# Patient Record
Sex: MALE | Race: WHITE | NOT HISPANIC OR LATINO | URBAN - METROPOLITAN AREA
[De-identification: names, ages, dates, MRNs, and addresses within clinical notes are randomized per-mention and may not be internally consistent; named-entity substitution may affect disease eponyms.]

---

## 2017-03-07 ENCOUNTER — OUTPATIENT (OUTPATIENT)
Dept: OUTPATIENT SERVICES | Facility: HOSPITAL | Age: 77
LOS: 1 days | Discharge: HOME | End: 2017-03-07

## 2017-06-27 DIAGNOSIS — C83.58 LYMPHOBLASTIC (DIFFUSE) LYMPHOMA, LYMPH NODES OF MULTIPLE SITES: ICD-10-CM

## 2017-10-13 ENCOUNTER — OUTPATIENT (OUTPATIENT)
Dept: OUTPATIENT SERVICES | Facility: HOSPITAL | Age: 77
LOS: 1 days | Discharge: HOME | End: 2017-10-13

## 2017-10-13 DIAGNOSIS — N40.0 BENIGN PROSTATIC HYPERPLASIA WITHOUT LOWER URINARY TRACT SYMPTOMS: ICD-10-CM

## 2017-10-13 DIAGNOSIS — F93.0 SEPARATION ANXIETY DISORDER OF CHILDHOOD: ICD-10-CM

## 2017-10-13 DIAGNOSIS — C83.58 LYMPHOBLASTIC (DIFFUSE) LYMPHOMA, LYMPH NODES OF MULTIPLE SITES: ICD-10-CM

## 2017-10-13 DIAGNOSIS — K21.9 GASTRO-ESOPHAGEAL REFLUX DISEASE WITHOUT ESOPHAGITIS: ICD-10-CM

## 2017-10-13 DIAGNOSIS — I95.9 HYPOTENSION, UNSPECIFIED: ICD-10-CM

## 2017-10-13 DIAGNOSIS — F41.9 ANXIETY DISORDER, UNSPECIFIED: ICD-10-CM

## 2017-10-13 DIAGNOSIS — E11.9 TYPE 2 DIABETES MELLITUS WITHOUT COMPLICATIONS: ICD-10-CM

## 2017-10-13 DIAGNOSIS — T67.5XXA HEAT EXHAUSTION, UNSPECIFIED, INITIAL ENCOUNTER: ICD-10-CM

## 2018-05-10 PROBLEM — Z00.00 ENCOUNTER FOR PREVENTIVE HEALTH EXAMINATION: Status: ACTIVE | Noted: 2018-05-10

## 2018-06-11 ENCOUNTER — APPOINTMENT (OUTPATIENT)
Dept: UROLOGY | Facility: CLINIC | Age: 78
End: 2018-06-11
Payer: MEDICARE

## 2018-06-11 VITALS
DIASTOLIC BLOOD PRESSURE: 82 MMHG | WEIGHT: 227 LBS | SYSTOLIC BLOOD PRESSURE: 112 MMHG | HEIGHT: 65 IN | BODY MASS INDEX: 37.82 KG/M2 | HEART RATE: 85 BPM

## 2018-06-11 DIAGNOSIS — F17.210 NICOTINE DEPENDENCE, CIGARETTES, UNCOMPLICATED: ICD-10-CM

## 2018-06-11 PROCEDURE — 99203 OFFICE O/P NEW LOW 30 MIN: CPT

## 2018-06-12 PROBLEM — F17.210 SMOKES CIGARETTES: Status: ACTIVE | Noted: 2018-06-11

## 2018-06-12 RX ORDER — UBIDECARENONE/VIT E ACET 100MG-5
25 MCG CAPSULE ORAL
Refills: 0 | Status: ACTIVE | COMMUNITY

## 2018-06-12 RX ORDER — VENLAFAXINE HYDROCHLORIDE 150 MG/1
150 CAPSULE, EXTENDED RELEASE ORAL
Refills: 0 | Status: ACTIVE | COMMUNITY

## 2018-06-12 RX ORDER — IRBESARTAN 300 MG/1
300 TABLET ORAL
Refills: 0 | Status: ACTIVE | COMMUNITY

## 2018-06-12 RX ORDER — METFORMIN HYDROCHLORIDE 500 MG/1
500 TABLET, COATED ORAL
Refills: 0 | Status: ACTIVE | COMMUNITY

## 2018-06-12 RX ORDER — FOLIC ACID 1 MG/1
1 TABLET ORAL
Refills: 0 | Status: ACTIVE | COMMUNITY

## 2018-06-12 RX ORDER — CLONAZEPAM 0.5 MG/1
0.5 TABLET ORAL
Refills: 0 | Status: ACTIVE | COMMUNITY

## 2018-06-12 RX ORDER — TAMSULOSIN HYDROCHLORIDE 0.4 MG/1
0.4 CAPSULE ORAL
Refills: 0 | Status: ACTIVE | COMMUNITY

## 2018-06-12 RX ORDER — LATANOPROST/PF 0.005 %
0.01 DROPS OPHTHALMIC (EYE)
Refills: 0 | Status: ACTIVE | COMMUNITY

## 2018-06-12 RX ORDER — RAMIPRIL 10 MG/1
10 CAPSULE ORAL
Refills: 0 | Status: ACTIVE | COMMUNITY

## 2018-06-12 RX ORDER — VENLAFAXINE HYDROCHLORIDE 75 MG/1
75 TABLET, EXTENDED RELEASE ORAL
Refills: 0 | Status: ACTIVE | COMMUNITY

## 2018-06-12 RX ORDER — TRAZODONE HYDROCHLORIDE 100 MG/1
100 TABLET ORAL
Refills: 0 | Status: ACTIVE | COMMUNITY

## 2018-06-12 RX ORDER — HYDROCHLOROTHIAZIDE 25 MG/1
25 TABLET ORAL
Refills: 0 | Status: ACTIVE | COMMUNITY

## 2018-06-12 RX ORDER — SIMVASTATIN 20 MG/1
20 TABLET, FILM COATED ORAL
Refills: 0 | Status: ACTIVE | COMMUNITY

## 2018-06-12 RX ORDER — ACETAMINOPHEN/DIPHENHYDRAMINE 500MG-25MG
1000 TABLET ORAL
Refills: 0 | Status: ACTIVE | COMMUNITY

## 2018-06-12 RX ORDER — VARENICLINE TARTRATE 0.5 (11)-1
0.5 MG X 11 & KIT ORAL
Refills: 0 | Status: COMPLETED | COMMUNITY

## 2018-06-12 NOTE — REASON FOR VISIT
[Initial Visit ___] : [unfilled] is here today for an initial visit  for [unfilled] [Family Member] : family member

## 2018-06-14 ENCOUNTER — OTHER (OUTPATIENT)
Age: 78
End: 2018-06-14

## 2018-06-25 ENCOUNTER — LABORATORY RESULT (OUTPATIENT)
Age: 78
End: 2018-06-25

## 2018-06-25 ENCOUNTER — OUTPATIENT (OUTPATIENT)
Dept: OUTPATIENT SERVICES | Facility: HOSPITAL | Age: 78
LOS: 1 days | Discharge: HOME | End: 2018-06-25

## 2018-06-25 DIAGNOSIS — E29.1 TESTICULAR HYPOFUNCTION: ICD-10-CM

## 2018-08-13 ENCOUNTER — APPOINTMENT (OUTPATIENT)
Dept: UROLOGY | Facility: CLINIC | Age: 78
End: 2018-08-13
Payer: MEDICARE

## 2018-08-13 VITALS
BODY MASS INDEX: 37.82 KG/M2 | WEIGHT: 227 LBS | DIASTOLIC BLOOD PRESSURE: 71 MMHG | HEIGHT: 65 IN | HEART RATE: 74 BPM | SYSTOLIC BLOOD PRESSURE: 108 MMHG

## 2018-08-13 LAB
BILIRUB UR QL STRIP: NORMAL
CLARITY UR: CLEAR
COLLECTION METHOD: NORMAL
GLUCOSE UR-MCNC: NORMAL
HCG UR QL: NORMAL EU/DL
HGB UR QL STRIP.AUTO: NORMAL
KETONES UR-MCNC: NORMAL
LEUKOCYTE ESTERASE UR QL STRIP: NORMAL
NITRITE UR QL STRIP: NORMAL
PH UR STRIP: 5
PROT UR STRIP-MCNC: NORMAL
SP GR UR STRIP: 1.01

## 2018-08-13 PROCEDURE — 51741 ELECTRO-UROFLOWMETRY FIRST: CPT

## 2018-08-13 PROCEDURE — 81003 URINALYSIS AUTO W/O SCOPE: CPT | Mod: QW

## 2018-08-13 PROCEDURE — 99214 OFFICE O/P EST MOD 30 MIN: CPT | Mod: 25

## 2018-08-13 PROCEDURE — 51798 US URINE CAPACITY MEASURE: CPT

## 2018-08-15 NOTE — ASSESSMENT
[FreeTextEntry1] : We discussed his history of microscopic hematuria. His most recent urine testing was negative for RBCs and CT urogram was negative for malignancy. His cytology did reveal atypical cells. The atypia / microhematuria may be secondary to his 6 mm stone.  At this time all options were reviewed and he elects to undergo serial cytologies. He will obtain one in 2 months from now.\par We discussed his stone and all the options available including ESWL etc... At this time he is electing for observation only. He understands the risks involved with this decision and that this stone could fall into the ureter at any moment and cause a surgical emergency.\par With regards to his hormone panel, his levels are good and we will continue to observe.\par We discussed his voiding symptoms and he is unhappy with the way he is voiding currently. He could not tolerate Tamsulosin and his uroflow from today demonstrates poor stream. His prostate is not terribly enlarged as per my physical exam and CT scan. We discussed minimally invasive prostate procedure but secondary to his comorbidities, he may not be a candidate. We did discus Urolift as an alternative option and he is interested. He will obtain a UDT to assess if he is a candidate and if so he will follow up with Dr. Alford for consultation.\par

## 2018-08-15 NOTE — LETTER HEADER
[FreeTextEntry3] : Henrry Byrd M.D.\par Director of Urology\par [facility]\par [address] [place] [address], [address]\[place] [address]

## 2018-08-15 NOTE — HISTORY OF PRESENT ILLNESS
[Urinary Frequency] : urinary frequency [Nocturia] : nocturia [Weak Stream] : weak stream [Urinary Urgency] : urinary urgency [Intermittency] : intermittency [FreeTextEntry1] : Zach is a 78 year old male, with a history of CLL and NHL, we have been following for microscopic hematuria and BPH with LUTS. Additionally, he had gynecomastia on physical examination and a hormone panel was ordered. He presents today for results of his CT urogram, uroflow, and blood work. He was managed on tamsulosin po qd however, he discontinued this medication 1 weeks ago, secondary to lightheadedness/dizziness. [Straining] : no straining [Dysuria] : no dysuria [Hematuria - Gross] : no gross hematuria

## 2018-08-15 NOTE — LETTER BODY
[Dear  ___] : Dear  [unfilled], [Please see my note below.] : Please see my note below. [Sincerely,] : Sincerely, [FreeTextEntry2] : Duncan Li MD\par [address]\[place] [address]\par

## 2018-08-15 NOTE — HISTORY OF PRESENT ILLNESS
[Urinary Urgency] : urinary urgency [Urinary Frequency] : urinary frequency [Nocturia] : nocturia [Weak Stream] : weak stream [Intermittency] : intermittency [FreeTextEntry1] : Zach is a relatively pleasant 78-year-old male who was found on routine testing to have microhematuria. He was therefore sent here for an evaluation. He is a  for some time, lives with his 33-year-old son and tells me as a totality his situation least medically hasn’t changed in the last year or two. He suffers from morbid obesity, he has a history of CLL as well as non-Hodgkin’s lymphoma. Urologically he has the microhematuria, and lower urinary tract symptoms with \par Intermittency – two \par Frequency – one\par Slow stream – one\par Urgency – two \par Incomplete emptying is – one \par A slow stream – one \par He does not strain\par Waking up at least twice per night and his quality of life is equal to a three. \par He waits until he gets a strong urge to void before he goes in with his decreased mobility that may be part of the problem.\par  [Straining] : no straining [Dysuria] : no dysuria [Hematuria - Gross] : no gross hematuria

## 2018-08-15 NOTE — LETTER BODY
[Dear  ___] : Dear  [unfilled], [Consult Letter:] : I had the pleasure of evaluating your patient, [unfilled]. [Consult Closing:] : Thank you very much for allowing me to participate in the care of this patient.  If you have any questions, please do not hesitate to contact me. [Sincerely,] : Sincerely, [FreeTextEntry2] : Duncan Li MD\par [address]\[place] [address]\par

## 2018-08-15 NOTE — PHYSICAL EXAM
[General Appearance - Well Developed] : well developed [Normal Appearance] : normal appearance [General Appearance - In No Acute Distress] : no acute distress [Bowel Sounds] : normal bowel sounds [Abdomen Soft] : soft [Abdomen Tenderness] : non-tender [Costovertebral Angle Tenderness] : no ~M costovertebral angle tenderness [Urinary Bladder Findings] : the bladder was normal on palpation [Edema] : no peripheral edema [] : no respiratory distress [Respiration, Rhythm And Depth] : normal respiratory rhythm and effort [Exaggerated Use Of Accessory Muscles For Inspiration] : no accessory muscle use [Auscultation Breath Sounds / Voice Sounds] : lungs were clear to auscultation bilaterally [Oriented To Time, Place, And Person] : oriented to person, place, and time [Affect] : the affect was normal [Mood] : the mood was normal [Not Anxious] : not anxious [Normal Station and Gait] : the gait and station were normal for the patient's age [No Focal Deficits] : no focal deficits [FreeTextEntry1] : B/L PVD

## 2018-08-15 NOTE — PHYSICAL EXAM
[General Appearance - Well Developed] : well developed [General Appearance - Well Nourished] : well nourished [Normal Appearance] : normal appearance [Well Groomed] : well groomed [General Appearance - In No Acute Distress] : no acute distress [] : no respiratory distress [Respiration, Rhythm And Depth] : normal respiratory rhythm and effort [Exaggerated Use Of Accessory Muscles For Inspiration] : no accessory muscle use [Auscultation Breath Sounds / Voice Sounds] : lungs were clear to auscultation bilaterally [Abdomen Soft] : soft [Abdomen Tenderness] : non-tender [Costovertebral Angle Tenderness] : no ~M costovertebral angle tenderness [Prostate Size ___ gm] : prostate size [unfilled] gm [Oriented To Time, Place, And Person] : oriented to person, place, and time [Affect] : the affect was normal [Mood] : the mood was normal [Not Anxious] : not anxious [FreeTextEntry1] : slow gait

## 2018-08-15 NOTE — ASSESSMENT
[FreeTextEntry1] : His examination showed a relatively small prostate perhaps 10 – 15 g without nodules. He had poor rectal tone and I could not elicit the BC reflex. He had mild edema the lower extremities with weak but present deep tendon reflexes. I thought he had a small right inguinal hernia I could not feel one on the left and he has a relatively large umbilical hernia which I could not fully reduce. He is morbidly obese with gynecomastia and testicles are smaller and softer than expected.\par \par He smokes about a pack a day has done so for as long as he can remember if not longer he doesn’t drink and he doesn’t do drugs.\par \par With respect to his general urologic issues some of this may just be his waiting to urinate until it is passed the point that he has to. His daytime urgency and frequency might be controlled if he goes before he gets the urge i.e. time voiding. His nighttime voiding may be related to mobilization of peripheral edema but the major concern is the hematuria and a man who has a strong smoking history and now has urgency.\par \par He will repeat the urine test today getting urinalysis, culture and cytology. He will send him for a CT urogram as he has a normal GFR. He will keep a record of his intake and output so I will see if any of this can be subject to behavior modification and when he comes back in we will consider a flow study. With respect to his hernias he will discuss this with Dr. Li and they will decide if he needs to see a general surgeon. With respect to his gynecomastia and his obesity he will need to get hormone values checked as if he is indeed hypogonadal secondary to hyper conversion it may help to get him to a normal testosterone level is that will help his metabolic syndrome. Whether we need to do that with testosterone or with aromatase inhibitors etc. remains to be seen. If he is indeed hypogonadal then a DEXA scan will be recommended.\par \par

## 2018-10-22 ENCOUNTER — APPOINTMENT (OUTPATIENT)
Dept: UROLOGY | Facility: CLINIC | Age: 78
End: 2018-10-22

## 2018-10-31 ENCOUNTER — EMERGENCY (EMERGENCY)
Facility: HOSPITAL | Age: 78
LOS: 1 days | Discharge: HOME | End: 2018-10-31
Attending: EMERGENCY MEDICINE

## 2018-10-31 VITALS
SYSTOLIC BLOOD PRESSURE: 104 MMHG | DIASTOLIC BLOOD PRESSURE: 61 MMHG | HEART RATE: 99 BPM | RESPIRATION RATE: 18 BRPM | OXYGEN SATURATION: 98 %

## 2018-10-31 VITALS
SYSTOLIC BLOOD PRESSURE: 118 MMHG | RESPIRATION RATE: 20 BRPM | HEART RATE: 114 BPM | OXYGEN SATURATION: 94 % | TEMPERATURE: 98 F | DIASTOLIC BLOOD PRESSURE: 60 MMHG
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
## 2018-10-31 DIAGNOSIS — S80.212A ABRASION, LEFT KNEE, INITIAL ENCOUNTER: ICD-10-CM

## 2018-10-31 DIAGNOSIS — S00.81XA ABRASION OF OTHER PART OF HEAD, INITIAL ENCOUNTER: ICD-10-CM

## 2018-10-31 DIAGNOSIS — S09.90XA UNSPECIFIED INJURY OF HEAD, INITIAL ENCOUNTER: ICD-10-CM
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
## 2018-10-31 DIAGNOSIS — Z23 ENCOUNTER FOR IMMUNIZATION: ICD-10-CM

## 2018-10-31 DIAGNOSIS — S80.211A ABRASION, RIGHT KNEE, INITIAL ENCOUNTER: ICD-10-CM

## 2018-10-31 DIAGNOSIS — E11.9 TYPE 2 DIABETES MELLITUS WITHOUT COMPLICATIONS: ICD-10-CM
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
## 2018-10-31 RX ORDER — TETANUS TOXOID, REDUCED DIPHTHERIA TOXOID AND ACELLULAR PERTUSSIS VACCINE, ADSORBED 5; 2.5; 8; 8; 2.5 [IU]/.5ML; [IU]/.5ML; UG/.5ML; UG/.5ML; UG/.5ML
0.5 SUSPENSION INTRAMUSCULAR ONCE
Qty: 0 | Refills: 0 | Status: COMPLETED | OUTPATIENT
Start: 2018-10-31 | End: 2018-10-31

## 2018-10-31 RX ADMIN — TETANUS TOXOID, REDUCED DIPHTHERIA TOXOID AND ACELLULAR PERTUSSIS VACCINE, ADSORBED 0.5 MILLILITER(S): 5; 2.5; 8; 8; 2.5 SUSPENSION INTRAMUSCULAR at 19:02

## 2018-10-31 NOTE — ED PROVIDER NOTE - PHYSICAL EXAMINATION
CONSTITUTIONAL: Well-developed; well-nourished; in no acute distress, nontoxic appearing  SKIN: abrasions to left forehead  HEAD: Normocephalic; atraumatic.  EYES: PERRL, 3 mm bilateral, no nystagmus, EOM intact; conjunctiva and sclera clear.  ENT: MMM, no nasal congestion oropharynx with only 3 present teeth but no new missing teeth or damaged mouth  NECK: Supple; non tender.  ROM intact.  CARD: S1, S2 normal, no murmur  RESP: No wheezes, rales or rhonchi. Good air movement bilaterally  ABD: soft; non-distended; non-tender. No Rebound, No guarding  EXT: Normal ROM. No cyanosis or edema. Dp Pulses intact.   NEURO: awake, alert, following commands, oriented, grossly unremarkable. No Focal deficits. GCS 15.   PSYCH: Cooperative, appropriate.

## 2018-10-31 NOTE — ED ADULT TRIAGE NOTE - CHIEF COMPLAINT QUOTE
patient found walking on side of road. states he tripped and fell into bush. no loc picked up by ems. states patient was AMS on scene patient awake alert in triage oriented. denies pain . mucous membranes dry patient unkempt .

## 2018-10-31 NOTE — ED PROVIDER NOTE - MEDICAL DECISION MAKING DETAILS
head injury with skin abrasions, knee abrasions, ct head showed no acute intracranial injury xrays of knee showed no new fx, tdap was given.

## 2018-10-31 NOTE — ED PROVIDER NOTE - NS ED ROS FT
Constitutional:  no fevers, no chills, no malaise  Eyes:  No visual changes  ENMT: No neck pain or stiffness, no nasal congestion, no ear pain, no throat pain  Cardiac:  No chest pain  Respiratory:  No cough or sob  GI:  No nausea, vomiting, diarrhea or abdominal pain.  :  No dysuria, frequency or burning.  MS:  No back pain, no joint pain.  Neuro:  No headache, no dizziness, no change in mental status  Skin:  +skin abrasion   Except as documented in the HPI,  all other systems are negative

## 2018-10-31 NOTE — ED ADULT NURSE NOTE - OBJECTIVE STATEMENT
Patient present to ED with complains of a fall sustained outside, and fell onto left side of face, denies LOC, not on blood thinner. No other injuries reported by patient.

## 2018-10-31 NOTE — ED PROVIDER NOTE - OBJECTIVE STATEMENT
77 yo male, baseline ambulatory with cane tripped and fall with abrasion to left forehead, he then got on bus and while getting of bus walked off stepped in some weeds and fell again to his knees, no loc, no vomiting no blood thinners.

## 2019-01-16 ENCOUNTER — INPATIENT (INPATIENT)
Facility: HOSPITAL | Age: 79
LOS: 1 days | Discharge: HOME | End: 2019-01-18
Attending: HOSPITALIST | Admitting: HOSPITALIST

## 2019-01-16 VITALS — HEIGHT: 66 IN | WEIGHT: 225.09 LBS

## 2019-01-16 DIAGNOSIS — Z96.653 PRESENCE OF ARTIFICIAL KNEE JOINT, BILATERAL: Chronic | ICD-10-CM

## 2019-01-16 PROBLEM — E11.9 TYPE 2 DIABETES MELLITUS WITHOUT COMPLICATIONS: Chronic | Status: ACTIVE | Noted: 2018-10-31

## 2019-01-16 PROBLEM — F41.9 ANXIETY DISORDER, UNSPECIFIED: Chronic | Status: ACTIVE | Noted: 2018-10-31

## 2019-01-16 LAB
ALBUMIN SERPL ELPH-MCNC: 3.8 G/DL — SIGNIFICANT CHANGE UP (ref 3.5–5.2)
ALP SERPL-CCNC: 87 U/L — SIGNIFICANT CHANGE UP (ref 30–115)
ALT FLD-CCNC: 10 U/L — SIGNIFICANT CHANGE UP (ref 0–41)
ANION GAP SERPL CALC-SCNC: 15 MMOL/L — HIGH (ref 7–14)
AST SERPL-CCNC: 17 U/L — SIGNIFICANT CHANGE UP (ref 0–41)
BASE EXCESS BLDV CALC-SCNC: 5.5 MMOL/L — HIGH (ref -2–2)
BASOPHILS # BLD AUTO: 0.02 K/UL — SIGNIFICANT CHANGE UP (ref 0–0.2)
BASOPHILS NFR BLD AUTO: 0.4 % — SIGNIFICANT CHANGE UP (ref 0–1)
BILIRUB SERPL-MCNC: 0.3 MG/DL — SIGNIFICANT CHANGE UP (ref 0.2–1.2)
BUN SERPL-MCNC: 14 MG/DL — SIGNIFICANT CHANGE UP (ref 10–20)
CA-I SERPL-SCNC: 1.26 MMOL/L — SIGNIFICANT CHANGE UP (ref 1.12–1.3)
CALCIUM SERPL-MCNC: 9.4 MG/DL — SIGNIFICANT CHANGE UP (ref 8.5–10.1)
CHLORIDE SERPL-SCNC: 94 MMOL/L — LOW (ref 98–110)
CO2 SERPL-SCNC: 28 MMOL/L — SIGNIFICANT CHANGE UP (ref 17–32)
CREAT SERPL-MCNC: 0.8 MG/DL — SIGNIFICANT CHANGE UP (ref 0.7–1.5)
EOSINOPHIL # BLD AUTO: 0.22 K/UL — SIGNIFICANT CHANGE UP (ref 0–0.7)
EOSINOPHIL NFR BLD AUTO: 4.5 % — SIGNIFICANT CHANGE UP (ref 0–8)
GAS PNL BLDV: 136 MMOL/L — SIGNIFICANT CHANGE UP (ref 136–145)
GAS PNL BLDV: SIGNIFICANT CHANGE UP
GLUCOSE BLDC GLUCOMTR-MCNC: 87 MG/DL — SIGNIFICANT CHANGE UP (ref 70–99)
GLUCOSE BLDC GLUCOMTR-MCNC: 97 MG/DL — SIGNIFICANT CHANGE UP (ref 70–99)
GLUCOSE SERPL-MCNC: 95 MG/DL — SIGNIFICANT CHANGE UP (ref 70–99)
HCO3 BLDV-SCNC: 33 MMOL/L — HIGH (ref 22–29)
HCT VFR BLD CALC: 40.8 % — LOW (ref 42–52)
HCT VFR BLDA CALC: 42.4 % — SIGNIFICANT CHANGE UP (ref 34–44)
HGB BLD CALC-MCNC: 13.8 G/DL — LOW (ref 14–18)
HGB BLD-MCNC: 13.3 G/DL — LOW (ref 14–18)
IMM GRANULOCYTES NFR BLD AUTO: 2 % — HIGH (ref 0.1–0.3)
LACTATE BLDV-MCNC: 1.9 MMOL/L — HIGH (ref 0.5–1.6)
LYMPHOCYTES # BLD AUTO: 0.62 K/UL — LOW (ref 1.2–3.4)
LYMPHOCYTES # BLD AUTO: 12.6 % — LOW (ref 20.5–51.1)
MAGNESIUM SERPL-MCNC: 1.7 MG/DL — LOW (ref 1.8–2.4)
MCHC RBC-ENTMCNC: 27.1 PG — SIGNIFICANT CHANGE UP (ref 27–31)
MCHC RBC-ENTMCNC: 32.6 G/DL — SIGNIFICANT CHANGE UP (ref 32–37)
MCV RBC AUTO: 83.3 FL — SIGNIFICANT CHANGE UP (ref 80–94)
MONOCYTES # BLD AUTO: 0.54 K/UL — SIGNIFICANT CHANGE UP (ref 0.1–0.6)
MONOCYTES NFR BLD AUTO: 10.9 % — HIGH (ref 1.7–9.3)
NEUTROPHILS # BLD AUTO: 3.44 K/UL — SIGNIFICANT CHANGE UP (ref 1.4–6.5)
NEUTROPHILS NFR BLD AUTO: 69.6 % — SIGNIFICANT CHANGE UP (ref 42.2–75.2)
NRBC # BLD: 0 /100 WBCS — SIGNIFICANT CHANGE UP (ref 0–0)
PCO2 BLDV: 57 MMHG — HIGH (ref 41–51)
PH BLDV: 7.36 — SIGNIFICANT CHANGE UP (ref 7.26–7.43)
PHOSPHATE SERPL-MCNC: 3.3 MG/DL — SIGNIFICANT CHANGE UP (ref 2.1–4.9)
PLATELET # BLD AUTO: 137 K/UL — SIGNIFICANT CHANGE UP (ref 130–400)
PO2 BLDV: 31 MMHG — SIGNIFICANT CHANGE UP (ref 20–40)
POTASSIUM BLDV-SCNC: 3.8 MMOL/L — SIGNIFICANT CHANGE UP (ref 3.3–5.6)
POTASSIUM SERPL-MCNC: 4.8 MMOL/L — SIGNIFICANT CHANGE UP (ref 3.5–5)
POTASSIUM SERPL-SCNC: 4.8 MMOL/L — SIGNIFICANT CHANGE UP (ref 3.5–5)
PROT SERPL-MCNC: 6.2 G/DL — SIGNIFICANT CHANGE UP (ref 6–8)
RBC # BLD: 4.9 M/UL — SIGNIFICANT CHANGE UP (ref 4.7–6.1)
RBC # FLD: 14.6 % — HIGH (ref 11.5–14.5)
SAO2 % BLDV: 49 % — SIGNIFICANT CHANGE UP
SODIUM SERPL-SCNC: 137 MMOL/L — SIGNIFICANT CHANGE UP (ref 135–146)
TROPONIN T SERPL-MCNC: <0.01 NG/ML — SIGNIFICANT CHANGE UP
WBC # BLD: 4.94 K/UL — SIGNIFICANT CHANGE UP (ref 4.8–10.8)
WBC # FLD AUTO: 4.94 K/UL — SIGNIFICANT CHANGE UP (ref 4.8–10.8)

## 2019-01-16 RX ORDER — PREGABALIN 225 MG/1
500 CAPSULE ORAL DAILY
Qty: 0 | Refills: 0 | Status: DISCONTINUED | OUTPATIENT
Start: 2019-01-16 | End: 2019-01-18

## 2019-01-16 RX ORDER — ACETAMINOPHEN 500 MG
975 TABLET ORAL ONCE
Qty: 0 | Refills: 0 | Status: COMPLETED | OUTPATIENT
Start: 2019-01-16 | End: 2019-01-16

## 2019-01-16 RX ORDER — SODIUM CHLORIDE 9 MG/ML
1000 INJECTION INTRAMUSCULAR; INTRAVENOUS; SUBCUTANEOUS ONCE
Qty: 0 | Refills: 0 | Status: COMPLETED | OUTPATIENT
Start: 2019-01-16 | End: 2019-01-16

## 2019-01-16 RX ORDER — HYDROCHLOROTHIAZIDE 25 MG
25 TABLET ORAL DAILY
Qty: 0 | Refills: 0 | Status: DISCONTINUED | OUTPATIENT
Start: 2019-01-16 | End: 2019-01-18

## 2019-01-16 RX ORDER — AMPICILLIN SODIUM AND SULBACTAM SODIUM 250; 125 MG/ML; MG/ML
1.5 INJECTION, POWDER, FOR SUSPENSION INTRAMUSCULAR; INTRAVENOUS EVERY 6 HOURS
Qty: 0 | Refills: 0 | Status: DISCONTINUED | OUTPATIENT
Start: 2019-01-16 | End: 2019-01-18

## 2019-01-16 RX ORDER — ASPIRIN/CALCIUM CARB/MAGNESIUM 324 MG
81 TABLET ORAL DAILY
Qty: 0 | Refills: 0 | Status: DISCONTINUED | OUTPATIENT
Start: 2019-01-16 | End: 2019-01-18

## 2019-01-16 RX ORDER — INFLUENZA VIRUS VACCINE 15; 15; 15; 15 UG/.5ML; UG/.5ML; UG/.5ML; UG/.5ML
0.5 SUSPENSION INTRAMUSCULAR ONCE
Qty: 0 | Refills: 0 | Status: DISCONTINUED | OUTPATIENT
Start: 2019-01-16 | End: 2019-01-18

## 2019-01-16 RX ORDER — MORPHINE SULFATE 50 MG/1
4 CAPSULE, EXTENDED RELEASE ORAL EVERY 4 HOURS
Qty: 0 | Refills: 0 | Status: DISCONTINUED | OUTPATIENT
Start: 2019-01-16 | End: 2019-01-17

## 2019-01-16 RX ORDER — LOSARTAN POTASSIUM 100 MG/1
100 TABLET, FILM COATED ORAL DAILY
Qty: 0 | Refills: 0 | Status: DISCONTINUED | OUTPATIENT
Start: 2019-01-16 | End: 2019-01-18

## 2019-01-16 RX ORDER — CHOLECALCIFEROL (VITAMIN D3) 125 MCG
1000 CAPSULE ORAL DAILY
Qty: 0 | Refills: 0 | Status: DISCONTINUED | OUTPATIENT
Start: 2019-01-16 | End: 2019-01-18

## 2019-01-16 RX ORDER — OXYCODONE AND ACETAMINOPHEN 5; 325 MG/1; MG/1
1 TABLET ORAL EVERY 6 HOURS
Qty: 0 | Refills: 0 | Status: DISCONTINUED | OUTPATIENT
Start: 2019-01-16 | End: 2019-01-17

## 2019-01-16 RX ORDER — FOLIC ACID 0.8 MG
1 TABLET ORAL DAILY
Qty: 0 | Refills: 0 | Status: DISCONTINUED | OUTPATIENT
Start: 2019-01-16 | End: 2019-01-18

## 2019-01-16 RX ORDER — TRAZODONE HCL 50 MG
100 TABLET ORAL DAILY
Qty: 0 | Refills: 0 | Status: DISCONTINUED | OUTPATIENT
Start: 2019-01-16 | End: 2019-01-18

## 2019-01-16 RX ORDER — ENOXAPARIN SODIUM 100 MG/ML
40 INJECTION SUBCUTANEOUS EVERY 24 HOURS
Qty: 0 | Refills: 0 | Status: DISCONTINUED | OUTPATIENT
Start: 2019-01-17 | End: 2019-01-18

## 2019-01-16 RX ORDER — SIMVASTATIN 20 MG/1
20 TABLET, FILM COATED ORAL AT BEDTIME
Qty: 0 | Refills: 0 | Status: DISCONTINUED | OUTPATIENT
Start: 2019-01-16 | End: 2019-01-18

## 2019-01-16 RX ORDER — CLONAZEPAM 1 MG
0.5 TABLET ORAL EVERY 6 HOURS
Qty: 0 | Refills: 0 | Status: DISCONTINUED | OUTPATIENT
Start: 2019-01-16 | End: 2019-01-18

## 2019-01-16 RX ORDER — VENLAFAXINE HCL 75 MG
75 CAPSULE, EXT RELEASE 24 HR ORAL DAILY
Qty: 0 | Refills: 0 | Status: DISCONTINUED | OUTPATIENT
Start: 2019-01-16 | End: 2019-01-17

## 2019-01-16 RX ORDER — AMPICILLIN SODIUM AND SULBACTAM SODIUM 250; 125 MG/ML; MG/ML
3 INJECTION, POWDER, FOR SUSPENSION INTRAMUSCULAR; INTRAVENOUS ONCE
Qty: 0 | Refills: 0 | Status: COMPLETED | OUTPATIENT
Start: 2019-01-16 | End: 2019-01-16

## 2019-01-16 RX ORDER — MORPHINE SULFATE 50 MG/1
4 CAPSULE, EXTENDED RELEASE ORAL ONCE
Qty: 0 | Refills: 0 | Status: DISCONTINUED | OUTPATIENT
Start: 2019-01-16 | End: 2019-01-16

## 2019-01-16 RX ORDER — VENLAFAXINE HCL 75 MG
150 CAPSULE, EXT RELEASE 24 HR ORAL DAILY
Qty: 0 | Refills: 0 | Status: DISCONTINUED | OUTPATIENT
Start: 2019-01-16 | End: 2019-01-17

## 2019-01-16 RX ADMIN — SIMVASTATIN 20 MILLIGRAM(S): 20 TABLET, FILM COATED ORAL at 23:52

## 2019-01-16 RX ADMIN — AMPICILLIN SODIUM AND SULBACTAM SODIUM 100 GRAM(S): 250; 125 INJECTION, POWDER, FOR SUSPENSION INTRAMUSCULAR; INTRAVENOUS at 23:53

## 2019-01-16 RX ADMIN — Medication 975 MILLIGRAM(S): at 10:50

## 2019-01-16 RX ADMIN — MORPHINE SULFATE 4 MILLIGRAM(S): 50 CAPSULE, EXTENDED RELEASE ORAL at 12:12

## 2019-01-16 RX ADMIN — AMPICILLIN SODIUM AND SULBACTAM SODIUM 200 GRAM(S): 250; 125 INJECTION, POWDER, FOR SUSPENSION INTRAMUSCULAR; INTRAVENOUS at 11:30

## 2019-01-16 RX ADMIN — OXYCODONE AND ACETAMINOPHEN 1 TABLET(S): 5; 325 TABLET ORAL at 23:54

## 2019-01-16 RX ADMIN — SODIUM CHLORIDE 1000 MILLILITER(S): 9 INJECTION INTRAMUSCULAR; INTRAVENOUS; SUBCUTANEOUS at 12:12

## 2019-01-16 NOTE — CONSULT NOTE ADULT - SUBJECTIVE AND OBJECTIVE BOX
78M with PMHx HTN, DM, CLL last chemo 2 months ago s/p fall last Friday when he slipped on a rug and fell hitting his face on a chair sustaining multiple tooth loss, ?LOC. He now presents to the ED complaining of worsening left facial pain and swelling. Denies any f/c/n/v, CP and SOB.      PMHx:as  above  PSHx: b/l knee replacement   All: NKDA     NAD, ambulates with cane   Left facial swelling, 3 puncture marks on left cheek draining purulence, no active bleeding, no trismus, mandible palpable, neck FROM, remaining tooth#30, edentulous everywhere else, 2x2cm intraoral opening in the buccal mucosa of left cheek draining purulence with tooth palpable inside defect. Tooth#22 extraction socket with debris.   Non-labored breathing on RA     WBC: 4.94  Plat: 137     Facial CT: Tooth embedded in the left cheek subcutaneous soft tissues with adjacent   foci of gas and small phlegmon/developing abscess lateral to the tooth.   Donor site of the tooth is likely the left mandible.  Head CT: Periventricular and subcortical white matter chronic small vessel   ischemic changes and old lacunar infarcts as described above.No acute fractures or mass effect, midline shift or intracranial   hemorrhage.      Procedure: R/B/A discussed with pt for removal of foreign body in left cheek, pt consented. 3 carpules of 2% lidocaine with 1:100,000 epinephrine used for local anesthesia. 2x2cm defect of left cheek with purulence expressed, culture/gram stain collected. Tooth#22 and wooden splinter removed from the left cheek defect, copious irrigation completed. Necrotic tissue removed with good viable bleeding tissue exposed. 3 extraoral puncture wounds of the face with intraoral communication irrigated. Both intraoral and extraoral wounds packed with iodoform gauze. Hemostasis achieved. Pt tolerated procedure well with no complications     A/P:  78M with PMHx HTN, DM, CLL last chemo 2 months ago s/p fall last Friday when he slipped on a rug and fell hitting his face on a chair sustaining multiple tooth loss with one tooth embedded in left cheek causing left cheek infection, now removed.     -Admit to medicine   -Start on Unasyn and Clindamycin  -f/u left cheek wound cultures   -Remove packings in the AM by OMFS   -pain meds prn   -Trend CBC   -Diet: mechanical soft   -Peridex mouth wash BID   -OMFS will follow pt   -Page OMFS/Dental with any further questions 78M with PMHx HTN, DM, CLL last chemo 2 months ago s/p fall last Friday when he slipped on a rug and fell hitting his face on a chair sustaining multiple tooth loss, ?LOC. He now presents to the ED complaining of worsening left facial pain and swelling. Denies any f/c/n/v, CP and SOB.      PMHx:as  above  PSHx: b/l knee replacement   All: NKDA     NAD, ambulates with cane   Left facial swelling, 3 puncture marks on left cheek draining purulence, no active bleeding, no trismus, mandible palpable, neck FROM, remaining tooth#30, edentulous everywhere else, 2x2cm intraoral opening in the buccal mucosa of left cheek draining purulence with tooth palpable inside defect. Tooth#22 extraction socket with debris.   Non-labored breathing on RA     WBC: 4.94  Plat: 137     Facial CT: Tooth embedded in the left cheek subcutaneous soft tissues with adjacent   foci of gas and small phlegmon/developing abscess lateral to the tooth.   Donor site of the tooth is likely the left mandible.  Head CT: Periventricular and subcortical white matter chronic small vessel   ischemic changes and old lacunar infarcts as described above.No acute fractures or mass effect, midline shift or intracranial   hemorrhage.      Procedure: R/B/A discussed with pt for removal of foreign body in left cheek, pt consented. 3 carpules of 2% lidocaine with 1:100,000 epinephrine used for local anesthesia. 2x2cm defect of left cheek with purulence expressed, culture/gram stain collected. Tooth#22 and wooden splinter removed from the left cheek defect, copious irrigation completed. Necrotic tissue removed with good viable bleeding tissue exposed. 3 extraoral puncture wounds of the face with intraoral communication irrigated. Both intraoral and extraoral wounds packed with iodoform gauze. Hemostasis achieved. Pt tolerated procedure well with no complications     A/P:  78M with PMHx HTN, DM, CLL last chemo 2 months ago s/p fall last Friday when he slipped on a rug and fell hitting his face on a chair sustaining multiple tooth loss with one tooth embedded in left cheek causing left cheek infection, now removed.     -Admit to medicine   -Start on Unasyn   -f/u left cheek wound cultures   -Remove packings in the AM by OMFS   -pain meds prn   -Trend CBC   -Diet: mechanical soft   -Peridex mouth wash BID   -OMFS will follow pt   -Page OMFS/Dental with any further questions

## 2019-01-16 NOTE — ED PROVIDER NOTE - PHYSICAL EXAMINATION
PHYSICAL EXAM:    Constitutional: awake, alert, NAD  Eyes: EOMI, no conj injection  HENT: NC AT, L lower/medial buccal surface w/ laceration w/ purulent material. fluctuance through cheek and superior w/ erythema/tenderness  Back: no c/t/l spine ttp  Respiratory: no respiratory distress, breath sounds equal b/l, no wheezing, rhonchi or stridor.   Cardiovascular: RRR nml S1S2  Gastrointestinal: soft, no masses, nontender, nondistended. No guarding or rebound.   Extremities: no peripheral edema  Neurological: AAOx3, CN II-XII grossly intact, no focal numbness or weakness  Skin: no rash  Musculoskeletal: no gross deformity

## 2019-01-16 NOTE — H&P ADULT - ATTENDING COMMENTS
patient seen and examined , agree with pgy 3 assesment and plan except as indicated above,   GEN Lying in no acute distress  HEENT Pupils equal and reactive to light and accommodationSupple Neck  PULM Clear to auscultation bilaterally  CV s1s2 regular rate and rhythm  GI + bowel sounds nontnender  EXT no cyanosis or edema  PSYCH awake alert and oriented x 3  INTEG No Lesions left cheek erythema  NEURO BANUELOS  #Tobacco abuse counseling - cousneled for 10 min regarding tobacco cessation, does not want to explore options for quitting at this time  #Nasal septal deviation no acute itnervention   #CKD 2 no acute intervention .

## 2019-01-16 NOTE — ED PROVIDER NOTE - OBJECTIVE STATEMENT
79 yo m hx htn, dm, CLL (on chemo q2 months w/ Dr. Shaikh) here for falls/head injury. per son, pt has been generally weak the past week and has had multiple falls. falls relating to pt tripping/falling. friday, pt suffered a fall and lost several teeth. Pt got a cut to L cheek which has since become infected w/ purulent drainage. no f/c/cough/cp/sob/ap/n/v/dysuria/hematuria/back pain.

## 2019-01-16 NOTE — ED ADULT NURSE NOTE - NSIMPLEMENTINTERV_GEN_ALL_ED
Implemented All Fall with Harm Risk Interventions:  [place] to call system. Call bell, personal items and telephone within reach. Instruct patient to call for assistance. Room bathroom lighting operational. Non-slip footwear when patient is off stretcher. Physically safe environment: no spills, clutter or unnecessary equipment. Stretcher in lowest position, wheels locked, appropriate side rails in place. Provide visual cue, wrist band, yellow gown, etc. Monitor gait and stability. Monitor for mental status changes and reorient to person, place, and time. Review medications for side effects contributing to fall risk. Reinforce activity limits and safety measures with patient and family. Provide visual clues: red socks.

## 2019-01-16 NOTE — H&P ADULT - NSHPLABSRESULTS_GEN_ALL_CORE
13.3   4.94  )-----------( 137      ( 16 Jan 2019 09:50 )             40.8       01-16    137  |  94<L>  |  14  ----------------------------<  95  4.8   |  28  |  0.8    Ca    9.4      16 Jan 2019 09:50  Phos  3.3     01-16  Mg     1.7     01-16    TPro  6.2  /  Alb  3.8  /  TBili  0.3  /  DBili  x   /  AST  17  /  ALT  10  /  AlkPhos  87  01-16        Lactate Trend      CARDIAC MARKERS ( 16 Jan 2019 09:50 )  x     / <0.01 ng/mL / x     / x     / x            CAPILLARY BLOOD GLUCOSE    < from: 12 Lead ECG (01.16.19 @ 10:32) >      Diagnosis Line Normal sinus rhythm  Left axis deviation  Abnormal ECG    < from: CT Maxillofacial w/ IV Cont (01.16.19 @ 11:45) >    Impression:    Tooth embedded in the left cheek subcutaneous soft tissues with adjacent   foci of gas and small phlegmon/developing abscess lateral to the tooth.   Donor site of the tooth is likely the left mandible.    < end of copied text >    < from: CT Cervical Spine No Cont (01.16.19 @ 11:44) >    Impression:    No evidence of acute cervical spine injury.      < from: CT Head No Cont (01.16.19 @ 11:38) >        IMPRESSION:     1.  Periventricular and subcortical white matter chronic small vessel   ischemic changes and old lacunar infarcts as described above.    2.  No acute fractures or mass effect, midline shift or intracranial   hemorrhage.      < end of copied text >    < from: Xray Chest 1 View-PORTABLE IMMEDIATE (01.16.19 @ 10:40) >      Impression:      No radiographic evidence of acute cardiopulmonary disease.    Without difference.    < end of copied text >

## 2019-01-16 NOTE — H&P ADULT - PMH
Anxiety    CLL (chronic lymphocytic leukemia)    Depression    DM (diabetes mellitus)    HTN (hypertension)

## 2019-01-16 NOTE — ED PROVIDER NOTE - PROGRESS NOTE DETAILS
oral surgery will perform procedure in dental clinic when attending arrives. pt will need admission to medicine for weakness, frequent falls after. d/w MAR who will coordinate with dental Patient to be admitted to an inpatient floor. Case discussed with and care endorsed to medical admitting resident. Admitting physician notified.  UA pending, pt to have procedure performed in dental clinic

## 2019-01-16 NOTE — H&P ADULT - ASSESSMENT
Patient is a 79 yo M PMH  HTN, DM, CLL (on chemo every 2 months w/ Dr. Shaikh, last chemotherapy 1 month PTP ) here for frequent falls and a tooth injury. As per son last Friday he walked up the front outside stair case and when he got to the top he lost control of his balance and fell forward hitting his face on an iron chair. Labs unremarkable. CT Max/fac revealed aTooth embedded in the left cheek subcutaneous soft tissues with adjacent foci of gas and small phlegmon/developing abscess lateral to the tooth.     1) Embedded Toot/ Abscess left cheek   -Admit to medicine   -started on Unasyn  -OMFS consulted, patient will be taken to clinic for additional imaging or procedure  -Pain control with morphine  -wound culture     2) CLL on chemotherapy   -Outpatient f/u with Oncology    -next due for chemo 2/12/19     3) HTN   -well controlled   -c/w home meds     4) DM   -FS AC QHS   -Start on basal bolus once patient is no longer NPO     5) DVT ppx  -start on Lovenox tomorrow after any anticipated procedure   -SCDs for today     6) Dispo: Home, requesting a walker

## 2019-01-16 NOTE — ED PROVIDER NOTE - CARE PLAN
Assessment and plan of treatment:	s/p fall and generalized weakness the past week  basic labs, r/o occult infection, r/o acs  L cheek abscess/erythema- ct maxfac w/ iv con, vbg, ctx, fluids, abx, OMFS consult  trauma- cth, ctcs, cxr Principal Discharge DX:	Weakness  Assessment and plan of treatment:	s/p fall and generalized weakness the past week  basic labs, r/o occult infection, r/o acs  L cheek abscess/erythema- ct maxfac w/ iv con, vbg, ctx, fluids, abx, OMFS consult  trauma- cth, ctcs, cxr  Secondary Diagnosis:	Falls frequently  Secondary Diagnosis:	Facial abscess

## 2019-01-16 NOTE — ED ADULT NURSE NOTE - OBJECTIVE STATEMENT
Pt presents to ED s/p fall on Friday. Pt states he was walking and tripped over his feet and landed face first onto a leg of a chair. Pt reports 3 teeth fell out during fall and pt has swelling and pain to left chin/inside of mouth with pus drainage. Pt denies LOC, pt denies anticoagulation use. Family endorses pt has been having frequent falls over the last couple weeks.

## 2019-01-16 NOTE — ED PROVIDER NOTE - MEDICAL DECISION MAKING DETAILS
pt w/ frequent falls- unclear etiology, weakness w/u negative for cause. pt found to have cheek abscess where tooth was embedded from previous fall. abx given, dental to evaluate in clinic and pt to be admitted for further w/u/pt

## 2019-01-16 NOTE — H&P ADULT - NSHPSOCIALHISTORY_GEN_ALL_CORE
Marital Status:  (   )    (   ) Single    (   )    (  )   Occupation:   Lives with: (  ) alone  (  x) children   (  ) spouse   (  ) parents  (  ) other  Recent Travel: neg     Substance Use (street drugs): (  x) never used  (  ) other:  Tobacco Usage:  (   ) never smoked   (   ) former smoker   (   x) current smoker  (  42   ) pack year  Alcohol Usage:neg   Sexual History: neg

## 2019-01-16 NOTE — H&P ADULT - NSHPPHYSICALEXAM_GEN_ALL_CORE
PHYSICAL EXAM:  GENERAL: NAD, speaks in full sentences, no signs of respiratory distress  HEAD:  Atraumatic, Normocephalic, 2cm wound to left chin w/ surrounding erythema and expressible pus   EYES: EOMI, PERRLA, conjunctiva and sclera clear  NECK: Supple, No JVD  CHEST/LUNG: Clear to auscultation bilaterally; No wheeze; No crackles; No accessory muscles used  HEART: Regular rate and rhythm; No murmurs;   ABDOMEN: Soft, Nontender, Nondistended; Bowel sounds present; No guarding  EXTREMITIES:  2+ Peripheral Pulses, No cyanosis or edema  PSYCH: AAOx3  NEUROLOGY: non-focal  SKIN: No rashes or lesions

## 2019-01-16 NOTE — ED PROVIDER NOTE - PLAN OF CARE
s/p fall and generalized weakness the past week  basic labs, r/o occult infection, r/o acs  L cheek abscess/erythema- ct maxfac w/ iv con, vbg, ctx, fluids, abx, OMFS consult  trauma- cth, ctcs, cxr

## 2019-01-16 NOTE — ED PROVIDER NOTE - NS ED ROS FT
Constitutional: no fever or rigors  Eyes: no eye redness, acute visual change  ENMT: no ear pain, no throat pain  Card: no chest pain, no palpitations  Pulm: no cough, no shortness of breath  GI: no abdominal pain, nausea or vomiting  : no dysuria or hematuria  MSK: no limitation in range of motion, no neck pain  Skin: no rash, no abrasion  Neuro: no numbness, pos weakness  Heme/Onc: no easy bruising, no bleeding tendency   Allergic: no hives, no throat swelling

## 2019-01-16 NOTE — H&P ADULT - HISTORY OF PRESENT ILLNESS
Patient is a 77 yo M PMH  HTN, DM, CLL (on chemo every 2 months w/ Dr. Shaikh, last chemotherapy 1 month PTP ) here for frequent falls and a tooth injury. As per son last Friday he walked up the front outside stair case and when he got to the top he lost control of his balance and fell forward hitting his face on an iron chair. He admitted to left sided chin/jaw pain described as throbbing type pain, 7/10 intensity with no radiation. He denied any associated symptoms. As per his son he has had over 5 falls within the last week. At baseline he uses a cane and has fallen on occasion but much more frequently as of late. He states that his knees feel unsteady and shake more now than before. He also gets the feeling that his knees sometimes buckle before he falls. He denies LOC, n/v/f/c, CP, SOB, palpitations, tongue biting, jerking of extremity, bowel/bladder incontinence, headache, neck pain, LE swelling, night sweats, weight loss.  In ED   ICU Vital Signs Last 24 Hrs  T(C): 36.4 (16 Jan 2019 10:31), Max: 36.4 (16 Jan 2019 10:31)  T(F): 97.6 (16 Jan 2019 10:31), Max: 97.6 (16 Jan 2019 10:31)  HR: 75 (16 Jan 2019 10:31) (75 - 75)  BP: 137/83 (16 Jan 2019 10:31) (137/83 - 137/83)  BP(mean): --  ABP: --  ABP(mean): --  RR: 16 (16 Jan 2019 10:31) (16 - 16)  SpO2: 98% (16 Jan 2019 10:31) (98% - 98%)

## 2019-01-17 LAB
ALBUMIN SERPL ELPH-MCNC: 4 G/DL — SIGNIFICANT CHANGE UP (ref 3.5–5.2)
ALP SERPL-CCNC: 94 U/L — SIGNIFICANT CHANGE UP (ref 30–115)
ALT FLD-CCNC: 9 U/L — SIGNIFICANT CHANGE UP (ref 0–41)
ANION GAP SERPL CALC-SCNC: 12 MMOL/L — SIGNIFICANT CHANGE UP (ref 7–14)
AST SERPL-CCNC: 11 U/L — SIGNIFICANT CHANGE UP (ref 0–41)
BASOPHILS # BLD AUTO: 0.03 K/UL — SIGNIFICANT CHANGE UP (ref 0–0.2)
BASOPHILS NFR BLD AUTO: 0.6 % — SIGNIFICANT CHANGE UP (ref 0–1)
BILIRUB SERPL-MCNC: 0.5 MG/DL — SIGNIFICANT CHANGE UP (ref 0.2–1.2)
BUN SERPL-MCNC: 11 MG/DL — SIGNIFICANT CHANGE UP (ref 10–20)
CALCIUM SERPL-MCNC: 9.6 MG/DL — SIGNIFICANT CHANGE UP (ref 8.5–10.1)
CHLORIDE SERPL-SCNC: 96 MMOL/L — LOW (ref 98–110)
CO2 SERPL-SCNC: 28 MMOL/L — SIGNIFICANT CHANGE UP (ref 17–32)
CREAT SERPL-MCNC: 0.8 MG/DL — SIGNIFICANT CHANGE UP (ref 0.7–1.5)
EOSINOPHIL # BLD AUTO: 0.1 K/UL — SIGNIFICANT CHANGE UP (ref 0–0.7)
EOSINOPHIL NFR BLD AUTO: 2 % — SIGNIFICANT CHANGE UP (ref 0–8)
GLUCOSE BLDC GLUCOMTR-MCNC: 113 MG/DL — HIGH (ref 70–99)
GLUCOSE BLDC GLUCOMTR-MCNC: 87 MG/DL — SIGNIFICANT CHANGE UP (ref 70–99)
GLUCOSE BLDC GLUCOMTR-MCNC: 96 MG/DL — SIGNIFICANT CHANGE UP (ref 70–99)
GLUCOSE BLDC GLUCOMTR-MCNC: 99 MG/DL — SIGNIFICANT CHANGE UP (ref 70–99)
GLUCOSE SERPL-MCNC: 88 MG/DL — SIGNIFICANT CHANGE UP (ref 70–99)
HCT VFR BLD CALC: 40.5 % — LOW (ref 42–52)
HGB BLD-MCNC: 12.9 G/DL — LOW (ref 14–18)
IMM GRANULOCYTES NFR BLD AUTO: 2.6 % — HIGH (ref 0.1–0.3)
LYMPHOCYTES # BLD AUTO: 0.57 K/UL — LOW (ref 1.2–3.4)
LYMPHOCYTES # BLD AUTO: 11.3 % — LOW (ref 20.5–51.1)
MAGNESIUM SERPL-MCNC: 1.7 MG/DL — LOW (ref 1.8–2.4)
MCHC RBC-ENTMCNC: 26.4 PG — LOW (ref 27–31)
MCHC RBC-ENTMCNC: 31.9 G/DL — LOW (ref 32–37)
MCV RBC AUTO: 83 FL — SIGNIFICANT CHANGE UP (ref 80–94)
MONOCYTES # BLD AUTO: 0.58 K/UL — SIGNIFICANT CHANGE UP (ref 0.1–0.6)
MONOCYTES NFR BLD AUTO: 11.5 % — HIGH (ref 1.7–9.3)
NEUTROPHILS # BLD AUTO: 3.63 K/UL — SIGNIFICANT CHANGE UP (ref 1.4–6.5)
NEUTROPHILS NFR BLD AUTO: 72 % — SIGNIFICANT CHANGE UP (ref 42.2–75.2)
PLATELET # BLD AUTO: 187 K/UL — SIGNIFICANT CHANGE UP (ref 130–400)
POTASSIUM SERPL-MCNC: 3.8 MMOL/L — SIGNIFICANT CHANGE UP (ref 3.5–5)
POTASSIUM SERPL-SCNC: 3.8 MMOL/L — SIGNIFICANT CHANGE UP (ref 3.5–5)
PROT SERPL-MCNC: 6.3 G/DL — SIGNIFICANT CHANGE UP (ref 6–8)
RBC # BLD: 4.88 M/UL — SIGNIFICANT CHANGE UP (ref 4.7–6.1)
RBC # FLD: 14.2 % — SIGNIFICANT CHANGE UP (ref 11.5–14.5)
SODIUM SERPL-SCNC: 136 MMOL/L — SIGNIFICANT CHANGE UP (ref 135–146)
WBC # BLD: 5.04 K/UL — SIGNIFICANT CHANGE UP (ref 4.8–10.8)
WBC # FLD AUTO: 5.04 K/UL — SIGNIFICANT CHANGE UP (ref 4.8–10.8)

## 2019-01-17 RX ORDER — MAGNESIUM SULFATE 500 MG/ML
2 VIAL (ML) INJECTION ONCE
Qty: 0 | Refills: 0 | Status: COMPLETED | OUTPATIENT
Start: 2019-01-17 | End: 2019-01-17

## 2019-01-17 RX ORDER — CHLORHEXIDINE GLUCONATE 213 G/1000ML
15 SOLUTION TOPICAL
Qty: 0 | Refills: 0 | Status: DISCONTINUED | OUTPATIENT
Start: 2019-01-17 | End: 2019-01-18

## 2019-01-17 RX ORDER — SODIUM CHLORIDE 9 MG/ML
1000 INJECTION INTRAMUSCULAR; INTRAVENOUS; SUBCUTANEOUS
Qty: 0 | Refills: 0 | Status: DISCONTINUED | OUTPATIENT
Start: 2019-01-17 | End: 2019-01-17

## 2019-01-17 RX ORDER — VENLAFAXINE HCL 75 MG
75 CAPSULE, EXT RELEASE 24 HR ORAL DAILY
Qty: 0 | Refills: 0 | Status: DISCONTINUED | OUTPATIENT
Start: 2019-01-17 | End: 2019-01-18

## 2019-01-17 RX ORDER — ACETAMINOPHEN 500 MG
650 TABLET ORAL EVERY 6 HOURS
Qty: 0 | Refills: 0 | Status: DISCONTINUED | OUTPATIENT
Start: 2019-01-17 | End: 2019-01-18

## 2019-01-17 RX ORDER — OXYCODONE HYDROCHLORIDE 5 MG/1
5 TABLET ORAL EVERY 6 HOURS
Qty: 0 | Refills: 0 | Status: DISCONTINUED | OUTPATIENT
Start: 2019-01-17 | End: 2019-01-18

## 2019-01-17 RX ADMIN — Medication 25 GRAM(S): at 09:51

## 2019-01-17 RX ADMIN — SIMVASTATIN 20 MILLIGRAM(S): 20 TABLET, FILM COATED ORAL at 21:26

## 2019-01-17 RX ADMIN — AMPICILLIN SODIUM AND SULBACTAM SODIUM 100 GRAM(S): 250; 125 INJECTION, POWDER, FOR SUSPENSION INTRAMUSCULAR; INTRAVENOUS at 19:05

## 2019-01-17 RX ADMIN — Medication 25 MILLIGRAM(S): at 05:19

## 2019-01-17 RX ADMIN — ENOXAPARIN SODIUM 40 MILLIGRAM(S): 100 INJECTION SUBCUTANEOUS at 05:19

## 2019-01-17 RX ADMIN — Medication 1 MILLIGRAM(S): at 12:11

## 2019-01-17 RX ADMIN — Medication 81 MILLIGRAM(S): at 12:11

## 2019-01-17 RX ADMIN — Medication 1000 UNIT(S): at 12:12

## 2019-01-17 RX ADMIN — AMPICILLIN SODIUM AND SULBACTAM SODIUM 100 GRAM(S): 250; 125 INJECTION, POWDER, FOR SUSPENSION INTRAMUSCULAR; INTRAVENOUS at 12:15

## 2019-01-17 RX ADMIN — OXYCODONE HYDROCHLORIDE 5 MILLIGRAM(S): 5 TABLET ORAL at 19:06

## 2019-01-17 RX ADMIN — PREGABALIN 500 MICROGRAM(S): 225 CAPSULE ORAL at 12:11

## 2019-01-17 RX ADMIN — CHLORHEXIDINE GLUCONATE 15 MILLILITER(S): 213 SOLUTION TOPICAL at 19:06

## 2019-01-17 RX ADMIN — CHLORHEXIDINE GLUCONATE 15 MILLILITER(S): 213 SOLUTION TOPICAL at 12:13

## 2019-01-17 RX ADMIN — AMPICILLIN SODIUM AND SULBACTAM SODIUM 100 GRAM(S): 250; 125 INJECTION, POWDER, FOR SUSPENSION INTRAMUSCULAR; INTRAVENOUS at 05:20

## 2019-01-17 RX ADMIN — Medication 100 MILLIGRAM(S): at 12:13

## 2019-01-17 RX ADMIN — Medication 150 MILLIGRAM(S): at 12:14

## 2019-01-17 RX ADMIN — LOSARTAN POTASSIUM 100 MILLIGRAM(S): 100 TABLET, FILM COATED ORAL at 05:19

## 2019-01-17 NOTE — PROGRESS NOTE ADULT - ASSESSMENT
77 yo M PMH  HTN, DM, CLL (on chemo every 2 months w/ Dr. Shaikh, last chemotherapy 1 month PTP ) here for frequent falls and a tooth injury. CT Max/fac revealed a tooth embedded in the left cheek subcutaneous soft tissues with adjacent foci of gas and small phlegmon/developing abscess lateral to the tooth. OMFS removed tooth from cheek and unasyn was started    1) Embedded Toot/ Abscess left cheek s/p removal by OMFS  -OMFS following, recs abraham  -c/w Unasyn q6h  -Pain control- oxycodone 5 q6h prn, tylenol prn  -wound culture and BCx f/u- sent  -Diet: mechanical soft   -Peridex mouth wash BID       2) CLL on chemotherapy   -Outpatient f/u with Oncology    -next due for chemo 2/12/19     3) HTN   - controlled   -c/w home meds     4) DM   -FS AC QHS   -Start on basal bolus once FS >180    5) DVT ppx- lovenox 40 qd    6) Dispo: Home, requesting a walker   Physiatry rec home with home PT. Pending PT/OT

## 2019-01-17 NOTE — CONSULT NOTE ADULT - ASSESSMENT
IMPRESSION: Rehab of debility, low back pain, falls    PRECAUTIONS: [ x ] Cardiac  [  ] Respiratory  [  ] Seizures [  ] Contact Isolation  [  ] Droplet Isolation  [  ] Other    Weight Bearing Status:     RECOMMENDATION:  Home with home PT. He will need to upgrade to a rolling walker from a straight cane. Adaptive equipment to reduce the risk of falls in the bathroom were advised. OT will see.    Out of Bed to Chair     DVT/Decubiti Prophylaxis    REHAB PLAN:     [ xx  ] Bedside P/T 3-5 times a week   [   ]   Bedside O/T  2-3 times a week             [   ] No Rehab Therapy Indicated                   [   ]  Speech Therapy   Conditioning/ROM                                    ADL  Bed Mobility                                               Conditioning/ROM  Transfers                                                     Bed Mobility  Sitting /Standing Balance                         Transfers                                        Gait Training                                               Sitting/Standing Balance  Stair Training [   ]Applicable                    Home equipment Eval                                                                        Splinting  [   ] Only      GOALS:   ADL   [ x  ]   Independent                    Transfers  [  x ] Independent                          Ambulation  [x   ] Independent     [   x ] With device                            [   ]  CG                                                         [   ]  CG                                                                  [   ] CG                            [    ] Min A                                                   [   ] Min A                                                              [   ] Min  A          DISCHARGE PLAN:   [   ]  Good candidate for Intensive Rehabilitation/Hospital based-4A [facility]                                             Will tolerate 3hrs Intensive Rehab Daily                                       [    ]  Short Term Rehab in Skilled Nursing Facility                                       [  xx  ]  Home with Outpatient or VN services including home PT, rolling walker, home OT                                         [    ]  Possible Candidate for Intensive Hospital based Rehab

## 2019-01-17 NOTE — PROGRESS NOTE ADULT - SUBJECTIVE AND OBJECTIVE BOX
Pt was seen and examined with no acute events overnight. Remains afebrile and reports improvement in left facial pain. pt was taken to the dental clinic in the pm for irrigation and packing placement. denies any f/c/n/v, CP and SOB. 	    AF VSS     NAD, resting in bed   Left facial swelling, 3 puncture marks on left cheek draining purulence, no active bleeding, no trismus, mandible palpable, neck FROM, remaining tooth#30, edentulous everywhere else, 2x2cm intraoral opening in the buccal mucosa of left cheek draining purulence. Tooth#22 extraction socket healing well   Non-labored breathing on RA     WBC: 4.94-> 5.04          A/P:  78M with PMHx HTN, DM, CLL last chemo 2 months ago s/p fall last Friday when he slipped on a rug and fell hitting his face on a chair sustaining multiple tooth loss with one tooth embedded in left cheek causing left cheek infection, now removed.     -Admit to medicine   -c/w Unasyn   -f/u left cheek wound cultures   -devance packing tomorrow by OMFS   -pain meds prn   -Trend CBC   -Diet: mechanical soft   -Peridex mouth wash BID   -OMFS will follow pt, please do not discharge until OMFS clears pt   -Page OMFS/Dental with any further questions

## 2019-01-17 NOTE — PROGRESS NOTE ADULT - SUBJECTIVE AND OBJECTIVE BOX
SUBJECTIVE:    Patient is a 78y old Male who presents with a chief complaint of s/p fall, tooth injury (17 Jan 2019 12:47)    Currently admitted to medicine with the primary diagnosis of Weakness     Today is hospital day 1d. Overnight no event. Denied CP, SOB, abd pain, uriary/ BM issues    PAST MEDICAL & SURGICAL HISTORY  CLL (chronic lymphocytic leukemia)  Depression  HTN (hypertension)  DM (diabetes mellitus)  Anxiety  H/O total knee replacement, bilateral        ALLERGIES:  No Known Allergies    MEDICATIONS:  STANDING MEDICATIONS  ampicillin/sulbactam  IVPB 1.5 Gram(s) IV Intermittent every 6 hours  aspirin  chewable 81 milliGRAM(s) Oral daily  chlorhexidine 0.12% Liquid 15 milliLiter(s) Oral Mucosa two times a day  cholecalciferol 1000 Unit(s) Oral daily  cyanocobalamin 500 MICROGram(s) Oral daily  enoxaparin Injectable 40 milliGRAM(s) SubCutaneous every 24 hours  folic acid 1 milliGRAM(s) Oral daily  hydrochlorothiazide 25 milliGRAM(s) Oral daily  influenza   Vaccine 0.5 milliLiter(s) IntraMuscular once  losartan 100 milliGRAM(s) Oral daily  simvastatin 20 milliGRAM(s) Oral at bedtime  traZODone 100 milliGRAM(s) Oral daily  venlafaxine 75 milliGRAM(s) Oral daily  venlafaxine XR. 150 milliGRAM(s) Oral daily    PRN MEDICATIONS  clonazePAM Tablet 0.5 milliGRAM(s) Oral every 6 hours PRN  morphine  - Injectable 4 milliGRAM(s) IV Push every 4 hours PRN  oxyCODONE    5 mG/acetaminophen 325 mG 1 Tablet(s) Oral every 6 hours PRN    VITALS:   T(F): 97  HR: 74  BP: 118/75  RR: 18  SpO2: --    LABS:                        12.9   5.04  )-----------( 187      ( 17 Jan 2019 06:32 )             40.5     01-17    136  |  96<L>  |  11  ----------------------------<  88  3.8   |  28  |  0.8    Ca    9.6      17 Jan 2019 06:32  Phos  3.3     01-16  Mg     1.7     01-17    TPro  6.3  /  Alb  4.0  /  TBili  0.5  /  DBili  x   /  AST  11  /  ALT  9   /  AlkPhos  94  01-17              CARDIAC MARKERS ( 16 Jan 2019 09:50 )  x     / <0.01 ng/mL / x     / x     / x          RADIOLOGY:    PHYSICAL EXAM:  GENERAL: NAD, speaks in full sentences, no signs of respiratory distress  HEAD:  Atraumatic, Normocephalic  EYES: EOMI, PERRLA, conjunctiva and sclera clear  NECK: Supple, No JVD  Mouth: L jaw swollen, red, tender. Incision inside cheek not bleeding, dressing in place  PULM: CTAB; No wheeze; No crackles; No accessory muscles used  CVA: Regular rate and rhythm; No murmurs;   ABDOMEN: Soft, Nontender, Nondistended; Bowel sounds present; No guarding  EXTREMITIES:  2+ Peripheral Pulses, No cyanosis or edema  PSYCH: AAOx3  NEUROLOGY: non-focal  SKIN: No rashes or lesions

## 2019-01-17 NOTE — PROGRESS NOTE ADULT - ATTENDING COMMENTS
patient seen and examined , agree with pgy 1 assesment and plan except as indicated above,   GEN Lying in no acute distress  HEENT Pupils equal and reactive to light and accommodationSupple Neck  PULM Clear to auscultation bilaterally  CV s1s2 regular rate and rhythm  GI + bowel sounds nontnender  EXT no cyanosis or edema  PSYCH awake alert and oriented x 3  INTEG No Lesions left cheek erythema  NEURO BANUELOS  #Tobacco abuse counseling - cousneled for 10 min regarding tobacco cessation, does not want to explore options for quitting at this time  #Nasal septal deviation no acute itnervention   #CKD 2 no acute intervention

## 2019-01-17 NOTE — OCCUPATIONAL THERAPY INITIAL EVALUATION ADULT - GENERAL OBSERVATIONS, REHAB EVAL
Pt encountered supine in bed agreeable to OT Tx. +IV lock. Pt son at bedside. Pt seen 13:15-13:45 PM

## 2019-01-17 NOTE — CONSULT NOTE ADULT - SUBJECTIVE AND OBJECTIVE BOX
HPI:  Patient is a 77 yo M PMH  HTN, DM, CLL (on chemo every 2 months w/ Dr. Shaikh, last chemotherapy 1 month PTP ) here for frequent falls and a tooth injury. As per son last Friday he walked up the front outside stair case and when he got to the top he lost control of his balance and fell forward hitting his face on an iron chair. He admitted to left sided chin/jaw pain described as throbbing type pain, 7/10 intensity with no radiation. He denied any associated symptoms. As per his son he has had over 5 falls within the last week. At baseline he uses a cane and has fallen on occasion but much more frequently as of late. He states that his knees feel unsteady and shake more now than before. He also gets the feeling that his knees sometimes buckle before he falls. He denies LOC, n/v/f/c, CP, SOB, palpitations, tongue biting, jerking of extremity, bowel/bladder incontinence, headache, neck pain, LE swelling, night sweats, weight loss.  In ED   ICU Vital Signs Last 24 Hrs  T(C): 36.4 (16 Jan 2019 10:31), Max: 36.4 (16 Jan 2019 10:31)  T(F): 97.6 (16 Jan 2019 10:31), Max: 97.6 (16 Jan 2019 10:31)  HR: 75 (16 Jan 2019 10:31) (75 - 75)  BP: 137/83 (16 Jan 2019 10:31) (137/83 - 137/83)  BP(mean): --  ABP: --  ABP(mean): --  RR: 16 (16 Jan 2019 10:31) (16 - 16)  SpO2: 98% (16 Jan 2019 10:31) (98% - 98%) (16 Jan 2019 14:24)      PAST MEDICAL & SURGICAL HISTORY:  CLL (chronic lymphocytic leukemia)  Depression  HTN (hypertension)  DM (diabetes mellitus)  Anxiety  H/O total knee replacement, bilateral      Hospital Course:  He has chronic low back pain which radiates to the left knee at times. He has a moderate pain. He has CLL and is on chemo every other month. He had 5 falls over the last week. He has a dental abscess.  TODAY'S SUBJECTIVE & REVIEW OF SYMPTOMS:     Constitutional WNL   Cardio WNL   Resp WNL   GI WNL  Heme WNL  Endo WNL  Skin WNL  MSK WNL  Neuro WNL  Cognitive WNL  Psych WNL      MEDICATIONS  (STANDING):  ampicillin/sulbactam  IVPB 1.5 Gram(s) IV Intermittent every 6 hours  aspirin  chewable 81 milliGRAM(s) Oral daily  chlorhexidine 0.12% Liquid 15 milliLiter(s) Oral Mucosa two times a day  cholecalciferol 1000 Unit(s) Oral daily  cyanocobalamin 500 MICROGram(s) Oral daily  enoxaparin Injectable 40 milliGRAM(s) SubCutaneous every 24 hours  folic acid 1 milliGRAM(s) Oral daily  hydrochlorothiazide 25 milliGRAM(s) Oral daily  influenza   Vaccine 0.5 milliLiter(s) IntraMuscular once  losartan 100 milliGRAM(s) Oral daily  simvastatin 20 milliGRAM(s) Oral at bedtime  traZODone 100 milliGRAM(s) Oral daily  venlafaxine 75 milliGRAM(s) Oral daily  venlafaxine XR. 150 milliGRAM(s) Oral daily    MEDICATIONS  (PRN):  clonazePAM Tablet 0.5 milliGRAM(s) Oral every 6 hours PRN anxiety  morphine  - Injectable 4 milliGRAM(s) IV Push every 4 hours PRN Severe Pain (7 - 10)  oxyCODONE    5 mG/acetaminophen 325 mG 1 Tablet(s) Oral every 6 hours PRN Moderate Pain (4 - 6)      FAMILY HISTORY:  No pertinent family history in first degree relatives      Allergies    No Known Allergies    Intolerances        SOCIAL HISTORY:    [  ] Etoh  [  ] Smoking  [  ] Substance abuse     Home Environment:  [  ] Home Alone  [ x ] Lives with Family-son  [  ] Home Health Aid    Dwelling:  [  ] Apartment  [  ] Private House  [  ] Adult Home  [  ] Skilled Nursing Facility      [  ] Short Term  [  ] Long Term  [  ] Stairs       Elevator [  ]    FUNCTIONAL STATUS PTA: (Check all that apply)  Ambulation: [x   ]Independent    [  ] Dependent     [  ] Non-Ambulatory  Assistive Device: [ x ] SA Cane  [  ]  Q Cane  [  ] Walker  [  ]  Wheelchair  ADL : [  ] Independent  [  ]  Dependent       Vital Signs Last 24 Hrs  T(C): 36.1 (17 Jan 2019 05:53), Max: 36.1 (17 Jan 2019 05:53)  T(F): 97 (17 Jan 2019 05:53), Max: 97 (17 Jan 2019 05:53)  HR: 74 (17 Jan 2019 05:53) (73 - 80)  BP: 118/75 (17 Jan 2019 05:53) (118/75 - 167/84)  BP(mean): --  RR: 18 (17 Jan 2019 05:53) (18 - 18)  SpO2: --      PHYSICAL EXAM: Alert & Oriented X3  GENERAL: NAD, well-groomed, well-developed  HEAD:  Atraumatic, Normocephalic  EYES: EOMI, PERRLA, conjunctiva and sclera clear  NECK: Supple, No JVD, Normal thyroid  CHEST/LUNG: Clear to percussion bilaterally; No rales, rhonchi, wheezing, or rubs  HEART: Regular rate and rhythm; No murmurs, rubs, or gallops  ABDOMEN: Soft, Nontender, Nondistended; Bowel sounds present  EXTREMITIES:  2+ Peripheral Pulses, No clubbing, cyanosis, or edema    NERVOUS SYSTEM:  Cranial Nerves 2-12 intact [  ] Abnormal  [  ]  ROM: WFL all extremities [  ]  Abnormal [  ]  Motor Strength: WFL all extremities  [  ]  Abnormal [ x ] 5-/5 LE's  Sensation: intact to light touch [  ] Abnormal [  ]  Reflexes: Symmetric [  ]  Abnormal [  ]    FUNCTIONAL STATUS:  Bed Mobility: Independent [  ]  Supervision [  ]  Needs Assistance [  ]  N/A [  ]  Transfers: Independent [  ]  Supervision [  ]  Needs Assistance [  ]  N/A [  ]   Ambulation: Independent [  ]  Supervision [  ]  Needs Assistance [  ]  N/A [  ]  ADL: Independent [  ] Requires Assistance [  ] N/A [  ]      LABS:                        12.9   5.04  )-----------( 187      ( 17 Jan 2019 06:32 )             40.5     01-17    136  |  96<L>  |  11  ----------------------------<  88  3.8   |  28  |  0.8    Ca    9.6      17 Jan 2019 06:32  Phos  3.3     01-16  Mg     1.7     01-17    TPro  6.3  /  Alb  4.0  /  TBili  0.5  /  DBili  x   /  AST  11  /  ALT  9   /  AlkPhos  94  01-17          RADIOLOGY & ADDITIONAL STUDIES:    Assesment:

## 2019-01-18 ENCOUNTER — TRANSCRIPTION ENCOUNTER (OUTPATIENT)
Age: 79
End: 2019-01-18

## 2019-01-18 VITALS
DIASTOLIC BLOOD PRESSURE: 74 MMHG | RESPIRATION RATE: 20 BRPM | SYSTOLIC BLOOD PRESSURE: 121 MMHG | HEART RATE: 88 BPM | TEMPERATURE: 98 F

## 2019-01-18 LAB
ALBUMIN SERPL ELPH-MCNC: 3.7 G/DL — SIGNIFICANT CHANGE UP (ref 3.5–5.2)
ALP SERPL-CCNC: 82 U/L — SIGNIFICANT CHANGE UP (ref 30–115)
ALT FLD-CCNC: 9 U/L — SIGNIFICANT CHANGE UP (ref 0–41)
ANION GAP SERPL CALC-SCNC: 15 MMOL/L — HIGH (ref 7–14)
AST SERPL-CCNC: 10 U/L — SIGNIFICANT CHANGE UP (ref 0–41)
BASOPHILS # BLD AUTO: 0.04 K/UL — SIGNIFICANT CHANGE UP (ref 0–0.2)
BASOPHILS NFR BLD AUTO: 0.9 % — SIGNIFICANT CHANGE UP (ref 0–1)
BILIRUB SERPL-MCNC: 0.4 MG/DL — SIGNIFICANT CHANGE UP (ref 0.2–1.2)
BUN SERPL-MCNC: 16 MG/DL — SIGNIFICANT CHANGE UP (ref 10–20)
CALCIUM SERPL-MCNC: 9.3 MG/DL — SIGNIFICANT CHANGE UP (ref 8.5–10.1)
CHLORIDE SERPL-SCNC: 97 MMOL/L — LOW (ref 98–110)
CO2 SERPL-SCNC: 27 MMOL/L — SIGNIFICANT CHANGE UP (ref 17–32)
CREAT SERPL-MCNC: 0.8 MG/DL — SIGNIFICANT CHANGE UP (ref 0.7–1.5)
CULTURE RESULTS: SIGNIFICANT CHANGE UP
EOSINOPHIL # BLD AUTO: 0.13 K/UL — SIGNIFICANT CHANGE UP (ref 0–0.7)
EOSINOPHIL NFR BLD AUTO: 2.9 % — SIGNIFICANT CHANGE UP (ref 0–8)
GLUCOSE BLDC GLUCOMTR-MCNC: 110 MG/DL — HIGH (ref 70–99)
GLUCOSE BLDC GLUCOMTR-MCNC: 114 MG/DL — HIGH (ref 70–99)
GLUCOSE SERPL-MCNC: 104 MG/DL — HIGH (ref 70–99)
HCT VFR BLD CALC: 40.9 % — LOW (ref 42–52)
HGB BLD-MCNC: 13.1 G/DL — LOW (ref 14–18)
IMM GRANULOCYTES NFR BLD AUTO: 4.2 % — HIGH (ref 0.1–0.3)
LYMPHOCYTES # BLD AUTO: 0.51 K/UL — LOW (ref 1.2–3.4)
LYMPHOCYTES # BLD AUTO: 11.3 % — LOW (ref 20.5–51.1)
MAGNESIUM SERPL-MCNC: 1.9 MG/DL — SIGNIFICANT CHANGE UP (ref 1.8–2.4)
MCHC RBC-ENTMCNC: 26.6 PG — LOW (ref 27–31)
MCHC RBC-ENTMCNC: 32 G/DL — SIGNIFICANT CHANGE UP (ref 32–37)
MCV RBC AUTO: 83 FL — SIGNIFICANT CHANGE UP (ref 80–94)
MONOCYTES # BLD AUTO: 0.57 K/UL — SIGNIFICANT CHANGE UP (ref 0.1–0.6)
MONOCYTES NFR BLD AUTO: 12.6 % — HIGH (ref 1.7–9.3)
NEUTROPHILS # BLD AUTO: 3.07 K/UL — SIGNIFICANT CHANGE UP (ref 1.4–6.5)
NEUTROPHILS NFR BLD AUTO: 68.1 % — SIGNIFICANT CHANGE UP (ref 42.2–75.2)
NRBC # BLD: 0 /100 WBCS — SIGNIFICANT CHANGE UP (ref 0–0)
PLATELET # BLD AUTO: 163 K/UL — SIGNIFICANT CHANGE UP (ref 130–400)
POTASSIUM SERPL-MCNC: 3.8 MMOL/L — SIGNIFICANT CHANGE UP (ref 3.5–5)
POTASSIUM SERPL-SCNC: 3.8 MMOL/L — SIGNIFICANT CHANGE UP (ref 3.5–5)
PROT SERPL-MCNC: 5.8 G/DL — LOW (ref 6–8)
RBC # BLD: 4.93 M/UL — SIGNIFICANT CHANGE UP (ref 4.7–6.1)
RBC # FLD: 14.2 % — SIGNIFICANT CHANGE UP (ref 11.5–14.5)
SODIUM SERPL-SCNC: 139 MMOL/L — SIGNIFICANT CHANGE UP (ref 135–146)
SPECIMEN SOURCE: SIGNIFICANT CHANGE UP
WBC # BLD: 4.51 K/UL — LOW (ref 4.8–10.8)
WBC # FLD AUTO: 4.51 K/UL — LOW (ref 4.8–10.8)

## 2019-01-18 RX ORDER — OXYCODONE HYDROCHLORIDE 5 MG/1
1 TABLET ORAL
Qty: 0 | Refills: 0 | DISCHARGE
Start: 2019-01-18

## 2019-01-18 RX ORDER — CHLORHEXIDINE GLUCONATE 213 G/1000ML
15 SOLUTION TOPICAL
Qty: 450 | Refills: 0
Start: 2019-01-18 | End: 2019-02-01

## 2019-01-18 RX ORDER — ACETAMINOPHEN 500 MG
2 TABLET ORAL
Qty: 240 | Refills: 0
Start: 2019-01-18 | End: 2019-02-16

## 2019-01-18 RX ORDER — ASPIRIN/CALCIUM CARB/MAGNESIUM 324 MG
1 TABLET ORAL
Qty: 0 | Refills: 0 | COMMUNITY

## 2019-01-18 RX ADMIN — LOSARTAN POTASSIUM 100 MILLIGRAM(S): 100 TABLET, FILM COATED ORAL at 05:48

## 2019-01-18 RX ADMIN — PREGABALIN 500 MICROGRAM(S): 225 CAPSULE ORAL at 12:33

## 2019-01-18 RX ADMIN — AMPICILLIN SODIUM AND SULBACTAM SODIUM 100 GRAM(S): 250; 125 INJECTION, POWDER, FOR SUSPENSION INTRAMUSCULAR; INTRAVENOUS at 12:33

## 2019-01-18 RX ADMIN — AMPICILLIN SODIUM AND SULBACTAM SODIUM 100 GRAM(S): 250; 125 INJECTION, POWDER, FOR SUSPENSION INTRAMUSCULAR; INTRAVENOUS at 06:13

## 2019-01-18 RX ADMIN — AMPICILLIN SODIUM AND SULBACTAM SODIUM 100 GRAM(S): 250; 125 INJECTION, POWDER, FOR SUSPENSION INTRAMUSCULAR; INTRAVENOUS at 00:15

## 2019-01-18 RX ADMIN — Medication 1 MILLIGRAM(S): at 12:33

## 2019-01-18 RX ADMIN — OXYCODONE HYDROCHLORIDE 5 MILLIGRAM(S): 5 TABLET ORAL at 12:32

## 2019-01-18 RX ADMIN — OXYCODONE HYDROCHLORIDE 5 MILLIGRAM(S): 5 TABLET ORAL at 05:48

## 2019-01-18 RX ADMIN — Medication 1000 UNIT(S): at 12:32

## 2019-01-18 RX ADMIN — Medication 75 MILLIGRAM(S): at 12:34

## 2019-01-18 RX ADMIN — Medication 100 MILLIGRAM(S): at 12:33

## 2019-01-18 RX ADMIN — Medication 81 MILLIGRAM(S): at 12:33

## 2019-01-18 RX ADMIN — Medication 25 MILLIGRAM(S): at 05:48

## 2019-01-18 RX ADMIN — CHLORHEXIDINE GLUCONATE 15 MILLILITER(S): 213 SOLUTION TOPICAL at 05:48

## 2019-01-18 RX ADMIN — ENOXAPARIN SODIUM 40 MILLIGRAM(S): 100 INJECTION SUBCUTANEOUS at 05:48

## 2019-01-18 NOTE — DISCHARGE NOTE ADULT - HOSPITAL COURSE
79 yo M PMH  HTN, DM, CLL (on chemo every 2 months w/ Dr. Shaikh, last chemotherapy 1 month before) here for frequent falls and a tooth embedded in left cheek forming abscess. As per son, last Friday he walked up the front outside stair case and when he got to the top he lost control of his balance and fell forward hitting his face on an iron chair. He admitted to left sided chin/jaw pain described as throbbing type pain, 7/10 intensity with no radiation. He denied any associated symptoms. As per his son he has had over 5 falls within the last week. At baseline he uses a cane and has fallen on occasion but much more frequently as of late. He states that his knees feel unsteady and shake more now than before. He also gets the feeling that his knees sometimes buckle before he falls. He denies LOC, n/v/f/c, CP, SOB, palpitations, tongue biting, jerking of extremity, bowel/bladder incontinence, headache, neck pain, LE swelling, night sweats, weight loss.    His embedded Toot/ Abscess left cheek was removed by OMFS.   -Was on Unasyn q6h, d/c with augmentin 875 bid for 10 days.  -Diet: mechanical soft   -Peridex mouth wash BID   -wound culture- alpha hemolytic strep and multiple bact. Sensitivities will not be performed  -BCx NGTD  -Patient is cleared for discharge per OMFS   -f/u in [facility] dental clinic with Dr. Gale on Wednesday 1/23 at 1pm     Pt will be discharged home with rolling walker. 79 yo M PMH  HTN, DM, CLL (on chemo every 2 months w/ Dr. Shaikh, last chemotherapy 1 month before) here for frequent falls and a tooth embedded in left cheek forming abscess. As per son, last Friday he walked up the front outside stair case and when he got to the top he lost control of his balance and fell forward hitting his face on an iron chair. He admitted to left sided chin/jaw pain described as throbbing type pain, 7/10 intensity with no radiation. He denied any associated symptoms. As per his son he has had over 5 falls within the last week. At baseline he uses a cane and has fallen on occasion but much more frequently as of late. He states that his knees feel unsteady and shake more now than before. He also gets the feeling that his knees sometimes buckle before he falls. He denies LOC, n/v/f/c, CP, SOB, palpitations, tongue biting, jerking of extremity, bowel/bladder incontinence, headache, neck pain, LE swelling, night sweats, weight loss.    His embedded Toot/ Abscess left cheek was removed by OMFS.   -Was on Unasyn q6h, d/c with augmentin 875 bid for 10 days.  -Diet: mechanical soft   -Peridex mouth wash BID   -wound culture- alpha hemolytic strep and multiple bact. Sensitivities will not be performed  -BCx NGTD  -Patient is cleared for discharge per OMFS   -f/u in [facility] dental clinic with Dr. Gale on Wednesday 1/23 at 1pm     Pt stated he does not take aspirin at home.  Pt will be discharged home with rolling walker.

## 2019-01-18 NOTE — DISCHARGE NOTE ADULT - CARE PLAN
Principal Discharge DX:	Facial abscess  Goal:	Treat and prevent complications of left cheek abscess from tooth  Assessment and plan of treatment:	Please take antibiotics augmentin 2x/day for 10 days.  Use peridex mouth wash 2x/day.  Consume a mechanical soft diet.    Follow up in [facility] dental clinic with Dr. Gale on Wednesday 1/23 at 1pm.  Follow up with primary care Dr. Li in 1-2 weeks.  Return to ED if worsening symptoms. Principal Discharge DX:	Facial abscess  Goal:	Treat and prevent complications of left cheek abscess from tooth  Assessment and plan of treatment:	Please take antibiotics augmentin 2x/day for 10 days.  Use peridex mouth wash 2x/day.  Consume a mechanical soft diet.    Follow up in [facility] dental clinic with Dr. Gale on Wednesday 1/23 at 1pm.  Follow up with primary care Dr. Li in 1-2 weeks.  Return to ED if worsening symptoms.  Follow up with onocologist Dr. Shaikh for CLL.

## 2019-01-18 NOTE — PHYSICAL THERAPY INITIAL EVALUATION ADULT - PERTINENT HX OF CURRENT PROBLEM, REHAB EVAL
Pt walked up the front outside stair case and when he got to the top he lost control of his balance and fell forward hitting his face on an iron chair. He admitted to left sided chin/jaw pain described as throbbing type pain, 7/10 intensity with no radiation.

## 2019-01-18 NOTE — PROGRESS NOTE ADULT - SUBJECTIVE AND OBJECTIVE BOX
LEXIE CR  78y  [facility] [place] [address] A      Patient is a 78y old  Male who presents with a chief complaint of s/p fall, tooth injury (18 Jan 2019 13:24)      INTERVAL HPI/OVERNIGHT EVENTS:    no acute complaints     REVIEW OF SYSTEMS:  CONSTITUTIONAL: No fever, weight loss, or fatigue  EYES: No eye pain, visual disturbances, or discharge  ENMT:  No difficulty hearing, tinnitus, vertigo; No sinus or throat pain  NECK: No pain or stiffness  BREASTS: No pain, masses, or nipple discharge  RESPIRATORY: No cough, wheezing, chills or hemoptysis; No shortness of breath  CARDIOVASCULAR: No chest pain, palpitations, dizziness, or leg swelling  GASTROINTESTINAL: No abdominal or epigastric pain. No nausea, vomiting, or hematemesis; No diarrhea or constipation. No melena or hematochezia.  GENITOURINARY: No dysuria, frequency, hematuria, or incontinence  NEUROLOGICAL: No headaches, memory loss, loss of strength, numbness, or tremors  SKIN: No itching, burning, rashes, or lesions   LYMPH NODES: No enlarged glands  ENDOCRINE: No heat or cold intolerance; No hair loss  MUSCULOSKELETAL: No joint pain or swelling; No muscle, back, or extremity pain  PSYCHIATRIC: No depression, anxiety, mood swings, or difficulty sleeping  HEME/LYMPH: No easy bruising, or bleeding gums  ALLERY AND IMMUNOLOGIC: No hives or eczema  FAMILY HISTORY:  No pertinent family history in first degree relatives    T(C): 36.4 (01-18-19 @ 12:44), Max: 36.4 (01-18-19 @ 05:19)  HR: 88 (01-18-19 @ 12:44) (69 - 88)  BP: 121/74 (01-18-19 @ 12:44) (121/74 - 131/84)  RR: 20 (01-18-19 @ 12:44) (18 - 20)  SpO2: --  Wt(kg): --Vital Signs Last 24 Hrs  T(C): 36.4 (18 Jan 2019 12:44), Max: 36.4 (18 Jan 2019 05:19)  T(F): 97.6 (18 Jan 2019 12:44), Max: 97.6 (18 Jan 2019 05:19)  HR: 88 (18 Jan 2019 12:44) (69 - 88)  BP: 121/74 (18 Jan 2019 12:44) (121/74 - 131/84)  BP(mean): --  RR: 20 (18 Jan 2019 12:44) (18 - 20)  SpO2: --    PHYSICAL EXAM:  GENERAL: NAD, well-groomed, well-developed  HEAD:  Atraumatic, Normocephalic  EYES: EOMI, PERRLA, conjunctiva and sclera clear  ENMT: No tonsillar erythema, exudates, or enlargement; Moist mucous membranes, Good dentition, No lesions  NECK: Supple, No JVD, Normal thyroid  NERVOUS SYSTEM:  Alert & Oriented X3, Good concentration; Motor Strength 5/5 B/L upper and lower extremities; DTRs 2+ intact and symmetric  PULM: Clear to auscultation bilaterally  CARDIAC: Regular rate and rhythm; No murmurs, rubs, or gallops  GI: Soft, Nontender, Nondistended; Bowel sounds present  EXTREMITIES:  2+ Peripheral Pulses, No clubbing, cyanosis, or edema  LYMPH: No lymphadenopathy noted  SKIN: Left cheek erythema improving     Consultant(s) Notes Reviewed:  [x ] YES  [ ] NO  Care Discussed with Consultants/Other Providers [ x] YES  [ ] NO    LABS:                            13.1   4.51  )-----------( 163      ( 18 Jan 2019 06:28 )             40.9   01-18    139  |  97<L>  |  16  ----------------------------<  104<H>  3.8   |  27  |  0.8    Ca    9.3      18 Jan 2019 06:28  Mg     1.9     01-18    TPro  5.8<L>  /  Alb  3.7  /  TBili  0.4  /  DBili  x   /  AST  10  /  ALT  9   /  AlkPhos  82  01-18            Culture - Abscess with Gram Stain (collected 16 Jan 2019 17:40)  Source: .Abscess left cheek  Preliminary Report (17 Jan 2019 22:12):    Numerous Alpha hemolytic strep    Multiple Morphological Strains "Susceptibilities not performed"    Culture - Blood (collected 16 Jan 2019 09:50)  Source: .Blood Blood-Peripheral  Preliminary Report (17 Jan 2019 17:01):    No growth to date.      acetaminophen   Tablet .. 650 milliGRAM(s) Oral every 6 hours PRN  ampicillin/sulbactam  IVPB 1.5 Gram(s) IV Intermittent every 6 hours  aspirin  chewable 81 milliGRAM(s) Oral daily  chlorhexidine 0.12% Liquid 15 milliLiter(s) Oral Mucosa two times a day  cholecalciferol 1000 Unit(s) Oral daily  clonazePAM Tablet 0.5 milliGRAM(s) Oral every 6 hours PRN  cyanocobalamin 500 MICROGram(s) Oral daily  enoxaparin Injectable 40 milliGRAM(s) SubCutaneous every 24 hours  folic acid 1 milliGRAM(s) Oral daily  hydrochlorothiazide 25 milliGRAM(s) Oral daily  influenza   Vaccine 0.5 milliLiter(s) IntraMuscular once  losartan 100 milliGRAM(s) Oral daily  oxyCODONE    IR 5 milliGRAM(s) Oral every 6 hours  simvastatin 20 milliGRAM(s) Oral at bedtime  traZODone 100 milliGRAM(s) Oral daily  venlafaxine 75 milliGRAM(s) Oral daily      HEALTH ISSUES - PROBLEM Dx:          Case Discussed with House Staff   45 minutes spent on total encounter; more than 50% of the visit was spent counseling and/or coordinating care by the attending physician.   Spectra [phone]

## 2019-01-18 NOTE — PROGRESS NOTE ADULT - ASSESSMENT
#Left cheek abscess   -Patient is cleared for discharge per OMFS   -f/u in [facility] dental clinic with Dr. Gale on Wednesday 1/23 at 1pm   -augmentin for 10 more days   #Nasal septal deviation no acute itnervention   #CKD 2 no acute intervention .  #CLL on chemotherapy  outpatient follow up

## 2019-01-18 NOTE — DISCHARGE NOTE ADULT - PATIENT PORTAL LINK FT
You can access the [facility] Patient Portal, offered by [facility], by registering with the following website: [email or web address]

## 2019-01-18 NOTE — DISCHARGE NOTE ADULT - MEDICATION SUMMARY - MEDICATIONS TO TAKE
I will START or STAY ON the medications listed below when I get home from the hospital:    acetaminophen 325 mg oral tablet  -- 2 tab(s) by mouth every 6 hours, As needed, Temp greater or equal to 38C (100.4F), Moderate Pain (4 - 6)  -- Indication: For pain    oxyCODONE 5 mg oral tablet  -- 1 tab(s) by mouth every 6 hours  -- Indication: For pain    irbesartan 300 mg oral tablet  -- 1 tab(s) by mouth once a day  -- Indication: For HTN (hypertension)    clonazePAM 0.5 mg oral tablet  -- orally every 6 hours  -- Indication: For mood    traZODone 100 mg oral tablet  -- orally once a day  -- Indication: For mood    venlafaxine 150 mg oral capsule, extended release  -- 1 cap(s) by mouth once a day  -- Indication: For mood    venlafaxine 75 mg oral tablet  -- orally once a day  -- Indication: For mood    metFORMIN 500 mg oral tablet  -- 1 tab(s) by mouth 2 times a day  -- Indication: For DM    simvastatin 20 mg oral tablet  -- 1 tab(s) by mouth once a day (at bedtime)  -- Indication: For HLD    chlorhexidine 0.12% mucous membrane liquid  -- 15 milliliter(s) mucous membrane 2 times a day  -- Indication: For mouth abscess removed    hydroCHLOROthiazide 25 mg oral tablet  -- 1 tab(s) by mouth once a day  -- Indication: For HTN (hypertension)    Augmentin 875 mg-125 mg oral tablet  -- 1 tab(s) by mouth every 12 hours   -- Finish all this medication unless otherwise directed by prescriber.  Take with food or milk.    -- Indication: For mouth abscess    Vitamin D3  -- Indication: For vitamin    Vitamin B12 500 mcg oral tablet  -- 1 tab(s) by mouth once a day  -- Indication: For vitamin    folic acid 1 mg oral tablet  -- 1 tab(s) by mouth once a day  -- Indication: For vitamin

## 2019-01-18 NOTE — DISCHARGE NOTE ADULT - CARE PROVIDER_API CALL
Duncan Li), Family Medicine  [address] [place] [address]  Phone: [phone]  Fax: [phone]    Danielle Gale (BASIM), Dentistry  [address] [place] [address]  Phone: [phone]  Fax: [phone]

## 2019-01-18 NOTE — PROGRESS NOTE ADULT - SUBJECTIVE AND OBJECTIVE BOX
Pt was seen and examined at bedside with no acute events overnight. Remains afebrile and reports improvement in left facial pain. Extraoral and intraoral packing removed with no purulent drainge. Denies any f/c/n/v, CP and SOB. 	    AF VSS     NAD, resting in bed   Left facial swelling, 3 puncture marks on left cheek draining purulence, no active bleeding, no trismus, mandible palpable, neck FROM, remaining tooth#30, edentulous everywhere else, 2x2cm intraoral opening in the buccal mucosa of left cheek draining purulence. Tooth#22 extraction socket healing well   Non-labored breathing on RA     WBC: 4.94-> 5.04->4.51  Left cheek cultures: alpha hemolytic strep       A/P:  78M with PMHx HTN, DM, CLL last chemo 2 months ago s/p fall last Friday when he slipped on a rug and fell hitting his face on a chair sustaining multiple tooth loss with one tooth embedded in left cheek causing left cheek infection, now removed.       -discharge on Augmentin 875mg for 10 days   -pain meds prn   -Diet: mechanical soft   -Peridex mouth wash BID   -Patient is cleared for discharge per OMFS   -f/u in [facility] dental clinic with Dr. Gale on Wednesday 1/23 at 1pm   -Page OMFS/Dental with any further questions

## 2019-01-18 NOTE — PHYSICAL THERAPY INITIAL EVALUATION ADULT - GENERAL OBSERVATIONS, REHAB EVAL
10:30-11:00. Pt received sitting at EOB, alert, oriented agreeable to PT evaluation, + stitches L check, pain 9/10 . NAD.

## 2019-01-18 NOTE — DISCHARGE NOTE ADULT - PLAN OF CARE
Treat and prevent complications of left cheek abscess from tooth Please take antibiotics augmentin 2x/day for 10 days.  Use peridex mouth wash 2x/day.  Consume a mechanical soft diet.    Follow up in [facility] dental clinic with Dr. Gale on Wednesday 1/23 at 1pm.  Follow up with primary care Dr. Li in 1-2 weeks.  Return to ED if worsening symptoms. Please take antibiotics augmentin 2x/day for 10 days.  Use peridex mouth wash 2x/day.  Consume a mechanical soft diet.    Follow up in [facility] dental clinic with Dr. Gale on Wednesday 1/23 at 1pm.  Follow up with primary care Dr. Li in 1-2 weeks.  Return to ED if worsening symptoms.  Follow up with onocologist Dr. Shaikh for CLL.

## 2019-01-21 LAB
CULTURE RESULTS: SIGNIFICANT CHANGE UP
SPECIMEN SOURCE: SIGNIFICANT CHANGE UP

## 2019-01-23 ENCOUNTER — OUTPATIENT (OUTPATIENT)
Dept: OUTPATIENT SERVICES | Facility: HOSPITAL | Age: 79
LOS: 1 days | Discharge: HOME | End: 2019-01-23

## 2019-01-23 DIAGNOSIS — Z96.653 PRESENCE OF ARTIFICIAL KNEE JOINT, BILATERAL: Chronic | ICD-10-CM

## 2019-01-23 PROBLEM — F32.9 MAJOR DEPRESSIVE DISORDER, SINGLE EPISODE, UNSPECIFIED: Chronic | Status: ACTIVE | Noted: 2019-01-16

## 2019-01-23 PROBLEM — I10 ESSENTIAL (PRIMARY) HYPERTENSION: Chronic | Status: ACTIVE | Noted: 2019-01-16

## 2019-01-23 NOTE — CONSULT NOTE ADULT - SUBJECTIVE AND OBJECTIVE BOX
Patient is a 78y old  Male who presents for follow up of left cheek infection following a fall which caused him to hit his face on a chair.     HPI: On 1/16/19 patient presented for incision and drainage of left facial swelling.       PAST MEDICAL & SURGICAL HISTORY:  CLL (chronic lymphocytic leukemia)  Depression  HTN (hypertension)  DM (diabetes mellitus)  Anxiety  H/O total knee replacement, bilateral      Allergies : No Known Allergies      Healing within normal limits. No purulent drainage noted. Swelling has subsided, extraoral wounds have healed appropriately. Small residual opening noted in buccal vestibule (left mandible). Noted food debris inside small opening. Instructed patient's sister on how to irrigate area after meals. Explained to patient that he must return in 2 weeks for follow up to assess further healing. Patient will undergo immunotherapy in 3 weeks.       PROCEDURE:    Irrigation of the area completed using saline.     RECOMMENDATIONS:  1) << Continue to irrigate area with saline after meals. Return in 2 weeks for follow up.  >>  2) Dental F/U with outpatient dentist for comprehensive dental care.   3) If any difficulty swallowing/breathing, fever occur, return to ER.     Resident Name, pager #  Timothy Johnson, [phone]  Neftali Howell
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
## 2019-01-25 DIAGNOSIS — Z79.84 LONG TERM (CURRENT) USE OF ORAL HYPOGLYCEMIC DRUGS: ICD-10-CM

## 2019-01-25 DIAGNOSIS — F41.9 ANXIETY DISORDER, UNSPECIFIED: ICD-10-CM

## 2019-01-25 DIAGNOSIS — Z18.32 RETAINED TOOTH: ICD-10-CM

## 2019-01-25 DIAGNOSIS — R29.6 REPEATED FALLS: ICD-10-CM

## 2019-01-25 DIAGNOSIS — E11.22 TYPE 2 DIABETES MELLITUS WITH DIABETIC CHRONIC KIDNEY DISEASE: ICD-10-CM

## 2019-01-25 DIAGNOSIS — C91.90 LYMPHOID LEUKEMIA, UNSPECIFIED NOT HAVING ACHIEVED REMISSION: ICD-10-CM

## 2019-01-25 DIAGNOSIS — K01.0 EMBEDDED TEETH: ICD-10-CM

## 2019-01-25 DIAGNOSIS — R53.1 WEAKNESS: ICD-10-CM

## 2019-01-25 DIAGNOSIS — Z92.21 PERSONAL HISTORY OF ANTINEOPLASTIC CHEMOTHERAPY: ICD-10-CM
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
## 2019-01-25 DIAGNOSIS — K02.52 DENTAL CARIES ON PIT AND FISSURE SURFACE PENETRATING INTO DENTIN: ICD-10-CM
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
## 2019-01-25 DIAGNOSIS — F17.210 NICOTINE DEPENDENCE, CIGARETTES, UNCOMPLICATED: ICD-10-CM

## 2019-01-25 DIAGNOSIS — N18.2 CHRONIC KIDNEY DISEASE, STAGE 2 (MILD): ICD-10-CM

## 2019-01-25 DIAGNOSIS — I12.9 HYPERTENSIVE CHRONIC KIDNEY DISEASE WITH STAGE 1 THROUGH STAGE 4 CHRONIC KIDNEY DISEASE, OR UNSPECIFIED CHRONIC KIDNEY DISEASE: ICD-10-CM

## 2019-01-25 DIAGNOSIS — S01.442A: ICD-10-CM

## 2019-01-25 DIAGNOSIS — Z79.82 LONG TERM (CURRENT) USE OF ASPIRIN: ICD-10-CM

## 2019-01-25 DIAGNOSIS — F32.9 MAJOR DEPRESSIVE DISORDER, SINGLE EPISODE, UNSPECIFIED: ICD-10-CM

## 2019-01-25 DIAGNOSIS — K12.2 CELLULITIS AND ABSCESS OF MOUTH: ICD-10-CM
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
## 2019-02-06 ENCOUNTER — OUTPATIENT (OUTPATIENT)
Dept: OUTPATIENT SERVICES | Facility: HOSPITAL | Age: 79
LOS: 1 days | Discharge: HOME | End: 2019-02-06

## 2019-02-06 DIAGNOSIS — Z96.653 PRESENCE OF ARTIFICIAL KNEE JOINT, BILATERAL: Chronic | ICD-10-CM

## 2019-02-07 DIAGNOSIS — Z98.818 OTHER DENTAL PROCEDURE STATUS: ICD-10-CM

## 2019-03-26 ENCOUNTER — APPOINTMENT (OUTPATIENT)
Dept: NEUROSURGERY | Facility: CLINIC | Age: 79
End: 2019-03-26
Payer: MEDICARE

## 2019-03-26 VITALS — HEIGHT: 63 IN | BODY MASS INDEX: 36.32 KG/M2 | WEIGHT: 205 LBS

## 2019-03-26 DIAGNOSIS — Z85.72 PERSONAL HISTORY OF NON-HODGKIN LYMPHOMAS: ICD-10-CM

## 2019-03-26 DIAGNOSIS — Z86.39 PERSONAL HISTORY OF OTHER ENDOCRINE, NUTRITIONAL AND METABOLIC DISEASE: ICD-10-CM

## 2019-03-26 DIAGNOSIS — F41.9 ANXIETY DISORDER, UNSPECIFIED: ICD-10-CM

## 2019-03-26 DIAGNOSIS — I25.10 ATHEROSCLEROTIC HEART DISEASE OF NATIVE CORONARY ARTERY W/OUT ANGINA PECTORIS: ICD-10-CM

## 2019-03-26 DIAGNOSIS — I10 ESSENTIAL (PRIMARY) HYPERTENSION: ICD-10-CM

## 2019-03-26 DIAGNOSIS — E78.5 HYPERLIPIDEMIA, UNSPECIFIED: ICD-10-CM

## 2019-03-26 DIAGNOSIS — F17.200 NICOTINE DEPENDENCE, UNSPECIFIED, UNCOMPLICATED: ICD-10-CM

## 2019-03-26 DIAGNOSIS — J44.9 CHRONIC OBSTRUCTIVE PULMONARY DISEASE, UNSPECIFIED: ICD-10-CM

## 2019-03-26 PROCEDURE — 99203 OFFICE O/P NEW LOW 30 MIN: CPT

## 2019-03-26 RX ORDER — CICLOPIROX 7.7 MG/G
0.77 GEL TOPICAL
Refills: 0 | Status: ACTIVE | COMMUNITY

## 2019-03-26 RX ORDER — NICOTINE 21 MG/24H
21 PATCH TRANSDERMAL
Refills: 0 | Status: DISCONTINUED | COMMUNITY
End: 2019-03-26

## 2019-03-26 RX ORDER — BUPROPION HYDROCHLORIDE 150 MG/1
150 TABLET, FILM COATED, EXTENDED RELEASE ORAL
Refills: 0 | Status: ACTIVE | COMMUNITY

## 2019-03-26 NOTE — DATA REVIEWED
[de-identified] : \par - An MRI of the lumbar spine was reviewed today from 2/26/19. He is found to have multilevel lumbar degenerative disc disease with severe stenosis worse at L3/4 and L4/5. There is prominent epidural fat which causes significant stenosis at L3.

## 2019-03-26 NOTE — PHYSICAL EXAM
[General Appearance - Alert] : alert [General Appearance - In No Acute Distress] : in no acute distress [General Appearance - Well Nourished] : well nourished [General Appearance - Well Developed] : well developed [Oriented To Time, Place, And Person] : oriented to person, place, and time [Restricted] : was restricted [Pain] : was painful [Antalgic] : antalgic [Intact] : all reflexes within normal limits bilaterally [FreeTextEntry1] : He ambulates with a cane.  [Straight-Leg Raise Test - Left] : straight leg raise of the left leg was negative [Straight-Leg Raise Test - Right] : straight leg raise  of the right leg was negative

## 2019-03-26 NOTE — HISTORY OF PRESENT ILLNESS
[de-identified] : He presents today for evaluation of lower back pain with neurogenic claudication and weakness in his legs. His back pain started 4 years ago and his condition has progressed since. He is unable to walk due to these symptoms. He has had 5 falls over the past year due to his leg weakness. He denies lower back pain and leg symptoms with sitting. He has difficulty getting up from a seated position due to back pain. He has trialed PT in the past without improvement. He has not had injection treatments. He notes pain with range of motion of the lumbar spine. It should be noted that 3 months ago he had a dental infection which was treated with IV and PO antibiotics. This injections has now cleared. He does not feel his symptoms have worsened when compared to how he felt 3 months ago prior to the dental issues. \par \par An MRI of the lumbar spine was reviewed today from 2/26/19. He is found to have multilevel lumbar degenerative disc disease with severe stenosis worse at L3/4 and L4/5. There is prominent epidural fat which causes significant stenosis at L3. \par

## 2019-08-15 ENCOUNTER — APPOINTMENT (OUTPATIENT)
Dept: NEUROSURGERY | Facility: CLINIC | Age: 79
End: 2019-08-15
Payer: MEDICARE

## 2019-08-15 VITALS — WEIGHT: 205 LBS | HEIGHT: 63 IN | BODY MASS INDEX: 36.32 KG/M2

## 2019-08-15 PROCEDURE — 99214 OFFICE O/P EST MOD 30 MIN: CPT

## 2019-08-19 NOTE — PHYSICAL EXAM
[FreeTextEntry1] : Constitutional: alert, in no acute distress, well nourished and well developed . He ambulates with a cane. \par Psychiatric: oriented to person, place, and time. \par Spine: \par Lumbar/sacral spine examination demonstrates. straight leg raise of the left leg was negative. straight leg raise of the right leg was negative. Flexion was restricted and was painful. Extension was restricted and was painful. \par Gait: antalgic \par Motor Exam: all motor groups within normal limits of strength and tone bilaterally. \par Sensory Exam: all sensory within normal limits bilaterally. \par Deep Tendon Reflexes: all reflexes within normal limits bilaterally

## 2019-08-19 NOTE — HISTORY OF PRESENT ILLNESS
[FreeTextEntry1] : Mr. Zaman presents today with continues neurogenic claudication affecting his legs. Since his last visit he has trialed PT and injection treatments without sustained relief. Recently he had an injection with Dr. Chang which provided him with minimal relief. He continues to ambulate with assistance. He feel's unsteady with ambulation. \par \par An MRI of the lumbar spine was from 2/26/19 showed multilevel lumbar degenerative disc disease with severe stenosis worse at L3/4 and L4/5. There is prominent epidural fat which causes significant stenosis at L3. \par \par

## 2019-08-19 NOTE — ASSESSMENT
[FreeTextEntry1] : At this point he is considering surgical intervention. He is a candidate for a decompressive laminectomy at L3/4 and L4/5 with posterior stabilization. I would like dynamic xrays for surgical planing. I will see him back in 2 weeks to finalize surgical intervention.

## 2019-09-14 ENCOUNTER — INPATIENT (INPATIENT)
Facility: HOSPITAL | Age: 79
LOS: 3 days | Discharge: HOME | End: 2019-09-18
Attending: INTERNAL MEDICINE | Admitting: INTERNAL MEDICINE
Payer: MEDICARE

## 2019-09-14 VITALS — SYSTOLIC BLOOD PRESSURE: 105 MMHG | HEART RATE: 94 BPM | DIASTOLIC BLOOD PRESSURE: 59 MMHG

## 2019-09-14 DIAGNOSIS — N18.3 CHRONIC KIDNEY DISEASE, STAGE 3 (MODERATE): ICD-10-CM

## 2019-09-14 DIAGNOSIS — Z96.653 PRESENCE OF ARTIFICIAL KNEE JOINT, BILATERAL: Chronic | ICD-10-CM

## 2019-09-14 LAB
ALBUMIN SERPL ELPH-MCNC: 4.3 G/DL — SIGNIFICANT CHANGE UP (ref 3.5–5.2)
ALP SERPL-CCNC: 77 U/L — SIGNIFICANT CHANGE UP (ref 30–115)
ALT FLD-CCNC: 16 U/L — SIGNIFICANT CHANGE UP (ref 0–41)
ANION GAP SERPL CALC-SCNC: 12 MMOL/L — SIGNIFICANT CHANGE UP (ref 7–14)
ANION GAP SERPL CALC-SCNC: 13 MMOL/L — SIGNIFICANT CHANGE UP (ref 7–14)
ANISOCYTOSIS BLD QL: SIGNIFICANT CHANGE UP
APTT BLD: 24.7 SEC — LOW (ref 27–39.2)
AST SERPL-CCNC: 43 U/L — HIGH (ref 0–41)
BASE EXCESS BLDV CALC-SCNC: 0.8 MMOL/L — SIGNIFICANT CHANGE UP (ref -2–2)
BASOPHILS # BLD AUTO: 0 K/UL — SIGNIFICANT CHANGE UP (ref 0–0.2)
BASOPHILS NFR BLD AUTO: 0 % — SIGNIFICANT CHANGE UP (ref 0–1)
BILIRUB SERPL-MCNC: 0.4 MG/DL — SIGNIFICANT CHANGE UP (ref 0.2–1.2)
BUN SERPL-MCNC: 24 MG/DL — HIGH (ref 10–20)
BUN SERPL-MCNC: 27 MG/DL — HIGH (ref 10–20)
CA-I SERPL-SCNC: 1.2 MMOL/L — SIGNIFICANT CHANGE UP (ref 1.12–1.3)
CALCIUM SERPL-MCNC: 8.7 MG/DL — SIGNIFICANT CHANGE UP (ref 8.5–10.1)
CALCIUM SERPL-MCNC: 9.6 MG/DL — SIGNIFICANT CHANGE UP (ref 8.5–10.1)
CHLORIDE SERPL-SCNC: 101 MMOL/L — SIGNIFICANT CHANGE UP (ref 98–110)
CHLORIDE SERPL-SCNC: 96 MMOL/L — LOW (ref 98–110)
CK SERPL-CCNC: 177 U/L — SIGNIFICANT CHANGE UP (ref 0–225)
CK SERPL-CCNC: 287 U/L — HIGH (ref 0–225)
CO2 SERPL-SCNC: 25 MMOL/L — SIGNIFICANT CHANGE UP (ref 17–32)
CO2 SERPL-SCNC: 26 MMOL/L — SIGNIFICANT CHANGE UP (ref 17–32)
CREAT SERPL-MCNC: 1.6 MG/DL — HIGH (ref 0.7–1.5)
CREAT SERPL-MCNC: 2.1 MG/DL — HIGH (ref 0.7–1.5)
ELLIPTOCYTES BLD QL SMEAR: SLIGHT — SIGNIFICANT CHANGE UP
EOSINOPHIL # BLD AUTO: 0 K/UL — SIGNIFICANT CHANGE UP (ref 0–0.7)
EOSINOPHIL NFR BLD AUTO: 0 % — SIGNIFICANT CHANGE UP (ref 0–8)
ETHANOL SERPL-MCNC: <10 MG/DL — SIGNIFICANT CHANGE UP
GAS PNL BLDV: 134 MMOL/L — LOW (ref 136–145)
GAS PNL BLDV: SIGNIFICANT CHANGE UP
GIANT PLATELETS BLD QL SMEAR: PRESENT — SIGNIFICANT CHANGE UP
GLUCOSE BLDC GLUCOMTR-MCNC: 102 MG/DL — HIGH (ref 70–99)
GLUCOSE BLDC GLUCOMTR-MCNC: 83 MG/DL — SIGNIFICANT CHANGE UP (ref 70–99)
GLUCOSE SERPL-MCNC: 162 MG/DL — HIGH (ref 70–99)
GLUCOSE SERPL-MCNC: 99 MG/DL — SIGNIFICANT CHANGE UP (ref 70–99)
HCO3 BLDV-SCNC: 31 MMOL/L — HIGH (ref 22–29)
HCT VFR BLD CALC: 48.5 % — SIGNIFICANT CHANGE UP (ref 42–52)
HCT VFR BLDA CALC: 41.2 % — SIGNIFICANT CHANGE UP (ref 34–44)
HGB BLD CALC-MCNC: 13.4 G/DL — LOW (ref 14–18)
HGB BLD-MCNC: 15.2 G/DL — SIGNIFICANT CHANGE UP (ref 14–18)
INR BLD: 1.02 RATIO — SIGNIFICANT CHANGE UP (ref 0.65–1.3)
LACTATE BLDV-MCNC: 2.5 MMOL/L — HIGH (ref 0.5–1.6)
LACTATE SERPL-SCNC: 1.4 MMOL/L — SIGNIFICANT CHANGE UP (ref 0.5–2.2)
LACTATE SERPL-SCNC: 2.6 MMOL/L — HIGH (ref 0.5–2.2)
LIDOCAIN IGE QN: 25 U/L — SIGNIFICANT CHANGE UP (ref 7–60)
LYMPHOCYTES # BLD AUTO: 0.93 K/UL — LOW (ref 1.2–3.4)
LYMPHOCYTES # BLD AUTO: 9.6 % — LOW (ref 20.5–51.1)
MANUAL SMEAR VERIFICATION: SIGNIFICANT CHANGE UP
MCHC RBC-ENTMCNC: 27.3 PG — SIGNIFICANT CHANGE UP (ref 27–31)
MCHC RBC-ENTMCNC: 31.3 G/DL — LOW (ref 32–37)
MCV RBC AUTO: 87.1 FL — SIGNIFICANT CHANGE UP (ref 80–94)
MICROCYTES BLD QL: SIGNIFICANT CHANGE UP
MONOCYTES # BLD AUTO: 0.75 K/UL — HIGH (ref 0.1–0.6)
MONOCYTES NFR BLD AUTO: 7.8 % — SIGNIFICANT CHANGE UP (ref 1.7–9.3)
MYELOCYTES NFR BLD: 0.9 % — HIGH (ref 0–0)
NEUTROPHILS # BLD AUTO: 7.72 K/UL — HIGH (ref 1.4–6.5)
NEUTROPHILS NFR BLD AUTO: 78.3 % — HIGH (ref 42.2–75.2)
NEUTS BAND # BLD: 1.7 % — SIGNIFICANT CHANGE UP (ref 0–6)
OVALOCYTES BLD QL SMEAR: SLIGHT — SIGNIFICANT CHANGE UP
PCO2 BLDV: 74 MMHG — HIGH (ref 41–51)
PH BLDV: 7.22 — LOW (ref 7.26–7.43)
PLAT MORPH BLD: NORMAL — SIGNIFICANT CHANGE UP
PLATELET # BLD AUTO: 213 K/UL — SIGNIFICANT CHANGE UP (ref 130–400)
PO2 BLDV: 23 MMHG — SIGNIFICANT CHANGE UP (ref 20–40)
POIKILOCYTOSIS BLD QL AUTO: SIGNIFICANT CHANGE UP
POLYCHROMASIA BLD QL SMEAR: SIGNIFICANT CHANGE UP
POTASSIUM BLDV-SCNC: 4 MMOL/L — SIGNIFICANT CHANGE UP (ref 3.3–5.6)
POTASSIUM SERPL-MCNC: 4.4 MMOL/L — SIGNIFICANT CHANGE UP (ref 3.5–5)
POTASSIUM SERPL-MCNC: 7.5 MMOL/L — CRITICAL HIGH (ref 3.5–5)
POTASSIUM SERPL-SCNC: 4.4 MMOL/L — SIGNIFICANT CHANGE UP (ref 3.5–5)
POTASSIUM SERPL-SCNC: 7.5 MMOL/L — CRITICAL HIGH (ref 3.5–5)
PROT SERPL-MCNC: 6.9 G/DL — SIGNIFICANT CHANGE UP (ref 6–8)
PROTHROM AB SERPL-ACNC: 11.7 SEC — SIGNIFICANT CHANGE UP (ref 9.95–12.87)
RBC # BLD: 5.57 M/UL — SIGNIFICANT CHANGE UP (ref 4.7–6.1)
RBC # FLD: 16 % — HIGH (ref 11.5–14.5)
RBC BLD AUTO: ABNORMAL
SAO2 % BLDV: 39 % — SIGNIFICANT CHANGE UP
SODIUM SERPL-SCNC: 134 MMOL/L — LOW (ref 135–146)
SODIUM SERPL-SCNC: 139 MMOL/L — SIGNIFICANT CHANGE UP (ref 135–146)
TROPONIN T SERPL-MCNC: 0.02 NG/ML — HIGH
TROPONIN T SERPL-MCNC: 0.04 NG/ML — CRITICAL HIGH
VARIANT LYMPHS # BLD: 1.7 % — SIGNIFICANT CHANGE UP (ref 0–5)
WBC # BLD: 9.65 K/UL — SIGNIFICANT CHANGE UP (ref 4.8–10.8)
WBC # FLD AUTO: 9.65 K/UL — SIGNIFICANT CHANGE UP (ref 4.8–10.8)

## 2019-09-14 PROCEDURE — 70450 CT HEAD/BRAIN W/O DYE: CPT | Mod: 26

## 2019-09-14 PROCEDURE — 93306 TTE W/DOPPLER COMPLETE: CPT | Mod: 26

## 2019-09-14 PROCEDURE — 72170 X-RAY EXAM OF PELVIS: CPT | Mod: 26

## 2019-09-14 PROCEDURE — 71045 X-RAY EXAM CHEST 1 VIEW: CPT | Mod: 26

## 2019-09-14 PROCEDURE — 93010 ELECTROCARDIOGRAM REPORT: CPT

## 2019-09-14 PROCEDURE — 72125 CT NECK SPINE W/O DYE: CPT | Mod: 26

## 2019-09-14 PROCEDURE — 99291 CRITICAL CARE FIRST HOUR: CPT

## 2019-09-14 PROCEDURE — 74177 CT ABD & PELVIS W/CONTRAST: CPT | Mod: 26,59

## 2019-09-14 PROCEDURE — 70486 CT MAXILLOFACIAL W/O DYE: CPT | Mod: 26

## 2019-09-14 PROCEDURE — 99223 1ST HOSP IP/OBS HIGH 75: CPT

## 2019-09-14 PROCEDURE — 71260 CT THORAX DX C+: CPT | Mod: 26,59

## 2019-09-14 RX ORDER — TETANUS TOXOID, REDUCED DIPHTHERIA TOXOID AND ACELLULAR PERTUSSIS VACCINE, ADSORBED 5; 2.5; 8; 8; 2.5 [IU]/.5ML; [IU]/.5ML; UG/.5ML; UG/.5ML; UG/.5ML
0.5 SUSPENSION INTRAMUSCULAR ONCE
Refills: 0 | Status: COMPLETED | OUTPATIENT
Start: 2019-09-14 | End: 2019-09-14

## 2019-09-14 RX ORDER — HEPARIN SODIUM 5000 [USP'U]/ML
5000 INJECTION INTRAVENOUS; SUBCUTANEOUS EVERY 8 HOURS
Refills: 0 | Status: DISCONTINUED | OUTPATIENT
Start: 2019-09-14 | End: 2019-09-18

## 2019-09-14 RX ORDER — BUPROPION HYDROCHLORIDE 150 MG/1
300 TABLET, EXTENDED RELEASE ORAL DAILY
Refills: 0 | Status: DISCONTINUED | OUTPATIENT
Start: 2019-09-14 | End: 2019-09-18

## 2019-09-14 RX ORDER — VENLAFAXINE HCL 75 MG
0 CAPSULE, EXT RELEASE 24 HR ORAL
Qty: 0 | Refills: 0 | DISCHARGE

## 2019-09-14 RX ORDER — LOSARTAN POTASSIUM 100 MG/1
100 TABLET, FILM COATED ORAL DAILY
Refills: 0 | Status: DISCONTINUED | OUTPATIENT
Start: 2019-09-14 | End: 2019-09-14

## 2019-09-14 RX ORDER — ACETAMINOPHEN 500 MG
650 TABLET ORAL EVERY 6 HOURS
Refills: 0 | Status: DISCONTINUED | OUTPATIENT
Start: 2019-09-14 | End: 2019-09-18

## 2019-09-14 RX ORDER — SODIUM CHLORIDE 9 MG/ML
1000 INJECTION INTRAMUSCULAR; INTRAVENOUS; SUBCUTANEOUS ONCE
Refills: 0 | Status: COMPLETED | OUTPATIENT
Start: 2019-09-14 | End: 2019-09-14

## 2019-09-14 RX ORDER — GLUCAGON INJECTION, SOLUTION 0.5 MG/.1ML
1 INJECTION, SOLUTION SUBCUTANEOUS ONCE
Refills: 0 | Status: DISCONTINUED | OUTPATIENT
Start: 2019-09-14 | End: 2019-09-18

## 2019-09-14 RX ORDER — SODIUM CHLORIDE 9 MG/ML
1000 INJECTION, SOLUTION INTRAVENOUS
Refills: 0 | Status: DISCONTINUED | OUTPATIENT
Start: 2019-09-14 | End: 2019-09-18

## 2019-09-14 RX ORDER — VENLAFAXINE HCL 75 MG
1 CAPSULE, EXT RELEASE 24 HR ORAL
Qty: 0 | Refills: 0 | DISCHARGE

## 2019-09-14 RX ORDER — HYDROCHLOROTHIAZIDE 25 MG
25 TABLET ORAL DAILY
Refills: 0 | Status: DISCONTINUED | OUTPATIENT
Start: 2019-09-14 | End: 2019-09-14

## 2019-09-14 RX ORDER — DEXTROSE 50 % IN WATER 50 %
12.5 SYRINGE (ML) INTRAVENOUS ONCE
Refills: 0 | Status: DISCONTINUED | OUTPATIENT
Start: 2019-09-14 | End: 2019-09-18

## 2019-09-14 RX ORDER — DEXTROSE 50 % IN WATER 50 %
15 SYRINGE (ML) INTRAVENOUS ONCE
Refills: 0 | Status: DISCONTINUED | OUTPATIENT
Start: 2019-09-14 | End: 2019-09-18

## 2019-09-14 RX ORDER — FOLIC ACID 0.8 MG
1 TABLET ORAL DAILY
Refills: 0 | Status: DISCONTINUED | OUTPATIENT
Start: 2019-09-14 | End: 2019-09-18

## 2019-09-14 RX ORDER — SODIUM CHLORIDE 9 MG/ML
1000 INJECTION, SOLUTION INTRAVENOUS ONCE
Refills: 0 | Status: COMPLETED | OUTPATIENT
Start: 2019-09-14 | End: 2019-09-14

## 2019-09-14 RX ORDER — PANTOPRAZOLE SODIUM 20 MG/1
40 TABLET, DELAYED RELEASE ORAL
Refills: 0 | Status: DISCONTINUED | OUTPATIENT
Start: 2019-09-14 | End: 2019-09-18

## 2019-09-14 RX ORDER — PREGABALIN 225 MG/1
1000 CAPSULE ORAL DAILY
Refills: 0 | Status: DISCONTINUED | OUTPATIENT
Start: 2019-09-14 | End: 2019-09-18

## 2019-09-14 RX ORDER — NICOTINE POLACRILEX 2 MG
1 GUM BUCCAL DAILY
Refills: 0 | Status: DISCONTINUED | OUTPATIENT
Start: 2019-09-14 | End: 2019-09-18

## 2019-09-14 RX ORDER — SODIUM CHLORIDE 9 MG/ML
1000 INJECTION INTRAMUSCULAR; INTRAVENOUS; SUBCUTANEOUS
Refills: 0 | Status: DISCONTINUED | OUTPATIENT
Start: 2019-09-14 | End: 2019-09-16

## 2019-09-14 RX ORDER — BUDESONIDE AND FORMOTEROL FUMARATE DIHYDRATE 160; 4.5 UG/1; UG/1
2 AEROSOL RESPIRATORY (INHALATION)
Refills: 0 | Status: DISCONTINUED | OUTPATIENT
Start: 2019-09-14 | End: 2019-09-18

## 2019-09-14 RX ORDER — IPRATROPIUM/ALBUTEROL SULFATE 18-103MCG
3 AEROSOL WITH ADAPTER (GRAM) INHALATION EVERY 6 HOURS
Refills: 0 | Status: DISCONTINUED | OUTPATIENT
Start: 2019-09-14 | End: 2019-09-18

## 2019-09-14 RX ORDER — INFLUENZA VIRUS VACCINE 15; 15; 15; 15 UG/.5ML; UG/.5ML; UG/.5ML; UG/.5ML
0.5 SUSPENSION INTRAMUSCULAR ONCE
Refills: 0 | Status: DISCONTINUED | OUTPATIENT
Start: 2019-09-14 | End: 2019-09-18

## 2019-09-14 RX ORDER — DEXTROSE 50 % IN WATER 50 %
25 SYRINGE (ML) INTRAVENOUS ONCE
Refills: 0 | Status: DISCONTINUED | OUTPATIENT
Start: 2019-09-14 | End: 2019-09-18

## 2019-09-14 RX ORDER — SIMVASTATIN 20 MG/1
20 TABLET, FILM COATED ORAL AT BEDTIME
Refills: 0 | Status: DISCONTINUED | OUTPATIENT
Start: 2019-09-14 | End: 2019-09-18

## 2019-09-14 RX ORDER — NALOXONE HYDROCHLORIDE 4 MG/.1ML
0.04 SPRAY NASAL ONCE
Refills: 0 | Status: COMPLETED | OUTPATIENT
Start: 2019-09-14 | End: 2019-09-14

## 2019-09-14 RX ORDER — INSULIN LISPRO 100/ML
VIAL (ML) SUBCUTANEOUS
Refills: 0 | Status: DISCONTINUED | OUTPATIENT
Start: 2019-09-14 | End: 2019-09-18

## 2019-09-14 RX ORDER — TRAZODONE HCL 50 MG
0 TABLET ORAL
Qty: 0 | Refills: 0 | DISCHARGE

## 2019-09-14 RX ORDER — TAMSULOSIN HYDROCHLORIDE 0.4 MG/1
0.4 CAPSULE ORAL AT BEDTIME
Refills: 0 | Status: DISCONTINUED | OUTPATIENT
Start: 2019-09-14 | End: 2019-09-18

## 2019-09-14 RX ORDER — CLONAZEPAM 1 MG
0.5 TABLET ORAL EVERY 6 HOURS
Refills: 0 | Status: DISCONTINUED | OUTPATIENT
Start: 2019-09-14 | End: 2019-09-18

## 2019-09-14 RX ORDER — METFORMIN HYDROCHLORIDE 850 MG/1
1 TABLET ORAL
Qty: 0 | Refills: 0 | DISCHARGE

## 2019-09-14 RX ORDER — CHLORHEXIDINE GLUCONATE 213 G/1000ML
1 SOLUTION TOPICAL
Refills: 0 | Status: DISCONTINUED | OUTPATIENT
Start: 2019-09-14 | End: 2019-09-18

## 2019-09-14 RX ADMIN — SODIUM CHLORIDE 75 MILLILITER(S): 9 INJECTION INTRAMUSCULAR; INTRAVENOUS; SUBCUTANEOUS at 22:03

## 2019-09-14 RX ADMIN — TETANUS TOXOID, REDUCED DIPHTHERIA TOXOID AND ACELLULAR PERTUSSIS VACCINE, ADSORBED 0.5 MILLILITER(S): 5; 2.5; 8; 8; 2.5 SUSPENSION INTRAMUSCULAR at 11:13

## 2019-09-14 RX ADMIN — NALOXONE HYDROCHLORIDE 0.04 MILLIGRAM(S): 4 SPRAY NASAL at 18:55

## 2019-09-14 RX ADMIN — SODIUM CHLORIDE 75 MILLILITER(S): 9 INJECTION INTRAMUSCULAR; INTRAVENOUS; SUBCUTANEOUS at 13:35

## 2019-09-14 RX ADMIN — SIMVASTATIN 20 MILLIGRAM(S): 20 TABLET, FILM COATED ORAL at 22:03

## 2019-09-14 RX ADMIN — SODIUM CHLORIDE 1000 MILLILITER(S): 9 INJECTION INTRAMUSCULAR; INTRAVENOUS; SUBCUTANEOUS at 10:31

## 2019-09-14 RX ADMIN — SODIUM CHLORIDE 2000 MILLILITER(S): 9 INJECTION, SOLUTION INTRAVENOUS at 21:21

## 2019-09-14 RX ADMIN — SODIUM CHLORIDE 1000 MILLILITER(S): 9 INJECTION, SOLUTION INTRAVENOUS at 11:15

## 2019-09-14 RX ADMIN — TAMSULOSIN HYDROCHLORIDE 0.4 MILLIGRAM(S): 0.4 CAPSULE ORAL at 22:03

## 2019-09-14 NOTE — ED ADULT NURSE REASSESSMENT NOTE - NS ED NURSE REASSESS COMMENT FT1
PT remains a&Ox4, Vs obtained and stable, BP is improving. One bolus of NS and one bolus of LR was given. CT scans were complete pending results.

## 2019-09-14 NOTE — ED ADULT NURSE NOTE - NSIMPLEMENTINTERV_GEN_ALL_ED
Implemented All Fall with Harm Risk Interventions:  [place] to call system. Call bell, personal items and telephone within reach. Instruct patient to call for assistance. Room bathroom lighting operational. Non-slip footwear when patient is off stretcher. Physically safe environment: no spills, clutter or unnecessary equipment. Stretcher in lowest position, wheels locked, appropriate side rails in place. Provide visual cue, wrist band, yellow gown, etc. Monitor gait and stability. Monitor for mental status changes and reorient to person, place, and time. Review medications for side effects contributing to fall risk. Reinforce activity limits and safety measures with patient and family. Provide visual clues: red socks.

## 2019-09-14 NOTE — ED ADULT NURSE REASSESSMENT NOTE - NS ED NURSE REASSESS COMMENT FT1
PT came in s/p fall, unknown down time at home. PT is A&Ox4, VS obtained and stable. Labs were drawn and sent. Xray was completed.

## 2019-09-14 NOTE — H&P ADULT - NSHPPHYSICALEXAM_GEN_ALL_CORE
Vital Signs Last 24 Hrs  T(C): 36.3 (14 Sep 2019 12:00), Max: 36.3 (14 Sep 2019 12:00)  T(F): 97.4 (14 Sep 2019 12:00), Max: 97.4 (14 Sep 2019 12:00)  HR: 88 (14 Sep 2019 12:00) (82 - 98)  BP: 125/60 (14 Sep 2019 12:00) (77/57 - 125/60)  BP(mean): 90 (14 Sep 2019 09:56) (73 - 90)  RR: 18 (14 Sep 2019 12:00) (18 - 54)  SpO2: 99% (14 Sep 2019 12:00) (95% - 99%)      PHYSICAL EXAM:  GENERAL: NAD, speaks in full sentences, no signs of respiratory distress, on room air  HEAD:  traumatic, nosebleed, and blood clots in oral cavity  EYES: pinpoint pupils  NECK: Supple   CHEST/LUNG: Clear to auscultation bilaterally; No wheeze; No crackles; No accessory muscles used  HEART: Regular rate and rhythm; No murmurs;   ABDOMEN: Soft, Nontender, Nondistended; No guarding  EXTREMITIES:  2+ Peripheral Pulses, No cyanosis, mild bilateral pitting edema  PSYCH: AAOx3  NEUROLOGY: non-focal  SKIN: No rashes or lesions

## 2019-09-14 NOTE — ED PROVIDER NOTE - OBJECTIVE STATEMENT
Patient has a hx of HTN, DM, CLL (last chemo 2 months ago), found down at home by family for unknown amount of time. He has history of frequent falls in the past. Family found him, called EMS. He was altered, not responding well to questions. En route, he was give intranasal narcan, which improved his responsiveness to questions. He endorsed use of unknown "illegal opioids" orally yesterday night. He denied any pain, shortness of breath, fever, chills, nausea, vomiting.

## 2019-09-14 NOTE — ED PROVIDER NOTE - CARE PLAN
Principal Discharge DX:	Elevated troponin  Secondary Diagnosis:	Nasal bone fracture  Secondary Diagnosis:	CHANA (acute kidney injury)  Secondary Diagnosis:	Opiate abuse, episodic

## 2019-09-14 NOTE — ED PROVIDER NOTE - NS ED ROS FT
Constitutional: (-) fever  Eyes/ENT: (-) blurry vision, (-) epistaxis  Cardiovascular: (-) chest pain, (-) syncope  Respiratory: (-) cough, (-) shortness of breath  Gastrointestinal: (-) vomiting, (-) diarrhea  Musculoskeletal: (-) neck pain, (-) back pain, (-) joint pain  Integumentary: (-) rash, (-) edema  Neurological: (-) headache, (+) altered mental status  Psychiatric: (-) hallucinations  Allergic/Immunologic: (-) pruritus

## 2019-09-14 NOTE — ED PROVIDER NOTE - CLINICAL SUMMARY MEDICAL DECISION MAKING FREE TEXT BOX
80yo M history of HTN DM CLL on chemo followed by Dr. Neel TEJADA sp fall- unknown downtime, no anticoagulation/antiplatelets. Per EMS, pt was found down by family at home, non ambulatory, confused, initially hypotensive 80s/palp, given narcan, mild improvement. Currently pt with no complaints. +head injury, unknown LOC, no pain, no numbness/focal weakness. labs imaging reviewed. will admit 80yo M history of HTN DM CLL on chemo followed by Dr. Neel melissa fall- unknown downtime, no anticoagulation/antiplatelets. Per EMS, pt was found down by family at home, non ambulatory, confused, initially hypotensive 80s/palp, given narcan, mild improvement. Currently pt with no complaints. +head injury, unknown LOC, no pain, no numbness/focal weakness. labs imaging reviewed. dw trauma, cleared for medicine admit. VSS. will admit

## 2019-09-14 NOTE — CONSULT NOTE ADULT - ATTENDING COMMENTS
78 yo male patient   s/p fall, unwitnessed.  Blood in his face for nasal bone fracture and facial contrusion.  No other obvious acute injuries.  Pan scan negative.    a/p;  s/p fall.  Nasal bone fx.  No other acute trauma injuries.  Admit to medicine.  Trauma reevaluation.

## 2019-09-14 NOTE — CONSULT NOTE ADULT - ASSESSMENT
Patient is a 78 y/o with PMHx of HTN, DM Type II, HLD, CLL, HLD, chronic back pain, depression, and illegal narcotics use ( take Opiods for sleep- unprescribed, could not name drug nor quantify dose), that presented to [facility] after being found down at home in an altered state. Patient notes he lives with his son but takes care of himself. He notes no recent illness and has no direct complaints during interview. He does not recall falling and as per EMS he was found by family. Fall was unwitnessed with no noted LOC. He was found altered and given 2mg of Narcan in which h returned to baseline.  Patient is a poor historian but is alert and oriented and participates in interview. Family was not present nor available to supplement. Declined to come to hospital as per EMS. In Trauma  area patient relatively stable with preserved hemodynamics.    S/P Fall/ evidence of dried blood B/L nasal passages to suggested falling forward with no evidence of trauma to the posterior aspect of the body  - U Tox  - Repeat Potassium with Mg  - CT scans advised including head, face, abdomen ( given free fluid), spine  - C-Collar placed   - LR hydration   - Will follow Patient is a 78 y/o with PMHx of HTN, DM Type II, HLD, CLL, HLD, chronic back pain, depression, and illegal narcotics use ( take Opiods for sleep- unprescribed, could not name drug nor quantify dose), that presented to [facility] after being found down at home in an altered state. Patient notes he lives with his son but takes care of himself. He notes no recent illness and has no direct complaints during interview. He does not recall falling and as per EMS he was found by family. Fall was unwitnessed with no noted LOC. He was found altered and given 2mg of Narcan in which h returned to baseline.  Patient is a poor historian but is alert and oriented and participates in interview. Family was not present nor available to supplement. Declined to come to hospital as per EMS. In Trauma  area patient relatively stable with preserved hemodynamics.    S/P Fall/ evidence of dried blood B/L nasal passages to suggested falling forward with no evidence of trauma to the posterior aspect of the body    PLAN:  - no acute traumatic injuries requiring any intervention. Cleared from Trauma, Dispo per ED.

## 2019-09-14 NOTE — H&P ADULT - NSICDXPASTMEDICALHX_GEN_ALL_CORE_FT
PAST MEDICAL HISTORY:  Anxiety     CLL (chronic lymphocytic leukemia)     Depression     DM (diabetes mellitus)     HTN (hypertension)     Non-Hodgkin lymphoma

## 2019-09-14 NOTE — H&P ADULT - NSHPLABSRESULTS_GEN_ALL_CORE
Labs:                            15.2   9.65  )-----------( 213      ( 14 Sep 2019 10:01 )             48.5       09-14    134<L>  |  96<L>  |  24<H>  ----------------------------<  162<H>  7.5<HH>   |  25  |  2.1<H>    Ca    9.6      14 Sep 2019 10:01    TPro  6.9  /  Alb  4.3  /  TBili  0.4  /  DBili  x   /  AST  43<H>  /  ALT  16  /  AlkPhos  77  09-14          PT/INR - ( 14 Sep 2019 10:01 )   PT: 11.70 sec;   INR: 1.02 ratio         PTT - ( 14 Sep 2019 10:01 )  PTT:24.7 sec    Lactate Trend  09-14 @ 10:01 Lactate:2.6       CARDIAC MARKERS ( 14 Sep 2019 10:01 )  x     / 0.04 ng/mL / 287 U/L / x     / x            CAPILLARY BLOOD GLUCOSE      POCT Blood Glucose.: 126 mg/dL (14 Sep 2019 09:51)      < from: CT Chest w/ IV Cont (09.14.19 @ 11:22) >    IMPRESSION:  Subacute/chronic left lower rib fractures. Otherwise no CT evidence of   acute traumatic injury in the chest abdomen or pelvis.    < end of copied text >      < from: CT Maxillofacial No Cont (09.14.19 @ 11:17) >    IMPRESSION:    Acute bilateral nasal bone fractures with displacement of the distal   fracture fragments and adjacent soft tissue swelling.    < end of copied text >        < from: CT Cervical Spine No Cont (09.14.19 @ 11:15) >    IMPRESSION:    In comparison with the prior noncontrast CT scan of the cervical spine   dated January 16, 2019:    Stable examination.    No evidence of acute cervical spine fracture or subluxation.    Multilevel degenerative changes of the cervical spine, most pronounced at   C4 through C7.    < end of copied text >

## 2019-09-14 NOTE — ED ADULT NURSE NOTE - OBJECTIVE STATEMENT
Pt found on the floor, ams, FOUND BY FAMILY. uNKNOWN how long pt was on the floor. Pt has bloody nose and mouth and face. Pt is confused, trauma alert called. [id number]

## 2019-09-14 NOTE — CONSULT NOTE ADULT - SUBJECTIVE AND OBJECTIVE BOX
Trauma Alert    Patient is a 80 y/o with PMHx of HTN, DM Type II, HLD, CLL, HLD, chronic back pain, depression, and illegal narcotics use ( take Opiods for sleep- unprescribed, could not name drug nor quantify dose), that presented to [facility] after being found down at home in an altered state. Patient notes he lives with his son but takes care of himself. He notes no recent illness and has no direct complaints during interview. He does not recall falling and as per EMS he was found by family. Fall was unwitnessed with no noted LOC. He was found altered and given 2mg of Narcan in which h returned to baseline.  Patient is a poor historian but is alert and oriented and participates in interview. Family was not present nor available to supplement. Declined to come to hospital as per EMS.     MECHANISM OF INJURY:   	[x] Fall	        PAST MEDICAL & SURGICAL HISTORY:  CLL (chronic lymphocytic leukemia)  Depression  HTN (hypertension)  DM (diabetes mellitus)  Anxiety  H/O total knee replacement, bilateral      Allergies  No Known Allergies        Home Medications:  clonazePAM 0.5 mg oral tablet: orally every 6 hours (16 Jan 2019 10:59)  folic acid 1 mg oral tablet: 1 tab(s) orally once a day (16 Jan 2019 10:59)  hydroCHLOROthiazide 25 mg oral tablet: 1 tab(s) orally once a day (16 Jan 2019 10:59)  irbesartan 300 mg oral tablet: 1 tab(s) orally once a day (16 Jan 2019 10:59)  metFORMIN 500 mg oral tablet: 1 tab(s) orally 2 times a day (16 Jan 2019 10:59)  oxyCODONE 5 mg oral tablet: 1 tab(s) orally every 6 hours (18 Jan 2019 14:00)  simvastatin 20 mg oral tablet: 1 tab(s) orally once a day (at bedtime) (16 Jan 2019 10:59)  traZODone 100 mg oral tablet: orally once a day (16 Jan 2019 10:59)  venlafaxine 150 mg oral capsule, extended release: 1 cap(s) orally once a day (16 Jan 2019 10:59)  venlafaxine 75 mg oral tablet: orally once a day (16 Jan 2019 10:59)  Vitamin B12 500 mcg oral tablet: 1 tab(s) orally once a day (16 Jan 2019 10:59)  Vitamin D3:  (16 Jan 2019 10:59)      ROS: 10-system review is otherwise negative except HPI above.    Primary Survey:    A - airway intact  B - bilateral breath sounds and good chest rise  C - palpable pulses in all extremities  D - GCS 15 on arrival, BANUELOS  Exposure obtained    Vital Signs:  HR: 98  BP: 105/83   RR: 26  SpO2: 96%     Secondary Survey:   General: NAD  HEENT: Normocephalic, no scalp lacerations or hematoma, dried blood seen B/L nasal passages   Neck: Soft, midline trachea. no cspine tenderness  Chest: No chest wall tenderness. or subq  emphysema   Cardiac: S1, S2, RRR  Respiratory: Bilateral breath sounds, clear and equal bilaterally  Abdomen: Soft, non-distended, non-tender, no rebound,   Groin: Normal appearing, pelvis stable   Ext: palp radial b/l UE, b/l DP palp in Lower Extrem.   Back: no TTP, no palpable runoff/stepoff/deformity  Rectal: No salvatore blood, CLARITA with good tone brown stool       POCT Blood Glucose.: 126 mg/dL (14 Sep 2019 09:51)                          15.2   9.65  )-----------( 213      ( 14 Sep 2019 10:01 )             48.5         09-14    134<L>  |  96<L>  |  24<H>  ----------------------------<  162<H>  x    |  25  |  2.1<H>      Calcium, Total Serum: 9.6 mg/dL (09-14-19 @ 10:01)      LFTs:             6.9  | 0.4  | 43       ------------------[77      ( 14 Sep 2019 10:01 )  4.3  | x    | 16          Lipase:25        Lactate, Blood: 2.6 mmol/L (09-14-19 @ 10:01)      Coags:     11.70  ----< 1.02    ( 14 Sep 2019 10:01 )     24.7        CARDIAC MARKERS ( 14 Sep 2019 10:01 )  x     / 0.04 ng/mL / 287 U/L / x     / x Trauma Alert    Patient is a 80 y/o with PMHx of HTN, DM Type II, HLD, CLL, HLD, chronic back pain, depression, and illegal narcotics use ( take Opiods for sleep- unprescribed, could not name drug nor quantify dose), that presented to [facility] after being found down at home in an altered state. Patient notes he lives with his son but takes care of himself. He notes no recent illness and has no direct complaints during interview. He does not recall falling and as per EMS he was found by family. Fall was unwitnessed with no noted LOC. He was found altered and given 2mg of Narcan in which h returned to baseline.  Patient is a poor historian but is alert and oriented and participates in interview. Family was not present nor available to supplement. Declined to come to hospital as per EMS.     MECHANISM OF INJURY:   	[x] Fall	    PAST MEDICAL & SURGICAL HISTORY:  CLL (chronic lymphocytic leukemia)  Depression  HTN (hypertension)  DM (diabetes mellitus)  Anxiety  H/O total knee replacement, bilateral    Allergies  No Known Allergies    Home Medications:  clonazePAM 0.5 mg oral tablet: orally every 6 hours (16 Jan 2019 10:59)  folic acid 1 mg oral tablet: 1 tab(s) orally once a day (16 Jan 2019 10:59)  hydroCHLOROthiazide 25 mg oral tablet: 1 tab(s) orally once a day (16 Jan 2019 10:59)  irbesartan 300 mg oral tablet: 1 tab(s) orally once a day (16 Jan 2019 10:59)  metFORMIN 500 mg oral tablet: 1 tab(s) orally 2 times a day (16 Jan 2019 10:59)  oxyCODONE 5 mg oral tablet: 1 tab(s) orally every 6 hours (18 Jan 2019 14:00)  simvastatin 20 mg oral tablet: 1 tab(s) orally once a day (at bedtime) (16 Jan 2019 10:59)  traZODone 100 mg oral tablet: orally once a day (16 Jan 2019 10:59)  venlafaxine 150 mg oral capsule, extended release: 1 cap(s) orally once a day (16 Jan 2019 10:59)  venlafaxine 75 mg oral tablet: orally once a day (16 Jan 2019 10:59)  Vitamin B12 500 mcg oral tablet: 1 tab(s) orally once a day (16 Jan 2019 10:59)  Vitamin D3:  (16 Jan 2019 10:59)      ROS: 10-system review is otherwise negative except HPI above.    Primary Survey:    A - airway intact  B - bilateral breath sounds and good chest rise  C - palpable pulses in all extremities  D - GCS 15 on arrival, BANUELOS  Exposure obtained    Vital Signs:  HR: 98  BP: 105/83   RR: 26  SpO2: 96%     Secondary Survey:   General: NAD  HEENT: Normocephalic, no scalp lacerations or hematoma, dried blood seen B/L nasal passages   Neck: Soft, midline trachea. no cspine tenderness  Chest: No chest wall tenderness. or subq  emphysema   Cardiac: S1, S2, RRR  Respiratory: Bilateral breath sounds, clear and equal bilaterally  Abdomen: Soft, non-distended, non-tender, no rebound,   Groin: Normal appearing, pelvis stable   Ext: palp radial b/l UE, b/l DP palp in Lower Extrem.   Back: no TTP, no palpable runoff/stepoff/deformity  Rectal: No salvatore blood, CLARITA with good tone brown stool       POCT Blood Glucose.: 126 mg/dL (14 Sep 2019 09:51)                          15.2   9.65  )-----------( 213      ( 14 Sep 2019 10:01 )             48.5         09-14    134<L>  |  96<L>  |  24<H>  ----------------------------<  162<H>  x    |  25  |  2.1<H>      Calcium, Total Serum: 9.6 mg/dL (09-14-19 @ 10:01)      LFTs:             6.9  | 0.4  | 43       ------------------[77      ( 14 Sep 2019 10:01 )  4.3  | x    | 16          Lipase:25        Lactate, Blood: 2.6 mmol/L (09-14-19 @ 10:01)      Coags:     11.70  ----< 1.02    ( 14 Sep 2019 10:01 )     24.7        CARDIAC MARKERS ( 14 Sep 2019 10:01 )  x     / 0.04 ng/mL / 287 U/L / x     / x        IMAGING:  CT Head No Cont (01.16.19 @ 11:38) >  IMPRESSION:   1.  Periventricular and subcortical white matter chronic small vessel   ischemic changes and old lacunar infarcts as described above.  2.  No acute fractures or mass effect, midline shift or intracranial   hemorrhage.      CT Maxillofacial No Cont (09.14.19 @ 11:17) >  IMPRESSION:  Acute bilateral nasal bone fractures with adjacent soft tissue swelling.    CT Cervical Spine No Cont (09.14.19 @ 11:15) >  IMPRESSION:   No evidence of acute cervical spine fracture or subluxation.  Multilevel degenerative changes of the cervical spine, most pronounced at   C4 through C7.    CT Chest w/ IV Cont (09.14.19 @ 11:22) >  IMPRESSION:  Subacute/chronic left lower rib fractures. Otherwise no CT evidence of   acute traumatic injury in the chest abdomen or pelvis.    CT Abdomen and Pelvis w/ IV Cont (09.14.19 @ 11:22) >  IMPRESSION:  Subacute/chronic left lower rib fractures. Otherwise no CT evidence of   acute traumatic injury in the chest abdomen or pelvis.

## 2019-09-14 NOTE — ED PROVIDER NOTE - PHYSICAL EXAMINATION
Vital Signs: I have reviewed the initial vital signs.  Constitutional: NAD, well-nourished, appears stated age, no acute distress.  HEENT: Airway patent, moist MM, dried blood on face and in the mouth. No deformity of oral structures. EOMI, PERRLA, 2-3 mm bilaterally. No alford' sign, no raccoon eyes, no septal hematoma.   CV: regular rate, regular rhythm, well-perfused extremities, 2+ b/l DP and radial pulses equal.  Lungs: BCTA, no increased WOB.  ABD: NTND, no guarding or rebound, no pulsatile mass, no hernias.   MSK: Neck supple, nontender, nl ROM, no stepoff. Chest nontender. Back nontender in TLS spine or to b/l bony structures or flanks. Ext nontender, nl rom, no deformity.   INTEG: Skin warm, dry, no rash.  NEURO: A&Ox3, moving all extremities, responding to questions, normal speech  PSYCH: Calm, cooperative, normal affect and interaction.

## 2019-09-14 NOTE — H&P ADULT - HISTORY OF PRESENT ILLNESS
Mr. Zaman is a 77 yo male patient with a PMH of HTN, DM, depression, non-hodgkin lymphoma and CLL (on rituximab every 2 months with Dr. Shaikh, last chemotherapy 3 weeks PTP), spinal stenosis, frequent falls (last in January 2019 with a tooth embedded in the left cheek subcutaneous soft tissues with adjacent foci of gas and small phlegmon/developing abscess lateral to the tooth), SOFIA not compliant with CPAP, COPD, and illegal narcotics use (buy opiods for sleep- unprescribed, could not name drug nor quantify dose), was brought to [facility] after being found down at home in an altered state.    Patient notes he lives with his son but takes care of himself. He notes no recent illness and has no direct complaints during interview. He does not recall falling and as per EMS he was found by family. Fall was unwitnessed with no noted LOC. He was found altered with pinpoint pupils and given 2mg of Narcan by EMS after which he returned to baseline.  Patient is a poor historian but is alert and oriented and participates in interview. Declined to come to hospital as per EMS.     Patient noted chronic back pain and knees pain. Sister at bedside noted frequent falls in the last year. He denies chest pain, SOB, or cough at this time. No headache. Patient noted that his depression is controlled, however when asked sister said that he is depressed. He does not do his usual activities like watching movies, as he did not pay his bills. He denies any suicidal ideation.     In ED, patient was hypotensive initially, was given 2 L of NSS with stabilization of BP. VBG showed acidosis, hyperkalemia. Patient is on room air, saturating well. Patient will be admitted to medical floor.

## 2019-09-14 NOTE — H&P ADULT - ATTENDING COMMENTS
#Progress Note Handoff    Pending (specify):  Trending troponin tele monitoring for 24 hours minimum. Doubt MI. Possible medication over dose   Family discussion: D/W patient. now AAAO X 3   Disposition: Unknown at this time, need PT / Rehab

## 2019-09-15 LAB
ALBUMIN SERPL ELPH-MCNC: 3.4 G/DL — LOW (ref 3.5–5.2)
ALP SERPL-CCNC: 72 U/L — SIGNIFICANT CHANGE UP (ref 30–115)
ALT FLD-CCNC: 12 U/L — SIGNIFICANT CHANGE UP (ref 0–41)
ANION GAP SERPL CALC-SCNC: 10 MMOL/L — SIGNIFICANT CHANGE UP (ref 7–14)
AST SERPL-CCNC: 14 U/L — SIGNIFICANT CHANGE UP (ref 0–41)
BASOPHILS # BLD AUTO: 0.05 K/UL — SIGNIFICANT CHANGE UP (ref 0–0.2)
BASOPHILS NFR BLD AUTO: 0.8 % — SIGNIFICANT CHANGE UP (ref 0–1)
BILIRUB SERPL-MCNC: 0.4 MG/DL — SIGNIFICANT CHANGE UP (ref 0.2–1.2)
BUN SERPL-MCNC: 24 MG/DL — HIGH (ref 10–20)
CALCIUM SERPL-MCNC: 8.7 MG/DL — SIGNIFICANT CHANGE UP (ref 8.5–10.1)
CHLORIDE SERPL-SCNC: 106 MMOL/L — SIGNIFICANT CHANGE UP (ref 98–110)
CK SERPL-CCNC: 127 U/L — SIGNIFICANT CHANGE UP (ref 0–225)
CO2 SERPL-SCNC: 25 MMOL/L — SIGNIFICANT CHANGE UP (ref 17–32)
CREAT SERPL-MCNC: 1.2 MG/DL — SIGNIFICANT CHANGE UP (ref 0.7–1.5)
EOSINOPHIL # BLD AUTO: 0.13 K/UL — SIGNIFICANT CHANGE UP (ref 0–0.7)
EOSINOPHIL NFR BLD AUTO: 2.2 % — SIGNIFICANT CHANGE UP (ref 0–8)
GLUCOSE BLDC GLUCOMTR-MCNC: 107 MG/DL — HIGH (ref 70–99)
GLUCOSE BLDC GLUCOMTR-MCNC: 110 MG/DL — HIGH (ref 70–99)
GLUCOSE BLDC GLUCOMTR-MCNC: 118 MG/DL — HIGH (ref 70–99)
GLUCOSE BLDC GLUCOMTR-MCNC: 95 MG/DL — SIGNIFICANT CHANGE UP (ref 70–99)
GLUCOSE SERPL-MCNC: 103 MG/DL — HIGH (ref 70–99)
HCT VFR BLD CALC: 40.5 % — LOW (ref 42–52)
HGB BLD-MCNC: 12.7 G/DL — LOW (ref 14–18)
IMM GRANULOCYTES NFR BLD AUTO: 5.1 % — HIGH (ref 0.1–0.3)
LYMPHOCYTES # BLD AUTO: 0.6 K/UL — LOW (ref 1.2–3.4)
LYMPHOCYTES # BLD AUTO: 10.1 % — LOW (ref 20.5–51.1)
MAGNESIUM SERPL-MCNC: 1.7 MG/DL — LOW (ref 1.8–2.4)
MCHC RBC-ENTMCNC: 26.9 PG — LOW (ref 27–31)
MCHC RBC-ENTMCNC: 31.4 G/DL — LOW (ref 32–37)
MCV RBC AUTO: 85.8 FL — SIGNIFICANT CHANGE UP (ref 80–94)
MONOCYTES # BLD AUTO: 0.48 K/UL — SIGNIFICANT CHANGE UP (ref 0.1–0.6)
MONOCYTES NFR BLD AUTO: 8.1 % — SIGNIFICANT CHANGE UP (ref 1.7–9.3)
NEUTROPHILS # BLD AUTO: 4.37 K/UL — SIGNIFICANT CHANGE UP (ref 1.4–6.5)
NEUTROPHILS NFR BLD AUTO: 73.7 % — SIGNIFICANT CHANGE UP (ref 42.2–75.2)
NRBC # BLD: 0 /100 WBCS — SIGNIFICANT CHANGE UP (ref 0–0)
PHOSPHATE SERPL-MCNC: 3.3 MG/DL — SIGNIFICANT CHANGE UP (ref 2.1–4.9)
PLATELET # BLD AUTO: 194 K/UL — SIGNIFICANT CHANGE UP (ref 130–400)
POTASSIUM SERPL-MCNC: 4.3 MMOL/L — SIGNIFICANT CHANGE UP (ref 3.5–5)
POTASSIUM SERPL-SCNC: 4.3 MMOL/L — SIGNIFICANT CHANGE UP (ref 3.5–5)
PROT SERPL-MCNC: 5.2 G/DL — LOW (ref 6–8)
RBC # BLD: 4.72 M/UL — SIGNIFICANT CHANGE UP (ref 4.7–6.1)
RBC # FLD: 15.9 % — HIGH (ref 11.5–14.5)
SODIUM SERPL-SCNC: 141 MMOL/L — SIGNIFICANT CHANGE UP (ref 135–146)
TROPONIN T SERPL-MCNC: <0.01 NG/ML — SIGNIFICANT CHANGE UP
WBC # BLD: 5.93 K/UL — SIGNIFICANT CHANGE UP (ref 4.8–10.8)
WBC # FLD AUTO: 5.93 K/UL — SIGNIFICANT CHANGE UP (ref 4.8–10.8)

## 2019-09-15 PROCEDURE — 99223 1ST HOSP IP/OBS HIGH 75: CPT

## 2019-09-15 RX ADMIN — TAMSULOSIN HYDROCHLORIDE 0.4 MILLIGRAM(S): 0.4 CAPSULE ORAL at 21:55

## 2019-09-15 RX ADMIN — HEPARIN SODIUM 5000 UNIT(S): 5000 INJECTION INTRAVENOUS; SUBCUTANEOUS at 21:55

## 2019-09-15 RX ADMIN — Medication 1 MILLIGRAM(S): at 11:07

## 2019-09-15 RX ADMIN — BUPROPION HYDROCHLORIDE 300 MILLIGRAM(S): 150 TABLET, EXTENDED RELEASE ORAL at 11:08

## 2019-09-15 RX ADMIN — BUDESONIDE AND FORMOTEROL FUMARATE DIHYDRATE 2 PUFF(S): 160; 4.5 AEROSOL RESPIRATORY (INHALATION) at 08:17

## 2019-09-15 RX ADMIN — HEPARIN SODIUM 5000 UNIT(S): 5000 INJECTION INTRAVENOUS; SUBCUTANEOUS at 14:18

## 2019-09-15 RX ADMIN — Medication 1 PATCH: at 19:43

## 2019-09-15 RX ADMIN — Medication 3 MILLILITER(S): at 08:58

## 2019-09-15 RX ADMIN — HEPARIN SODIUM 5000 UNIT(S): 5000 INJECTION INTRAVENOUS; SUBCUTANEOUS at 06:03

## 2019-09-15 RX ADMIN — Medication 3 MILLILITER(S): at 20:18

## 2019-09-15 RX ADMIN — BUDESONIDE AND FORMOTEROL FUMARATE DIHYDRATE 2 PUFF(S): 160; 4.5 AEROSOL RESPIRATORY (INHALATION) at 22:27

## 2019-09-15 RX ADMIN — Medication 1 PATCH: at 11:08

## 2019-09-15 RX ADMIN — PREGABALIN 1000 MICROGRAM(S): 225 CAPSULE ORAL at 11:07

## 2019-09-15 RX ADMIN — SIMVASTATIN 20 MILLIGRAM(S): 20 TABLET, FILM COATED ORAL at 21:55

## 2019-09-15 RX ADMIN — PANTOPRAZOLE SODIUM 40 MILLIGRAM(S): 20 TABLET, DELAYED RELEASE ORAL at 06:03

## 2019-09-15 NOTE — PROGRESS NOTE ADULT - ASSESSMENT
Patient is a 80 y/o with PMHx of HTN, DM Type II, HLD, CLL, HLD, chronic back pain, depression, and illegal narcotics use ( take Opiods for sleep- unprescribed, could not name drug nor quantify dose), that presented to [facility] after being found down at home in an altered state.    Plan:   - no acute traumatic intervention   - admitted to medicine service

## 2019-09-15 NOTE — PROGRESS NOTE ADULT - SUBJECTIVE AND OBJECTIVE BOX
GENERAL SURGERY PROGRESS NOTE     CR OWEN  79y  Male  Hospital day :1d  POD:  Procedure:   OVERNIGHT EVENTS: No acute events. Patient's pain is improved. Pain is worst in lower back (chronic pain). No nausea or vomiting     T(F): 97.1 (09-14-19 @ 23:50), Max: 98.5 (09-14-19 @ 22:15)  HR: 89 (09-14-19 @ 23:50) (82 - 98)  BP: 133/81 (09-14-19 @ 23:50) (77/57 - 153/79)  ABP: --  ABP(mean): --  RR: 18 (09-14-19 @ 23:50) (18 - 54)  SpO2: 99% (09-14-19 @ 23:50) (95% - 99%)    DIET/FLUIDS: cyanocobalamin 1000 MICROGram(s) Oral daily  dextrose 5%. 1000 milliLiter(s) IV Continuous <Continuous>  folic acid 1 milliGRAM(s) Oral daily  sodium chloride 0.9%. 1000 milliLiter(s) IV Continuous <Continuous>    NG:                                                                              DRAINS:   BM:   EMESIS:   URINE:    GI proph:  pantoprazole    Tablet 40 milliGRAM(s) Oral before breakfast    AC/ proph: heparin  Injectable 5000 Unit(s) SubCutaneous every 8 hours    ABx:     PHYSICAL EXAM:  GENERAL: NAD, old blood around nose  CHEST/LUNG: Clear to auscultation bilaterally  HEART: Regular rate and rhythm  ABDOMEN: Soft, Nontender, Nondistended;   EXTREMITIES:  No clubbing, cyanosis, or edema      LABS  Labs:  CAPILLARY BLOOD GLUCOSE      POCT Blood Glucose.: 83 mg/dL (14 Sep 2019 21:05)  POCT Blood Glucose.: 102 mg/dL (14 Sep 2019 16:29)  POCT Blood Glucose.: 126 mg/dL (14 Sep 2019 09:51)                          15.2   9.65  )-----------( 213      ( 14 Sep 2019 10:01 )             48.5       Auto Neutrophil %: 78.3 % (09-14-19 @ 10:01)  Band Neutrophils %: 1.7 % (09-14-19 @ 10:01)    09-14    139  |  101  |  27<H>  ----------------------------<  99  4.4   |  26  |  1.6<H>      Calcium, Total Serum: 8.7 mg/dL (09-14-19 @ 16:59)      LFTs:             6.9  | 0.4  | 43       ------------------[77      ( 14 Sep 2019 10:01 )  4.3  | x    | 16          Lipase:25     Amylase:x         Lactate, Blood: 1.4 mmol/L (09-14-19 @ 16:59)  Blood Gas Venous - Lactate: 2.5 mmoL/L (09-14-19 @ 11:28)  Lactate, Blood: 2.6 mmol/L (09-14-19 @ 10:01)      Coags:     11.70  ----< 1.02    ( 14 Sep 2019 10:01 )     24.7        CARDIAC MARKERS ( 14 Sep 2019 16:59 )  x     / 0.02 ng/mL / 177 U/L / x     / x      CARDIAC MARKERS ( 14 Sep 2019 10:01 )  x     / 0.04 ng/mL / 287 U/L / x     / x            Alcohol, Blood: <10 mg/dL (09-14-19 @ 10:01)            RADIOLOGY & ADDITIONAL TESTS:  < from: CT Abdomen and Pelvis w/ IV Cont (09.14.19 @ 11:22) >  IMPRESSION:  Subacute/chronic left lower rib fractures. Otherwise no CT evidence of   acute traumatic injury in the chest abdomen or pelvis.    < end of copied text >  < from: CT Maxillofacial No Cont (09.14.19 @ 11:17) >  IMPRESSION:    Acute bilateral nasal bone fractures with displacement of the distal   fracture fragments and adjacent soft tissue swelling.    < end of copied text >        < from: CT Head No Cont (09.14.19 @ 11:14) >  IMPRESSION:     No CT evidence of acute intracranial hemorrhage. Stable examination since   1/16/2019.    Stable mild chronic microvascular ischemic changes and chronic lacunar   infarcts as above.    Acute nasal bone fractures better evaluated on concurrently performed   maxillofacial CT.    < end of copied text >

## 2019-09-15 NOTE — CHART NOTE - NSCHARTNOTEFT_GEN_A_CORE
Trauma tertiary Survey    Patient seen and examined. No complaints at this time.     PHYSICAL EXAM:  Craniofacial: Atraumatic, No deformity  Eyes: Pupil Size equal B/L and RTL. EOMI  Oropharynx: Atraumatic  Neck: Non-tender, No deformity, Trachea midline.  Chest: Equal breath sounds, non-tender, No deformity or crepitus  Heart: RSR, No murmurs, no rubs  Abdomen: Atraumatic, Non-tender, non-distended. No hepatosplenomegaly  Pelvis: Stable, non-tender, no deformity  Back: Spine non-tender, Atraumatic  Extremities: Atraumatic, no deformities, normal pulses, moving all extremities   Neurologic: CN intact, Motor intact throughout. Sensation intact/normal throughout.  Psych: Mood and affect normal. Judgment and insight normal    Alcohol, Blood: <10 mg/dL (09-14-19 @ 10:01)    CAGE SUBSTANCE ABUSE SCREENING TOOL:  1.	Have you ever felt you should cut down on your drinking?   [  ] YES = 1      [x  ] NO = 0  2.	Have people annoyed you by criticizing your drinking?    [  ] YES = 1      [ x ] NO = 0  3.	Have you ever felt bad or guilty about your drinking?   [  ] YES = 1      [ x ] NO = 0  4.	Have you ever had a drink first thing in the morning to steady your nerves or to get rid of a hangover (eye-opener)?    [  ] YES = 1      [ x ] NO = 0    Total =     [  ] Score is two or greater which is considered clinically significant, social work consult will be placed.   [ x ] Score is not two or greater which is not considered clinically significant, social work consult not warranted at this time.    All images/reports reviewed. No further traumatic work-up warranted.

## 2019-09-16 LAB
ESTIMATED AVERAGE GLUCOSE: 126 MG/DL — HIGH (ref 68–114)
GLUCOSE BLDC GLUCOMTR-MCNC: 110 MG/DL — HIGH (ref 70–99)
GLUCOSE BLDC GLUCOMTR-MCNC: 112 MG/DL — HIGH (ref 70–99)
GLUCOSE BLDC GLUCOMTR-MCNC: 117 MG/DL — HIGH (ref 70–99)
GLUCOSE BLDC GLUCOMTR-MCNC: 118 MG/DL — HIGH (ref 70–99)
HBA1C BLD-MCNC: 6 % — HIGH (ref 4–5.6)

## 2019-09-16 PROCEDURE — 99223 1ST HOSP IP/OBS HIGH 75: CPT

## 2019-09-16 PROCEDURE — 99222 1ST HOSP IP/OBS MODERATE 55: CPT

## 2019-09-16 PROCEDURE — 99233 SBSQ HOSP IP/OBS HIGH 50: CPT

## 2019-09-16 RX ORDER — METOPROLOL TARTRATE 50 MG
25 TABLET ORAL
Refills: 0 | Status: DISCONTINUED | OUTPATIENT
Start: 2019-09-16 | End: 2019-09-18

## 2019-09-16 RX ADMIN — PANTOPRAZOLE SODIUM 40 MILLIGRAM(S): 20 TABLET, DELAYED RELEASE ORAL at 05:52

## 2019-09-16 RX ADMIN — HEPARIN SODIUM 5000 UNIT(S): 5000 INJECTION INTRAVENOUS; SUBCUTANEOUS at 13:54

## 2019-09-16 RX ADMIN — Medication 25 MILLIGRAM(S): at 17:57

## 2019-09-16 RX ADMIN — BUDESONIDE AND FORMOTEROL FUMARATE DIHYDRATE 2 PUFF(S): 160; 4.5 AEROSOL RESPIRATORY (INHALATION) at 20:39

## 2019-09-16 RX ADMIN — Medication 1 PATCH: at 07:00

## 2019-09-16 RX ADMIN — PREGABALIN 1000 MICROGRAM(S): 225 CAPSULE ORAL at 11:33

## 2019-09-16 RX ADMIN — Medication 1 PATCH: at 19:07

## 2019-09-16 RX ADMIN — TAMSULOSIN HYDROCHLORIDE 0.4 MILLIGRAM(S): 0.4 CAPSULE ORAL at 22:05

## 2019-09-16 RX ADMIN — Medication 1 PATCH: at 18:04

## 2019-09-16 RX ADMIN — BUDESONIDE AND FORMOTEROL FUMARATE DIHYDRATE 2 PUFF(S): 160; 4.5 AEROSOL RESPIRATORY (INHALATION) at 09:51

## 2019-09-16 RX ADMIN — HEPARIN SODIUM 5000 UNIT(S): 5000 INJECTION INTRAVENOUS; SUBCUTANEOUS at 05:52

## 2019-09-16 RX ADMIN — BUPROPION HYDROCHLORIDE 300 MILLIGRAM(S): 150 TABLET, EXTENDED RELEASE ORAL at 11:33

## 2019-09-16 RX ADMIN — Medication 1 PATCH: at 11:34

## 2019-09-16 RX ADMIN — SIMVASTATIN 20 MILLIGRAM(S): 20 TABLET, FILM COATED ORAL at 22:04

## 2019-09-16 RX ADMIN — HEPARIN SODIUM 5000 UNIT(S): 5000 INJECTION INTRAVENOUS; SUBCUTANEOUS at 22:04

## 2019-09-16 RX ADMIN — Medication 1 MILLIGRAM(S): at 11:33

## 2019-09-16 NOTE — PROGRESS NOTE ADULT - SUBJECTIVE AND OBJECTIVE BOX
CR OWEN  79y Male    CHIEF COMPLAINT:    Patient is a 79y old  Male who presents with a chief complaint of Syncope (16 Sep 2019 08:28)      INTERVAL HPI/OVERNIGHT EVENTS:    Patient seen and examined.    ROS: All other systems are negative.    Vital Signs:    T(F): 97 (09-16-19 @ 05:58), Max: 97 (09-15-19 @ 20:38)  HR: 58 (09-16-19 @ 05:58) (58 - 84)  BP: 136/63 (09-16-19 @ 05:58) (116/71 - 144/78)  RR: 18 (09-16-19 @ 05:58) (18 - 18)  SpO2: --  I&O's Summary    15 Sep 2019 07:01  -  16 Sep 2019 07:00  --------------------------------------------------------  IN: 900 mL / OUT: 300 mL / NET: 600 mL    16 Sep 2019 07:01  -  16 Sep 2019 12:13  --------------------------------------------------------  IN: 210 mL / OUT: 500 mL / NET: -290 mL      Daily     Daily   CAPILLARY BLOOD GLUCOSE      POCT Blood Glucose.: 117 mg/dL (16 Sep 2019 11:20)  POCT Blood Glucose.: 110 mg/dL (16 Sep 2019 08:06)  POCT Blood Glucose.: 95 mg/dL (15 Sep 2019 21:41)  POCT Blood Glucose.: 107 mg/dL (15 Sep 2019 17:01)      PHYSICAL EXAM:    GENERAL:  NAD  SKIN: No rashes or lesions  HENT: Atrumatic. Normocephalic. PERRL. Moist membranes.  NECK: Supple, No JVD. No lymphadenopathy.  PULMONARY: CTA B/L. No wheezing. No rales  CVS: Normal S1, S2. Rate and Rythm are regular. No murmurs.  ABDOMEN/GI: Soft, Nontender, Nondistended; BS present  EXTREMITIES: Peripheral pulses intact. No edema B/L LE.  NEUROLOGIC:  No motor or sensory deficit.  PSYCH: Alert & oriented x 3    Consultant(s) Notes Reviewed:  [x ] YES  [ ] NO  Care Discussed with Consultants/Other Providers [ x] YES  [ ] NO    EKG reviewed  Telemetry reviewed    LABS:                        12.7   5.93  )-----------( 194      ( 15 Sep 2019 06:19 )             40.5     09-15    141  |  106  |  24<H>  ----------------------------<  103<H>  4.3   |  25  |  1.2    Ca    8.7      15 Sep 2019 06:19  Phos  3.3     09-15  Mg     1.7     09-15    TPro  5.2<L>  /  Alb  3.4<L>  /  TBili  0.4  /  DBili  x   /  AST  14  /  ALT  12  /  AlkPhos  72  09-15        Trop <0.01, CKMB --, , 09-15-19 @ 06:19  Trop 0.02, CKMB --, , 09-14-19 @ 16:59  Trop 0.04, CKMB --, , 09-14-19 @ 10:01        RADIOLOGY & ADDITIONAL TESTS:      Imaging or report Personally Reviewed:  [ ] YES  [ ] NO    Medications:  Standing  ALBUTerol/ipratropium for Nebulization 3 milliLiter(s) Nebulizer every 6 hours  buDESOnide 160 MICROgram(s)/formoterol 4.5 MICROgram(s) Inhaler 2 Puff(s) Inhalation two times a day  buPROPion XL . 300 milliGRAM(s) Oral daily  chlorhexidine 4% Liquid 1 Application(s) Topical <User Schedule>  cyanocobalamin 1000 MICROGram(s) Oral daily  dextrose 5%. 1000 milliLiter(s) IV Continuous <Continuous>  dextrose 50% Injectable 12.5 Gram(s) IV Push once  dextrose 50% Injectable 25 Gram(s) IV Push once  dextrose 50% Injectable 25 Gram(s) IV Push once  folic acid 1 milliGRAM(s) Oral daily  heparin  Injectable 5000 Unit(s) SubCutaneous every 8 hours  influenza   Vaccine 0.5 milliLiter(s) IntraMuscular once  insulin lispro (HumaLOG) corrective regimen sliding scale   SubCutaneous three times a day before meals  nicotine -  14 mG/24Hr(s) Patch 1 patch Transdermal daily  pantoprazole    Tablet 40 milliGRAM(s) Oral before breakfast  simvastatin 20 milliGRAM(s) Oral at bedtime  sodium chloride 0.9%. 1000 milliLiter(s) IV Continuous <Continuous>  tamsulosin 0.4 milliGRAM(s) Oral at bedtime    PRN Meds  acetaminophen   Tablet .. 650 milliGRAM(s) Oral every 6 hours PRN  clonazePAM  Tablet 0.5 milliGRAM(s) Oral every 6 hours PRN  dextrose 40% Gel 15 Gram(s) Oral once PRN  glucagon  Injectable 1 milliGRAM(s) IntraMuscular once PRN      Case discussed with resident    Care discussed with pt/family CR OWEN  79y Male    CHIEF COMPLAINT:    Patient is a 79y old  Male who presents with a chief complaint of Syncope (16 Sep 2019 08:28)      INTERVAL HPI/OVERNIGHT EVENTS:    Patient seen and examined. No cp. No palpitations. No sob. Pt states he took three red pills which he bought form the street for sleeping.     ROS: All other systems are negative.    Vital Signs:    T(F): 97 (09-16-19 @ 05:58), Max: 97 (09-15-19 @ 20:38)  HR: 58 (09-16-19 @ 05:58) (58 - 84)  BP: 136/63 (09-16-19 @ 05:58) (116/71 - 144/78)  RR: 18 (09-16-19 @ 05:58) (18 - 18)  SpO2: --  I&O's Summary    15 Sep 2019 07:01  -  16 Sep 2019 07:00  --------------------------------------------------------  IN: 900 mL / OUT: 300 mL / NET: 600 mL    16 Sep 2019 07:01  -  16 Sep 2019 12:13  --------------------------------------------------------  IN: 210 mL / OUT: 500 mL / NET: -290 mL      Daily     Daily   CAPILLARY BLOOD GLUCOSE      POCT Blood Glucose.: 117 mg/dL (16 Sep 2019 11:20)  POCT Blood Glucose.: 110 mg/dL (16 Sep 2019 08:06)  POCT Blood Glucose.: 95 mg/dL (15 Sep 2019 21:41)  POCT Blood Glucose.: 107 mg/dL (15 Sep 2019 17:01)      PHYSICAL EXAM:    GENERAL:  NAD  SKIN: No rashes or lesions  HENT: Atraumatic Normocephalic. PERRL. Moist membranes.  NECK: Supple, No JVD. No lymphadenopathy.  PULMONARY: CTA B/L. No wheezing. No rales  CVS: Normal S1, S2. Rate and Rhythm are regular. No murmurs.  ABDOMEN/GI: Soft, Nontender, Nondistended; BS present  EXTREMITIES: Peripheral pulses intact. No edema B/L LE.  NEUROLOGIC:  No motor or sensory deficit.  PSYCH: Alert & oriented x 3    Consultant(s) Notes Reviewed:  [x ] YES  [ ] NO  Care Discussed with Consultants/Other Providers [ x] YES  [ ] NO    EKG reviewed  Telemetry reviewed    LABS:                        12.7   5.93  )-----------( 194      ( 15 Sep 2019 06:19 )             40.5     09-15    141  |  106  |  24<H>  ----------------------------<  103<H>  4.3   |  25  |  1.2    Ca    8.7      15 Sep 2019 06:19  Phos  3.3     09-15  Mg     1.7     09-15    TPro  5.2<L>  /  Alb  3.4<L>  /  TBili  0.4  /  DBili  x   /  AST  14  /  ALT  12  /  AlkPhos  72  09-15        Trop <0.01, CKMB --, , 09-15-19 @ 06:19  Trop 0.02, CKMB --, , 09-14-19 @ 16:59  Trop 0.04, CKMB --, , 09-14-19 @ 10:01        RADIOLOGY & ADDITIONAL TESTS:      Imaging or report Personally Reviewed:  [ ] YES  [ ] NO    Medications:  Standing  ALBUTerol/ipratropium for Nebulization 3 milliLiter(s) Nebulizer every 6 hours  buDESOnide 160 MICROgram(s)/formoterol 4.5 MICROgram(s) Inhaler 2 Puff(s) Inhalation two times a day  buPROPion XL . 300 milliGRAM(s) Oral daily  chlorhexidine 4% Liquid 1 Application(s) Topical <User Schedule>  cyanocobalamin 1000 MICROGram(s) Oral daily  dextrose 5%. 1000 milliLiter(s) IV Continuous <Continuous>  dextrose 50% Injectable 12.5 Gram(s) IV Push once  dextrose 50% Injectable 25 Gram(s) IV Push once  dextrose 50% Injectable 25 Gram(s) IV Push once  folic acid 1 milliGRAM(s) Oral daily  heparin  Injectable 5000 Unit(s) SubCutaneous every 8 hours  influenza   Vaccine 0.5 milliLiter(s) IntraMuscular once  insulin lispro (HumaLOG) corrective regimen sliding scale   SubCutaneous three times a day before meals  nicotine -  14 mG/24Hr(s) Patch 1 patch Transdermal daily  pantoprazole    Tablet 40 milliGRAM(s) Oral before breakfast  simvastatin 20 milliGRAM(s) Oral at bedtime  sodium chloride 0.9%. 1000 milliLiter(s) IV Continuous <Continuous>  tamsulosin 0.4 milliGRAM(s) Oral at bedtime    PRN Meds  acetaminophen   Tablet .. 650 milliGRAM(s) Oral every 6 hours PRN  clonazePAM  Tablet 0.5 milliGRAM(s) Oral every 6 hours PRN  dextrose 40% Gel 15 Gram(s) Oral once PRN  glucagon  Injectable 1 milliGRAM(s) IntraMuscular once PRN      Case discussed with resident    Care discussed with pt/family

## 2019-09-16 NOTE — PROGRESS NOTE ADULT - ASSESSMENT
Mr. Zaman is a 79 yo male patient with a PMH of HTN, DM, depression, non-hodgkin lymphoma and CLL (on rituximab every 2 months with Dr. Shaikh, last chemotherapy 3 weeks PTP), spinal stenosis, frequent falls (last in January 2019 with a tooth embedded in the left cheek subcutaneous soft tissues with adjacent foci of gas and small phlegmon/developing abscess lateral to the tooth), SOFIA not compliant with CPAP, COPD, and illegal narcotics use (buy opioids for sleep- unprescribed, could not name drug nor quantify dose), was brought to [facility] after being found down at home in an altered state.    # Syncope + hypotension + CO2 retention, likely due opioid intoxication  - Illegal opoid use, pinpoint pupils, responding to Narcan  - Responded to fluid challenge.   - will continue IV hydration, and monitor respiratory status.   - Admission to telemetry  - TTE: Normal LV function, G1DD, limited study to evaluate severity of AS  - orthostatic BP  - ECG with NSR.   - avoiding opioids   - will repeat CK, K+, to rule out rhabdomyolysis.    # CHANA  - likely due to hypotension and dehydration  - started on IV hydration     # Acute bilateral nasal bone fractures with displacement of the distal fracture fragments and adjacent soft tissue swelling  - will obtain an evaluation by [facility]    # Depression  - no suicidal ideation  - on bupropion 150 BID, will continue  - might need an evaluation by psychiatry    # Elevated troponins  - no chest pain at this time  - ECG: NSR, no ischemic changes  - likely nonsignificant in the setting of CHANA. Will monitor    # CLL on chemotherapy   - Outpatient f/u with Oncology Dr. Shaikh   - on rituximab    # HTN   - withholding home meds (irbesartan and HCTZ) given hypotension    # DM   - withholding metformin  - will start on basal bolus once FS >180    # COPD  - no signs of exacerbation  - will continue home symbicort  - will add duonebs    # Frequent falls  - will request an evaluation by PT/physiatry Mr. Zaman is a 79 yo male patient with a PMH of HTN, DM, depression, non-hodgkin lymphoma and CLL (on rituximab every 2 months with Dr. Shaikh, last chemotherapy 3 weeks PTP), spinal stenosis, frequent falls (last in January 2019 with a tooth embedded in the left cheek subcutaneous soft tissues with adjacent foci of gas and small phlegmon/developing abscess lateral to the tooth), SOFIA not compliant with CPAP, COPD, and illegal narcotics use (buy opioids for sleep- unprescribed, could not name drug nor quantify dose), was brought to [facility] after being found down at home in an altered state.    # Syncope + hypotension + CO2 retention, likely due opioid intoxication  - Illegal opoid use, pinpoint pupils, responding to Narcan  - Responded to fluid challenge.   - will continue IV hydration, and monitor respiratory status.   - Admission to telemetry  - TTE: Normal LV function, G1DD, limited study to evaluate severity of AS  - orthostatic BP  - ECG with NSR.   - avoiding opioids   - will repeat CK, K+, to rule out rhabdomyolysis  - Consulting cardiology for episodes of tachy  - ENT eval for facial fractures    # CHANA  - likely due to hypotension and dehydration  - started on IV hydration     # Acute bilateral nasal bone fractures with displacement of the distal fracture fragments and adjacent soft tissue swelling  - will obtain an evaluation by [facility]    # Depression  - no suicidal ideation  - on bupropion 150 BID, will continue  - might need an evaluation by psychiatry    # Elevated troponins  - no chest pain at this time  - ECG: NSR, no ischemic changes  - likely nonsignificant in the setting of CHANA. Will monitor    # CLL on chemotherapy   - Outpatient f/u with Oncology Dr. Shaikh   - on rituximab    # HTN   - withholding home meds (irbesartan and HCTZ) given hypotension    # DM   - withholding metformin  - will start on basal bolus once FS >180    # COPD  - no signs of exacerbation  - will continue home symbicort  - will add duonebs    # Frequent falls  - will request an evaluation by PT/physiatry

## 2019-09-16 NOTE — CONSULT NOTE ADULT - ASSESSMENT
IMPRESSION: Rehab of  debility, lumbar spinal stenosis/LBP    PRECAUTIONS: [x  ] Cardiac  [  ] Respiratory  [  ] Seizures [  ] Contact Isolation  [  ] Droplet Isolation  [  ] Other    Weight Bearing Status:     RECOMMENDATION:    Out of Bed to Chair     DVT/Decubiti Prophylaxis    REHAB PLAN:     [  x ] Bedside P/T 3-5 times a week   [   ]   Bedside O/T  2-3 times a week             [   ] No Rehab Therapy Indicated                   [   ]  Speech Therapy   Conditioning/ROM                                    ADL  Bed Mobility                                               Conditioning/ROM  Transfers                                                     Bed Mobility  Sitting /Standing Balance                         Transfers                                        Gait Training                                               Sitting/Standing Balance  Stair Training [   ]Applicable                    Home equipment Eval                                                                        Splinting  [   ] Only      GOALS:   ADL   [ x  ]   Independent                    Transfers  [ x  ] Independent                          Ambulation  [x   ] Independent     [    ] With device                            [   ]  CG                                                         [   ]  CG                                                                  [   ] CG                            [    ] Min A                                                   [   ] Min A                                                              [   ] Min  A          DISCHARGE PLAN:   [   ]  Good candidate for Intensive Rehabilitation/Hospital based-[facility]A [facility]                                             Will tolerate 3hrs Intensive Rehab Daily                                       [ x   ]  Short Term Rehab in Skilled Nursing Facility                                       [    ]  Home with Outpatient or  services                                         [    ]  Possible Candidate for Intensive Hospital based Rehab

## 2019-09-16 NOTE — CONSULT NOTE ADULT - SUBJECTIVE AND OBJECTIVE BOX
HPI:  Mr. Zaman is a 79 yo male patient with a PMH of HTN, DM, depression, non-hodgkin lymphoma and CLL (on rituximab every 2 months with Dr. Shaikh, last chemotherapy 3 weeks PTP), spinal stenosis, frequent falls (last in January 2019 with a tooth embedded in the left cheek subcutaneous soft tissues with adjacent foci of gas and small phlegmon/developing abscess lateral to the tooth), SOFIA not compliant with CPAP, COPD, and illegal narcotics use (buy opiods for sleep- unprescribed, could not name drug nor quantify dose), was brought to [facility] after being found down at home in an altered state.    Patient notes he lives with his son but takes care of himself. He notes no recent illness and has no direct complaints during interview. He does not recall falling and as per EMS he was found by family. Fall was unwitnessed with no noted LOC. He was found altered with pinpoint pupils and given 2mg of Narcan by EMS after which he returned to baseline.  Patient is a poor historian but is alert and oriented and participates in interview. Declined to come to hospital as per EMS.     Patient noted chronic back pain and knees pain. Sister at bedside noted frequent falls in the last year. He denies chest pain, SOB, or cough at this time. No headache. Patient noted that his depression is controlled, however when asked sister said that he is depressed. He does not do his usual activities like watching movies, as he did not pay his bills. He denies any suicidal ideation.     In ED, patient was hypotensive initially, was given 2 L of NSS with stabilization of BP. VBG showed acidosis, hyperkalemia. Patient is on room air, saturating well. Patient will be admitted to medical floor. (14 Sep 2019 12:51)      PAST MEDICAL & SURGICAL HISTORY:  Non-Hodgkin lymphoma  CLL (chronic lymphocytic leukemia)  Depression  HTN (hypertension)  DM (diabetes mellitus)  Anxiety  H/O total knee replacement, bilateral      Hospital Course:  He is deconditioned and weak. He has lumbar spinal stenosis. He amb. 77 with a RW with cg assist.  TODAY'S SUBJECTIVE & REVIEW OF SYMPTOMS:     Constitutional WNL   Cardio WNL   Resp WNL   GI WNL  Heme WNL  Endo WNL  Skin WNL  MSK WNL  Neuro WNL  Cognitive WNL  Psych WNL      MEDICATIONS  (STANDING):  ALBUTerol/ipratropium for Nebulization 3 milliLiter(s) Nebulizer every 6 hours  buDESOnide 160 MICROgram(s)/formoterol 4.5 MICROgram(s) Inhaler 2 Puff(s) Inhalation two times a day  buPROPion XL . 300 milliGRAM(s) Oral daily  chlorhexidine 4% Liquid 1 Application(s) Topical <User Schedule>  cyanocobalamin 1000 MICROGram(s) Oral daily  dextrose 5%. 1000 milliLiter(s) (50 mL/Hr) IV Continuous <Continuous>  dextrose 50% Injectable 12.5 Gram(s) IV Push once  dextrose 50% Injectable 25 Gram(s) IV Push once  dextrose 50% Injectable 25 Gram(s) IV Push once  folic acid 1 milliGRAM(s) Oral daily  heparin  Injectable 5000 Unit(s) SubCutaneous every 8 hours  influenza   Vaccine 0.5 milliLiter(s) IntraMuscular once  insulin lispro (HumaLOG) corrective regimen sliding scale   SubCutaneous three times a day before meals  metoprolol tartrate 25 milliGRAM(s) Oral two times a day  nicotine -  14 mG/24Hr(s) Patch 1 patch Transdermal daily  pantoprazole    Tablet 40 milliGRAM(s) Oral before breakfast  simvastatin 20 milliGRAM(s) Oral at bedtime  sodium chloride 0.9%. 1000 milliLiter(s) (75 mL/Hr) IV Continuous <Continuous>  tamsulosin 0.4 milliGRAM(s) Oral at bedtime    MEDICATIONS  (PRN):  acetaminophen   Tablet .. 650 milliGRAM(s) Oral every 6 hours PRN Temp greater or equal to 38C (100.4F), Mild Pain (1 - 3), Moderate Pain (4 - 6)  clonazePAM  Tablet 0.5 milliGRAM(s) Oral every 6 hours PRN anxiety, agitation  dextrose 40% Gel 15 Gram(s) Oral once PRN Blood Glucose LESS THAN 70 milliGRAM(s)/deciliter  glucagon  Injectable 1 milliGRAM(s) IntraMuscular once PRN Glucose LESS THAN 70 milligrams/deciliter      FAMILY HISTORY:  No pertinent family history in first degree relatives      Allergies    No Known Allergies    Intolerances        SOCIAL HISTORY:    [  ] Etoh  [  ] Smoking  [  ] Substance abuse     Home Environment:  [  ] Home Alone  [  ] Lives with Family  [  ] Home Health Aid    Dwelling:  [  ] Apartment  [  ] Private House  [  ] Adult Home  [  ] Skilled Nursing Facility      [  ] Short Term  [  ] Long Term  [  ] Stairs       Elevator [  ]    FUNCTIONAL STATUS PTA: (Check all that apply)  Ambulation: [   ]Independent    [  ] Dependent     [  ] Non-Ambulatory  Assistive Device: [  ] SA Cane  [  ]  Q Cane  [  ] Walker  [  ]  Wheelchair  ADL : [  ] Independent  [  ]  Dependent       Vital Signs Last 24 Hrs  T(C): 37.4 (16 Sep 2019 14:44), Max: 37.4 (16 Sep 2019 14:44)  T(F): 99.3 (16 Sep 2019 14:44), Max: 99.3 (16 Sep 2019 14:44)  HR: 94 (16 Sep 2019 14:44) (58 - 94)  BP: 146/82 (16 Sep 2019 14:44) (136/63 - 146/82)  BP(mean): --  RR: 18 (16 Sep 2019 14:44) (18 - 18)  SpO2: --      PHYSICAL EXAM: Alert & Oriented X3  GENERAL: NAD, well-groomed, well-developed  HEAD:  Atraumatic, Normocephalic  EYES: EOMI, PERRLA, conjunctiva and sclera clear  NECK: Supple, No JVD, Normal thyroid  CHEST/LUNG: Clear to percussion bilaterally; No rales, rhonchi, wheezing, or rubs  HEART: Regular rate and rhythm; No murmurs, rubs, or gallops  ABDOMEN: Soft, Nontender, Nondistended; Bowel sounds present  EXTREMITIES:  2+ Peripheral Pulses, No clubbing, cyanosis, or edema    NERVOUS SYSTEM:  Cranial Nerves 2-12 intact [  ] Abnormal  [  ]  ROM: WFL all extremities [  ]  Abnormal [  ]  Motor Strength: WFL all extremities  [  ]  Abnormal [x  ]   5-/5 LE's  Sensation: intact to light touch [  ] Abnormal [  ]  Reflexes: Symmetric [  ]  Abnormal [  ]    FUNCTIONAL STATUS:  Bed Mobility: Independent [  ]  Supervision [  ]  Needs Assistance [  ]  N/A [  ]  Transfers: Independent [  ]  Supervision [  ]  Needs Assistance [  ]  N/A [  ]   Ambulation: Independent [  ]  Supervision [  ]  Needs Assistance [  ]  N/A [  ]  ADL: Independent [  ] Requires Assistance [  ] N/A [  ]      LABS:                        12.7   5.93  )-----------( 194      ( 15 Sep 2019 06:19 )             40.5     09-15    141  |  106  |  24<H>  ----------------------------<  103<H>  4.3   |  25  |  1.2    Ca    8.7      15 Sep 2019 06:19  Phos  3.3     09-15  Mg     1.7     09-15    TPro  5.2<L>  /  Alb  3.4<L>  /  TBili  0.4  /  DBili  x   /  AST  14  /  ALT  12  /  AlkPhos  72  09-15          RADIOLOGY & ADDITIONAL STUDIES:    Assesment:

## 2019-09-16 NOTE — CONSULT NOTE ADULT - SUBJECTIVE AND OBJECTIVE BOX
Chief Complaint: Syncope    HPI:  79 yo M with a PMH of HTN, DM, depression, non-hodgkin lymphoma and CLL (on rituximab every 2 months with Dr. Shaikh, last chemotherapy 3 weeks PTP), spinal stenosis, frequent falls (last in January 2019 with a tooth embedded in the left cheek subcutaneous soft tissues with adjacent foci of gas and small phlegmon/developing abscess lateral to the tooth), SOFIA not compliant with CPAP, COPD, and illegal narcotics use (buy opiods for sleep- unprescribed, could not name drug nor quantify dose), was brought to [facility] after being found down at home in an altered state.    Patient notes he lives with his son but takes care of himself. He notes no recent illness and has no direct complaints during interview. He does not recall falling and as per EMS he was found by family. Fall was unwitnessed with no noted LOC. He was found altered with pinpoint pupils and given 2mg of Narcan by EMS after which he returned to baseline.  Patient is a poor historian but is alert and oriented and participates in interview. Declined to come to hospital as per EMS.     Patient noted chronic back pain and knees pain. Sister at bedside noted frequent falls in the last year. He denies chest pain, SOB, or cough at this time. No headache. Patient noted that his depression is controlled, however when asked sister said that he is depressed. He does not do his usual activities like watching movies, as he did not pay his bills. He denies any suicidal ideation.     In ED, patient was hypotensive initially, was given 2 L of NSS with stabilization of BP. VBG showed acidosis, hyperkalemia. Patient is on room air, saturating well. Patient will be admitted to medical floor.       Telemetry: NSR, PAC's, ?brief runs of AF/AT, significant artifact      PAST MEDICAL & SURGICAL HISTORY:  Non-Hodgkin lymphoma  CLL (chronic lymphocytic leukemia)  Depression  HTN (hypertension)  DM (diabetes mellitus)  Anxiety  H/O total knee replacement, bilateral      SOCIAL HISTORY: Denies EtOH, smoking or drug use    Home Medications:  budesonide-formoterol 160 mcg-4.5 mcg/inh inhalation aerosol: 2 puff(s) inhaled 2 times a day (14 Sep 2019 12:43)  buPROPion 150 mg/12 hours (SR) oral tablet, extended release: 1 tab(s) orally 2 times a day (14 Sep 2019 12:43)  clonazePAM 0.5 mg oral tablet: orally every 6 hours (14 Sep 2019 12:43)  folic acid 1 mg oral tablet: 1 tab(s) orally once a day (14 Sep 2019 12:43)  hydroCHLOROthiazide 25 mg oral tablet: 1 tab(s) orally once a day (14 Sep 2019 12:43)  irbesartan 300 mg oral tablet: 1 tab(s) orally once a day (14 Sep 2019 12:43)  metFORMIN 500 mg oral tablet: 1 tab(s) orally once a day (at bedtime) (14 Sep 2019 12:43)  simvastatin 20 mg oral tablet: 1 tab(s) orally once a day (at bedtime) (14 Sep 2019 12:43)  tamsulosin 0.4 mg oral capsule: 1 cap(s) orally once a day (14 Sep 2019 12:43)  Vitamin B12 500 mcg oral tablet: 1 tab(s) orally once a day (14 Sep 2019 12:43)  Vitamin D3:  (14 Sep 2019 12:43)      MEDICATIONS  (STANDING):  ALBUTerol/ipratropium for Nebulization 3 milliLiter(s) Nebulizer every 6 hours  buDESOnide 160 MICROgram(s)/formoterol 4.5 MICROgram(s) Inhaler 2 Puff(s) Inhalation two times a day  buPROPion XL . 300 milliGRAM(s) Oral daily  chlorhexidine 4% Liquid 1 Application(s) Topical <User Schedule>  cyanocobalamin 1000 MICROGram(s) Oral daily  dextrose 5%. 1000 milliLiter(s) (50 mL/Hr) IV Continuous <Continuous>  dextrose 50% Injectable 12.5 Gram(s) IV Push once  dextrose 50% Injectable 25 Gram(s) IV Push once  dextrose 50% Injectable 25 Gram(s) IV Push once  folic acid 1 milliGRAM(s) Oral daily  heparin  Injectable 5000 Unit(s) SubCutaneous every 8 hours  influenza   Vaccine 0.5 milliLiter(s) IntraMuscular once  insulin lispro (HumaLOG) corrective regimen sliding scale   SubCutaneous three times a day before meals  metoprolol tartrate 25 milliGRAM(s) Oral two times a day  nicotine -  14 mG/24Hr(s) Patch 1 patch Transdermal daily  pantoprazole    Tablet 40 milliGRAM(s) Oral before breakfast  simvastatin 20 milliGRAM(s) Oral at bedtime  sodium chloride 0.9%. 1000 milliLiter(s) (75 mL/Hr) IV Continuous <Continuous>  tamsulosin 0.4 milliGRAM(s) Oral at bedtime      REVIEW OF SYSTEMS:  CONSTITUTIONAL: No fever, weight loss, fatigue  NECK: No pain or stiffness  RESPIRATORY: No cough, wheezing, shortness of breath  CARDIOVASCULAR: See HPI  GASTROINTESTINAL: No abdominal/epigastric pain, nausea, vomiting, hematemesis, diarrhea, constipation, melena or hematochezia  GENITOURINARY: No dysuria, frequency, hematuria, incontinence  NEUROLOGICAL: No headaches, memory loss, loss of strength, numbness, tremors  SKIN: No itching, burning, rashes, lesions   ENDOCRINE: No heat/cold intolerance or hair loss  MUSCULOSKELETAL: No joint pain or swelling  HEME/LYMPH: No easy bruising or bleeding gums    Vital Signs Last 24 Hrs  T(C): 37.4 (16 Sep 2019 14:44), Max: 37.4 (16 Sep 2019 14:44)  T(F): 99.3 (16 Sep 2019 14:44), Max: 99.3 (16 Sep 2019 14:44)  HR: 94 (16 Sep 2019 14:44) (58 - 94)  BP: 146/82 (16 Sep 2019 14:44) (136/63 - 146/82)  BP(mean): --  RR: 18 (16 Sep 2019 14:44) (18 - 18)  SpO2: --    PHYSICAL EXAM:  General: NAD, AAOx3  HEENT: NCAT, EOMI  Neck: supple, no JVD  CV: irregular, S1S2 nl  Respiratory: CTA bilaterally, no wheeze, rales or rhonchi	  Abdomen: soft, NT/ND, +BS  Extremities: ROM nl, no clubbing, cyanosis or edema  Neuro: Nonfocal                            12.7   5.93  )-----------( 194      ( 15 Sep 2019 06:19 )             40.5         09-15    141  |  106  |  24<H>  ----------------------------<  103<H>  4.3   |  25  |  1.2    Ca    8.7      15 Sep 2019 06:19  Phos  3.3     09-15  Mg     1.7     09-15    TPro  5.2<L>  /  Alb  3.4<L>  /  TBili  0.4  /  DBili  x   /  AST  14  /  ALT  12  /  AlkPhos  72  09-15        < from: Transthoracic Echocardiogram (09.14.19 @ 14:50) >  Summary:   1. Normal global left ventricular systolic function.   2. Normal left ventricular internal cavity size.   3. Spectral Doppler shows impaired relaxation pattern of left   ventricular myocardial filling (Grade I diastolic dysfunction).   4. Grossly normal LV function on limited views to evaluate LV.   5. Mild mitral annular calcification.   6. Mild thickening and calcification of the anterior and posterior   mitral valve leaflets.   7. Sclerotic aortic valve with decreased opening.   8. Limited study to evaluate severity of AS.   9. Peak transaortic gradient equals 36.7 mmHg, mean transaortic gradient   equals 14.1 mmHg, the calculated aortic valve area equals 1.10 cm² by the   continuity equation consistent with moderate aortic stenosis.    < end of copied text >        < from: CT Chest w/ IV Cont (09.14.19 @ 11:22) >  FINDINGS:    CHEST:    LUNGS, PLEURA, AIRWAYS: Bilateral dependent atelectasis.  No pulmonary   consolidation pleural effusion or pneumothorax. There is a right sided   tracheal diverticulum (5/84). Left upper lobe calcified granuloma.    THORACIC NODES: No intrathoracic lymphadenopathy.    MEDIASTINUM/GREAT VESSELS: No pericardial effusion. Heart size is within   normal limits. The aorta and main pulmonary artery are of normal caliber.   Aortic valvular/mitral annular calcifications are seen. Nodular thyroid   gland.    ABDOMEN/PELVIS:    HEPATOBILIARY: Distended gallbladder.    SPLEEN: Unremarkable.    PANCREAS: Unremarkable.    ADRENAL GLANDS: Nodular thickening of the bilateral adrenal glands.    KIDNEYS: Symmetric renal enhancement. No hydronephrosis. Bilateral renal   cysts/subcentimeter hypodensities, too small to further characterize. 5   mm nonobstructing right renal calculus.    ABDOMINOPELVIC NODES: No lymphadenopathy.    PELVIC ORGANS: Prostatomegaly. Unremarkable urinary bladder.    PERITONEUM/MESENTERY/BOWEL: Periampullary duodenal diverticulum. No bowel   obstruction or pneumoperitoneum. Normal caliber appendix. No free air or   fluid collection.    BONES/SOFT TISSUES: Subacute/chronic fractures of the left posterior 11th   and 12th ribs -fracture lines are visualized with surrounding callus   formation. Degenerative changes of the spine. Loss of height of the L2   and L3 vertebral bodies, seen dating to 10/13/2017. Moderate-sized   fat-containing periumbilical hernia    OTHER: Right-sided chest wall Port-A-Cath terminating in the SVC.   Retroaortic left renal vein.      IMPRESSION:  Subacute/chronic left lower rib fractures. Otherwise no CT evidence of   acute traumatic injury in the chest abdomen or pelvis.    < end of copied text >

## 2019-09-16 NOTE — CONSULT NOTE ADULT - ASSESSMENT
Telemetry: SR  bpm, PAC's, ?brief runs of AF/AT, significant baseline artifact limits interpretation      Recommend:  - Start Metoprolol 25 mg PO BID  - Daily EKG  - EP consult to review strips and for possible event monitor

## 2019-09-16 NOTE — PROGRESS NOTE ADULT - SUBJECTIVE AND OBJECTIVE BOX
HPI  Patient is a 79y old Male who presents with a chief complaint of Syncope (15 Sep 2019 05:48)    Currently admitted to medicine with the primary diagnosis of Elevated troponin     Today is hospital day 2d.     INTERVAL HPI / OVERNIGHT EVENTS:  Patient was examined and seen at bedside. This morning he is resting comfortably in bed and reports no new issues or overnight events.     ROS: Otherwise unremarkable     PAST MEDICAL & SURGICAL HISTORY  Non-Hodgkin lymphoma  CLL (chronic lymphocytic leukemia)  Depression  HTN (hypertension)  DM (diabetes mellitus)  Anxiety  H/O total knee replacement, bilateral    ALLERGIES  No Known Allergies    MEDICATIONS  STANDING MEDICATIONS  ALBUTerol/ipratropium for Nebulization 3 milliLiter(s) Nebulizer every 6 hours  buDESOnide 160 MICROgram(s)/formoterol 4.5 MICROgram(s) Inhaler 2 Puff(s) Inhalation two times a day  buPROPion XL . 300 milliGRAM(s) Oral daily  chlorhexidine 4% Liquid 1 Application(s) Topical <User Schedule>  cyanocobalamin 1000 MICROGram(s) Oral daily  dextrose 5%. 1000 milliLiter(s) IV Continuous <Continuous>  dextrose 50% Injectable 12.5 Gram(s) IV Push once  dextrose 50% Injectable 25 Gram(s) IV Push once  dextrose 50% Injectable 25 Gram(s) IV Push once  folic acid 1 milliGRAM(s) Oral daily  heparin  Injectable 5000 Unit(s) SubCutaneous every 8 hours  influenza   Vaccine 0.5 milliLiter(s) IntraMuscular once  insulin lispro (HumaLOG) corrective regimen sliding scale   SubCutaneous three times a day before meals  nicotine -  14 mG/24Hr(s) Patch 1 patch Transdermal daily  pantoprazole    Tablet 40 milliGRAM(s) Oral before breakfast  simvastatin 20 milliGRAM(s) Oral at bedtime  sodium chloride 0.9%. 1000 milliLiter(s) IV Continuous <Continuous>  tamsulosin 0.4 milliGRAM(s) Oral at bedtime    PRN MEDICATIONS  acetaminophen   Tablet .. 650 milliGRAM(s) Oral every 6 hours PRN  clonazePAM  Tablet 0.5 milliGRAM(s) Oral every 6 hours PRN  dextrose 40% Gel 15 Gram(s) Oral once PRN  glucagon  Injectable 1 milliGRAM(s) IntraMuscular once PRN    VITALS:  T(F): 97  HR: 58  BP: 136/63  RR: 18  SpO2: 96%    PHYSICAL EXAM  GEN: NAD, Resting comfortably in bed  PULM: Clear to auscultation bilaterally, No wheezes  CVS: Regular rate and rhythm, S1-S2, no murmurs  ABD: Soft, non-tender, non-distended, no guarding  EXT: No edema  NEURO: AAOx3, no focal deficits    LABS                        12.7   5.93  )-----------( 194      ( 15 Sep 2019 06:19 )             40.5     09-15    141  |  106  |  24<H>  ----------------------------<  103<H>  4.3   |  25  |  1.2    Ca    8.7      15 Sep 2019 06:19  Phos  3.3     09-15  Mg     1.7     09-15    TPro  5.2<L>  /  Alb  3.4<L>  /  TBili  0.4  /  DBili  x   /  AST  14  /  ALT  12  /  AlkPhos  72  09-15    PT/INR - ( 14 Sep 2019 10:01 )   PT: 11.70 sec;   INR: 1.02 ratio         PTT - ( 14 Sep 2019 10:01 )  PTT:24.7 sec          CARDIAC MARKERS ( 15 Sep 2019 06:19 )  x     / <0.01 ng/mL / 127 U/L / x     / x      CARDIAC MARKERS ( 14 Sep 2019 16:59 )  x     / 0.02 ng/mL / 177 U/L / x     / x      CARDIAC MARKERS ( 14 Sep 2019 10:01 )  x     / 0.04 ng/mL / 287 U/L / x     / x          RADIOLOGY    < from: CT Abdomen and Pelvis w/ IV Cont (09.14.19 @ 11:22) >    IMPRESSION:  Subacute/chronic left lower rib fractures. Otherwise no CT evidence of   acute traumatic injury in the chest abdomen or pelvis.    < end of copied text >    < from: CT Chest w/ IV Cont (09.14.19 @ 11:22) >  IMPRESSION:  Subacute/chronic left lower rib fractures. Otherwise no CT evidence of   acute traumatic injury in the chest abdomen or pelvis.    < end of copied text >    < from: CT Maxillofacial No Cont (09.14.19 @ 11:17) >  IMPRESSION:    Acute bilateral nasal bone fractures with displacement of the distal   fracture fragments and adjacent soft tissue swelling.    < end of copied text >    < from: CT Cervical Spine No Cont (09.14.19 @ 11:15) >  IMPRESSION:    In comparison with the prior noncontrast CT scan of the cervical spine   dated January 16, 2019:    Stable examination.    No evidence of acute cervical spine fracture or subluxation.    Multilevel degenerative changes of the cervical spine, most pronounced at   C4 through C7.    < end of copied text >    < from: CT Head No Cont (09.14.19 @ 11:14) >  IMPRESSION:     No CT evidence of acute intracranial hemorrhage. Stable examination since   1/16/2019.    Stable mild chronic microvascular ischemic changes and chronic lacunar   infarcts as above.    Acute nasal bone fractures better evaluated on concurrently performed   maxillofacial CT.    < end of copied text >    < from: Xray Chest 1 View AP/PA (09.14.19 @ 10:34) >    IMPRESSION:      No radiographic evidence of acute cardiopulmonary disease.    < end of copied text >    < from: Xray Pelvis AP only (09.14.19 @ 10:33) >  mpression:    No evidence of acute fracture or dislocation.    < end of copied text >    Summary:   1. Normal global left ventricular systolic function.   2. Normal left ventricular internal cavity size.   3. Spectral Doppler shows impaired relaxation pattern of left   ventricular myocardial filling (Grade I diastolic dysfunction).   4. Gossly normal LV function on limited views to evaluate LV.   5. Mild mitral annular calcification.   6. Mild thickening and calcification of the anterior and posterior   mitral valve leaflets.   7. Sclerotic aortic valve with decreased opening.   8. Limited study to evaluate severity of AS.   9. Peak transaortic gradient equals 36.7 mmHg, mean transaortic gradient   equals 14.1 mmHg, the calculated aortic valve area equals 1.10 cm² by the   continuity equation consistent with moderate aortic stenosis.

## 2019-09-16 NOTE — CONSULT NOTE ADULT - ASSESSMENT
79 yo male patient with a PMH of HTN, DM, depression, non-hodgkin lymphoma and CLL (on rituximab every 2 months with Dr. Shaikh, last chemotherapy 3 weeks PTP), spinal stenosis, frequent falls, SOFIA not compliant with CPAP, COPD a/w illegal opioid overdose.  EP was consulted for ? afib on tele. There is a lot of artifact on tele so it is difficult to interpret - ? NSR with artifact vs afib vs atach    Plan  - Per cardiology starting low dose BB to slow rate down to better assess rhythm  - will discuss with Dr. Higgins 77 yo male patient with a PMH of HTN, DM, depression, non-hodgkin lymphoma and CLL (on rituximab every 2 months with Dr. Shaikh, last chemotherapy 3 weeks PTP), spinal stenosis, frequent falls, SOFIA not compliant with CPAP, COPD a/w illegal opioid overdose.  EP was consulted for ? afib on tele. There is a lot of artifact on tele so it is difficult to interpret - ? NSR with artifact vs afib vs atach    Plan  - Per cardiology starting low dose BB to slow rate down to better assess rhythm  - replete Magnesium  - check TSH  - will discuss with Dr. Higgins 77 yo male patient with a PMH of HTN, DM, depression, non-hodgkin lymphoma and CLL (on rituximab every 2 months with Dr. Shaikh, last chemotherapy 3 weeks PTP), spinal stenosis, frequent falls, SOFIA not compliant with CPAP, COPD a/w illegal opioid overdose.  EP was consulted for ? afib on tele. There is a lot of artifact on tele so it is difficult to interpret - ? NSR with artifact vs afib vs atach    Syncope with normal EKG and preserved LV function  - Likely from drug overdose  - no further work uo    Telemetry  - there is no abnormal rhythm or AF found on telemeter   - no further work up required

## 2019-09-16 NOTE — CONSULT NOTE ADULT - ASSESSMENT
80 y/o male with b/l nasal bone fracture s/p fall  - pain control  - possible corrective procedure once swelling resolves  - f/u as o/p with ENT  - Will d/w attending 78 y/o male with b/l nasal bone fracture s/p fall  - pain control  - f/u as o/p with ENT  - Will d/w attending

## 2019-09-16 NOTE — PROGRESS NOTE ADULT - ASSESSMENT
Mr. Zaman is a 77 yo male patient with a PMH of HTN, DM, depression, non-hodgkin lymphoma and CLL (on rituximab every 2 months with Dr. Shaikh, last chemotherapy 3 weeks PTP), spinal stenosis, frequent falls (last in January 2019 with a tooth embedded in the left cheek subcutaneous soft tissues with adjacent foci of gas and small phlegmon/developing abscess lateral to the tooth), SOFIA not compliant with CPAP, COPD, and illegal narcotics use (buy opiods for sleep- unprescribed, could not name drug nor quantify dose), was brought to [facility] after being found down at home in an altered state.    1.	Syncope/altered mental status due to opoid overdose  2.	B/L nasal bone fractures  3.	CHANA on CKD-3  4.	Ac. Hypercapnic respiratory failure due to opoid overdose  5.	DM-2 / HTN  6.	CLL  7.	SOFIA non compliant with CPAP  8.	COPD         PLAN:    · Mr. Zaman is a 79 yo male patient with a PMH of HTN, DM, depression, non-hodgkin lymphoma and CLL (on rituximab every 2 months with Dr. Shaikh, last chemotherapy 3 weeks PTP), spinal stenosis, frequent falls (last in January 2019 with a tooth embedded in the left cheek subcutaneous soft tissues with adjacent foci of gas and small phlegmon/developing abscess lateral to the tooth), SOFIA not compliant with CPAP, COPD, and illegal narcotics use (buy opiods for sleep- unprescribed, could not name drug nor quantify dose), was brought to [facility] after being found down at home in an altered state.    1.	Syncope/altered mental status due to opoid overdose  2.	B/L nasal bone fractures  3.	CHANA on CKD-3  4.	Ac. Hypercapnic respiratory failure due to opoid overdose  5.	DM-2 / HTN  6.	CLL  7.	SOFIA non compliant with CPAP  8.	COPD         PLAN:    ·	Having episodes of tachycardia on tele. Will call cardiology. R/O A-Fib.  ·	ENT eval for nasal bones fx.   ·	Renal function improved with IVF. Can D/C IVF now  ·	Monitor FS  ·	Cont Nebs.   ·	Cont Insulin and his other meds.   ·	 for D/C planning.

## 2019-09-16 NOTE — PHYSICAL THERAPY INITIAL EVALUATION ADULT - PERTINENT HX OF CURRENT PROBLEM, REHAB EVAL
Mr. Zaman is a 77 yo male was brought to [facility] after being found down at home in an altered state.

## 2019-09-16 NOTE — CONSULT NOTE ADULT - CONSULT REASON
? afib on tele
Nasal bone fracture
Tachycardia / Possible AF
Trauma Alert  S/P fall
debility  lumbar spinal stenosis

## 2019-09-16 NOTE — CONSULT NOTE ADULT - SUBJECTIVE AND OBJECTIVE BOX
ENT DAILY PROGRESS NOTE    Overnight events/Interval HPI: HPI:  Mr. Zaman is a 79 yo male patient with a PMH of HTN, DM, depression, non-hodgkin lymphoma and CLL (on rituximab every 2 months with Dr. Shaikh, last chemotherapy 3 weeks PTP), spinal stenosis, frequent falls, SOFIA not compliant with CPAP, COPD, and illegal narcotics use (buy opiods for sleep- unprescribed, could not name drug nor quantify dose), was brought to [facility] after being found down at home in an altered state. He was found by family. Fall was unwitnessed with no noted LOC. He was found altered with pinpoint pupils and given 2mg of Narcan by EMS after which he returned to baseline.  Patient is a poor historian but is alert and oriented and participates in interview. Declined to come to hospital as per EMS.     In ED, patient was hypotensive initially, was given 2 L of NSS with stabilization of BP. VBG showed acidosis, hyperkalemia. Patient is on room air, saturating well. Patient will be admitted to medical floor. (14 Sep 2019 12:51)    ENT: Found to have b/l nasal bone fractures with displacement of posterior fragments. Denies any SOB/difficulty breathing, fevers, chills, N/V. Nose bleed resolved and has not recurred.         Allergies: No Known Allergies    MEDICATIONS:  Antiinfectives:     IV fluids:  cyanocobalamin 1000 MICROGram(s) Oral daily  dextrose 5%. 1000 milliLiter(s) IV Continuous <Continuous>  folic acid 1 milliGRAM(s) Oral daily  sodium chloride 0.9%. 1000 milliLiter(s) IV Continuous <Continuous>    Hematologic/Anticoagulation:  heparin  Injectable 5000 Unit(s) SubCutaneous every 8 hours    Pain medications/Neuro:  acetaminophen   Tablet .. 650 milliGRAM(s) Oral every 6 hours PRN  buPROPion XL . 300 milliGRAM(s) Oral daily  clonazePAM  Tablet 0.5 milliGRAM(s) Oral every 6 hours PRN    Endocrine Medications:   dextrose 40% Gel 15 Gram(s) Oral once PRN  dextrose 50% Injectable 12.5 Gram(s) IV Push once  dextrose 50% Injectable 25 Gram(s) IV Push once  dextrose 50% Injectable 25 Gram(s) IV Push once  glucagon  Injectable 1 milliGRAM(s) IntraMuscular once PRN  insulin lispro (HumaLOG) corrective regimen sliding scale   SubCutaneous three times a day before meals  simvastatin 20 milliGRAM(s) Oral at bedtime    All other standing medications:   ALBUTerol/ipratropium for Nebulization 3 milliLiter(s) Nebulizer every 6 hours  buDESOnide 160 MICROgram(s)/formoterol 4.5 MICROgram(s) Inhaler 2 Puff(s) Inhalation two times a day  chlorhexidine 4% Liquid 1 Application(s) Topical <User Schedule>  influenza   Vaccine 0.5 milliLiter(s) IntraMuscular once  nicotine -  14 mG/24Hr(s) Patch 1 patch Transdermal daily  pantoprazole    Tablet 40 milliGRAM(s) Oral before breakfast  tamsulosin 0.4 milliGRAM(s) Oral at bedtime    All other PRN medications:      Vital Signs Last 24 Hrs  T(C): 36.1 (16 Sep 2019 05:58), Max: 36.1 (15 Sep 2019 20:38)  T(F): 97 (16 Sep 2019 05:58), Max: 97 (15 Sep 2019 20:38)  HR: 58 (16 Sep 2019 05:58) (58 - 84)  BP: 136/63 (16 Sep 2019 05:58) (116/71 - 144/78)  BP(mean): --  RR: 18 (16 Sep 2019 05:58) (18 - 18)  SpO2: --      09-15 @ 07:01  -  09-16 @ 07:00  --------------------------------------------------------  IN:    sodium chloride 0.9%.: 900 mL  Total IN: 900 mL    OUT:    Voided: 300 mL  Total OUT: 300 mL    Total NET: 600 mL      09-16 @ 07:01  -  09-16 @ 14:08  --------------------------------------------------------  IN:    Oral Fluid: 210 mL  Total IN: 210 mL    OUT:    Voided: 500 mL  Total OUT: 500 mL    Total NET: -290 mL            PHYSICAL EXAM:    ENT EXAM-   Constitutional: NAD, awake/alert, No hoarseness.     Head:  normocephalic, atraumatic.   Nose:  + slight L deviation. malar distribution of ecchymosis. + TTP.  OC/OP: Floor of mouth, buccal mucosa, lips, hard palate, soft palate, uvula, posterior pharyngeal wall normal.  Mucosa moist.  Neck:  Trachea midline.  Thyroid, parotid and submandibular glands normal.      LABS:  CBC-                        12.7   5.93  )-----------( 194      ( 15 Sep 2019 06:19 )             40.5     BMP/CMP-  15 Sep 2019 06:19    141    |  106    |  24     ----------------------------<  103    4.3     |  25     |  1.2      Ca    8.7        15 Sep 2019 06:19  Phos  3.3       15 Sep 2019 06:19  Mg     1.7       15 Sep 2019 06:19    TPro  5.2    /  Alb  3.4    /  TBili  0.4    /  DBili  x      /  AST  14     /  ALT  12     /  AlkPhos  72     15 Sep 2019 06:19    Coagulation Studies-    Endocrine Panel-              RADIOLOGY & ADDITIONAL STUDIES:  < from: CT Maxillofacial No Cont (09.14.19 @ 11:17) >    EXAM:  CT MAXILLOFACIAL            PROCEDURE DATE:  09/14/2019            INTERPRETATION:  CLINICAL HISTORY/REASON FOR EXAM: Trauma.    TECHNIQUE: Multiaxial CT images of the maxillofacial bones-sinuses were   obtained using thin sections from the frontal bone through the mandible   without the administration of intravenous contrast. Sagittal and coronal   reformatted images were obtained.      COMPARISON: Maxillofacial CT dated 1/16/2019.    FINDINGS:    There is a right-sided central line on the  radiographs.    Acute bilateral nasal bone fractures with adjacent soft tissue swelling.   There is slight medial displacement of the right distal fracture fragment   and mild lateral lateral displacement of the left distal fracture   fragment. No additional fractures are seen.    There is mild mucosal thickening of the right maxillary sinus.    Moderate nasal septal deviation to the right of midline with a spur   arising from the apex of the curve projecting to the right of midline.    Pneumatization of the middle turbinates consistent with bilateral lilia   bullosa.    Degenerative changes of the visualized cervical spine.    IMPRESSION:    Acute bilateral nasal bone fractures with displacement of the distal   fracture fragments and adjacent soft tissue swelling.              AGNELICA BALDWIN M.D., RESIDENT RADIOLOGIST  This document has been electronically signed.  LILIA RICHARDS M.D., ATTENDING RADIOLOGIST  This document has been electronically signed. Sep 14 2019 12:44PM                < end of copied text > ENT DAILY PROGRESS NOTE    Overnight events/Interval HPI: HPI:  Mr. Zaman is a 77 yo male patient with a PMH of HTN, DM, depression, non-hodgkin lymphoma and CLL (on rituximab every 2 months with Dr. Shaikh, last chemotherapy 3 weeks PTP), spinal stenosis, frequent falls, SOFIA not compliant with CPAP, COPD, and illegal narcotics use (buy opiods for sleep- unprescribed, could not name drug nor quantify dose), was brought to [facility] after being found down at home in an altered state. He was found by family. Fall was unwitnessed with no noted LOC. He was found altered with pinpoint pupils and given 2mg of Narcan by EMS after which he returned to baseline.  Patient is a poor historian but is alert and oriented and participates in interview. Declined to come to hospital as per EMS.     In ED, patient was hypotensive initially, was given 2 L of NSS with stabilization of BP. VBG showed acidosis, hyperkalemia. Patient is on room air, saturating well. Patient will be admitted to medical floor. (14 Sep 2019 12:51)    ENT: Found to have b/l nasal bone fractures with displacement of posterior fragments. Denies any SOB/difficulty breathing, fevers, chills, N/V. Nose bleed resolved and has not recurred.         Allergies: No Known Allergies    MEDICATIONS:  Antiinfectives:     IV fluids:  cyanocobalamin 1000 MICROGram(s) Oral daily  dextrose 5%. 1000 milliLiter(s) IV Continuous <Continuous>  folic acid 1 milliGRAM(s) Oral daily  sodium chloride 0.9%. 1000 milliLiter(s) IV Continuous <Continuous>    Hematologic/Anticoagulation:  heparin  Injectable 5000 Unit(s) SubCutaneous every 8 hours    Pain medications/Neuro:  acetaminophen   Tablet .. 650 milliGRAM(s) Oral every 6 hours PRN  buPROPion XL . 300 milliGRAM(s) Oral daily  clonazePAM  Tablet 0.5 milliGRAM(s) Oral every 6 hours PRN    Endocrine Medications:   dextrose 40% Gel 15 Gram(s) Oral once PRN  dextrose 50% Injectable 12.5 Gram(s) IV Push once  dextrose 50% Injectable 25 Gram(s) IV Push once  dextrose 50% Injectable 25 Gram(s) IV Push once  glucagon  Injectable 1 milliGRAM(s) IntraMuscular once PRN  insulin lispro (HumaLOG) corrective regimen sliding scale   SubCutaneous three times a day before meals  simvastatin 20 milliGRAM(s) Oral at bedtime    All other standing medications:   ALBUTerol/ipratropium for Nebulization 3 milliLiter(s) Nebulizer every 6 hours  buDESOnide 160 MICROgram(s)/formoterol 4.5 MICROgram(s) Inhaler 2 Puff(s) Inhalation two times a day  chlorhexidine 4% Liquid 1 Application(s) Topical <User Schedule>  influenza   Vaccine 0.5 milliLiter(s) IntraMuscular once  nicotine -  14 mG/24Hr(s) Patch 1 patch Transdermal daily  pantoprazole    Tablet 40 milliGRAM(s) Oral before breakfast  tamsulosin 0.4 milliGRAM(s) Oral at bedtime    All other PRN medications:      Vital Signs Last 24 Hrs  T(C): 36.1 (16 Sep 2019 05:58), Max: 36.1 (15 Sep 2019 20:38)  T(F): 97 (16 Sep 2019 05:58), Max: 97 (15 Sep 2019 20:38)  HR: 58 (16 Sep 2019 05:58) (58 - 84)  BP: 136/63 (16 Sep 2019 05:58) (116/71 - 144/78)  BP(mean): --  RR: 18 (16 Sep 2019 05:58) (18 - 18)  SpO2: --      09-15 @ 07:01  -  09-16 @ 07:00  --------------------------------------------------------  IN:    sodium chloride 0.9%.: 900 mL  Total IN: 900 mL    OUT:    Voided: 300 mL  Total OUT: 300 mL    Total NET: 600 mL      09-16 @ 07:01  -  09-16 @ 14:08  --------------------------------------------------------  IN:    Oral Fluid: 210 mL  Total IN: 210 mL    OUT:    Voided: 500 mL  Total OUT: 500 mL    Total NET: -290 mL            PHYSICAL EXAM:    ENT EXAM-   Constitutional: NAD, awake/alert, No hoarseness.     Head:  normocephalic, atraumatic.   Nose:  + slight L deviation. + mild malar distribution of ecchymosis - improving. + TTP.  OC/OP: Floor of mouth, buccal mucosa, lips, hard palate, soft palate, uvula, posterior pharyngeal wall normal.  Mucosa moist.  Neck:  Trachea midline.  Thyroid, parotid and submandibular glands normal.      LABS:  CBC-                        12.7   5.93  )-----------( 194      ( 15 Sep 2019 06:19 )             40.5     BMP/CMP-  15 Sep 2019 06:19    141    |  106    |  24     ----------------------------<  103    4.3     |  25     |  1.2      Ca    8.7        15 Sep 2019 06:19  Phos  3.3       15 Sep 2019 06:19  Mg     1.7       15 Sep 2019 06:19    TPro  5.2    /  Alb  3.4    /  TBili  0.4    /  DBili  x      /  AST  14     /  ALT  12     /  AlkPhos  72     15 Sep 2019 06:19    Coagulation Studies-    Endocrine Panel-              RADIOLOGY & ADDITIONAL STUDIES:  < from: CT Maxillofacial No Cont (09.14.19 @ 11:17) >    EXAM:  CT MAXILLOFACIAL            PROCEDURE DATE:  09/14/2019            INTERPRETATION:  CLINICAL HISTORY/REASON FOR EXAM: Trauma.    TECHNIQUE: Multiaxial CT images of the maxillofacial bones-sinuses were   obtained using thin sections from the frontal bone through the mandible   without the administration of intravenous contrast. Sagittal and coronal   reformatted images were obtained.      COMPARISON: Maxillofacial CT dated 1/16/2019.    FINDINGS:    There is a right-sided central line on the  radiographs.    Acute bilateral nasal bone fractures with adjacent soft tissue swelling.   There is slight medial displacement of the right distal fracture fragment   and mild lateral lateral displacement of the left distal fracture   fragment. No additional fractures are seen.    There is mild mucosal thickening of the right maxillary sinus.    Moderate nasal septal deviation to the right of midline with a spur   arising from the apex of the curve projecting to the right of midline.    Pneumatization of the middle turbinates consistent with bilateral lilia   bullosa.    Degenerative changes of the visualized cervical spine.    IMPRESSION:    Acute bilateral nasal bone fractures with displacement of the distal   fracture fragments and adjacent soft tissue swelling.              ANGELICA BALDWIN M.D., RESIDENT RADIOLOGIST  This document has been electronically signed.  LILIA RICHARDS M.D., ATTENDING RADIOLOGIST  This document has been electronically signed. Sep 14 2019 12:44PM                < end of copied text > ENT DAILY PROGRESS NOTE    Overnight events/Interval HPI: HPI:  Mr. Zaman is a 77 yo male patient with a PMH of HTN, DM, depression, non-hodgkin lymphoma and CLL (on rituximab every 2 months with Dr. Shaikh, last chemotherapy 3 weeks PTP), spinal stenosis, frequent falls, SOFIA not compliant with CPAP, COPD, and illegal narcotics use (buy opiods for sleep- unprescribed, could not name drug nor quantify dose), was brought to [facility] after being found down at home in an altered state. He was found by family. Fall was unwitnessed with no noted LOC. He was found altered with pinpoint pupils and given 2mg of Narcan by EMS after which he returned to baseline.  Patient is a poor historian but is alert and oriented and participates in interview. Declined to come to hospital as per EMS.     In ED, patient was hypotensive initially, was given 2 L of NSS with stabilization of BP. VBG showed acidosis, hyperkalemia. Patient is on room air, saturating well. Patient will be admitted to medical floor. (14 Sep 2019 12:51)    ENT: Found to have b/l nasal bone fractures with displacement of posterior fragments. Denies any SOB/difficulty breathing, fevers, chills, N/V. Nose bleed resolved and has not recurred. Patient denies nasal congestion.         Allergies: No Known Allergies    MEDICATIONS:  Antiinfectives:     IV fluids:  cyanocobalamin 1000 MICROGram(s) Oral daily  dextrose 5%. 1000 milliLiter(s) IV Continuous <Continuous>  folic acid 1 milliGRAM(s) Oral daily  sodium chloride 0.9%. 1000 milliLiter(s) IV Continuous <Continuous>    Hematologic/Anticoagulation:  heparin  Injectable 5000 Unit(s) SubCutaneous every 8 hours    Pain medications/Neuro:  acetaminophen   Tablet .. 650 milliGRAM(s) Oral every 6 hours PRN  buPROPion XL . 300 milliGRAM(s) Oral daily  clonazePAM  Tablet 0.5 milliGRAM(s) Oral every 6 hours PRN    Endocrine Medications:   dextrose 40% Gel 15 Gram(s) Oral once PRN  dextrose 50% Injectable 12.5 Gram(s) IV Push once  dextrose 50% Injectable 25 Gram(s) IV Push once  dextrose 50% Injectable 25 Gram(s) IV Push once  glucagon  Injectable 1 milliGRAM(s) IntraMuscular once PRN  insulin lispro (HumaLOG) corrective regimen sliding scale   SubCutaneous three times a day before meals  simvastatin 20 milliGRAM(s) Oral at bedtime    All other standing medications:   ALBUTerol/ipratropium for Nebulization 3 milliLiter(s) Nebulizer every 6 hours  buDESOnide 160 MICROgram(s)/formoterol 4.5 MICROgram(s) Inhaler 2 Puff(s) Inhalation two times a day  chlorhexidine 4% Liquid 1 Application(s) Topical <User Schedule>  influenza   Vaccine 0.5 milliLiter(s) IntraMuscular once  nicotine -  14 mG/24Hr(s) Patch 1 patch Transdermal daily  pantoprazole    Tablet 40 milliGRAM(s) Oral before breakfast  tamsulosin 0.4 milliGRAM(s) Oral at bedtime    All other PRN medications:      Vital Signs Last 24 Hrs  T(C): 36.1 (16 Sep 2019 05:58), Max: 36.1 (15 Sep 2019 20:38)  T(F): 97 (16 Sep 2019 05:58), Max: 97 (15 Sep 2019 20:38)  HR: 58 (16 Sep 2019 05:58) (58 - 84)  BP: 136/63 (16 Sep 2019 05:58) (116/71 - 144/78)  BP(mean): --  RR: 18 (16 Sep 2019 05:58) (18 - 18)  SpO2: --      09-15 @ 07:01  -  09-16 @ 07:00  --------------------------------------------------------  IN:    sodium chloride 0.9%.: 900 mL  Total IN: 900 mL    OUT:    Voided: 300 mL  Total OUT: 300 mL    Total NET: 600 mL      09-16 @ 07:01 - 09-16 @ 14:08  --------------------------------------------------------  IN:    Oral Fluid: 210 mL  Total IN: 210 mL    OUT:    Voided: 500 mL  Total OUT: 500 mL    Total NET: -290 mL            PHYSICAL EXAM:    ENT EXAM-   Constitutional: NAD, awake/alert, No hoarseness.     Head:  normocephalic, atraumatic.   Nose:  + slight L deviation of upper 1/3. + mild malar distribution of ecchymosis - improving. + TTP. Minimal/moderate edema over nasal dorsum  OC/OP: Floor of mouth, buccal mucosa, lips, hard palate, soft palate, uvula, posterior pharyngeal wall normal.  Mucosa moist.  Neck:  Trachea midline.  Thyroid, parotid and submandibular glands normal.      LABS:  CBC-                        12.7   5.93  )-----------( 194      ( 15 Sep 2019 06:19 )             40.5     BMP/CMP-  15 Sep 2019 06:19    141    |  106    |  24     ----------------------------<  103    4.3     |  25     |  1.2      Ca    8.7        15 Sep 2019 06:19  Phos  3.3       15 Sep 2019 06:19  Mg     1.7       15 Sep 2019 06:19    TPro  5.2    /  Alb  3.4    /  TBili  0.4    /  DBili  x      /  AST  14     /  ALT  12     /  AlkPhos  72     15 Sep 2019 06:19    Coagulation Studies-    Endocrine Panel-              RADIOLOGY & ADDITIONAL STUDIES:  < from: CT Maxillofacial No Cont (09.14.19 @ 11:17) >    EXAM:  CT MAXILLOFACIAL            PROCEDURE DATE:  09/14/2019            INTERPRETATION:  CLINICAL HISTORY/REASON FOR EXAM: Trauma.    TECHNIQUE: Multiaxial CT images of the maxillofacial bones-sinuses were   obtained using thin sections from the frontal bone through the mandible   without the administration of intravenous contrast. Sagittal and coronal   reformatted images were obtained.      COMPARISON: Maxillofacial CT dated 1/16/2019.    FINDINGS:    There is a right-sided central line on the  radiographs.    Acute bilateral nasal bone fractures with adjacent soft tissue swelling.   There is slight medial displacement of the right distal fracture fragment   and mild lateral lateral displacement of the left distal fracture   fragment. No additional fractures are seen.    There is mild mucosal thickening of the right maxillary sinus.    Moderate nasal septal deviation to the right of midline with a spur   arising from the apex of the curve projecting to the right of midline.    Pneumatization of the middle turbinates consistent with bilateral lilia   bullosa.    Degenerative changes of the visualized cervical spine.    IMPRESSION:    Acute bilateral nasal bone fractures with displacement of the distal   fracture fragments and adjacent soft tissue swelling.              ANGELICA BALDWIN M.D., RESIDENT RADIOLOGIST  This document has been electronically signed.  LILIA RICHARDS M.D., ATTENDING RADIOLOGIST  This document has been electronically signed. Sep 14 2019 12:44PM                < end of copied text >

## 2019-09-16 NOTE — PHYSICAL THERAPY INITIAL EVALUATION ADULT - LEVEL OF INDEPENDENCE: GAIT, REHAB EVAL
pt ambulated with a rapid gait pattern, poor safety awareness - does not follow safety commands/supervision

## 2019-09-16 NOTE — CONSULT NOTE ADULT - SUBJECTIVE AND OBJECTIVE BOX
Patient is a 79y old  Male who presents with a chief complaint of Syncope (16 Sep 2019 14:06)    HPI:  Pt  is a 77 yo male patient with a PMH of HTN, DM, depression, non-hodgkin lymphoma and CLL (on rituximab every 2 months with Dr. Shaikh, last chemotherapy 3 weeks PTP), spinal stenosis, frequent falls, SOFIA not compliant with CPAP, COPD.  Pt had bought oxycodone off the street because he states someone told them they would help him sleep.  He does not know the dosage of the each pill but says he took 3 of them.  He doesn't recall the events but he says he must have fallen out of bed because he broke his nose.  Pt was found unconscious by his family who called EMS.  EMS found the pt altered with pinpoint pupils.  They gave him Narcan 2 mg with return of his mental status to baseline.  Pt states he was not trying to commit suicide or harm himself.    Pt denies use of illegal drugs before.  He denies history of syncope.  He denies history of cardiac issues.  He does not have a cardiologist.    EP was consulted due to concern of afib on tele monitor    PAST MEDICAL & SURGICAL HISTORY:  Non-Hodgkin lymphoma  CLL (chronic lymphocytic leukemia)  Depression  HTN (hypertension)  DM (diabetes mellitus)  Anxiety  H/O total knee replacement, bilateral      PREVIOUS DIAGNOSTIC TESTING:      ECHO  FINDINGS:   Transthoracic Echocardiogram (09.14.19 @ 14:50) >  Summary:   1. Normal global left ventricular systolic function.   2. Normal left ventricular internal cavity size.   3. Spectral Doppler shows impaired relaxation pattern of left   ventricular myocardial filling (Grade I diastolic dysfunction).   4. Gossly normal LV function on limited views to evaluate LV.   5. Mild mitral annular calcification.   6. Mild thickening and calcification of the anterior and posterior   mitral valve leaflets.   7. Sclerotic aortic valve with decreased opening.   8. Limited study to evaluate severity of AS.   9. Peak transaortic gradient equals 36.7 mmHg, mean transaortic gradient   equals 14.1 mmHg, the calculated aortic valve area equals 1.10 cm² by the   continuity equation consistent with moderate aortic stenosis.      MEDICATIONS  (STANDING):  ALBUTerol/ipratropium for Nebulization 3 milliLiter(s) Nebulizer every 6 hours  buDESOnide 160 MICROgram(s)/formoterol 4.5 MICROgram(s) Inhaler 2 Puff(s) Inhalation two times a day  buPROPion XL . 300 milliGRAM(s) Oral daily  chlorhexidine 4% Liquid 1 Application(s) Topical <User Schedule>  cyanocobalamin 1000 MICROGram(s) Oral daily  dextrose 5%. 1000 milliLiter(s) (50 mL/Hr) IV Continuous <Continuous>  dextrose 50% Injectable 12.5 Gram(s) IV Push once  dextrose 50% Injectable 25 Gram(s) IV Push once  dextrose 50% Injectable 25 Gram(s) IV Push once  folic acid 1 milliGRAM(s) Oral daily  heparin  Injectable 5000 Unit(s) SubCutaneous every 8 hours  influenza   Vaccine 0.5 milliLiter(s) IntraMuscular once  insulin lispro (HumaLOG) corrective regimen sliding scale   SubCutaneous three times a day before meals  metoprolol tartrate 25 milliGRAM(s) Oral two times a day  nicotine -  14 mG/24Hr(s) Patch 1 patch Transdermal daily  pantoprazole    Tablet 40 milliGRAM(s) Oral before breakfast  simvastatin 20 milliGRAM(s) Oral at bedtime  sodium chloride 0.9%. 1000 milliLiter(s) (75 mL/Hr) IV Continuous <Continuous>  tamsulosin 0.4 milliGRAM(s) Oral at bedtime    MEDICATIONS  (PRN):  acetaminophen   Tablet .. 650 milliGRAM(s) Oral every 6 hours PRN Temp greater or equal to 38C (100.4F), Mild Pain (1 - 3), Moderate Pain (4 - 6)  clonazePAM  Tablet 0.5 milliGRAM(s) Oral every 6 hours PRN anxiety, agitation  dextrose 40% Gel 15 Gram(s) Oral once PRN Blood Glucose LESS THAN 70 milliGRAM(s)/deciliter  glucagon  Injectable 1 milliGRAM(s) IntraMuscular once PRN Glucose LESS THAN 70 milligrams/deciliter      FAMILY HISTORY:  No pertinent family history in first degree relatives      SOCIAL HISTORY:    CIGARETTES: 1 PPD    ALCOHOL: denies    Past Surgical History:    Allergies:    No Known Allergies      REVIEW OF SYSTEMS:  10 point ROS negative    Vital Signs Last 24 Hrs  T(C): 37.4 (16 Sep 2019 14:44), Max: 37.4 (16 Sep 2019 14:44)  T(F): 99.3 (16 Sep 2019 14:44), Max: 99.3 (16 Sep 2019 14:44)  HR: 94 (16 Sep 2019 14:44) (58 - 94)  BP: 146/82 (16 Sep 2019 14:44) (136/63 - 146/82)  BP(mean): --  RR: 18 (16 Sep 2019 14:44) (18 - 18)  SpO2: --    PHYSICAL EXAM:  GENERAL: In no apparent distress, well nourished, and hydrated.  NECK: Supple   HEART: Regular rate and rhythm; No murmurs, rubs, or gallops.  PULMONARY: Clear to auscultation and perfusion.  No rales, wheezing, or rhonchi bilaterally.  ABDOMEN: Soft, Nontender, Nondistended; Bowel sounds present  EXTREMITIES:  2+ Peripheral Pulses, No clubbing, cyanosis, or edema  NEUROLOGICAL: Grossly nonfocal      ECG:  < from: 12 Lead ECG (09.14.19 @ 10:38) >  Ventricular Rate 79 BPM    Atrial Rate 79 BPM    P-R Interval 136 ms    QRS Duration 90 ms    Q-T Interval 378 ms    QTC Calculation(Bezet) 433 ms    P Axis 65 degrees    R Axis -15 degrees    T Axis 59 degrees    Diagnosis Line Normal sinus rhythm  Left axis deviation  Poor R wave progression  Otherwise normal ECG    < end of copied text >      I&O's Detail    15 Sep 2019 07:01  -  16 Sep 2019 07:00  --------------------------------------------------------  IN:    sodium chloride 0.9%.: 900 mL  Total IN: 900 mL    OUT:    Voided: 300 mL  Total OUT: 300 mL    Total NET: 600 mL      16 Sep 2019 07:01  -  16 Sep 2019 16:29  --------------------------------------------------------  IN:    Oral Fluid: 630 mL  Total IN: 630 mL    OUT:    Voided: 800 mL  Total OUT: 800 mL    Total NET: -170 mL          LABS:                        12.7   5.93  )-----------( 194      ( 15 Sep 2019 06:19 )             40.5     09-15    141  |  106  |  24<H>  ----------------------------<  103<H>  4.3   |  25  |  1.2    Ca    8.7      15 Sep 2019 06:19  Phos  3.3     09-15  Mg     1.7     09-15    TPro  5.2<L>  /  Alb  3.4<L>  /  TBili  0.4  /  DBili  x   /  AST  14  /  ALT  12  /  AlkPhos  72  09-15    CARDIAC MARKERS ( 15 Sep 2019 06:19 )  x     / <0.01 ng/mL / 127 U/L / x     / x      CARDIAC MARKERS ( 14 Sep 2019 16:59 )  x     / 0.02 ng/mL / 177 U/L / x     / x              BNP  I&O's Detail    15 Sep 2019 07:01  -  16 Sep 2019 07:00  --------------------------------------------------------  IN:    sodium chloride 0.9%.: 900 mL  Total IN: 900 mL    OUT:    Voided: 300 mL  Total OUT: 300 mL    Total NET: 600 mL      16 Sep 2019 07:01  -  16 Sep 2019 16:29  --------------------------------------------------------  IN:    Oral Fluid: 630 mL  Total IN: 630 mL    OUT:    Voided: 800 mL  Total OUT: 800 mL    Total NET: -170 mL        Daily     Daily     RADIOLOGY & ADDITIONAL STUDIES:

## 2019-09-17 ENCOUNTER — TRANSCRIPTION ENCOUNTER (OUTPATIENT)
Age: 79
End: 2019-09-17

## 2019-09-17 LAB
ANION GAP SERPL CALC-SCNC: 12 MMOL/L — SIGNIFICANT CHANGE UP (ref 7–14)
BUN SERPL-MCNC: 12 MG/DL — SIGNIFICANT CHANGE UP (ref 10–20)
CALCIUM SERPL-MCNC: 9.1 MG/DL — SIGNIFICANT CHANGE UP (ref 8.5–10.1)
CHLORIDE SERPL-SCNC: 106 MMOL/L — SIGNIFICANT CHANGE UP (ref 98–110)
CO2 SERPL-SCNC: 26 MMOL/L — SIGNIFICANT CHANGE UP (ref 17–32)
CREAT SERPL-MCNC: 0.6 MG/DL — LOW (ref 0.7–1.5)
GLUCOSE BLDC GLUCOMTR-MCNC: 115 MG/DL — HIGH (ref 70–99)
GLUCOSE BLDC GLUCOMTR-MCNC: 118 MG/DL — HIGH (ref 70–99)
GLUCOSE BLDC GLUCOMTR-MCNC: 122 MG/DL — HIGH (ref 70–99)
GLUCOSE BLDC GLUCOMTR-MCNC: 133 MG/DL — HIGH (ref 70–99)
GLUCOSE SERPL-MCNC: 125 MG/DL — HIGH (ref 70–99)
HCT VFR BLD CALC: 37.4 % — LOW (ref 42–52)
HGB BLD-MCNC: 12.2 G/DL — LOW (ref 14–18)
MAGNESIUM SERPL-MCNC: 1.4 MG/DL — LOW (ref 1.8–2.4)
MCHC RBC-ENTMCNC: 27 PG — SIGNIFICANT CHANGE UP (ref 27–31)
MCHC RBC-ENTMCNC: 32.6 G/DL — SIGNIFICANT CHANGE UP (ref 32–37)
MCV RBC AUTO: 82.7 FL — SIGNIFICANT CHANGE UP (ref 80–94)
NRBC # BLD: 0 /100 WBCS — SIGNIFICANT CHANGE UP (ref 0–0)
PLATELET # BLD AUTO: 180 K/UL — SIGNIFICANT CHANGE UP (ref 130–400)
POTASSIUM SERPL-MCNC: 3.9 MMOL/L — SIGNIFICANT CHANGE UP (ref 3.5–5)
POTASSIUM SERPL-SCNC: 3.9 MMOL/L — SIGNIFICANT CHANGE UP (ref 3.5–5)
RBC # BLD: 4.52 M/UL — LOW (ref 4.7–6.1)
RBC # FLD: 15.3 % — HIGH (ref 11.5–14.5)
SODIUM SERPL-SCNC: 144 MMOL/L — SIGNIFICANT CHANGE UP (ref 135–146)
WBC # BLD: 4.92 K/UL — SIGNIFICANT CHANGE UP (ref 4.8–10.8)
WBC # FLD AUTO: 4.92 K/UL — SIGNIFICANT CHANGE UP (ref 4.8–10.8)

## 2019-09-17 PROCEDURE — 99233 SBSQ HOSP IP/OBS HIGH 50: CPT

## 2019-09-17 RX ORDER — MAGNESIUM SULFATE 500 MG/ML
2 VIAL (ML) INJECTION ONCE
Refills: 0 | Status: COMPLETED | OUTPATIENT
Start: 2019-09-17 | End: 2019-09-17

## 2019-09-17 RX ADMIN — Medication 1 PATCH: at 07:05

## 2019-09-17 RX ADMIN — HEPARIN SODIUM 5000 UNIT(S): 5000 INJECTION INTRAVENOUS; SUBCUTANEOUS at 05:43

## 2019-09-17 RX ADMIN — Medication 25 MILLIGRAM(S): at 17:11

## 2019-09-17 RX ADMIN — BUDESONIDE AND FORMOTEROL FUMARATE DIHYDRATE 2 PUFF(S): 160; 4.5 AEROSOL RESPIRATORY (INHALATION) at 21:57

## 2019-09-17 RX ADMIN — CHLORHEXIDINE GLUCONATE 1 APPLICATION(S): 213 SOLUTION TOPICAL at 05:43

## 2019-09-17 RX ADMIN — Medication 50 GRAM(S): at 10:28

## 2019-09-17 RX ADMIN — TAMSULOSIN HYDROCHLORIDE 0.4 MILLIGRAM(S): 0.4 CAPSULE ORAL at 21:58

## 2019-09-17 RX ADMIN — BUPROPION HYDROCHLORIDE 300 MILLIGRAM(S): 150 TABLET, EXTENDED RELEASE ORAL at 12:16

## 2019-09-17 RX ADMIN — PREGABALIN 1000 MICROGRAM(S): 225 CAPSULE ORAL at 12:21

## 2019-09-17 RX ADMIN — Medication 0.5 MILLIGRAM(S): at 21:11

## 2019-09-17 RX ADMIN — SIMVASTATIN 20 MILLIGRAM(S): 20 TABLET, FILM COATED ORAL at 21:58

## 2019-09-17 RX ADMIN — PANTOPRAZOLE SODIUM 40 MILLIGRAM(S): 20 TABLET, DELAYED RELEASE ORAL at 05:43

## 2019-09-17 RX ADMIN — Medication 1 PATCH: at 12:00

## 2019-09-17 RX ADMIN — Medication 1 PATCH: at 12:18

## 2019-09-17 RX ADMIN — Medication 25 MILLIGRAM(S): at 05:43

## 2019-09-17 RX ADMIN — Medication 1 MILLIGRAM(S): at 12:16

## 2019-09-17 RX ADMIN — BUDESONIDE AND FORMOTEROL FUMARATE DIHYDRATE 2 PUFF(S): 160; 4.5 AEROSOL RESPIRATORY (INHALATION) at 08:06

## 2019-09-17 RX ADMIN — HEPARIN SODIUM 5000 UNIT(S): 5000 INJECTION INTRAVENOUS; SUBCUTANEOUS at 13:13

## 2019-09-17 RX ADMIN — HEPARIN SODIUM 5000 UNIT(S): 5000 INJECTION INTRAVENOUS; SUBCUTANEOUS at 21:58

## 2019-09-17 NOTE — DISCHARGE NOTE PROVIDER - NSDCCPCAREPLAN_GEN_ALL_CORE_FT
PRINCIPAL DISCHARGE DIAGNOSIS  Diagnosis: Opiate abuse, episodic  Assessment and Plan of Treatment: You presented to the hospital after consuming opiates. You were found on imaging workup to have nasal fractures, and were evaluated by ear, nose, and throat specialists who are recommending outpatient followup. You were also evaluated by cardologists and eletrophysiologists for your heart rate and you were cleared for discharge. Please follow up with your primary medical doctors outpatient, adhere to your prescribed medication regimen as instructed, and please refrain from use of any illicit drugs.      SECONDARY DISCHARGE DIAGNOSES  Diagnosis: Nasal bone fracture  Assessment and Plan of Treatment: You sustained nasal fracture(s) secondary to the use of ilicit opiate medication. Please follow up with your ear, nose, and throat doctor once you are discharged. Please continue taking your medications as directed.

## 2019-09-17 NOTE — DISCHARGE NOTE PROVIDER - NSDCFUSCHEDAPPT_GEN_ALL_CORE_FT
CR OWEN ; 10/08/2019 ; [facility] [address] CR OWEN ; 10/08/2019 ; [facility] [address]

## 2019-09-17 NOTE — PROGRESS NOTE ADULT - SUBJECTIVE AND OBJECTIVE BOX
HPI  Patient is a 79y old Male who presents with a chief complaint of Syncope (16 Sep 2019 17:15)    Currently admitted to medicine with the primary diagnosis of Elevated troponin     Today is hospital day 3d.     INTERVAL HPI / OVERNIGHT EVENTS:  Patient was examined and seen at bedside. This morning he is resting comfortably in bed and reports no new issues or overnight events.     ROS: Otherwise unremarkable     PAST MEDICAL & SURGICAL HISTORY  Non-Hodgkin lymphoma  CLL (chronic lymphocytic leukemia)  Depression  HTN (hypertension)  DM (diabetes mellitus)  Anxiety  H/O total knee replacement, bilateral    ALLERGIES  No Known Allergies    MEDICATIONS  STANDING MEDICATIONS  ALBUTerol/ipratropium for Nebulization 3 milliLiter(s) Nebulizer every 6 hours  buDESOnide 160 MICROgram(s)/formoterol 4.5 MICROgram(s) Inhaler 2 Puff(s) Inhalation two times a day  buPROPion XL . 300 milliGRAM(s) Oral daily  chlorhexidine 4% Liquid 1 Application(s) Topical <User Schedule>  cyanocobalamin 1000 MICROGram(s) Oral daily  dextrose 5%. 1000 milliLiter(s) IV Continuous <Continuous>  dextrose 50% Injectable 12.5 Gram(s) IV Push once  dextrose 50% Injectable 25 Gram(s) IV Push once  dextrose 50% Injectable 25 Gram(s) IV Push once  folic acid 1 milliGRAM(s) Oral daily  heparin  Injectable 5000 Unit(s) SubCutaneous every 8 hours  influenza   Vaccine 0.5 milliLiter(s) IntraMuscular once  insulin lispro (HumaLOG) corrective regimen sliding scale   SubCutaneous three times a day before meals  metoprolol tartrate 25 milliGRAM(s) Oral two times a day  nicotine -  14 mG/24Hr(s) Patch 1 patch Transdermal daily  pantoprazole    Tablet 40 milliGRAM(s) Oral before breakfast  simvastatin 20 milliGRAM(s) Oral at bedtime  tamsulosin 0.4 milliGRAM(s) Oral at bedtime    PRN MEDICATIONS  acetaminophen   Tablet .. 650 milliGRAM(s) Oral every 6 hours PRN  clonazePAM  Tablet 0.5 milliGRAM(s) Oral every 6 hours PRN  dextrose 40% Gel 15 Gram(s) Oral once PRN  glucagon  Injectable 1 milliGRAM(s) IntraMuscular once PRN    VITALS:  T(F): 97.1  HR: 71  BP: 126/64  RR: 18  SpO2: 96%    PHYSICAL EXAM  GEN: NAD, Resting comfortably in bed  PULM: Clear to auscultation bilaterally, No wheezes  CVS: Regular rate and rhythm, S1-S2, no murmurs  ABD: Soft, non-tender, non-distended, no guarding  EXT: No edema  NEURO: AAOx3, no focal deficits    LABS                        12.2   4.92  )-----------( 180      ( 17 Sep 2019 05:51 )             37.4     09-17    144  |  106  |  12  ----------------------------<  125<H>  3.9   |  26  |  0.6<L>    Ca    9.1      17 Sep 2019 05:51  Mg     1.4     09-17      RADIOLOGY    < from: CT Abdomen and Pelvis w/ IV Cont (09.14.19 @ 11:22) >    IMPRESSION:  Subacute/chronic left lower rib fractures. Otherwise no CT evidence of   acute traumatic injury in the chest abdomen or pelvis.    < end of copied text >    < from: CT Chest w/ IV Cont (09.14.19 @ 11:22) >  IMPRESSION:  Subacute/chronic left lower rib fractures. Otherwise no CT evidence of   acute traumatic injury in the chest abdomen or pelvis.    < end of copied text >    < from: CT Maxillofacial No Cont (09.14.19 @ 11:17) >  IMPRESSION:    Acute bilateral nasal bone fractures with displacement of the distal   fracture fragments and adjacent soft tissue swelling.    < end of copied text >    < from: CT Cervical Spine No Cont (09.14.19 @ 11:15) >  IMPRESSION:    In comparison with the prior noncontrast CT scan of the cervical spine   dated January 16, 2019:    Stable examination.    No evidence of acute cervical spine fracture or subluxation.    Multilevel degenerative changes of the cervical spine, most pronounced at   C4 through C7.    < end of copied text >    < from: CT Head No Cont (09.14.19 @ 11:14) >  IMPRESSION:     No CT evidence of acute intracranial hemorrhage. Stable examination since   1/16/2019.    Stable mild chronic microvascular ischemic changes and chronic lacunar   infarcts as above.    Acute nasal bone fractures better evaluated on concurrently performed   maxillofacial CT.    < end of copied text >    < from: Xray Chest 1 View AP/PA (09.14.19 @ 10:34) >    IMPRESSION:      No radiographic evidence of acute cardiopulmonary disease.    < end of copied text >    < from: Xray Pelvis AP only (09.14.19 @ 10:33) >  mpression:    No evidence of acute fracture or dislocation.    < end of copied text >    Summary:   1. Normal global left ventricular systolic function.   2. Normal left ventricular internal cavity size.   3. Spectral Doppler shows impaired relaxation pattern of left   ventricular myocardial filling (Grade I diastolic dysfunction).   4. Gossly normal LV function on limited views to evaluate LV.   5. Mild mitral annular calcification.   6. Mild thickening and calcification of the anterior and posterior   mitral valve leaflets.   7. Sclerotic aortic valve with decreased opening.   8. Limited study to evaluate severity of AS.   9. Peak transaortic gradient equals 36.7 mmHg, mean transaortic gradient   equals 14.1 mmHg, the calculated aortic valve area equals 1.10 cm² by the   continuity equation consistent with moderate aortic stenosis

## 2019-09-17 NOTE — PROGRESS NOTE ADULT - ASSESSMENT
Mr. Zaman is a 79 yo male patient with a PMH of HTN, DM, depression, non-hodgkin lymphoma and CLL (on rituximab every 2 months with Dr. Shaikh, last chemotherapy 3 weeks PTP), spinal stenosis, frequent falls (last in January 2019 with a tooth embedded in the left cheek subcutaneous soft tissues with adjacent foci of gas and small phlegmon/developing abscess lateral to the tooth), SOFIA not compliant with CPAP, COPD, and illegal narcotics use (buy opioids for sleep- unprescribed, could not name drug nor quantify dose), was brought to [facility] after being found down at home in an altered state.    # Syncope + hypotension + CO2 retention, likely due opioid intoxication  - Illegal opoid use, pinpoint pupils, responding to Narcan  - Responded to fluid challenge.   - will continue IV hydration, and monitor respiratory status.   - Admission to telemetry  - TTE: Normal LV function, G1DD, limited study to evaluate severity of AS  - orthostatic BP  - ECG with NSR.   - avoiding opioids   - will repeat CK, K+, to rule out rhabdomyolysis  - ENT: Possible intervention when swelling resolves, can f/u outpatient  - EP: Starting BB, repleting Mg  - Cardiology eval apprecaited - daily EKG's  - F/U TSH  - PT - moderate risk for fall 2-3x/week  - Rehab: SNF/STR    # CHANA  - likely due to hypotension and dehydration  - started on IV hydration     # Acute bilateral nasal bone fractures with displacement of the distal fracture fragments and adjacent soft tissue swelling  - will obtain an evaluation by OFMS    # Depression  - no suicidal ideation  - on bupropion 150 BID, will continue  - might need an evaluation by psychiatry    # Elevated troponins  - no chest pain at this time  - ECG: NSR, no ischemic changes  - likely nonsignificant in the setting of CHANA. Will monitor    # CLL on chemotherapy   - Outpatient f/u with Oncology Dr. Shaikh   - on rituximab    # HTN   - withholding home meds (irbesartan and HCTZ) given hypotension    # DM   - withholding metformin  - will start on basal bolus once FS >180    # COPD  - no signs of exacerbation  - will continue home symbicort  - will add duonebs    # Frequent falls  - will request an evaluation by PT/physiatry Mr. Zaman is a 79 yo male patient with a PMH of HTN, DM, depression, non-hodgkin lymphoma and CLL (on rituximab every 2 months with Dr. Shaikh, last chemotherapy 3 weeks PTP), spinal stenosis, frequent falls (last in January 2019 with a tooth embedded in the left cheek subcutaneous soft tissues with adjacent foci of gas and small phlegmon/developing abscess lateral to the tooth), SOFIA not compliant with CPAP, COPD, and illegal narcotics use (buy opioids for sleep- unprescribed, could not name drug nor quantify dose), was brought to [facility] after being found down at home in an altered state.    # Syncope + hypotension + CO2 retention, likely due opioid intoxication  - Illegal opoid use, pinpoint pupils, responding to Narcan  - Responded to fluid challenge.   - will continue IV hydration, and monitor respiratory status.   - Admission to telemetry  - TTE: Normal LV function, G1DD, limited study to evaluate severity of AS  - Orthostatic BP  - ECG with NSR.   - avoiding opioids   - will repeat CK, K+, to rule out rhabdomyolysis  - ENT: Possible intervention when swelling resolves, can f/u outpatient  - EP: Starting BB, repleting Mg  - Cardiology eval appreciated - daily EKG's  - F/U TSH  - PT - moderate risk for fall 2-3x/week  - Rehab: SNF/STR    # CHANA  - likely due to hypotension and dehydration  - started on IV hydration     # Acute bilateral nasal bone fractures with displacement of the distal fracture fragments and adjacent soft tissue swelling  - will obtain an evaluation by OFMS    # Depression  - no suicidal ideation  - on bupropion 150 BID, will continue  - might need an evaluation by psychiatry    # Elevated troponins  - no chest pain at this time  - ECG: NSR, no ischemic changes  - likely nonsignificant in the setting of CHANA. Will monitor    # CLL on chemotherapy   - Outpatient f/u with Oncology Dr. Shaikh   - on rituximab    # HTN   - withholding home meds (irbesartan and HCTZ) given hypotension    # DM   - withholding metformin  - will start on basal bolus once FS >180    # COPD  - no signs of exacerbation  - will continue home symbicort  - will add duonebs    # Frequent falls  - will request an evaluation by PT/physiatry

## 2019-09-17 NOTE — DISCHARGE NOTE PROVIDER - HOSPITAL COURSE
Mr. Zaman is a 77 yo male patient with a PMH of HTN, DM, depression, non-hodgkin lymphoma and CLL (on rituximab every 2 months with Dr. Shaikh, last chemotherapy 3 weeks PTP), spinal stenosis, frequent falls (last in January 2019 with a tooth embedded in the left cheek subcutaneous soft tissues with adjacent foci of gas and small phlegmon/developing abscess lateral to the tooth), SOFIA not compliant with CPAP, COPD, and illegal narcotics use (buy opiods for sleep- unprescribed, could not name drug nor quantify dose), was brought to [facility] after being found down at home in an altered state.        Patient notes he lives with his son but takes care of himself. He notes no recent illness and has no direct complaints during interview. He does not recall falling and as per EMS he was found by family. Fall was unwitnessed with no noted LOC. He was found altered with pinpoint pupils and given 2mg of Narcan by EMS after which he returned to baseline.  Patient is a poor historian but is alert and oriented and participates in interview. Declined to come to hospital as per EMS.         Patient noted chronic back pain and knees pain. Sister at bedside noted frequent falls in the last year. He denies chest pain, SOB, or cough at this time. No headache. Patient noted that his depression is controlled, however when asked sister said that he is depressed. He does not do his usual activities like watching movies, as he did not pay his bills. He denies any suicidal ideation.         In ED, patient was hypotensive initially, was given 2 L of NSS with stabilization of BP. VBG showed acidosis, hyperkalemia. Patient is on room air, saturating well. Patient will be admitted to medical floor.         Echocardiography was unremarkable and EKG showed NSR. The patient was having some tachycardia on the telemetry monitor, cardiology suggested EP evaluation to review the strips. EP saw the patient, stating that he had a normal rhythm. The patient was worked up on imaging and was shown to have nasal fractures as well as rib fractures. ENT evaluated the patient and recommended outpatient followup and would like to wait for swelling to go down. Rehab evaluated the patient and recommended SNF and STR. Otherwise, the patient had an uneventful stay on telemetry and understood the circumstances surrounding his discharge. Mr. Zaman is a 77 yo male patient with a PMH of HTN, DM, depression, non-hodgkin lymphoma and CLL (on rituximab every 2 months with Dr. Shaikh, last chemotherapy 3 weeks PTP), spinal stenosis, frequent falls (last in January 2019 with a tooth embedded in the left cheek subcutaneous soft tissues with adjacent foci of gas and small phlegmon/developing abscess lateral to the tooth), SOFIA not compliant with CPAP, COPD, and illegal narcotics use (buy opiods for sleep- unprescribed, could not name drug nor quantify dose), was brought to [facility] after being found down at home in an altered state.        Patient notes he lives with his son but takes care of himself. He notes no recent illness and has no direct complaints during interview. He does not recall falling and as per EMS he was found by family. Fall was unwitnessed with no noted LOC. He was found altered with pinpoint pupils and given 2mg of Narcan by EMS after which he returned to baseline.  Patient is a poor historian but is alert and oriented and participates in interview. Declined to come to hospital as per EMS.         Patient noted chronic back pain and knees pain. Sister at bedside noted frequent falls in the last year. He denies chest pain, SOB, or cough at this time. No headache. Patient noted that his depression is controlled, however when asked sister said that he is depressed. He does not do his usual activities like watching movies, as he did not pay his bills. He denies any suicidal ideation.         In ED, patient was hypotensive initially, was given 2 L of NSS with stabilization of BP. VBG showed acidosis, hyperkalemia. Patient is on room air, saturating well. Patient will be admitted to medical floor.         Echocardiography was unremarkable and EKG showed NSR. The patient was having some tachycardia on the telemetry monitor, cardiology suggested EP evaluation to review the strips. EP saw the patient, stating that he had a normal rhythm. The patient was worked up on imaging and was shown to have nasal fractures as well as rib fractures. ENT evaluated the patient and recommended outpatient follow-up and would like to wait for swelling to go down. Rehab evaluated the patient and recommended SNF and STR. Otherwise, the patient had an uneventful stay on telemetry and understood the circumstances surrounding his discharge.

## 2019-09-17 NOTE — DISCHARGE NOTE PROVIDER - CARE PROVIDERS DIRECT ADDRESSES
[email or web address]

## 2019-09-17 NOTE — DISCHARGE NOTE PROVIDER - PROVIDER TOKENS
PROVIDER:[TOKEN:[[id number]],FOLLOWUP:[1 week]],PROVIDER:[TOKEN:[[id number]],FOLLOWUP:[1 week]],PROVIDER:[TOKEN:[[id number]],FOLLOWUP:[1 week]]

## 2019-09-17 NOTE — DISCHARGE NOTE PROVIDER - CARE PROVIDER_API CALL
Duncan Li)  Family Medicine  [address] [place] [address]  Phone: [phone]  Fax: [phone]  Follow Up Time: 1 week    Benita Brunner)  Otolaryngology  [address], [address] [place] [address]  Phone: [phone]  Fax: [phone]  Follow Up Time: 1 week    Imelda Brown)  Internal Medicine  [address]  [place] [address]  Phone: [phone]  Fax: [phone]  Follow Up Time: 1 week

## 2019-09-17 NOTE — PROGRESS NOTE ADULT - ASSESSMENT
Mr. Zaman is a 77 yo male patient with a PMH of HTN, DM, depression, non-hodgkin lymphoma and CLL (on rituximab every 2 months with Dr. Shaikh, last chemotherapy 3 weeks PTP), spinal stenosis, frequent falls (last in January 2019 with a tooth embedded in the left cheek subcutaneous soft tissues with adjacent foci of gas and small phlegmon/developing abscess lateral to the tooth), SOFIA not compliant with CPAP, COPD, and illegal narcotics use (buy opiods for sleep- unprescribed, could not name drug nor quantify dose), was brought to [facility] after being found down at home in an altered state.    1.	Syncope/altered mental status due to opoid overdose  2.	B/L nasal bone fractures  3.	CHANA on CKD-3  4.	Ac. Hypercapnic respiratory failure due to opoid overdose  5.	DM-2 / HTN  6.	CLL  7.	SOFIA non compliant with CPAP  8.	COPD         PLAN:    ·	Care D/W the cardiology yesterday and started him on Metoprolol 25 mg po q 12h.  ·	Replete Mg and keep the level above 2. Keep K level above 4.  ·	Having episodes of tachycardia on tele. Waiting EP eval  ·	ENT eval for nasal bones fx.   ·	Renal function improved with IVF.   ·	Monitor FS  ·	Cont Nebs.   ·	Cont Insulin and his other meds.   ·	 for D/C planning.

## 2019-09-17 NOTE — PROGRESS NOTE ADULT - SUBJECTIVE AND OBJECTIVE BOX
CR OWEN  79y Male    CHIEF COMPLAINT:    Patient is a 79y old  Male who presents with a chief complaint of Syncope (17 Sep 2019 08:15)      INTERVAL HPI/OVERNIGHT EVENTS:    Patient seen and examined. Give H/O on & off dizziness. No palpitations. No cp. Having episodes of abnormal rhythm on tele.     ROS: All other systems are negative.    Vital Signs:    T(F): 98 (19 @ 14:29), Max: 98.2 (19 @ 21:27)  HR: 71 (19 @ 14:29) (70 - 96)  BP: 161/84 (19 @ 14:29) (126/64 - 161/84)  RR: 18 (19 @ 14:29) (18 - 18)  SpO2: 96% (19 @ 18:03) (96% - 96%)  I&O's Summary    16 Sep 2019 07:01  -  17 Sep 2019 07:00  --------------------------------------------------------  IN: 630 mL / OUT: 1400 mL / NET: -770 mL      Daily     Daily Weight in k.1 (17 Sep 2019 05:05)  CAPILLARY BLOOD GLUCOSE      POCT Blood Glucose.: 122 mg/dL (17 Sep 2019 11:52)  POCT Blood Glucose.: 118 mg/dL (17 Sep 2019 07:36)  POCT Blood Glucose.: 118 mg/dL (16 Sep 2019 22:11)  POCT Blood Glucose.: 112 mg/dL (16 Sep 2019 17:08)      PHYSICAL EXAM:    GENERAL:  NAD  SKIN: No rashes or lesions  HENT: Atraumatic Normocephalic. PERRL. Moist membranes.  NECK: Supple, No JVD. No lymphadenopathy.  PULMONARY: CTA B/L. No wheezing. No rales  CVS: Normal S1, S2. Rate and Rhythm are regular. No murmurs.  ABDOMEN/GI: Soft, Nontender, Nondistended; BS present  EXTREMITIES: Peripheral pulses intact. No edema B/L LE.  NEUROLOGIC:  No motor or sensory deficit.  PSYCH: Alert & oriented x 3    Consultant(s) Notes Reviewed:  [x ] YES  [ ] NO  Care Discussed with Consultants/Other Providers [ x] YES  [ ] NO    EKG reviewed  Telemetry reviewed    LABS:                        12.2   4.92  )-----------( 180      ( 17 Sep 2019 05:51 )             37.4         144  |  106  |  12  ----------------------------<  125<H>  3.9   |  26  |  0.6<L>    Ca    9.1      17 Sep 2019 05:51  Mg     1.4               Trop <0.01, CKMB --, , 09-15-19 @ 06:19  Trop 0.02, CKMB --, , 19 @ 16:59        RADIOLOGY & ADDITIONAL TESTS:      Imaging or report Personally Reviewed:  [ ] YES  [ ] NO    Medications:  Standing  ALBUTerol/ipratropium for Nebulization 3 milliLiter(s) Nebulizer every 6 hours  buDESOnide 160 MICROgram(s)/formoterol 4.5 MICROgram(s) Inhaler 2 Puff(s) Inhalation two times a day  buPROPion XL . 300 milliGRAM(s) Oral daily  chlorhexidine 4% Liquid 1 Application(s) Topical <User Schedule>  cyanocobalamin 1000 MICROGram(s) Oral daily  dextrose 5%. 1000 milliLiter(s) IV Continuous <Continuous>  dextrose 50% Injectable 12.5 Gram(s) IV Push once  dextrose 50% Injectable 25 Gram(s) IV Push once  dextrose 50% Injectable 25 Gram(s) IV Push once  folic acid 1 milliGRAM(s) Oral daily  heparin  Injectable 5000 Unit(s) SubCutaneous every 8 hours  influenza   Vaccine 0.5 milliLiter(s) IntraMuscular once  insulin lispro (HumaLOG) corrective regimen sliding scale   SubCutaneous three times a day before meals  metoprolol tartrate 25 milliGRAM(s) Oral two times a day  nicotine -  14 mG/24Hr(s) Patch 1 patch Transdermal daily  pantoprazole    Tablet 40 milliGRAM(s) Oral before breakfast  simvastatin 20 milliGRAM(s) Oral at bedtime  tamsulosin 0.4 milliGRAM(s) Oral at bedtime    PRN Meds  acetaminophen   Tablet .. 650 milliGRAM(s) Oral every 6 hours PRN  clonazePAM  Tablet 0.5 milliGRAM(s) Oral every 6 hours PRN  dextrose 40% Gel 15 Gram(s) Oral once PRN  glucagon  Injectable 1 milliGRAM(s) IntraMuscular once PRN      Case discussed with resident    Care discussed with pt/family

## 2019-09-18 ENCOUNTER — TRANSCRIPTION ENCOUNTER (OUTPATIENT)
Age: 79
End: 2019-09-18

## 2019-09-18 VITALS
HEART RATE: 66 BPM | SYSTOLIC BLOOD PRESSURE: 144 MMHG | TEMPERATURE: 97 F | DIASTOLIC BLOOD PRESSURE: 68 MMHG | RESPIRATION RATE: 18 BRPM

## 2019-09-18 LAB
ANION GAP SERPL CALC-SCNC: 12 MMOL/L — SIGNIFICANT CHANGE UP (ref 7–14)
BUN SERPL-MCNC: 14 MG/DL — SIGNIFICANT CHANGE UP (ref 10–20)
CALCIUM SERPL-MCNC: 9.3 MG/DL — SIGNIFICANT CHANGE UP (ref 8.5–10.1)
CHLORIDE SERPL-SCNC: 105 MMOL/L — SIGNIFICANT CHANGE UP (ref 98–110)
CO2 SERPL-SCNC: 25 MMOL/L — SIGNIFICANT CHANGE UP (ref 17–32)
CREAT SERPL-MCNC: 0.8 MG/DL — SIGNIFICANT CHANGE UP (ref 0.7–1.5)
GLUCOSE BLDC GLUCOMTR-MCNC: 113 MG/DL — HIGH (ref 70–99)
GLUCOSE BLDC GLUCOMTR-MCNC: 129 MG/DL — HIGH (ref 70–99)
GLUCOSE SERPL-MCNC: 124 MG/DL — HIGH (ref 70–99)
HCT VFR BLD CALC: 40 % — LOW (ref 42–52)
HGB BLD-MCNC: 13.1 G/DL — LOW (ref 14–18)
MAGNESIUM SERPL-MCNC: 1.7 MG/DL — LOW (ref 1.8–2.4)
MCHC RBC-ENTMCNC: 27.4 PG — SIGNIFICANT CHANGE UP (ref 27–31)
MCHC RBC-ENTMCNC: 32.8 G/DL — SIGNIFICANT CHANGE UP (ref 32–37)
MCV RBC AUTO: 83.7 FL — SIGNIFICANT CHANGE UP (ref 80–94)
NRBC # BLD: 0 /100 WBCS — SIGNIFICANT CHANGE UP (ref 0–0)
PLATELET # BLD AUTO: 188 K/UL — SIGNIFICANT CHANGE UP (ref 130–400)
POTASSIUM SERPL-MCNC: 4 MMOL/L — SIGNIFICANT CHANGE UP (ref 3.5–5)
POTASSIUM SERPL-SCNC: 4 MMOL/L — SIGNIFICANT CHANGE UP (ref 3.5–5)
RBC # BLD: 4.78 M/UL — SIGNIFICANT CHANGE UP (ref 4.7–6.1)
RBC # FLD: 15.7 % — HIGH (ref 11.5–14.5)
SODIUM SERPL-SCNC: 142 MMOL/L — SIGNIFICANT CHANGE UP (ref 135–146)
TSH SERPL-MCNC: 1.22 UIU/ML — SIGNIFICANT CHANGE UP (ref 0.27–4.2)
WBC # BLD: 5.39 K/UL — SIGNIFICANT CHANGE UP (ref 4.8–10.8)
WBC # FLD AUTO: 5.39 K/UL — SIGNIFICANT CHANGE UP (ref 4.8–10.8)

## 2019-09-18 PROCEDURE — 99239 HOSP IP/OBS DSCHRG MGMT >30: CPT

## 2019-09-18 RX ORDER — METOPROLOL TARTRATE 50 MG
1 TABLET ORAL
Qty: 60 | Refills: 0
Start: 2019-09-18 | End: 2019-10-17

## 2019-09-18 RX ORDER — METOPROLOL TARTRATE 50 MG
1 TABLET ORAL
Qty: 0 | Refills: 0 | DISCHARGE
Start: 2019-09-18

## 2019-09-18 RX ORDER — MAGNESIUM SULFATE 500 MG/ML
2 VIAL (ML) INJECTION ONCE
Refills: 0 | Status: COMPLETED | OUTPATIENT
Start: 2019-09-18 | End: 2019-09-18

## 2019-09-18 RX ADMIN — CHLORHEXIDINE GLUCONATE 1 APPLICATION(S): 213 SOLUTION TOPICAL at 05:55

## 2019-09-18 RX ADMIN — PREGABALIN 1000 MICROGRAM(S): 225 CAPSULE ORAL at 11:33

## 2019-09-18 RX ADMIN — Medication 25 MILLIGRAM(S): at 05:55

## 2019-09-18 RX ADMIN — BUDESONIDE AND FORMOTEROL FUMARATE DIHYDRATE 2 PUFF(S): 160; 4.5 AEROSOL RESPIRATORY (INHALATION) at 08:22

## 2019-09-18 RX ADMIN — Medication 0.5 MILLIGRAM(S): at 11:43

## 2019-09-18 RX ADMIN — Medication 0: at 08:21

## 2019-09-18 RX ADMIN — Medication 1 MILLIGRAM(S): at 11:33

## 2019-09-18 RX ADMIN — HEPARIN SODIUM 5000 UNIT(S): 5000 INJECTION INTRAVENOUS; SUBCUTANEOUS at 05:55

## 2019-09-18 RX ADMIN — HEPARIN SODIUM 5000 UNIT(S): 5000 INJECTION INTRAVENOUS; SUBCUTANEOUS at 12:50

## 2019-09-18 RX ADMIN — Medication 0.5 MILLIGRAM(S): at 03:40

## 2019-09-18 RX ADMIN — Medication 50 GRAM(S): at 12:49

## 2019-09-18 RX ADMIN — BUPROPION HYDROCHLORIDE 300 MILLIGRAM(S): 150 TABLET, EXTENDED RELEASE ORAL at 11:33

## 2019-09-18 RX ADMIN — PANTOPRAZOLE SODIUM 40 MILLIGRAM(S): 20 TABLET, DELAYED RELEASE ORAL at 06:45

## 2019-09-18 NOTE — PROGRESS NOTE ADULT - ASSESSMENT
Mr. Zaman is a 79 yo male patient with a PMH of HTN, DM, depression, non-hodgkin lymphoma and CLL (on rituximab every 2 months with Dr. Shaikh, last chemotherapy 3 weeks PTP), spinal stenosis, frequent falls (last in January 2019 with a tooth embedded in the left cheek subcutaneous soft tissues with adjacent foci of gas and small phlegmon/developing abscess lateral to the tooth), SOFIA not compliant with CPAP, COPD, and illegal narcotics use (buy opiods for sleep- unprescribed, could not name drug nor quantify dose), was brought to [facility] after being found down at home in an altered state.    1.	Syncope/altered mental status due to opoid overdose  2.	B/L nasal bone fractures  3.	CHANA on CKD-3  4.	Ac. Hypercapnic respiratory failure due to opoid overdose  5.	DM-2 / HTN  6.	CLL  7.	SOFIA non compliant with CPAP  8.	COPD         PLAN:    ·	EP eval noted. No arrhythmia on tele as per EP. No further W/U needed.   ·	Replete Mg prior to D/C  ·	ENT eval for nasal bones fx noted. Recommended out pt F/U  ·	Renal function improved with IVF.   ·	D/C home    * Med rec reviewed. Plan of care D/W the pt. Time spent 36 minutes.

## 2019-09-18 NOTE — DISCHARGE NOTE NURSING/CASE MANAGEMENT/SOCIAL WORK - PATIENT PORTAL LINK FT
You can access the FollowMyHealth Patient Portal offered by [facility] by registering at the following website: [email or web address]. By joining [facility]’s FollowMyHealth portal, you will also be able to view your health information using other applications (apps) compatible with our system.

## 2019-09-18 NOTE — PROGRESS NOTE ADULT - SUBJECTIVE AND OBJECTIVE BOX
CR OWEN  79y Male    CHIEF COMPLAINT:    Patient is a 79y old  Male who presents with a chief complaint of Syncope (17 Sep 2019 17:55)      INTERVAL HPI/OVERNIGHT EVENTS:    Patient seen and examined. No palpitations. No dizziness.    ROS: All other systems are negative.    Vital Signs:    T(F): 96.6 (09-18-19 @ 05:38), Max: 98 (09-17-19 @ 14:29)  HR: 70 (09-18-19 @ 05:38) (66 - 72)  BP: 124/77 (09-18-19 @ 05:38) (124/77 - 161/84)  RR: 18 (09-18-19 @ 05:38) (18 - 18)  SpO2: 98% (09-17-19 @ 16:20) (98% - 98%)  I&O's Summary    17 Sep 2019 07:01  -  18 Sep 2019 07:00  --------------------------------------------------------  IN: 480 mL / OUT: 300 mL / NET: 180 mL      Daily     Daily   CAPILLARY BLOOD GLUCOSE      POCT Blood Glucose.: 129 mg/dL (18 Sep 2019 07:34)  POCT Blood Glucose.: 115 mg/dL (17 Sep 2019 21:44)  POCT Blood Glucose.: 133 mg/dL (17 Sep 2019 16:57)  POCT Blood Glucose.: 122 mg/dL (17 Sep 2019 11:52)      PHYSICAL EXAM:    GENERAL:  NAD  SKIN: No rashes or lesions  HENT: Atraumatic Normocephalic. PERRL. Moist membranes.  NECK: Supple, No JVD. No lymphadenopathy.  PULMONARY: CTA B/L. No wheezing. No rales  CVS: Normal S1, S2. Rate and Rhythm are regular. No murmurs.  ABDOMEN/GI: Soft, Nontender, Nondistended; BS present  EXTREMITIES: Peripheral pulses intact. No edema B/L LE.  NEUROLOGIC:  No motor or sensory deficit.  PSYCH: Alert & oriented x 3    Consultant(s) Notes Reviewed:  [x ] YES  [ ] NO  Care Discussed with Consultants/Other Providers [ x] YES  [ ] NO    EKG reviewed  Telemetry reviewed    LABS:                        13.1   5.39  )-----------( 188      ( 18 Sep 2019 05:42 )             40.0     09-18    142  |  105  |  14  ----------------------------<  124<H>  4.0   |  25  |  0.8    Ca    9.3      18 Sep 2019 05:42  Mg     1.7     09-18                RADIOLOGY & ADDITIONAL TESTS:      Imaging or report Personally Reviewed:  [ ] YES  [ ] NO    Medications:  Standing  ALBUTerol/ipratropium for Nebulization 3 milliLiter(s) Nebulizer every 6 hours  buDESOnide 160 MICROgram(s)/formoterol 4.5 MICROgram(s) Inhaler 2 Puff(s) Inhalation two times a day  buPROPion XL . 300 milliGRAM(s) Oral daily  chlorhexidine 4% Liquid 1 Application(s) Topical <User Schedule>  cyanocobalamin 1000 MICROGram(s) Oral daily  dextrose 5%. 1000 milliLiter(s) IV Continuous <Continuous>  dextrose 50% Injectable 12.5 Gram(s) IV Push once  dextrose 50% Injectable 25 Gram(s) IV Push once  dextrose 50% Injectable 25 Gram(s) IV Push once  folic acid 1 milliGRAM(s) Oral daily  heparin  Injectable 5000 Unit(s) SubCutaneous every 8 hours  influenza   Vaccine 0.5 milliLiter(s) IntraMuscular once  insulin lispro (HumaLOG) corrective regimen sliding scale   SubCutaneous three times a day before meals  metoprolol tartrate 25 milliGRAM(s) Oral two times a day  nicotine -  14 mG/24Hr(s) Patch 1 patch Transdermal daily  pantoprazole    Tablet 40 milliGRAM(s) Oral before breakfast  simvastatin 20 milliGRAM(s) Oral at bedtime  tamsulosin 0.4 milliGRAM(s) Oral at bedtime    PRN Meds  acetaminophen   Tablet .. 650 milliGRAM(s) Oral every 6 hours PRN  clonazePAM  Tablet 0.5 milliGRAM(s) Oral every 6 hours PRN  dextrose 40% Gel 15 Gram(s) Oral once PRN  glucagon  Injectable 1 milliGRAM(s) IntraMuscular once PRN      Case discussed with resident    Care discussed with pt/family

## 2019-09-24 DIAGNOSIS — S22.39XA FRACTURE OF ONE RIB, UNSPECIFIED SIDE, INITIAL ENCOUNTER FOR CLOSED FRACTURE: ICD-10-CM

## 2019-09-24 DIAGNOSIS — M48.061 SPINAL STENOSIS, LUMBAR REGION WITHOUT NEUROGENIC CLAUDICATION: ICD-10-CM
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
## 2019-09-24 DIAGNOSIS — G47.33 OBSTRUCTIVE SLEEP APNEA (ADULT) (PEDIATRIC): ICD-10-CM

## 2019-09-24 DIAGNOSIS — E78.5 HYPERLIPIDEMIA, UNSPECIFIED: ICD-10-CM

## 2019-09-24 DIAGNOSIS — R41.82 ALTERED MENTAL STATUS, UNSPECIFIED: ICD-10-CM

## 2019-09-24 DIAGNOSIS — F41.9 ANXIETY DISORDER, UNSPECIFIED: ICD-10-CM

## 2019-09-24 DIAGNOSIS — N17.9 ACUTE KIDNEY FAILURE, UNSPECIFIED: ICD-10-CM

## 2019-09-24 DIAGNOSIS — I95.9 HYPOTENSION, UNSPECIFIED: ICD-10-CM

## 2019-09-24 DIAGNOSIS — N18.3 CHRONIC KIDNEY DISEASE, STAGE 3 (MODERATE): ICD-10-CM

## 2019-09-24 DIAGNOSIS — F11.129 OPIOID ABUSE WITH INTOXICATION, UNSPECIFIED: ICD-10-CM

## 2019-09-24 DIAGNOSIS — T40.2X4A POISONING BY OTHER OPIOIDS, UNDETERMINED, INITIAL ENCOUNTER: ICD-10-CM

## 2019-09-24 DIAGNOSIS — Z96.653 PRESENCE OF ARTIFICIAL KNEE JOINT, BILATERAL: ICD-10-CM

## 2019-09-24 DIAGNOSIS — E11.22 TYPE 2 DIABETES MELLITUS WITH DIABETIC CHRONIC KIDNEY DISEASE: ICD-10-CM

## 2019-09-24 DIAGNOSIS — C85.90 NON-HODGKIN LYMPHOMA, UNSPECIFIED, UNSPECIFIED SITE: ICD-10-CM

## 2019-09-24 DIAGNOSIS — S00.83XA CONTUSION OF OTHER PART OF HEAD, INITIAL ENCOUNTER: ICD-10-CM

## 2019-09-24 DIAGNOSIS — E87.5 HYPERKALEMIA: ICD-10-CM

## 2019-09-24 DIAGNOSIS — J44.9 CHRONIC OBSTRUCTIVE PULMONARY DISEASE, UNSPECIFIED: ICD-10-CM

## 2019-09-24 DIAGNOSIS — J96.02 ACUTE RESPIRATORY FAILURE WITH HYPERCAPNIA: ICD-10-CM

## 2019-09-24 DIAGNOSIS — Z91.19 PATIENT'S NONCOMPLIANCE WITH OTHER MEDICAL TREATMENT AND REGIMEN: ICD-10-CM

## 2019-09-24 DIAGNOSIS — F17.210 NICOTINE DEPENDENCE, CIGARETTES, UNCOMPLICATED: ICD-10-CM

## 2019-09-24 DIAGNOSIS — R29.6 REPEATED FALLS: ICD-10-CM
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
## 2019-09-24 DIAGNOSIS — Z79.84 LONG TERM (CURRENT) USE OF ORAL HYPOGLYCEMIC DRUGS: ICD-10-CM

## 2019-09-24 DIAGNOSIS — E87.2 ACIDOSIS: ICD-10-CM

## 2019-09-24 DIAGNOSIS — C91.10 CHRONIC LYMPHOCYTIC LEUKEMIA OF B-CELL TYPE NOT HAVING ACHIEVED REMISSION: ICD-10-CM

## 2019-09-24 DIAGNOSIS — F32.9 MAJOR DEPRESSIVE DISORDER, SINGLE EPISODE, UNSPECIFIED: ICD-10-CM

## 2019-09-24 DIAGNOSIS — S02.2XXA FRACTURE OF NASAL BONES, INITIAL ENCOUNTER FOR CLOSED FRACTURE: ICD-10-CM

## 2019-09-24 DIAGNOSIS — R74.8 ABNORMAL LEVELS OF OTHER SERUM ENZYMES: ICD-10-CM
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
## 2019-09-24 DIAGNOSIS — E86.0 DEHYDRATION: ICD-10-CM

## 2019-10-22 ENCOUNTER — APPOINTMENT (OUTPATIENT)
Dept: NEUROSURGERY | Facility: CLINIC | Age: 79
End: 2019-10-22
Payer: MEDICARE

## 2019-10-22 VITALS — HEIGHT: 63 IN | BODY MASS INDEX: 32.78 KG/M2 | WEIGHT: 185 LBS

## 2019-10-22 PROCEDURE — 99214 OFFICE O/P EST MOD 30 MIN: CPT

## 2019-10-22 NOTE — HISTORY OF PRESENT ILLNESS
[FreeTextEntry1] : Mr. Zaman presents today to discuss surgical intervention. He continues to have persistent back pain with neurogenic claudication affecting his legs. He has trialed PT and injection treatments without sustained relief. He continues to ambulate with assistance. He feel's unsteady with ambulation. \par \par An MRI of the lumbar spine was from 2/26/19 showed multilevel lumbar degenerative disc disease with severe stenosis worse at L3/4 and L4/5, lesser at L2/3. There is prominent epidural fat noted. Dynamic xrays from 8/2019 did not suggested instability. \par \par

## 2019-10-22 NOTE — PHYSICAL EXAM
[FreeTextEntry1] : Constitutional: alert, in no acute distress, well nourished and well developed. He ambulates with a cane.  Psychiatric: oriented to person, place, and time.  Spine:  Lumbar/sacral spine examination demonstrates. straight leg raise of the left leg was negative. straight leg raise of the right leg was negative. Flexion was restricted and was painful. Extension was restricted and was painful.  Gait: antalgic  Motor Exam: all motor groups within normal limits of strength and tone bilaterally.  Sensory Exam: all sensory within normal limits bilaterally.  Deep Tendon Reflexes: all reflexes within normal limits bilaterally.

## 2019-10-22 NOTE — ASSESSMENT
[FreeTextEntry1] : At this point he would like to proceed with surgical intervention. His condition has failed to improve with conservative treatments.. We discussed the option of an L3/4 and L4/5 decompressive laminectomy with interspinous stabilization, and possible L2/3 laminectomy. The risks of the surgery were discussed at length, including but not limited to failure to improve, recurrent herniated disc, paralysis, spinal fluid leak, wound infections, chronic neuropathy/pain, anesthesia complications, postoperative weakness, and the potential need for further surgical intervention. He is agreeable and would like to proceed.

## 2019-10-31 PROBLEM — C85.90 NON-HODGKIN LYMPHOMA, UNSPECIFIED, UNSPECIFIED SITE: Chronic | Status: ACTIVE | Noted: 2019-09-14

## 2019-11-11 ENCOUNTER — OUTPATIENT (OUTPATIENT)
Dept: OUTPATIENT SERVICES | Facility: HOSPITAL | Age: 79
LOS: 1 days | Discharge: HOME | End: 2019-11-11
Payer: COMMERCIAL

## 2019-11-11 VITALS
RESPIRATION RATE: 16 BRPM | SYSTOLIC BLOOD PRESSURE: 102 MMHG | TEMPERATURE: 97 F | HEIGHT: 66 IN | WEIGHT: 180.78 LBS | DIASTOLIC BLOOD PRESSURE: 60 MMHG | OXYGEN SATURATION: 96 % | HEART RATE: 68 BPM

## 2019-11-11 DIAGNOSIS — Z96.653 PRESENCE OF ARTIFICIAL KNEE JOINT, BILATERAL: Chronic | ICD-10-CM

## 2019-11-11 DIAGNOSIS — M48.062 SPINAL STENOSIS, LUMBAR REGION WITH NEUROGENIC CLAUDICATION: ICD-10-CM

## 2019-11-11 DIAGNOSIS — Z01.818 ENCOUNTER FOR OTHER PREPROCEDURAL EXAMINATION: ICD-10-CM

## 2019-11-11 DIAGNOSIS — Z95.828 PRESENCE OF OTHER VASCULAR IMPLANTS AND GRAFTS: Chronic | ICD-10-CM

## 2019-11-11 LAB
ALBUMIN SERPL ELPH-MCNC: 4.2 G/DL — SIGNIFICANT CHANGE UP (ref 3.5–5.2)
ALP SERPL-CCNC: 75 U/L — SIGNIFICANT CHANGE UP (ref 30–115)
ALT FLD-CCNC: 16 U/L — SIGNIFICANT CHANGE UP (ref 0–41)
ANION GAP SERPL CALC-SCNC: 16 MMOL/L — HIGH (ref 7–14)
APPEARANCE UR: CLEAR — SIGNIFICANT CHANGE UP
APTT BLD: 32.3 SEC — SIGNIFICANT CHANGE UP (ref 27–39.2)
AST SERPL-CCNC: 16 U/L — SIGNIFICANT CHANGE UP (ref 0–41)
BACTERIA # UR AUTO: ABNORMAL
BASOPHILS # BLD AUTO: 0.07 K/UL — SIGNIFICANT CHANGE UP (ref 0–0.2)
BASOPHILS NFR BLD AUTO: 0.9 % — SIGNIFICANT CHANGE UP (ref 0–1)
BILIRUB SERPL-MCNC: 0.4 MG/DL — SIGNIFICANT CHANGE UP (ref 0.2–1.2)
BILIRUB UR-MCNC: NEGATIVE — SIGNIFICANT CHANGE UP
BLD GP AB SCN SERPL QL: SIGNIFICANT CHANGE UP
BUN SERPL-MCNC: 23 MG/DL — HIGH (ref 10–20)
CALCIUM SERPL-MCNC: 9.5 MG/DL — SIGNIFICANT CHANGE UP (ref 8.5–10.1)
CHLORIDE SERPL-SCNC: 98 MMOL/L — SIGNIFICANT CHANGE UP (ref 98–110)
CO2 SERPL-SCNC: 22 MMOL/L — SIGNIFICANT CHANGE UP (ref 17–32)
COLOR SPEC: YELLOW — SIGNIFICANT CHANGE UP
CREAT SERPL-MCNC: 0.9 MG/DL — SIGNIFICANT CHANGE UP (ref 0.7–1.5)
DIFF PNL FLD: NEGATIVE — SIGNIFICANT CHANGE UP
EOSINOPHIL # BLD AUTO: 0.71 K/UL — HIGH (ref 0–0.7)
EOSINOPHIL NFR BLD AUTO: 9.6 % — HIGH (ref 0–8)
EPI CELLS # UR: 0 /HPF — SIGNIFICANT CHANGE UP (ref 0–5)
ESTIMATED AVERAGE GLUCOSE: 111 MG/DL — SIGNIFICANT CHANGE UP (ref 68–114)
GLUCOSE SERPL-MCNC: 122 MG/DL — HIGH (ref 70–99)
GLUCOSE UR QL: NEGATIVE — SIGNIFICANT CHANGE UP
HBA1C BLD-MCNC: 5.5 % — SIGNIFICANT CHANGE UP (ref 4–5.6)
HCT VFR BLD CALC: 44 % — SIGNIFICANT CHANGE UP (ref 42–52)
HGB BLD-MCNC: 14.4 G/DL — SIGNIFICANT CHANGE UP (ref 14–18)
HYALINE CASTS # UR AUTO: 4 /LPF — SIGNIFICANT CHANGE UP (ref 0–7)
IMM GRANULOCYTES NFR BLD AUTO: 2.6 % — HIGH (ref 0.1–0.3)
INR BLD: 1.25 RATIO — SIGNIFICANT CHANGE UP (ref 0.65–1.3)
KETONES UR-MCNC: NEGATIVE — SIGNIFICANT CHANGE UP
LEUKOCYTE ESTERASE UR-ACNC: ABNORMAL
LYMPHOCYTES # BLD AUTO: 0.87 K/UL — LOW (ref 1.2–3.4)
LYMPHOCYTES # BLD AUTO: 11.8 % — LOW (ref 20.5–51.1)
MCHC RBC-ENTMCNC: 28 PG — SIGNIFICANT CHANGE UP (ref 27–31)
MCHC RBC-ENTMCNC: 32.7 G/DL — SIGNIFICANT CHANGE UP (ref 32–37)
MCV RBC AUTO: 85.6 FL — SIGNIFICANT CHANGE UP (ref 80–94)
MONOCYTES # BLD AUTO: 0.79 K/UL — HIGH (ref 0.1–0.6)
MONOCYTES NFR BLD AUTO: 10.7 % — HIGH (ref 1.7–9.3)
NEUTROPHILS # BLD AUTO: 4.76 K/UL — SIGNIFICANT CHANGE UP (ref 1.4–6.5)
NEUTROPHILS NFR BLD AUTO: 64.4 % — SIGNIFICANT CHANGE UP (ref 42.2–75.2)
NITRITE UR-MCNC: POSITIVE
NRBC # BLD: 0 /100 WBCS — SIGNIFICANT CHANGE UP (ref 0–0)
PH UR: 6 — SIGNIFICANT CHANGE UP (ref 5–8)
PLATELET # BLD AUTO: 212 K/UL — SIGNIFICANT CHANGE UP (ref 130–400)
POTASSIUM SERPL-MCNC: 4.3 MMOL/L — SIGNIFICANT CHANGE UP (ref 3.5–5)
POTASSIUM SERPL-SCNC: 4.3 MMOL/L — SIGNIFICANT CHANGE UP (ref 3.5–5)
PROT SERPL-MCNC: 6.3 G/DL — SIGNIFICANT CHANGE UP (ref 6–8)
PROT UR-MCNC: NEGATIVE — SIGNIFICANT CHANGE UP
PROTHROM AB SERPL-ACNC: 14.4 SEC — HIGH (ref 9.95–12.87)
RBC # BLD: 5.14 M/UL — SIGNIFICANT CHANGE UP (ref 4.7–6.1)
RBC # FLD: 14.8 % — HIGH (ref 11.5–14.5)
RBC CASTS # UR COMP ASSIST: 2 /HPF — SIGNIFICANT CHANGE UP (ref 0–4)
SODIUM SERPL-SCNC: 136 MMOL/L — SIGNIFICANT CHANGE UP (ref 135–146)
SP GR SPEC: 1.02 — SIGNIFICANT CHANGE UP (ref 1.01–1.02)
UROBILINOGEN FLD QL: SIGNIFICANT CHANGE UP
WBC # BLD: 7.39 K/UL — SIGNIFICANT CHANGE UP (ref 4.8–10.8)
WBC # FLD AUTO: 7.39 K/UL — SIGNIFICANT CHANGE UP (ref 4.8–10.8)
WBC UR QL: 66 /HPF — HIGH (ref 0–5)

## 2019-11-11 PROCEDURE — 93010 ELECTROCARDIOGRAM REPORT: CPT

## 2019-11-11 RX ORDER — IRBESARTAN 75 MG/1
1 TABLET ORAL
Qty: 0 | Refills: 0 | DISCHARGE

## 2019-11-11 NOTE — H&P PST ADULT - NSICDXPASTMEDICALHX_GEN_ALL_CORE_FT
PAST MEDICAL HISTORY:  Anxiety     Back pain     BPH (benign prostatic hyperplasia)     CLL (chronic lymphocytic leukemia) treatment completed 10/19    COPD (chronic obstructive pulmonary disease)     Depression     DM (diabetes mellitus)     HTN (hypertension)     Non-Hodgkin lymphoma

## 2019-11-11 NOTE — H&P PST ADULT - REASON FOR ADMISSION
78 yo male presents with c/o low back pain& leg weakness for years, "it got worse", pt is scheduled for lumbar spine surgery;  denies chest pain, palpitations, shortness of breath, dyspnea, or dysuria. exercise tolerance: 2 blocks w/o sob, limited dt pain, denies sob, ambulates w/ cane

## 2019-11-18 ENCOUNTER — INPATIENT (INPATIENT)
Facility: HOSPITAL | Age: 79
LOS: 6 days | Discharge: HOME | End: 2019-11-25
Attending: NEUROLOGICAL SURGERY | Admitting: NEUROLOGICAL SURGERY
Payer: MEDICARE

## 2019-11-18 ENCOUNTER — APPOINTMENT (OUTPATIENT)
Dept: NEUROSURGERY | Facility: HOSPITAL | Age: 79
End: 2019-11-18

## 2019-11-18 VITALS
DIASTOLIC BLOOD PRESSURE: 87 MMHG | RESPIRATION RATE: 18 BRPM | HEART RATE: 90 BPM | TEMPERATURE: 98 F | SYSTOLIC BLOOD PRESSURE: 150 MMHG | HEIGHT: 66 IN | WEIGHT: 184.97 LBS

## 2019-11-18 DIAGNOSIS — Z96.653 PRESENCE OF ARTIFICIAL KNEE JOINT, BILATERAL: Chronic | ICD-10-CM

## 2019-11-18 DIAGNOSIS — Z95.828 PRESENCE OF OTHER VASCULAR IMPLANTS AND GRAFTS: Chronic | ICD-10-CM

## 2019-11-18 LAB
GLUCOSE BLDC GLUCOMTR-MCNC: 134 MG/DL — HIGH (ref 70–99)
GLUCOSE BLDC GLUCOMTR-MCNC: 151 MG/DL — HIGH (ref 70–99)

## 2019-11-18 PROCEDURE — 22868 INSJ STABLJ DEV W/DCMPRN: CPT

## 2019-11-18 PROCEDURE — 22867 INSJ STABLJ DEV W/DCMPRN: CPT

## 2019-11-18 RX ORDER — DEXTROSE 50 % IN WATER 50 %
25 SYRINGE (ML) INTRAVENOUS ONCE
Refills: 0 | Status: DISCONTINUED | OUTPATIENT
Start: 2019-11-18 | End: 2019-11-25

## 2019-11-18 RX ORDER — METOPROLOL TARTRATE 50 MG
25 TABLET ORAL
Refills: 0 | Status: DISCONTINUED | OUTPATIENT
Start: 2019-11-18 | End: 2019-11-25

## 2019-11-18 RX ORDER — DEXTROSE 50 % IN WATER 50 %
15 SYRINGE (ML) INTRAVENOUS ONCE
Refills: 0 | Status: DISCONTINUED | OUTPATIENT
Start: 2019-11-18 | End: 2019-11-25

## 2019-11-18 RX ORDER — OXYCODONE AND ACETAMINOPHEN 5; 325 MG/1; MG/1
1 TABLET ORAL EVERY 4 HOURS
Refills: 0 | Status: DISCONTINUED | OUTPATIENT
Start: 2019-11-18 | End: 2019-11-24

## 2019-11-18 RX ORDER — FOLIC ACID 0.8 MG
1 TABLET ORAL DAILY
Refills: 0 | Status: DISCONTINUED | OUTPATIENT
Start: 2019-11-18 | End: 2019-11-25

## 2019-11-18 RX ORDER — TRAZODONE HCL 50 MG
150 TABLET ORAL AT BEDTIME
Refills: 0 | Status: DISCONTINUED | OUTPATIENT
Start: 2019-11-18 | End: 2019-11-25

## 2019-11-18 RX ORDER — PANTOPRAZOLE SODIUM 20 MG/1
40 TABLET, DELAYED RELEASE ORAL
Refills: 0 | Status: DISCONTINUED | OUTPATIENT
Start: 2019-11-18 | End: 2019-11-25

## 2019-11-18 RX ORDER — SODIUM CHLORIDE 9 MG/ML
1000 INJECTION, SOLUTION INTRAVENOUS
Refills: 0 | Status: DISCONTINUED | OUTPATIENT
Start: 2019-11-18 | End: 2019-11-25

## 2019-11-18 RX ORDER — GLUCAGON INJECTION, SOLUTION 0.5 MG/.1ML
1 INJECTION, SOLUTION SUBCUTANEOUS ONCE
Refills: 0 | Status: DISCONTINUED | OUTPATIENT
Start: 2019-11-18 | End: 2019-11-25

## 2019-11-18 RX ORDER — TAMSULOSIN HYDROCHLORIDE 0.4 MG/1
0.4 CAPSULE ORAL AT BEDTIME
Refills: 0 | Status: DISCONTINUED | OUTPATIENT
Start: 2019-11-18 | End: 2019-11-25

## 2019-11-18 RX ORDER — CLONAZEPAM 1 MG
0.5 TABLET ORAL EVERY 6 HOURS
Refills: 0 | Status: DISCONTINUED | OUTPATIENT
Start: 2019-11-18 | End: 2019-11-18

## 2019-11-18 RX ORDER — CEFAZOLIN SODIUM 1 G
1000 VIAL (EA) INJECTION EVERY 8 HOURS
Refills: 0 | Status: COMPLETED | OUTPATIENT
Start: 2019-11-18 | End: 2019-11-19

## 2019-11-18 RX ORDER — VENLAFAXINE HCL 75 MG
150 CAPSULE, EXT RELEASE 24 HR ORAL DAILY
Refills: 0 | Status: DISCONTINUED | OUTPATIENT
Start: 2019-11-18 | End: 2019-11-25

## 2019-11-18 RX ORDER — MORPHINE SULFATE 50 MG/1
2 CAPSULE, EXTENDED RELEASE ORAL EVERY 4 HOURS
Refills: 0 | Status: DISCONTINUED | OUTPATIENT
Start: 2019-11-18 | End: 2019-11-20

## 2019-11-18 RX ORDER — LISINOPRIL 2.5 MG/1
10 TABLET ORAL DAILY
Refills: 0 | Status: DISCONTINUED | OUTPATIENT
Start: 2019-11-18 | End: 2019-11-25

## 2019-11-18 RX ORDER — OXYCODONE AND ACETAMINOPHEN 5; 325 MG/1; MG/1
2 TABLET ORAL ONCE
Refills: 0 | Status: DISCONTINUED | OUTPATIENT
Start: 2019-11-18 | End: 2019-11-19

## 2019-11-18 RX ORDER — OXYCODONE AND ACETAMINOPHEN 5; 325 MG/1; MG/1
2 TABLET ORAL EVERY 4 HOURS
Refills: 0 | Status: DISCONTINUED | OUTPATIENT
Start: 2019-11-18 | End: 2019-11-23

## 2019-11-18 RX ORDER — SENNA PLUS 8.6 MG/1
2 TABLET ORAL AT BEDTIME
Refills: 0 | Status: DISCONTINUED | OUTPATIENT
Start: 2019-11-18 | End: 2019-11-25

## 2019-11-18 RX ORDER — PREGABALIN 225 MG/1
1000 CAPSULE ORAL DAILY
Refills: 0 | Status: DISCONTINUED | OUTPATIENT
Start: 2019-11-18 | End: 2019-11-25

## 2019-11-18 RX ORDER — SODIUM CHLORIDE 9 MG/ML
1000 INJECTION, SOLUTION INTRAVENOUS
Refills: 0 | Status: DISCONTINUED | OUTPATIENT
Start: 2019-11-18 | End: 2019-11-19

## 2019-11-18 RX ORDER — BUDESONIDE AND FORMOTEROL FUMARATE DIHYDRATE 160; 4.5 UG/1; UG/1
2 AEROSOL RESPIRATORY (INHALATION)
Refills: 0 | Status: DISCONTINUED | OUTPATIENT
Start: 2019-11-18 | End: 2019-11-25

## 2019-11-18 RX ORDER — ONDANSETRON 8 MG/1
4 TABLET, FILM COATED ORAL ONCE
Refills: 0 | Status: DISCONTINUED | OUTPATIENT
Start: 2019-11-18 | End: 2019-11-19

## 2019-11-18 RX ORDER — METHOCARBAMOL 500 MG/1
750 TABLET, FILM COATED ORAL EVERY 6 HOURS
Refills: 0 | Status: DISCONTINUED | OUTPATIENT
Start: 2019-11-18 | End: 2019-11-25

## 2019-11-18 RX ORDER — CHOLECALCIFEROL (VITAMIN D3) 125 MCG
400 CAPSULE ORAL DAILY
Refills: 0 | Status: DISCONTINUED | OUTPATIENT
Start: 2019-11-18 | End: 2019-11-25

## 2019-11-18 RX ORDER — ONDANSETRON 8 MG/1
4 TABLET, FILM COATED ORAL EVERY 6 HOURS
Refills: 0 | Status: DISCONTINUED | OUTPATIENT
Start: 2019-11-18 | End: 2019-11-25

## 2019-11-18 RX ORDER — DEXTROSE 50 % IN WATER 50 %
12.5 SYRINGE (ML) INTRAVENOUS ONCE
Refills: 0 | Status: DISCONTINUED | OUTPATIENT
Start: 2019-11-18 | End: 2019-11-25

## 2019-11-18 RX ORDER — METFORMIN HYDROCHLORIDE 850 MG/1
500 TABLET ORAL AT BEDTIME
Refills: 0 | Status: DISCONTINUED | OUTPATIENT
Start: 2019-11-18 | End: 2019-11-25

## 2019-11-18 RX ORDER — HYDROCHLOROTHIAZIDE 25 MG
25 TABLET ORAL DAILY
Refills: 0 | Status: DISCONTINUED | OUTPATIENT
Start: 2019-11-18 | End: 2019-11-25

## 2019-11-18 RX ORDER — ACETAMINOPHEN 500 MG
650 TABLET ORAL EVERY 6 HOURS
Refills: 0 | Status: DISCONTINUED | OUTPATIENT
Start: 2019-11-18 | End: 2019-11-25

## 2019-11-18 RX ORDER — INSULIN LISPRO 100/ML
VIAL (ML) SUBCUTANEOUS AT BEDTIME
Refills: 0 | Status: DISCONTINUED | OUTPATIENT
Start: 2019-11-18 | End: 2019-11-25

## 2019-11-18 RX ORDER — SIMVASTATIN 20 MG/1
20 TABLET, FILM COATED ORAL AT BEDTIME
Refills: 0 | Status: DISCONTINUED | OUTPATIENT
Start: 2019-11-18 | End: 2019-11-25

## 2019-11-18 RX ORDER — HYDROMORPHONE HYDROCHLORIDE 2 MG/ML
0.5 INJECTION INTRAMUSCULAR; INTRAVENOUS; SUBCUTANEOUS
Refills: 0 | Status: DISCONTINUED | OUTPATIENT
Start: 2019-11-18 | End: 2019-11-19

## 2019-11-18 RX ADMIN — METFORMIN HYDROCHLORIDE 500 MILLIGRAM(S): 850 TABLET ORAL at 22:59

## 2019-11-18 RX ADMIN — SODIUM CHLORIDE 75 MILLILITER(S): 9 INJECTION, SOLUTION INTRAVENOUS at 22:31

## 2019-11-18 RX ADMIN — METHOCARBAMOL 750 MILLIGRAM(S): 500 TABLET, FILM COATED ORAL at 23:05

## 2019-11-18 RX ADMIN — TAMSULOSIN HYDROCHLORIDE 0.4 MILLIGRAM(S): 0.4 CAPSULE ORAL at 23:00

## 2019-11-18 RX ADMIN — SIMVASTATIN 20 MILLIGRAM(S): 20 TABLET, FILM COATED ORAL at 22:59

## 2019-11-18 NOTE — BRIEF OPERATIVE NOTE - NSICDXBRIEFPROCEDURE_GEN_ALL_CORE_FT
PROCEDURES:  Interlaminar lumbar instrumented fusion at 3 levels 18-Nov-2019 21:40:46  Dylan Isabel  Laminectomy, decompressive, spine, lumbar, 3 levels 18-Nov-2019 21:39:17  Dylan Isabel

## 2019-11-18 NOTE — BRIEF OPERATIVE NOTE - OPERATION/FINDINGS
very severe stenosis at L2-3, L3-4, L4-5 due to very hypertrophic ligamentum flavum, spondylosis and lipomatosis.

## 2019-11-18 NOTE — ASU PATIENT PROFILE, ADULT - PMH
Anxiety    Back pain    BPH (benign prostatic hyperplasia)    CLL (chronic lymphocytic leukemia)  treatment completed 10/19  COPD (chronic obstructive pulmonary disease)    Depression    DM (diabetes mellitus)    HTN (hypertension)    Non-Hodgkin lymphoma

## 2019-11-18 NOTE — CHART NOTE - NSCHARTNOTEFT_GEN_A_CORE
PACU ANESTHESIA ADMISSION NOTE      Procedure: Interlaminar lumbar instrumented fusion at 3 levels  Laminectomy, decompressive, spine, lumbar, 3 levels      ____  Intubated  TV:______       Rate: ______      FiO2: ______    __x__  Patent Airway    ____  Full return of protective reflexes    ____  Full recovery from anesthesia / back to baseline status    Vitals:  T(C): 36.5   HR: 90   BP: 150/87  RR: 18   SpO2: 98    Mental Status:  _x___ Awake   _____ Alert   _____ Drowsy   _____ Sedated    Nausea/Vomiting:  ____ Yes, See Post - Op Orders      __x__ No    Pain Scale (0-10):  _____    Treatment: ____ None    __x__ See Post - Op/PCA Orders    Post - Operative Fluids:   ____ Oral   _x___ See Post - Op Orders    Plan: Discharge:   ____Home       ___x__Floor     _____Critical Care    _____  Other:_________________    Comments:  report given to RN

## 2019-11-19 PROBLEM — C91.90 LYMPHOID LEUKEMIA, UNSPECIFIED NOT HAVING ACHIEVED REMISSION: Chronic | Status: ACTIVE | Noted: 2019-01-16

## 2019-11-19 PROBLEM — M54.9 DORSALGIA, UNSPECIFIED: Chronic | Status: ACTIVE | Noted: 2019-11-11

## 2019-11-19 PROBLEM — N40.0 BENIGN PROSTATIC HYPERPLASIA WITHOUT LOWER URINARY TRACT SYMPTOMS: Chronic | Status: ACTIVE | Noted: 2019-11-11

## 2019-11-19 PROBLEM — J44.9 CHRONIC OBSTRUCTIVE PULMONARY DISEASE, UNSPECIFIED: Chronic | Status: ACTIVE | Noted: 2019-11-11

## 2019-11-19 LAB
GLUCOSE BLDC GLUCOMTR-MCNC: 119 MG/DL — HIGH (ref 70–99)
GLUCOSE BLDC GLUCOMTR-MCNC: 140 MG/DL — HIGH (ref 70–99)
HCT VFR BLD CALC: 33.4 % — LOW (ref 42–52)
HGB BLD-MCNC: 10.5 G/DL — LOW (ref 14–18)
MCHC RBC-ENTMCNC: 28.3 PG — SIGNIFICANT CHANGE UP (ref 27–31)
MCHC RBC-ENTMCNC: 31.4 G/DL — LOW (ref 32–37)
MCV RBC AUTO: 90 FL — SIGNIFICANT CHANGE UP (ref 80–94)
NRBC # BLD: 0 /100 WBCS — SIGNIFICANT CHANGE UP (ref 0–0)
PLATELET # BLD AUTO: 168 K/UL — SIGNIFICANT CHANGE UP (ref 130–400)
RBC # BLD: 3.71 M/UL — LOW (ref 4.7–6.1)
RBC # FLD: 15 % — HIGH (ref 11.5–14.5)
WBC # BLD: 6.21 K/UL — SIGNIFICANT CHANGE UP (ref 4.8–10.8)
WBC # FLD AUTO: 6.21 K/UL — SIGNIFICANT CHANGE UP (ref 4.8–10.8)

## 2019-11-19 RX ORDER — HEPARIN SODIUM 5000 [USP'U]/ML
5000 INJECTION INTRAVENOUS; SUBCUTANEOUS EVERY 8 HOURS
Refills: 0 | Status: DISCONTINUED | OUTPATIENT
Start: 2019-11-19 | End: 2019-11-25

## 2019-11-19 RX ORDER — SODIUM CHLORIDE 9 MG/ML
500 INJECTION INTRAMUSCULAR; INTRAVENOUS; SUBCUTANEOUS ONCE
Refills: 0 | Status: COMPLETED | OUTPATIENT
Start: 2019-11-19 | End: 2019-11-19

## 2019-11-19 RX ORDER — INFLUENZA VIRUS VACCINE 15; 15; 15; 15 UG/.5ML; UG/.5ML; UG/.5ML; UG/.5ML
0.5 SUSPENSION INTRAMUSCULAR ONCE
Refills: 0 | Status: COMPLETED | OUTPATIENT
Start: 2019-11-19 | End: 2019-11-25

## 2019-11-19 RX ADMIN — Medication 25 MILLIGRAM(S): at 05:42

## 2019-11-19 RX ADMIN — MORPHINE SULFATE 2 MILLIGRAM(S): 50 CAPSULE, EXTENDED RELEASE ORAL at 11:51

## 2019-11-19 RX ADMIN — BUDESONIDE AND FORMOTEROL FUMARATE DIHYDRATE 2 PUFF(S): 160; 4.5 AEROSOL RESPIRATORY (INHALATION) at 20:33

## 2019-11-19 RX ADMIN — MORPHINE SULFATE 2 MILLIGRAM(S): 50 CAPSULE, EXTENDED RELEASE ORAL at 06:52

## 2019-11-19 RX ADMIN — SODIUM CHLORIDE 500 MILLILITER(S): 9 INJECTION INTRAMUSCULAR; INTRAVENOUS; SUBCUTANEOUS at 14:26

## 2019-11-19 RX ADMIN — HEPARIN SODIUM 5000 UNIT(S): 5000 INJECTION INTRAVENOUS; SUBCUTANEOUS at 21:22

## 2019-11-19 RX ADMIN — BUDESONIDE AND FORMOTEROL FUMARATE DIHYDRATE 2 PUFF(S): 160; 4.5 AEROSOL RESPIRATORY (INHALATION) at 14:29

## 2019-11-19 RX ADMIN — HEPARIN SODIUM 5000 UNIT(S): 5000 INJECTION INTRAVENOUS; SUBCUTANEOUS at 14:48

## 2019-11-19 RX ADMIN — Medication 100 MILLIGRAM(S): at 05:41

## 2019-11-19 RX ADMIN — SIMVASTATIN 20 MILLIGRAM(S): 20 TABLET, FILM COATED ORAL at 21:23

## 2019-11-19 RX ADMIN — SODIUM CHLORIDE 500 MILLILITER(S): 9 INJECTION INTRAMUSCULAR; INTRAVENOUS; SUBCUTANEOUS at 14:27

## 2019-11-19 RX ADMIN — METFORMIN HYDROCHLORIDE 500 MILLIGRAM(S): 850 TABLET ORAL at 21:23

## 2019-11-19 RX ADMIN — LISINOPRIL 10 MILLIGRAM(S): 2.5 TABLET ORAL at 05:42

## 2019-11-19 RX ADMIN — METHOCARBAMOL 750 MILLIGRAM(S): 500 TABLET, FILM COATED ORAL at 05:42

## 2019-11-19 RX ADMIN — OXYCODONE AND ACETAMINOPHEN 2 TABLET(S): 5; 325 TABLET ORAL at 09:36

## 2019-11-19 RX ADMIN — METHOCARBAMOL 750 MILLIGRAM(S): 500 TABLET, FILM COATED ORAL at 23:58

## 2019-11-19 RX ADMIN — Medication 100 MILLIGRAM(S): at 03:00

## 2019-11-19 RX ADMIN — Medication 150 MILLIGRAM(S): at 21:23

## 2019-11-19 RX ADMIN — Medication 1 MILLIGRAM(S): at 11:49

## 2019-11-19 RX ADMIN — METHOCARBAMOL 750 MILLIGRAM(S): 500 TABLET, FILM COATED ORAL at 11:49

## 2019-11-19 RX ADMIN — MORPHINE SULFATE 2 MILLIGRAM(S): 50 CAPSULE, EXTENDED RELEASE ORAL at 06:37

## 2019-11-19 RX ADMIN — PREGABALIN 1000 MICROGRAM(S): 225 CAPSULE ORAL at 11:49

## 2019-11-19 RX ADMIN — TAMSULOSIN HYDROCHLORIDE 0.4 MILLIGRAM(S): 0.4 CAPSULE ORAL at 21:23

## 2019-11-19 RX ADMIN — PANTOPRAZOLE SODIUM 40 MILLIGRAM(S): 20 TABLET, DELAYED RELEASE ORAL at 06:37

## 2019-11-19 RX ADMIN — Medication 150 MILLIGRAM(S): at 14:47

## 2019-11-19 RX ADMIN — Medication 400 UNIT(S): at 11:50

## 2019-11-19 RX ADMIN — Medication 100 MILLIGRAM(S): at 14:47

## 2019-11-19 NOTE — CONSULT NOTE ADULT - REASON FOR ADMISSION
·  PRE-OP DIAGNOSIS:  Lumbar stenosis with neurogenic claudication 18-Nov-2019 21:41:17  Dylan Isabel.  ·  POST-OP DIAGNOSIS:  Lumbar stenosis with neurogenic claudication 18-Nov-2019 21:41:28  Dylan Isabel.  ·  PROCEDURES:  Interlaminar lumbar instrumented fusion at 3 levels 18-Nov-2019 21:40:46  Dylan Isabel  Laminectomy, decompressive, spine, lumbar, 3 levels 18-Nov-2019 21:39:17  Dylan Isabel.      Operative Findings:  · Operative Findings  very severe stenosis at L2-3, L3-4, L4-5 due to very hypertrophic ligamentum flavum, spondylosis and lipomatosis.

## 2019-11-19 NOTE — PROGRESS NOTE ADULT - SUBJECTIVE AND OBJECTIVE BOX
CR OWEN  [id number]    Current Issues: Pt is a 78y/o M s/p L3-4 L4-5 decompressive laminectomy with coflex with L2-3 laminectomy POD # 0. Pt seen and examined at bedside. No acute complaints at this time. Pt feeling well, c/o mouth dryness, slight incisional back pain.     PAST MEDICAL & SURGICAL HISTORY:  COPD (chronic obstructive pulmonary disease)  BPH (benign prostatic hyperplasia)  Back pain  Non-Hodgkin lymphoma  CLL (chronic lymphocytic leukemia): treatment completed 10/19  Depression  HTN (hypertension)  DM (diabetes mellitus)  Anxiety  Port-A-Cath in place  H/O total knee replacement, bilateral    Allergies  No Known Allergies    Home Medications:  budesonide-formoterol 160 mcg-4.5 mcg/inh inhalation aerosol: 2 puff(s) inhaled 2 times a day (18 Nov 2019 11:15)  buPROPion 150 mg/12 hours (SR) oral tablet, extended release: 1 tab(s) orally 2 times a day (18 Nov 2019 11:15)  clonazePAM 0.5 mg oral tablet: orally every 6 hours, As Needed (18 Nov 2019 11:15)  Effexor  mg oral capsule, extended release: 1 cap(s) orally once a day (18 Nov 2019 11:15)  folic acid 1 mg oral tablet: 1 tab(s) orally once a day (18 Nov 2019 11:15)  hydroCHLOROthiazide 25 mg oral tablet: 1 tab(s) orally once a day (18 Nov 2019 11:15)  metFORMIN 500 mg oral tablet: 1 tab(s) orally once a day (at bedtime) (18 Nov 2019 11:15)  ramipril 10 mg oral capsule: 1 cap(s) orally once a day (18 Nov 2019 11:15)  simvastatin 20 mg oral tablet: 1 tab(s) orally once a day (at bedtime) (18 Nov 2019 11:15)  tamsulosin 0.4 mg oral capsule: 1 cap(s) orally once a day (18 Nov 2019 11:15)  traZODone 150 mg oral tablet: 1 tab(s) orally once a day (at bedtime) (18 Nov 2019 11:15)  Vitamin B12 500 mcg oral tablet: 1 tab(s) orally once a day (18 Nov 2019 11:15)  Vitamin D3:  (18 Nov 2019 11:15)      MEDICATIONS:  Antibiotics:  ceFAZolin   IVPB 1000 milliGRAM(s) IV Intermittent every 8 hours    Neuro:  acetaminophen   Tablet .. 650 milliGRAM(s) Oral every 6 hours PRN  clonazePAM  Tablet 0.5 milliGRAM(s) Oral every 6 hours PRN  HYDROmorphone  Injectable 0.5 milliGRAM(s) IV Push every 10 minutes PRN  methocarbamol 750 milliGRAM(s) Oral every 6 hours  morphine  - Injectable 2 milliGRAM(s) IV Push every 4 hours PRN  ondansetron Injectable 4 milliGRAM(s) IV Push once PRN  ondansetron Injectable 4 milliGRAM(s) IV Push every 6 hours PRN  oxycodone    5 mG/acetaminophen 325 mG 1 Tablet(s) Oral every 4 hours PRN  oxycodone    5 mG/acetaminophen 325 mG 2 Tablet(s) Oral every 4 hours PRN  oxycodone    5 mG/acetaminophen 325 mG 2 Tablet(s) Oral once PRN  traZODone 150 milliGRAM(s) Oral at bedtime  venlafaxine XR. 150 milliGRAM(s) Oral daily    OTHER:  budesonide 160 MICROgram(s)/formoterol 4.5 MICROgram(s) Inhaler 2 Puff(s) Inhalation two times a day  dextrose 40% Gel 15 Gram(s) Oral once PRN  dextrose 50% Injectable 12.5 Gram(s) IV Push once  dextrose 50% Injectable 25 Gram(s) IV Push once  dextrose 50% Injectable 25 Gram(s) IV Push once  glucagon  Injectable 1 milliGRAM(s) IntraMuscular once PRN  hydrochlorothiazide 25 milliGRAM(s) Oral daily  insulin lispro (HumaLOG) corrective regimen sliding scale   SubCutaneous at bedtime  lisinopril 10 milliGRAM(s) Oral daily  metFORMIN 500 milliGRAM(s) Oral at bedtime  metoprolol tartrate 25 milliGRAM(s) Oral two times a day  pantoprazole    Tablet 40 milliGRAM(s) Oral before breakfast  senna 2 Tablet(s) Oral at bedtime PRN  simvastatin 20 milliGRAM(s) Oral at bedtime  tamsulosin 0.4 milliGRAM(s) Oral at bedtime    IVF:  cholecalciferol 400 Unit(s) Oral daily  cyanocobalamin 1000 MICROGram(s) Oral daily  dextrose 5% + sodium chloride 0.45%. 1000 milliLiter(s) IV Continuous <Continuous>  dextrose 5%. 1000 milliLiter(s) IV Continuous <Continuous>  folic acid 1 milliGRAM(s) Oral daily  lactated ringers. 1000 milliLiter(s) IV Continuous <Continuous>      Vital Signs Last 24 Hrs  T(C): 35.7 (19 Nov 2019 00:13), Max: 36.8 (18 Nov 2019 21:55)  T(F): 96.3 (19 Nov 2019 00:13), Max: 98.3 (18 Nov 2019 21:55)  HR: 104 (19 Nov 2019 00:13) (90 - 114)  BP: 134/65 (19 Nov 2019 00:13) (93/66 - 150/87)  RR: 18 (19 Nov 2019 00:13) (12 - 25)  SpO2: 96% (18 Nov 2019 23:35) (95% - 97%)    PHYSICAL EXAM:  AAOX3. Verbal function intact  Follows commands  tongue midline, facial motions symmetric  PERRL, EOMI  Motor: MAEx4, 5/5 power in b/l UE and LE  Sensation: intact to touch in all extremities      Plan:  - Analgesia/antiemetics prn   - OOB-chair  - PT/Rehab  - DVT/GI ppx  - Will d/w attending               . CR OWEN  [id number]    Current Issues: Pt is a 80y/o M s/p L3-4 L4-5 decompressive laminectomy with coflex with L2-3 laminectomy POD # 0. Pt seen and examined at bedside. No acute complaints at this time. Pt feeling well, c/o mouth dryness, slight incisional back pain.     PAST MEDICAL & SURGICAL HISTORY:  COPD (chronic obstructive pulmonary disease)  BPH (benign prostatic hyperplasia)  Back pain  Non-Hodgkin lymphoma  CLL (chronic lymphocytic leukemia): treatment completed 10/19  Depression  HTN (hypertension)  DM (diabetes mellitus)  Anxiety  Port-A-Cath in place  H/O total knee replacement, bilateral    Allergies  No Known Allergies    Home Medications:  budesonide-formoterol 160 mcg-4.5 mcg/inh inhalation aerosol: 2 puff(s) inhaled 2 times a day (18 Nov 2019 11:15)  buPROPion 150 mg/12 hours (SR) oral tablet, extended release: 1 tab(s) orally 2 times a day (18 Nov 2019 11:15)  clonazePAM 0.5 mg oral tablet: orally every 6 hours, As Needed (18 Nov 2019 11:15)  Effexor  mg oral capsule, extended release: 1 cap(s) orally once a day (18 Nov 2019 11:15)  folic acid 1 mg oral tablet: 1 tab(s) orally once a day (18 Nov 2019 11:15)  hydroCHLOROthiazide 25 mg oral tablet: 1 tab(s) orally once a day (18 Nov 2019 11:15)  metFORMIN 500 mg oral tablet: 1 tab(s) orally once a day (at bedtime) (18 Nov 2019 11:15)  ramipril 10 mg oral capsule: 1 cap(s) orally once a day (18 Nov 2019 11:15)  simvastatin 20 mg oral tablet: 1 tab(s) orally once a day (at bedtime) (18 Nov 2019 11:15)  tamsulosin 0.4 mg oral capsule: 1 cap(s) orally once a day (18 Nov 2019 11:15)  traZODone 150 mg oral tablet: 1 tab(s) orally once a day (at bedtime) (18 Nov 2019 11:15)  Vitamin B12 500 mcg oral tablet: 1 tab(s) orally once a day (18 Nov 2019 11:15)  Vitamin D3:  (18 Nov 2019 11:15)      MEDICATIONS:  Antibiotics:  ceFAZolin   IVPB 1000 milliGRAM(s) IV Intermittent every 8 hours    Neuro:  acetaminophen   Tablet .. 650 milliGRAM(s) Oral every 6 hours PRN  clonazePAM  Tablet 0.5 milliGRAM(s) Oral every 6 hours PRN  HYDROmorphone  Injectable 0.5 milliGRAM(s) IV Push every 10 minutes PRN  methocarbamol 750 milliGRAM(s) Oral every 6 hours  morphine  - Injectable 2 milliGRAM(s) IV Push every 4 hours PRN  ondansetron Injectable 4 milliGRAM(s) IV Push once PRN  ondansetron Injectable 4 milliGRAM(s) IV Push every 6 hours PRN  oxycodone    5 mG/acetaminophen 325 mG 1 Tablet(s) Oral every 4 hours PRN  oxycodone    5 mG/acetaminophen 325 mG 2 Tablet(s) Oral every 4 hours PRN  oxycodone    5 mG/acetaminophen 325 mG 2 Tablet(s) Oral once PRN  traZODone 150 milliGRAM(s) Oral at bedtime  venlafaxine XR. 150 milliGRAM(s) Oral daily    OTHER:  budesonide 160 MICROgram(s)/formoterol 4.5 MICROgram(s) Inhaler 2 Puff(s) Inhalation two times a day  dextrose 40% Gel 15 Gram(s) Oral once PRN  dextrose 50% Injectable 12.5 Gram(s) IV Push once  dextrose 50% Injectable 25 Gram(s) IV Push once  dextrose 50% Injectable 25 Gram(s) IV Push once  glucagon  Injectable 1 milliGRAM(s) IntraMuscular once PRN  hydrochlorothiazide 25 milliGRAM(s) Oral daily  insulin lispro (HumaLOG) corrective regimen sliding scale   SubCutaneous at bedtime  lisinopril 10 milliGRAM(s) Oral daily  metFORMIN 500 milliGRAM(s) Oral at bedtime  metoprolol tartrate 25 milliGRAM(s) Oral two times a day  pantoprazole    Tablet 40 milliGRAM(s) Oral before breakfast  senna 2 Tablet(s) Oral at bedtime PRN  simvastatin 20 milliGRAM(s) Oral at bedtime  tamsulosin 0.4 milliGRAM(s) Oral at bedtime    IVF:  cholecalciferol 400 Unit(s) Oral daily  cyanocobalamin 1000 MICROGram(s) Oral daily  dextrose 5% + sodium chloride 0.45%. 1000 milliLiter(s) IV Continuous <Continuous>  dextrose 5%. 1000 milliLiter(s) IV Continuous <Continuous>  folic acid 1 milliGRAM(s) Oral daily  lactated ringers. 1000 milliLiter(s) IV Continuous <Continuous>      Vital Signs Last 24 Hrs  T(C): 35.7 (19 Nov 2019 00:13), Max: 36.8 (18 Nov 2019 21:55)  T(F): 96.3 (19 Nov 2019 00:13), Max: 98.3 (18 Nov 2019 21:55)  HR: 104 (19 Nov 2019 00:13) (90 - 114)  BP: 134/65 (19 Nov 2019 00:13) (93/66 - 150/87)  RR: 18 (19 Nov 2019 00:13) (12 - 25)  SpO2: 96% (18 Nov 2019 23:35) (95% - 97%)    PHYSICAL EXAM:  AAOX3. Verbal function intact  Follows commands  tongue midline, facial motions symmetric  PERRL, EOMI  Motor: MAEx4, 5/5 power in b/l UE and LE  Sensation: intact to touch in all extremities      Plan:  - Analgesia/antiemetics prn   - OOB-chair  - PT/Rehab  - DVT/GI ppx  - Will d/w attending               .

## 2019-11-19 NOTE — CONSULT NOTE ADULT - CONSULT REASON
CC:  ·  PRE-OP DIAGNOSIS:  Lumbar stenosis with neurogenic claudication 18-Nov-2019 21:41:17  Dylan Isabel.  ·  POST-OP DIAGNOSIS:  Lumbar stenosis with neurogenic claudication 18-Nov-2019 21:41:28  Dylan Isabel.  ·  PROCEDURES:  Interlaminar lumbar instrumented fusion at 3 levels 18-Nov-2019 21:40:46  Dylan Isabel  Laminectomy, decompressive, spine, lumbar, 3 levels 18-Nov-2019 21:39:17  Dylan Isabel.      Operative Findings:  · Operative Findings  very severe stenosis at L2-3, L3-4, L4-5 due to very hypertrophic ligamentum flavum, spondylosis and lipomatosis.
normal...

## 2019-11-19 NOTE — CONSULT NOTE ADULT - SUBJECTIVE AND OBJECTIVE BOX
HPI:      PAST MEDICAL & SURGICAL HISTORY:  COPD (chronic obstructive pulmonary disease)  BPH (benign prostatic hyperplasia)  Back pain  Non-Hodgkin lymphoma  CLL (chronic lymphocytic leukemia): treatment completed 10/19  Depression  HTN (hypertension)  DM (diabetes mellitus)  Anxiety  Port-A-Cath in place  H/O total knee replacement, bilateral      HOSPITAL COURSE:  S/p iInterlaminar lumbar instrumented fusion at 3 levels 18-Nov-2019 21:40:46  Dylan Isabel  Laminectomy, decompressive, spine, lumbar, 3 levels 18-Nov-2019 21:39:17  Dylan Isabel.      Operative Findings:  · Operative Findings	very severe stenosis at L2-3, L3-4, L4-5 due to very hypertrophic ligamentum flavum, spondylosis and lipomatosis.	      TODAY'S SUBJECTIVE & REVIEW OF SYMPTOMS:    Constitutional WNL  Cardiac WNL   Respiratory WNL  GI WNL   WNL  Endocrine WNL  Skin WNL  Musculoskeletal WNL  Neuro WNL  Cognitive WNL  Psych WNL      MEDICATIONS  (STANDING):  budesonide 160 MICROgram(s)/formoterol 4.5 MICROgram(s) Inhaler 2 Puff(s) Inhalation two times a day  ceFAZolin   IVPB 1000 milliGRAM(s) IV Intermittent every 8 hours  cholecalciferol 400 Unit(s) Oral daily  cyanocobalamin 1000 MICROGram(s) Oral daily  dextrose 5% + sodium chloride 0.45%. 1000 milliLiter(s) (75 mL/Hr) IV Continuous <Continuous>  dextrose 5%. 1000 milliLiter(s) (50 mL/Hr) IV Continuous <Continuous>  dextrose 50% Injectable 12.5 Gram(s) IV Push once  dextrose 50% Injectable 25 Gram(s) IV Push once  dextrose 50% Injectable 25 Gram(s) IV Push once  folic acid 1 milliGRAM(s) Oral daily  hydrochlorothiazide 25 milliGRAM(s) Oral daily  influenza   Vaccine 0.5 milliLiter(s) IntraMuscular once  insulin lispro (HumaLOG) corrective regimen sliding scale   SubCutaneous at bedtime  lisinopril 10 milliGRAM(s) Oral daily  metFORMIN 500 milliGRAM(s) Oral at bedtime  methocarbamol 750 milliGRAM(s) Oral every 6 hours  metoprolol tartrate 25 milliGRAM(s) Oral two times a day  pantoprazole    Tablet 40 milliGRAM(s) Oral before breakfast  simvastatin 20 milliGRAM(s) Oral at bedtime  tamsulosin 0.4 milliGRAM(s) Oral at bedtime  traZODone 150 milliGRAM(s) Oral at bedtime  venlafaxine XR. 150 milliGRAM(s) Oral daily    MEDICATIONS  (PRN):  acetaminophen   Tablet .. 650 milliGRAM(s) Oral every 6 hours PRN Temp greater or equal to 38C (100.4F)  clonazePAM  Tablet 0.5 milliGRAM(s) Oral every 6 hours PRN anxiety  dextrose 40% Gel 15 Gram(s) Oral once PRN Blood Glucose LESS THAN 70 milliGRAM(s)/deciliter  glucagon  Injectable 1 milliGRAM(s) IntraMuscular once PRN Glucose LESS THAN 70 milligrams/deciliter  morphine  - Injectable 2 milliGRAM(s) IV Push every 4 hours PRN Severe Pain (7 - 10)  ondansetron Injectable 4 milliGRAM(s) IV Push every 6 hours PRN Nausea and/or Vomiting  oxycodone    5 mG/acetaminophen 325 mG 1 Tablet(s) Oral every 4 hours PRN Mild Pain (1 - 3)  oxycodone    5 mG/acetaminophen 325 mG 2 Tablet(s) Oral every 4 hours PRN Moderate Pain (4 - 6)  senna 2 Tablet(s) Oral at bedtime PRN Constipation      FAMILY HISTORY:      Allergies    No Known Allergies    Intolerances        SOCIAL HISTORY:    [  ] Smoking  [  ] EtOH  [  ] Substance abuse     Home Environment:  [  ] Alone  [  ] Lives with Family  [  ] Home Health Aide    Dwelling:  [  ] Private House  [  ] Apartment  [  ] Adult Home/Assisted Living Facility  [  ] Skilled Nursing Facility      [  ] Short Term  [  ] Long Term  [  ] Stairs       Elevator [  ]    FUNCTIONAL STATUS PTA: (Check all that apply)  Ambulation: [   ]Independent    [  ] Dependent     [  ] Non-Ambulatory  Assistive Device: [  ] Straight Cane  [  ]  Quad Cane  [  ] Walker  [  ]  Wheelchair  ADL: [  ] Independent  [  ]  Dependent       Vital Signs Last 24 Hrs  T(C): 36.2 (19 Nov 2019 05:00), Max: 36.8 (18 Nov 2019 21:55)  T(F): 97.1 (19 Nov 2019 05:00), Max: 98.3 (18 Nov 2019 21:55)  HR: 94 (19 Nov 2019 05:00) (90 - 114)  BP: 110/62 (19 Nov 2019 05:00) (93/66 - 150/87)  BP(mean): 81 (19 Nov 2019 05:00) (81 - 81)  RR: 18 (19 Nov 2019 05:00) (12 - 25)  SpO2: 96% (18 Nov 2019 23:35) (95% - 97%)      PHYSICAL EXAM: Alert and oriented X 4  GENERAL: Well-developed, well-nourished, in NAD  HEAD:  Normocephalic, atraumatic  EYES: EOMI, PERRLA, sclerae anicteric, conjunctivae not injected  NECK: Supple, No JVD, Normal thyroid  CHEST/LUNG: Clear to auscultation bilaterally; No rales, rhonchi, wheezing  HEART: Regular rate and rhythm; No murmurs, gallops, or rubs  ABDOMEN: Soft, nontender, nondistended; Bowel sounds present  EXTREMITIES:  2+ Peripheral pulses; No clubbing, cyanosis, or edema    NERVOUS SYSTEM:  Cranial Nerves II-XII intact [  ] Abnormal  [  ]  ROM: WFL all extremities [  ]  Abnormal [  ]  Motor Strength: WFL all extremities  [  ]  Abnormal [  ]  Sensation: intact to light touch [  ] Abnormal [  ]  Reflexes: Symmetric [  ]  Abnormal [  ]    FUNCTIONAL STATUS:  Bed Mobility: Independent [  ]  Supervision [  ]  Needs Assistance [  ]  N/A [  ]  Transfers: Independent [  ]  Supervision [  ]  Needs Assistance [  ]  N/A [  ]   Ambulation: Independent [  ]  Supervision [  ]  Needs Assistance [  ]  N/A [  ]  ADLs: Independent [  ] Requires Assistance [  ] N/A [  ]      LABS:                RADIOLOGY & ADDITIONAL STUDIES: HPI:  79-year-old right-handed man with PMH as below admitted here 11/18/2019 with lumbar spinal stenosis resulting in neurogenic claudication.      PAST MEDICAL & SURGICAL HISTORY:  COPD (chronic obstructive pulmonary disease)  BPH (benign prostatic hyperplasia)  Back pain  Non-Hodgkin lymphoma  CLL (chronic lymphocytic leukemia): treatment completed 10/19  Depression  HTN (hypertension)  DM (diabetes mellitus)  Anxiety  Port-A-Cath in place  H/O total knee replacement, bilateral      HOSPITAL COURSE:  S/p interlaminar lumbar instrumented fusion at 3 levels 18-Nov-2019 21:40:46  Dylan Isabel  Laminectomy, decompressive, spine, lumbar, 3 levels 18-Nov-2019 21:39:17  Dylan Isabel.      Operative Findings:  · Operative Findings	very severe stenosis at L2-3, L3-4, L4-5 due to very hypertrophic ligamentum flavum, spondylosis and lipomatosis.	      TODAY'S SUBJECTIVE & REVIEW OF SYMPTOMS:    Constitutional WNL  Cardiac WNL   Respiratory WNL  GI WNL   WNL  Endocrine WNL  Skin WNL  Musculoskeletal + incisioninal LBP  Neuro BLE paresthesias  Cognitive WNL  Psych WNL      MEDICATIONS  (STANDING):  budesonide 160 MICROgram(s)/formoterol 4.5 MICROgram(s) Inhaler 2 Puff(s) Inhalation two times a day  ceFAZolin   IVPB 1000 milliGRAM(s) IV Intermittent every 8 hours  cholecalciferol 400 Unit(s) Oral daily  cyanocobalamin 1000 MICROGram(s) Oral daily  dextrose 5% + sodium chloride 0.45%. 1000 milliLiter(s) (75 mL/Hr) IV Continuous <Continuous>  dextrose 5%. 1000 milliLiter(s) (50 mL/Hr) IV Continuous <Continuous>  dextrose 50% Injectable 12.5 Gram(s) IV Push once  dextrose 50% Injectable 25 Gram(s) IV Push once  dextrose 50% Injectable 25 Gram(s) IV Push once  folic acid 1 milliGRAM(s) Oral daily  hydrochlorothiazide 25 milliGRAM(s) Oral daily  influenza   Vaccine 0.5 milliLiter(s) IntraMuscular once  insulin lispro (HumaLOG) corrective regimen sliding scale   SubCutaneous at bedtime  lisinopril 10 milliGRAM(s) Oral daily  metFORMIN 500 milliGRAM(s) Oral at bedtime  methocarbamol 750 milliGRAM(s) Oral every 6 hours  metoprolol tartrate 25 milliGRAM(s) Oral two times a day  pantoprazole    Tablet 40 milliGRAM(s) Oral before breakfast  simvastatin 20 milliGRAM(s) Oral at bedtime  tamsulosin 0.4 milliGRAM(s) Oral at bedtime  traZODone 150 milliGRAM(s) Oral at bedtime  venlafaxine XR. 150 milliGRAM(s) Oral daily    MEDICATIONS  (PRN):  acetaminophen   Tablet .. 650 milliGRAM(s) Oral every 6 hours PRN Temp greater or equal to 38C (100.4F)  clonazePAM  Tablet 0.5 milliGRAM(s) Oral every 6 hours PRN anxiety  dextrose 40% Gel 15 Gram(s) Oral once PRN Blood Glucose LESS THAN 70 milliGRAM(s)/deciliter  glucagon  Injectable 1 milliGRAM(s) IntraMuscular once PRN Glucose LESS THAN 70 milligrams/deciliter  morphine  - Injectable 2 milliGRAM(s) IV Push every 4 hours PRN Severe Pain (7 - 10)  ondansetron Injectable 4 milliGRAM(s) IV Push every 6 hours PRN Nausea and/or Vomiting  oxycodone    5 mG/acetaminophen 325 mG 1 Tablet(s) Oral every 4 hours PRN Mild Pain (1 - 3)  oxycodone    5 mG/acetaminophen 325 mG 2 Tablet(s) Oral every 4 hours PRN Moderate Pain (4 - 6)  senna 2 Tablet(s) Oral at bedtime PRN Constipation      FAMILY HISTORY:      Allergies    No Known Allergies    Intolerances        SOCIAL HISTORY:    [ X ] Smoking--1 PPD  [  ] EtOH  [  ] Substance abuse     Home Environment:  [  ] Alone  [ X ] Lives with Family--son  [ X ] Home Health Aide  3-4 hours/day x 3 days/week    Dwelling:  [  ] Private House  [ X ] Apartment--walk-in  [  ] Adult Home/Assisted Living Facility  [  ] Skilled Nursing Facility      [  ] Short Term  [  ] Long Term  [  ] Stairs       Elevator [  ]    FUNCTIONAL STATUS PTA: (Check all that apply)  Ambulation: [ X  ]Independent    [  ] Dependent     [  ] Non-Ambulatory  Assistive Device: [ X ] Straight Cane  [  ]  Quad Cane  [X  ] Walker  [  ]  Wheelchair  ADL: [ X ] Independent  [  ]  Dependent       Vital Signs Last 24 Hrs  T(C): 36.2 (19 Nov 2019 05:00), Max: 36.8 (18 Nov 2019 21:55)  T(F): 97.1 (19 Nov 2019 05:00), Max: 98.3 (18 Nov 2019 21:55)  HR: 94 (19 Nov 2019 05:00) (90 - 114)  BP: 110/62 (19 Nov 2019 05:00) (93/66 - 150/87)  BP(mean): 81 (19 Nov 2019 05:00) (81 - 81)  RR: 18 (19 Nov 2019 05:00) (12 - 25)  SpO2: 96% (18 Nov 2019 23:35) (95% - 97%)      PHYSICAL EXAM: Alert and oriented X 4  GENERAL: Well-developed, well-nourished, in NAD  HEAD:  Normocephalic, atraumatic  EYES: EOMI, PERRLA, sclerae anicteric, conjunctivae not injected  NECK: Supple, No JVD, Normal thyroid  CHEST/LUNG: Clear to auscultation bilaterally; No rales, rhonchi, wheezing  HEART: Regular rate and rhythm; No murmurs, gallops, or rubs  ABDOMEN: Obese, soft, nontender, nondistended; Bowel sounds present  EXTREMITIES:  2+ Peripheral pulses; No clubbing, cyanosis, or edema    NERVOUS SYSTEM:  Cranial Nerves II-XII intact [  X] Abnormal  [  ]  ROM: WFL all extremities [X  ]  Abnormal [  ]  Motor Strength: WFL all extremities  [  ]  Abnormal [ X ]  Sensation: intact to light touch [  ] Abnormal [ X ]  Reflexes: Symmetric [  ]  Abnormal [  ]    FUNCTIONAL STATUS:  Bed Mobility: Independent [  ]  Supervision [  ]  Needs Assistance [ X ]  N/A [  ]  Transfers: Independent [  ]  Supervision [  ]  Needs Assistance [X  ]  N/A [  ]   Ambulation: Independent [  ]  Supervision [  ]  Needs Assistance [ X ]  N/A [  ]  ADLs: Independent [ X ] Requires Assistance [  ] N/A [  ]      LABS:                RADIOLOGY & ADDITIONAL STUDIES:

## 2019-11-19 NOTE — CONSULT NOTE ADULT - ASSESSMENT
IMPRESSION: Rehabilitation for lumbar stenosis with neurogenic claudication s/p  Interlaminar lumbar instrumented fusion at 3 levels and laminectomy, decompressive, spine, lumbar, 3 levels 18-Nov-2019 21:39:17  Dylan Isabel      Operative Findings:  · Operative Findings	very severe stenosis at L2-3, L3-4, L4-5 due to very hypertrophic ligamentum flavum, spondylosis and lipomatosis.	      PRECAUTIONS: [  ] Cardiac  [  ] Respiratory  [  ] Seizures [  ] Contact Precautions  [  ] Droplet Isolation  [  ] Other:      Weight Bearing Status:  WBAT    RECOMMENDATION:    Out of bed to chair     DVT/decubitus ulcer prophylaxis    REHABILITATION PLAN:     [  X ] Bedside PT 3-5 times a week   [   ]   Bedside OT  2-3 times a week             [   ] No Rehab Therapy Indicated                   [   ]  Speech Therapy   Conditioning/ROM                                    ADL  Bed Mobility                                               Conditioning/ROM  Transfers                                                     Bed Mobility  Sitting /Standing Balance                         Transfers                                        Gait Training                                               Sitting/Standing Balance  Stair Training  [   ] Applicable                    Home Equipment Evaluation                                                                        Splinting  [   ] Only      GOALS:   ADL   [   ]   Independent                    Transfers  [   ] Independent                          Ambulation  [   ] Independent     [    ] With device                            [   ]  CG                                                         [   ]  CG                                                                  [   ] CG                            [    ] Min A                                                   [   ] Min A                                                              [   ] Min  A          DISCHARGE PLAN:  [    ]  Good candidate for Intensive Rehabilitation/Hospital-based 4A [facility]                                             Will tolerate 3 hours of Intensive Rehab Daily                                       [    ]  Short Term Rehabilitation in Skilled Nursing Facility                                       [    ]  Home with Outpatient or  services                                         [    ]  Possible Candidate for Intensive Hospital-Based Rehabilitation      Thank you. IMPRESSION: Rehabilitation for lumbar stenosis with neurogenic claudication s/p  Interlaminar lumbar instrumented fusion at 3 levels and laminectomy, decompressive, spine, lumbar, 3 levels 18-Nov-2019 21:39:17  Dylan Isabel      Operative Findings:  · Operative Findings	very severe stenosis at L2-3, L3-4, L4-5 due to very hypertrophic ligamentum flavum, spondylosis and lipomatosis.	      PRECAUTIONS: [  ] Cardiac  [  ] Respiratory  [  ] Seizures [  ] Contact Precautions  [  ] Droplet Isolation  [  ] Other:      Weight Bearing Status:  WBAT    RECOMMENDATION:    Out of bed to chair     DVT/decubitus ulcer prophylaxis    REHABILITATION PLAN:     [  X ] Bedside PT 3-5 times a week   [   ]   Bedside OT  2-3 times a week             [   ] No Rehab Therapy Indicated                   [   ]  Speech Therapy   Conditioning/ROM                                    ADL  Bed Mobility                                               Conditioning/ROM  Transfers                                                     Bed Mobility  Sitting /Standing Balance                         Transfers                                        Gait Training                                               Sitting/Standing Balance  Stair Training  [   ] Applicable                    Home Equipment Evaluation                                                                        Splinting  [   ] Only      GOALS:   ADL   [   ]   Independent                    Transfers  [ X  ] Independent                          Ambulation  [  X ] Independent     [ X   ] With device                            [   ]  CG                                                         [   ]  CG                                                                  [   ] CG                            [    ] Min A                                                   [   ] Min A                                                              [   ] Min  A          DISCHARGE PLAN:  [    ]  Good candidate for Intensive Rehabilitation/Hospital-based 4A [facility]                                             Will tolerate 3 hours of Intensive Rehab Daily                                       [   X ]  Short Term Rehabilitation in Skilled Nursing Facility if needed                                       [  X  ]  Home with Outpatient or  services                                         [    ]  Possible Candidate for Intensive Hospital-Based Rehabilitation      Thank you.

## 2019-11-20 LAB
GLUCOSE BLDC GLUCOMTR-MCNC: 105 MG/DL — HIGH (ref 70–99)
GLUCOSE BLDC GLUCOMTR-MCNC: 125 MG/DL — HIGH (ref 70–99)
GLUCOSE BLDC GLUCOMTR-MCNC: 137 MG/DL — HIGH (ref 70–99)
HCT VFR BLD CALC: 32 % — LOW (ref 42–52)
HGB BLD-MCNC: 10.2 G/DL — LOW (ref 14–18)
MCHC RBC-ENTMCNC: 28.2 PG — SIGNIFICANT CHANGE UP (ref 27–31)
MCHC RBC-ENTMCNC: 31.9 G/DL — LOW (ref 32–37)
MCV RBC AUTO: 88.4 FL — SIGNIFICANT CHANGE UP (ref 80–94)
NRBC # BLD: 0 /100 WBCS — SIGNIFICANT CHANGE UP (ref 0–0)
PLATELET # BLD AUTO: 124 K/UL — LOW (ref 130–400)
RBC # BLD: 3.62 M/UL — LOW (ref 4.7–6.1)
RBC # FLD: 14.2 % — SIGNIFICANT CHANGE UP (ref 11.5–14.5)
WBC # BLD: 6.14 K/UL — SIGNIFICANT CHANGE UP (ref 4.8–10.8)
WBC # FLD AUTO: 6.14 K/UL — SIGNIFICANT CHANGE UP (ref 4.8–10.8)

## 2019-11-20 RX ORDER — SODIUM CHLORIDE 9 MG/ML
500 INJECTION INTRAMUSCULAR; INTRAVENOUS; SUBCUTANEOUS ONCE
Refills: 0 | Status: COMPLETED | OUTPATIENT
Start: 2019-11-20 | End: 2019-11-20

## 2019-11-20 RX ORDER — SODIUM CHLORIDE 9 MG/ML
1000 INJECTION INTRAMUSCULAR; INTRAVENOUS; SUBCUTANEOUS
Refills: 0 | Status: DISCONTINUED | OUTPATIENT
Start: 2019-11-20 | End: 2019-11-20

## 2019-11-20 RX ORDER — SODIUM CHLORIDE 9 MG/ML
1000 INJECTION INTRAMUSCULAR; INTRAVENOUS; SUBCUTANEOUS
Refills: 0 | Status: DISCONTINUED | OUTPATIENT
Start: 2019-11-20 | End: 2019-11-22

## 2019-11-20 RX ADMIN — MORPHINE SULFATE 2 MILLIGRAM(S): 50 CAPSULE, EXTENDED RELEASE ORAL at 15:17

## 2019-11-20 RX ADMIN — METFORMIN HYDROCHLORIDE 500 MILLIGRAM(S): 850 TABLET ORAL at 22:02

## 2019-11-20 RX ADMIN — Medication 150 MILLIGRAM(S): at 11:27

## 2019-11-20 RX ADMIN — MORPHINE SULFATE 2 MILLIGRAM(S): 50 CAPSULE, EXTENDED RELEASE ORAL at 08:17

## 2019-11-20 RX ADMIN — METHOCARBAMOL 750 MILLIGRAM(S): 500 TABLET, FILM COATED ORAL at 05:43

## 2019-11-20 RX ADMIN — OXYCODONE AND ACETAMINOPHEN 2 TABLET(S): 5; 325 TABLET ORAL at 00:58

## 2019-11-20 RX ADMIN — PREGABALIN 1000 MICROGRAM(S): 225 CAPSULE ORAL at 11:27

## 2019-11-20 RX ADMIN — Medication 150 MILLIGRAM(S): at 22:02

## 2019-11-20 RX ADMIN — TAMSULOSIN HYDROCHLORIDE 0.4 MILLIGRAM(S): 0.4 CAPSULE ORAL at 22:02

## 2019-11-20 RX ADMIN — HEPARIN SODIUM 5000 UNIT(S): 5000 INJECTION INTRAVENOUS; SUBCUTANEOUS at 22:02

## 2019-11-20 RX ADMIN — METHOCARBAMOL 750 MILLIGRAM(S): 500 TABLET, FILM COATED ORAL at 23:25

## 2019-11-20 RX ADMIN — OXYCODONE AND ACETAMINOPHEN 2 TABLET(S): 5; 325 TABLET ORAL at 11:25

## 2019-11-20 RX ADMIN — OXYCODONE AND ACETAMINOPHEN 2 TABLET(S): 5; 325 TABLET ORAL at 01:28

## 2019-11-20 RX ADMIN — SODIUM CHLORIDE 100 MILLILITER(S): 9 INJECTION INTRAMUSCULAR; INTRAVENOUS; SUBCUTANEOUS at 18:35

## 2019-11-20 RX ADMIN — METHOCARBAMOL 750 MILLIGRAM(S): 500 TABLET, FILM COATED ORAL at 17:18

## 2019-11-20 RX ADMIN — MORPHINE SULFATE 2 MILLIGRAM(S): 50 CAPSULE, EXTENDED RELEASE ORAL at 14:47

## 2019-11-20 RX ADMIN — BUDESONIDE AND FORMOTEROL FUMARATE DIHYDRATE 2 PUFF(S): 160; 4.5 AEROSOL RESPIRATORY (INHALATION) at 08:18

## 2019-11-20 RX ADMIN — PANTOPRAZOLE SODIUM 40 MILLIGRAM(S): 20 TABLET, DELAYED RELEASE ORAL at 06:39

## 2019-11-20 RX ADMIN — LISINOPRIL 10 MILLIGRAM(S): 2.5 TABLET ORAL at 05:43

## 2019-11-20 RX ADMIN — Medication 25 MILLIGRAM(S): at 05:43

## 2019-11-20 RX ADMIN — HEPARIN SODIUM 5000 UNIT(S): 5000 INJECTION INTRAVENOUS; SUBCUTANEOUS at 14:45

## 2019-11-20 RX ADMIN — Medication 1 MILLIGRAM(S): at 11:27

## 2019-11-20 RX ADMIN — MORPHINE SULFATE 2 MILLIGRAM(S): 50 CAPSULE, EXTENDED RELEASE ORAL at 08:47

## 2019-11-20 RX ADMIN — OXYCODONE AND ACETAMINOPHEN 2 TABLET(S): 5; 325 TABLET ORAL at 11:55

## 2019-11-20 RX ADMIN — METHOCARBAMOL 750 MILLIGRAM(S): 500 TABLET, FILM COATED ORAL at 11:26

## 2019-11-20 RX ADMIN — SODIUM CHLORIDE 500 MILLILITER(S): 9 INJECTION INTRAMUSCULAR; INTRAVENOUS; SUBCUTANEOUS at 17:56

## 2019-11-20 RX ADMIN — HEPARIN SODIUM 5000 UNIT(S): 5000 INJECTION INTRAVENOUS; SUBCUTANEOUS at 05:44

## 2019-11-20 RX ADMIN — SIMVASTATIN 20 MILLIGRAM(S): 20 TABLET, FILM COATED ORAL at 22:02

## 2019-11-20 NOTE — PROGRESS NOTE ADULT - SUBJECTIVE AND OBJECTIVE BOX
Subjective: 79yMale with a pmhx of M48.062/[id number]  ^M48.062/[id number]                                                           PT ID SHOWN  No pertinent family history in first degree relatives  Handoff  MEWS Score  COPD (chronic obstructive pulmonary disease)  BPH (benign prostatic hyperplasia)  Back pain  Non-Hodgkin lymphoma  CLL (chronic lymphocytic leukemia)  Depression  HTN (hypertension)  DM (diabetes mellitus)  Anxiety  Lumbar stenosis with neurogenic claudication  Lumbar stenosis with neurogenic claudication  Interlaminar lumbar instrumented fusion at 3 levels  Laminectomy, decompressive, spine, lumbar, 3 levels  Port-A-Cath in place  H/O total knee replacement, bilateral    POD#2  s/p L2-L5 decompressive lami and coflex     Pt seen an examined at bedside with Dr Isabel.  Pt's B/P improved after 1L NS bolus yesterday.  SBP remains>100.  Pt sts he's feeling ok.  c/o incisional pain, denies radicular pain, numbness or paresthesias.    JAVI 30cc/ 24 hrs.      Allergies    No Known Allergies    Intolerances        Vital Signs Last 24 Hrs  T(C): 37.2 (20 Nov 2019 05:16), Max: 37.2 (20 Nov 2019 05:16)  T(F): 98.9 (20 Nov 2019 05:16), Max: 98.9 (20 Nov 2019 05:16)  HR: 87 (20 Nov 2019 05:16) (67 - 87)  BP: 100/62 (20 Nov 2019 05:16) (75/45 - 108/64)  BP(mean): --  RR: 19 (20 Nov 2019 05:16) (18 - 19)  SpO2: --      acetaminophen   Tablet .. 650 milliGRAM(s) Oral every 6 hours PRN  budesonide 160 MICROgram(s)/formoterol 4.5 MICROgram(s) Inhaler 2 Puff(s) Inhalation two times a day  cholecalciferol 400 Unit(s) Oral daily  clonazePAM  Tablet 0.5 milliGRAM(s) Oral every 6 hours PRN  cyanocobalamin 1000 MICROGram(s) Oral daily  dextrose 40% Gel 15 Gram(s) Oral once PRN  dextrose 5% + sodium chloride 0.45%. 1000 milliLiter(s) IV Continuous <Continuous>  dextrose 5%. 1000 milliLiter(s) IV Continuous <Continuous>  dextrose 50% Injectable 12.5 Gram(s) IV Push once  dextrose 50% Injectable 25 Gram(s) IV Push once  dextrose 50% Injectable 25 Gram(s) IV Push once  folic acid 1 milliGRAM(s) Oral daily  glucagon  Injectable 1 milliGRAM(s) IntraMuscular once PRN  heparin  Injectable 5000 Unit(s) SubCutaneous every 8 hours  hydrochlorothiazide 25 milliGRAM(s) Oral daily  influenza   Vaccine 0.5 milliLiter(s) IntraMuscular once  insulin lispro (HumaLOG) corrective regimen sliding scale   SubCutaneous at bedtime  lisinopril 10 milliGRAM(s) Oral daily  metFORMIN 500 milliGRAM(s) Oral at bedtime  methocarbamol 750 milliGRAM(s) Oral every 6 hours  metoprolol tartrate 25 milliGRAM(s) Oral two times a day  morphine  - Injectable 2 milliGRAM(s) IV Push every 4 hours PRN  ondansetron Injectable 4 milliGRAM(s) IV Push every 6 hours PRN  oxycodone    5 mG/acetaminophen 325 mG 1 Tablet(s) Oral every 4 hours PRN  oxycodone    5 mG/acetaminophen 325 mG 2 Tablet(s) Oral every 4 hours PRN  pantoprazole    Tablet 40 milliGRAM(s) Oral before breakfast  senna 2 Tablet(s) Oral at bedtime PRN  simvastatin 20 milliGRAM(s) Oral at bedtime  tamsulosin 0.4 milliGRAM(s) Oral at bedtime  traZODone 150 milliGRAM(s) Oral at bedtime  venlafaxine XR. 150 milliGRAM(s) Oral daily        11-19-19 @ 07:01  -  11-20-19 @ 07:00  --------------------------------------------------------  IN: 480 mL / OUT: 2780 mL / NET: -2300 mL    11-20-19 @ 07:01  -  11-20-19 @ 12:17  --------------------------------------------------------  IN: 240 mL / OUT: 305 mL / NET: -65 mL          Exam:  AAOX3. Verbal function intact  follows commands  PERRL  Motor: MAEx4  5/5 power in b/l UE and LE  Sensation: intact         CBC Full  -  ( 19 Nov 2019 17:06 )  WBC Count : 6.21 K/uL  RBC Count : 3.71 M/uL  Hemoglobin : 10.5 g/dL  Hematocrit : 33.4 %  Platelet Count - Automated : 168 K/uL  Mean Cell Volume : 90.0 fL  Mean Cell Hemoglobin : 28.3 pg  Mean Cell Hemoglobin Concentration : 31.4 g/dL  Auto Neutrophil # : x  Auto Lymphocyte # : x  Auto Monocyte # : x  Auto Eosinophil # : x  Auto Basophil # : x  Auto Neutrophil % : x  Auto Lymphocyte % : x  Auto Monocyte % : x  Auto Eosinophil % : x  Auto Basophil % : x                Assessment/Plan: as above  Toradol x 24hrs for pain  d/c JAVI today  d/c looney for TOV  pain meds  PT/rehab  d/w attg

## 2019-11-20 NOTE — PHYSICAL THERAPY INITIAL EVALUATION ADULT - GENERAL OBSERVATIONS, REHAB EVAL
Pt encountered supine in bed in NAD. + IV. Pt recently returned to bed receiving bolus 2* to low BP 91/57. Recommend to hold PT at this time Trish SANTANA in agreement. Pt educated on goals for PT. Will f/u.
[id number] pm. 79/M received in bed, left in reclined b/s chair, nad, post pain meds, + JAVI, sister present. patient is very pleasant and motivated. reported 8/10 incisional pain in bed, 4/10 pain with ambulation, 2/10 pain in chair. vitals: in bed 87/53 66 91%; sitting EOB 91/52 67 92%; sitting in chair/feet down post amb 99/54 69 94. RN made aware of the vitals.

## 2019-11-21 LAB
GLUCOSE BLDC GLUCOMTR-MCNC: 108 MG/DL — HIGH (ref 70–99)
GLUCOSE BLDC GLUCOMTR-MCNC: 119 MG/DL — HIGH (ref 70–99)
GLUCOSE BLDC GLUCOMTR-MCNC: 129 MG/DL — HIGH (ref 70–99)
GLUCOSE BLDC GLUCOMTR-MCNC: 138 MG/DL — HIGH (ref 70–99)
HCT VFR BLD CALC: 35.7 % — LOW (ref 42–52)
HGB BLD-MCNC: 11.1 G/DL — LOW (ref 14–18)
MCHC RBC-ENTMCNC: 27.8 PG — SIGNIFICANT CHANGE UP (ref 27–31)
MCHC RBC-ENTMCNC: 31.1 G/DL — LOW (ref 32–37)
MCV RBC AUTO: 89.5 FL — SIGNIFICANT CHANGE UP (ref 80–94)
NRBC # BLD: 0 /100 WBCS — SIGNIFICANT CHANGE UP (ref 0–0)
PLATELET # BLD AUTO: 165 K/UL — SIGNIFICANT CHANGE UP (ref 130–400)
RBC # BLD: 3.99 M/UL — LOW (ref 4.7–6.1)
RBC # FLD: 14.1 % — SIGNIFICANT CHANGE UP (ref 11.5–14.5)
WBC # BLD: 6.23 K/UL — SIGNIFICANT CHANGE UP (ref 4.8–10.8)
WBC # FLD AUTO: 6.23 K/UL — SIGNIFICANT CHANGE UP (ref 4.8–10.8)

## 2019-11-21 RX ADMIN — BUDESONIDE AND FORMOTEROL FUMARATE DIHYDRATE 2 PUFF(S): 160; 4.5 AEROSOL RESPIRATORY (INHALATION) at 07:56

## 2019-11-21 RX ADMIN — Medication 25 MILLIGRAM(S): at 05:48

## 2019-11-21 RX ADMIN — SIMVASTATIN 20 MILLIGRAM(S): 20 TABLET, FILM COATED ORAL at 21:44

## 2019-11-21 RX ADMIN — Medication 1 MILLIGRAM(S): at 11:49

## 2019-11-21 RX ADMIN — HEPARIN SODIUM 5000 UNIT(S): 5000 INJECTION INTRAVENOUS; SUBCUTANEOUS at 05:48

## 2019-11-21 RX ADMIN — LISINOPRIL 10 MILLIGRAM(S): 2.5 TABLET ORAL at 05:48

## 2019-11-21 RX ADMIN — Medication 400 UNIT(S): at 11:50

## 2019-11-21 RX ADMIN — METHOCARBAMOL 750 MILLIGRAM(S): 500 TABLET, FILM COATED ORAL at 11:49

## 2019-11-21 RX ADMIN — METHOCARBAMOL 750 MILLIGRAM(S): 500 TABLET, FILM COATED ORAL at 18:10

## 2019-11-21 RX ADMIN — HEPARIN SODIUM 5000 UNIT(S): 5000 INJECTION INTRAVENOUS; SUBCUTANEOUS at 14:50

## 2019-11-21 RX ADMIN — BUDESONIDE AND FORMOTEROL FUMARATE DIHYDRATE 2 PUFF(S): 160; 4.5 AEROSOL RESPIRATORY (INHALATION) at 21:42

## 2019-11-21 RX ADMIN — METFORMIN HYDROCHLORIDE 500 MILLIGRAM(S): 850 TABLET ORAL at 21:43

## 2019-11-21 RX ADMIN — Medication 150 MILLIGRAM(S): at 21:45

## 2019-11-21 RX ADMIN — Medication 150 MILLIGRAM(S): at 11:49

## 2019-11-21 RX ADMIN — PANTOPRAZOLE SODIUM 40 MILLIGRAM(S): 20 TABLET, DELAYED RELEASE ORAL at 05:49

## 2019-11-21 RX ADMIN — TAMSULOSIN HYDROCHLORIDE 0.4 MILLIGRAM(S): 0.4 CAPSULE ORAL at 21:44

## 2019-11-21 RX ADMIN — PREGABALIN 1000 MICROGRAM(S): 225 CAPSULE ORAL at 11:49

## 2019-11-21 RX ADMIN — HEPARIN SODIUM 5000 UNIT(S): 5000 INJECTION INTRAVENOUS; SUBCUTANEOUS at 21:43

## 2019-11-21 RX ADMIN — METHOCARBAMOL 750 MILLIGRAM(S): 500 TABLET, FILM COATED ORAL at 05:48

## 2019-11-21 NOTE — PROGRESS NOTE ADULT - SUBJECTIVE AND OBJECTIVE BOX
POD # 3    S/P L2-L5 decompressive lami and coflex     Pt seen and examined at bedside. Pt c/o incisional pain at this time. Denies lower extremity pain, parasthesias.    Vital Signs Last 24 Hrs  T(C): 36 (21 Nov 2019 13:50), Max: 36.4 (21 Nov 2019 05:59)  T(F): 96.8 (21 Nov 2019 13:50), Max: 97.5 (21 Nov 2019 05:59)  HR: 74 (21 Nov 2019 13:50) (70 - 80)  BP: 96/56 (21 Nov 2019 13:50) (83/52 - 130/69)  BP(mean): --  RR: 16 (21 Nov 2019 13:50) (16 - 19)  SpO2: 97% (20 Nov 2019 17:20) (97% - 97%)  JAVI: 180cc/24hrs    PHYSICAL EXAM:  Strength 5/5  Sensation intact to light touch  Dressing intact  JAVI in place      MEDICATIONS:  Antibiotics:    Neuro:  acetaminophen   Tablet .. 650 milliGRAM(s) Oral every 6 hours PRN  clonazePAM  Tablet 0.5 milliGRAM(s) Oral every 6 hours PRN  methocarbamol 750 milliGRAM(s) Oral every 6 hours  morphine  - Injectable 2 milliGRAM(s) IV Push every 4 hours PRN  ondansetron Injectable 4 milliGRAM(s) IV Push every 6 hours PRN  oxycodone    5 mG/acetaminophen 325 mG 1 Tablet(s) Oral every 4 hours PRN  oxycodone    5 mG/acetaminophen 325 mG 2 Tablet(s) Oral every 4 hours PRN  traZODone 150 milliGRAM(s) Oral at bedtime  venlafaxine XR. 150 milliGRAM(s) Oral daily    Anticoagulation:  heparin  Injectable 5000 Unit(s) SubCutaneous every 8 hours    OTHER:  budesonide 160 MICROgram(s)/formoterol 4.5 MICROgram(s) Inhaler 2 Puff(s) Inhalation two times a day  dextrose 40% Gel 15 Gram(s) Oral once PRN  dextrose 50% Injectable 12.5 Gram(s) IV Push once  dextrose 50% Injectable 25 Gram(s) IV Push once  dextrose 50% Injectable 25 Gram(s) IV Push once  glucagon  Injectable 1 milliGRAM(s) IntraMuscular once PRN  hydrochlorothiazide 25 milliGRAM(s) Oral daily  influenza   Vaccine 0.5 milliLiter(s) IntraMuscular once  insulin lispro (HumaLOG) corrective regimen sliding scale   SubCutaneous at bedtime  lisinopril 10 milliGRAM(s) Oral daily  metFORMIN 500 milliGRAM(s) Oral at bedtime  metoprolol tartrate 25 milliGRAM(s) Oral two times a day  pantoprazole    Tablet 40 milliGRAM(s) Oral before breakfast  senna 2 Tablet(s) Oral at bedtime PRN  simvastatin 20 milliGRAM(s) Oral at bedtime  tamsulosin 0.4 milliGRAM(s) Oral at bedtime    IVF:  cholecalciferol 400 Unit(s) Oral daily  cyanocobalamin 1000 MICROGram(s) Oral daily  dextrose 5% + sodium chloride 0.45%. 1000 milliLiter(s) IV Continuous <Continuous>  dextrose 5%. 1000 milliLiter(s) IV Continuous <Continuous>  folic acid 1 milliGRAM(s) Oral daily  sodium chloride 0.9%. 1000 milliLiter(s) IV Continuous <Continuous>        LABS:                        11.1   6.23  )-----------( 165      ( 21 Nov 2019 07:04 )             35.7     Assessment:  As above    Plan:  Pain Meds PRN  Monitor JAVI o/p  PT/Rehab

## 2019-11-22 LAB
GLUCOSE BLDC GLUCOMTR-MCNC: 105 MG/DL — HIGH (ref 70–99)
GLUCOSE BLDC GLUCOMTR-MCNC: 114 MG/DL — HIGH (ref 70–99)
GLUCOSE BLDC GLUCOMTR-MCNC: 146 MG/DL — HIGH (ref 70–99)
GLUCOSE BLDC GLUCOMTR-MCNC: 157 MG/DL — HIGH (ref 70–99)

## 2019-11-22 RX ADMIN — Medication 25 MILLIGRAM(S): at 05:40

## 2019-11-22 RX ADMIN — TAMSULOSIN HYDROCHLORIDE 0.4 MILLIGRAM(S): 0.4 CAPSULE ORAL at 21:34

## 2019-11-22 RX ADMIN — HEPARIN SODIUM 5000 UNIT(S): 5000 INJECTION INTRAVENOUS; SUBCUTANEOUS at 05:41

## 2019-11-22 RX ADMIN — SIMVASTATIN 20 MILLIGRAM(S): 20 TABLET, FILM COATED ORAL at 21:34

## 2019-11-22 RX ADMIN — BUDESONIDE AND FORMOTEROL FUMARATE DIHYDRATE 2 PUFF(S): 160; 4.5 AEROSOL RESPIRATORY (INHALATION) at 19:35

## 2019-11-22 RX ADMIN — Medication 150 MILLIGRAM(S): at 21:33

## 2019-11-22 RX ADMIN — PREGABALIN 1000 MICROGRAM(S): 225 CAPSULE ORAL at 12:01

## 2019-11-22 RX ADMIN — METHOCARBAMOL 750 MILLIGRAM(S): 500 TABLET, FILM COATED ORAL at 17:58

## 2019-11-22 RX ADMIN — Medication 1 MILLIGRAM(S): at 12:01

## 2019-11-22 RX ADMIN — METHOCARBAMOL 750 MILLIGRAM(S): 500 TABLET, FILM COATED ORAL at 05:40

## 2019-11-22 RX ADMIN — LISINOPRIL 10 MILLIGRAM(S): 2.5 TABLET ORAL at 05:41

## 2019-11-22 RX ADMIN — HEPARIN SODIUM 5000 UNIT(S): 5000 INJECTION INTRAVENOUS; SUBCUTANEOUS at 14:25

## 2019-11-22 RX ADMIN — Medication 25 MILLIGRAM(S): at 17:58

## 2019-11-22 RX ADMIN — PANTOPRAZOLE SODIUM 40 MILLIGRAM(S): 20 TABLET, DELAYED RELEASE ORAL at 05:41

## 2019-11-22 RX ADMIN — METHOCARBAMOL 750 MILLIGRAM(S): 500 TABLET, FILM COATED ORAL at 12:01

## 2019-11-22 RX ADMIN — METFORMIN HYDROCHLORIDE 500 MILLIGRAM(S): 850 TABLET ORAL at 21:33

## 2019-11-22 RX ADMIN — HEPARIN SODIUM 5000 UNIT(S): 5000 INJECTION INTRAVENOUS; SUBCUTANEOUS at 21:34

## 2019-11-22 RX ADMIN — Medication 150 MILLIGRAM(S): at 12:01

## 2019-11-22 RX ADMIN — SENNA PLUS 2 TABLET(S): 8.6 TABLET ORAL at 23:33

## 2019-11-22 RX ADMIN — METHOCARBAMOL 750 MILLIGRAM(S): 500 TABLET, FILM COATED ORAL at 23:30

## 2019-11-22 RX ADMIN — METHOCARBAMOL 750 MILLIGRAM(S): 500 TABLET, FILM COATED ORAL at 00:15

## 2019-11-22 NOTE — PROGRESS NOTE ADULT - SUBJECTIVE AND OBJECTIVE BOX
POD # 4    S/P  L2-L5 decompressive lami and coflex     Pt seen and examined at bedside. Pt c/o incisional pain at this time. Denies lower extremity pain, parasthesias.     Vital Signs Last 24 Hrs  T(C): 36.5 (22 Nov 2019 05:37), Max: 36.5 (22 Nov 2019 05:37)  T(F): 97.7 (22 Nov 2019 05:37), Max: 97.7 (22 Nov 2019 05:37)  HR: 81 (22 Nov 2019 05:37) (70 - 81)  BP: 125/70 (22 Nov 2019 05:37) (96/56 - 125/70)  BP(mean): --  RR: 18 (22 Nov 2019 05:37) (16 - 19)  SpO2: --  JAVI: 120cc    PHYSICAL EXAM:  Strength 5/5  + sensation to light touch B/L LE  Dressing intact  JAVI in place    MEDICATIONS:  Antibiotics:    Neuro:  acetaminophen   Tablet .. 650 milliGRAM(s) Oral every 6 hours PRN  clonazePAM  Tablet 0.5 milliGRAM(s) Oral every 6 hours PRN  methocarbamol 750 milliGRAM(s) Oral every 6 hours  morphine  - Injectable 2 milliGRAM(s) IV Push every 4 hours PRN  ondansetron Injectable 4 milliGRAM(s) IV Push every 6 hours PRN  oxycodone    5 mG/acetaminophen 325 mG 1 Tablet(s) Oral every 4 hours PRN  oxycodone    5 mG/acetaminophen 325 mG 2 Tablet(s) Oral every 4 hours PRN  traZODone 150 milliGRAM(s) Oral at bedtime  venlafaxine XR. 150 milliGRAM(s) Oral daily    Anticoagulation:  heparin  Injectable 5000 Unit(s) SubCutaneous every 8 hours    OTHER:  budesonide 160 MICROgram(s)/formoterol 4.5 MICROgram(s) Inhaler 2 Puff(s) Inhalation two times a day  dextrose 40% Gel 15 Gram(s) Oral once PRN  dextrose 50% Injectable 12.5 Gram(s) IV Push once  dextrose 50% Injectable 25 Gram(s) IV Push once  dextrose 50% Injectable 25 Gram(s) IV Push once  glucagon  Injectable 1 milliGRAM(s) IntraMuscular once PRN  hydrochlorothiazide 25 milliGRAM(s) Oral daily  influenza   Vaccine 0.5 milliLiter(s) IntraMuscular once  insulin lispro (HumaLOG) corrective regimen sliding scale   SubCutaneous at bedtime  lisinopril 10 milliGRAM(s) Oral daily  metFORMIN 500 milliGRAM(s) Oral at bedtime  metoprolol tartrate 25 milliGRAM(s) Oral two times a day  pantoprazole    Tablet 40 milliGRAM(s) Oral before breakfast  senna 2 Tablet(s) Oral at bedtime PRN  simvastatin 20 milliGRAM(s) Oral at bedtime  tamsulosin 0.4 milliGRAM(s) Oral at bedtime    IVF:  cholecalciferol 400 Unit(s) Oral daily  cyanocobalamin 1000 MICROGram(s) Oral daily  dextrose 5% + sodium chloride 0.45%. 1000 milliLiter(s) IV Continuous <Continuous>  dextrose 5%. 1000 milliLiter(s) IV Continuous <Continuous>  folic acid 1 milliGRAM(s) Oral daily  sodium chloride 0.9%. 1000 milliLiter(s) IV Continuous <Continuous>    LABS:                        11.1   6.23  )-----------( 165      ( 21 Nov 2019 07:04 )             35.7       Assessment:  As above    Plan:  CBC in am  Continue to monitor JAVI o/p  Pain Meds PRN  PT/Rehab

## 2019-11-23 LAB
GLUCOSE BLDC GLUCOMTR-MCNC: 104 MG/DL — HIGH (ref 70–99)
GLUCOSE BLDC GLUCOMTR-MCNC: 111 MG/DL — HIGH (ref 70–99)
GLUCOSE BLDC GLUCOMTR-MCNC: 125 MG/DL — HIGH (ref 70–99)
GLUCOSE BLDC GLUCOMTR-MCNC: 128 MG/DL — HIGH (ref 70–99)
GLUCOSE BLDC GLUCOMTR-MCNC: 98 MG/DL — SIGNIFICANT CHANGE UP (ref 70–99)

## 2019-11-23 RX ADMIN — OXYCODONE AND ACETAMINOPHEN 2 TABLET(S): 5; 325 TABLET ORAL at 08:40

## 2019-11-23 RX ADMIN — SIMVASTATIN 20 MILLIGRAM(S): 20 TABLET, FILM COATED ORAL at 21:48

## 2019-11-23 RX ADMIN — BUDESONIDE AND FORMOTEROL FUMARATE DIHYDRATE 2 PUFF(S): 160; 4.5 AEROSOL RESPIRATORY (INHALATION) at 07:31

## 2019-11-23 RX ADMIN — OXYCODONE AND ACETAMINOPHEN 1 TABLET(S): 5; 325 TABLET ORAL at 18:18

## 2019-11-23 RX ADMIN — TAMSULOSIN HYDROCHLORIDE 0.4 MILLIGRAM(S): 0.4 CAPSULE ORAL at 21:47

## 2019-11-23 RX ADMIN — HEPARIN SODIUM 5000 UNIT(S): 5000 INJECTION INTRAVENOUS; SUBCUTANEOUS at 14:05

## 2019-11-23 RX ADMIN — Medication 400 UNIT(S): at 11:38

## 2019-11-23 RX ADMIN — PANTOPRAZOLE SODIUM 40 MILLIGRAM(S): 20 TABLET, DELAYED RELEASE ORAL at 07:30

## 2019-11-23 RX ADMIN — BUDESONIDE AND FORMOTEROL FUMARATE DIHYDRATE 2 PUFF(S): 160; 4.5 AEROSOL RESPIRATORY (INHALATION) at 20:16

## 2019-11-23 RX ADMIN — Medication 25 MILLIGRAM(S): at 05:08

## 2019-11-23 RX ADMIN — PREGABALIN 1000 MICROGRAM(S): 225 CAPSULE ORAL at 11:37

## 2019-11-23 RX ADMIN — METHOCARBAMOL 750 MILLIGRAM(S): 500 TABLET, FILM COATED ORAL at 23:44

## 2019-11-23 RX ADMIN — Medication 150 MILLIGRAM(S): at 21:47

## 2019-11-23 RX ADMIN — Medication 150 MILLIGRAM(S): at 11:37

## 2019-11-23 RX ADMIN — Medication 1 MILLIGRAM(S): at 11:37

## 2019-11-23 RX ADMIN — OXYCODONE AND ACETAMINOPHEN 1 TABLET(S): 5; 325 TABLET ORAL at 17:38

## 2019-11-23 RX ADMIN — METFORMIN HYDROCHLORIDE 500 MILLIGRAM(S): 850 TABLET ORAL at 21:51

## 2019-11-23 RX ADMIN — OXYCODONE AND ACETAMINOPHEN 2 TABLET(S): 5; 325 TABLET ORAL at 07:38

## 2019-11-23 RX ADMIN — HEPARIN SODIUM 5000 UNIT(S): 5000 INJECTION INTRAVENOUS; SUBCUTANEOUS at 21:48

## 2019-11-23 RX ADMIN — METHOCARBAMOL 750 MILLIGRAM(S): 500 TABLET, FILM COATED ORAL at 11:37

## 2019-11-23 RX ADMIN — LISINOPRIL 10 MILLIGRAM(S): 2.5 TABLET ORAL at 05:08

## 2019-11-23 RX ADMIN — METHOCARBAMOL 750 MILLIGRAM(S): 500 TABLET, FILM COATED ORAL at 05:08

## 2019-11-23 RX ADMIN — METHOCARBAMOL 750 MILLIGRAM(S): 500 TABLET, FILM COATED ORAL at 17:38

## 2019-11-23 RX ADMIN — HEPARIN SODIUM 5000 UNIT(S): 5000 INJECTION INTRAVENOUS; SUBCUTANEOUS at 05:08

## 2019-11-23 NOTE — PROGRESS NOTE ADULT - SUBJECTIVE AND OBJECTIVE BOX
Subjective: 79yMale with a pmhx of M48.062/[id number]  ^M48.062/[id number],[id number]                                                           PT ID SHOWN  No pertinent family history in first degree relatives  Handoff  MEWS Score  COPD (chronic obstructive pulmonary disease)  BPH (benign prostatic hyperplasia)  Back pain  Non-Hodgkin lymphoma  CLL (chronic lymphocytic leukemia)  Depression  HTN (hypertension)  DM (diabetes mellitus)  Anxiety  Lumbar stenosis with neurogenic claudication  Lumbar stenosis with neurogenic claudication  Interlaminar lumbar instrumented fusion at 3 levels  Laminectomy, decompressive, spine, lumbar, 3 levels  Port-A-Cath in place  H/O total knee replacement, bilateral      POD # 5    S/P  L2-L5 decompressive lami and coflex     Pt seen and examined at bedside. Pt c/o incisional pain at this time. Denies radicular pain or, parasthesias. JAVI output 70 cc/ 24 hrs  Allergies    No Known Allergies    Intolerances        Vital Signs Last 24 Hrs  T(C): 35.7 (23 Nov 2019 14:04), Max: 36.1 (22 Nov 2019 19:58)  T(F): 96.2 (23 Nov 2019 14:04), Max: 97 (22 Nov 2019 19:58)  HR: 66 (23 Nov 2019 14:32) (65 - 79)  BP: 96/64 (23 Nov 2019 14:32) (81/50 - 144/78)  BP(mean): --  RR: 18 (23 Nov 2019 14:04) (18 - 19)  SpO2: --      acetaminophen   Tablet .. 650 milliGRAM(s) Oral every 6 hours PRN  budesonide 160 MICROgram(s)/formoterol 4.5 MICROgram(s) Inhaler 2 Puff(s) Inhalation two times a day  cholecalciferol 400 Unit(s) Oral daily  clonazePAM  Tablet 0.5 milliGRAM(s) Oral every 6 hours PRN  cyanocobalamin 1000 MICROGram(s) Oral daily  dextrose 40% Gel 15 Gram(s) Oral once PRN  dextrose 5% + sodium chloride 0.45%. 1000 milliLiter(s) IV Continuous <Continuous>  dextrose 5%. 1000 milliLiter(s) IV Continuous <Continuous>  dextrose 50% Injectable 12.5 Gram(s) IV Push once  dextrose 50% Injectable 25 Gram(s) IV Push once  dextrose 50% Injectable 25 Gram(s) IV Push once  folic acid 1 milliGRAM(s) Oral daily  glucagon  Injectable 1 milliGRAM(s) IntraMuscular once PRN  heparin  Injectable 5000 Unit(s) SubCutaneous every 8 hours  hydrochlorothiazide 25 milliGRAM(s) Oral daily  influenza   Vaccine 0.5 milliLiter(s) IntraMuscular once  insulin lispro (HumaLOG) corrective regimen sliding scale   SubCutaneous at bedtime  lisinopril 10 milliGRAM(s) Oral daily  metFORMIN 500 milliGRAM(s) Oral at bedtime  methocarbamol 750 milliGRAM(s) Oral every 6 hours  metoprolol tartrate 25 milliGRAM(s) Oral two times a day  morphine  - Injectable 2 milliGRAM(s) IV Push every 4 hours PRN  ondansetron Injectable 4 milliGRAM(s) IV Push every 6 hours PRN  oxycodone    5 mG/acetaminophen 325 mG 1 Tablet(s) Oral every 4 hours PRN  oxycodone    5 mG/acetaminophen 325 mG 2 Tablet(s) Oral every 4 hours PRN  pantoprazole    Tablet 40 milliGRAM(s) Oral before breakfast  senna 2 Tablet(s) Oral at bedtime PRN  simvastatin 20 milliGRAM(s) Oral at bedtime  tamsulosin 0.4 milliGRAM(s) Oral at bedtime  traZODone 150 milliGRAM(s) Oral at bedtime  venlafaxine XR. 150 milliGRAM(s) Oral daily        11-22-19 @ 07:01  -  11-23-19 @ 07:00  --------------------------------------------------------  IN: 0 mL / OUT: 1670 mL / NET: -1670 mL    11-23-19 @ 07:01  -  11-23-19 @ 15:03  --------------------------------------------------------  IN: 0 mL / OUT: 35 mL / NET: -35 mL          Exam:  AAOX3. Verbal function intact  follows commands  PERRL  Motor: MAEx4, 5/5 power in b/l UE and LE  Sensation: intact         Assessment/Plan: as above  monitor JAVI output  pain meds  PT/rehab  d/w attg

## 2019-11-24 LAB
GLUCOSE BLDC GLUCOMTR-MCNC: 106 MG/DL — HIGH (ref 70–99)
GLUCOSE BLDC GLUCOMTR-MCNC: 137 MG/DL — HIGH (ref 70–99)
GLUCOSE BLDC GLUCOMTR-MCNC: 165 MG/DL — HIGH (ref 70–99)
GLUCOSE BLDC GLUCOMTR-MCNC: 89 MG/DL — SIGNIFICANT CHANGE UP (ref 70–99)

## 2019-11-24 RX ORDER — LANOLIN ALCOHOL/MO/W.PET/CERES
3 CREAM (GRAM) TOPICAL AT BEDTIME
Refills: 0 | Status: DISCONTINUED | OUTPATIENT
Start: 2019-11-24 | End: 2019-11-25

## 2019-11-24 RX ADMIN — PREGABALIN 1000 MICROGRAM(S): 225 CAPSULE ORAL at 11:25

## 2019-11-24 RX ADMIN — METHOCARBAMOL 750 MILLIGRAM(S): 500 TABLET, FILM COATED ORAL at 17:40

## 2019-11-24 RX ADMIN — PANTOPRAZOLE SODIUM 40 MILLIGRAM(S): 20 TABLET, DELAYED RELEASE ORAL at 06:00

## 2019-11-24 RX ADMIN — OXYCODONE AND ACETAMINOPHEN 1 TABLET(S): 5; 325 TABLET ORAL at 08:58

## 2019-11-24 RX ADMIN — HEPARIN SODIUM 5000 UNIT(S): 5000 INJECTION INTRAVENOUS; SUBCUTANEOUS at 05:53

## 2019-11-24 RX ADMIN — METHOCARBAMOL 750 MILLIGRAM(S): 500 TABLET, FILM COATED ORAL at 11:25

## 2019-11-24 RX ADMIN — OXYCODONE AND ACETAMINOPHEN 1 TABLET(S): 5; 325 TABLET ORAL at 17:18

## 2019-11-24 RX ADMIN — BUDESONIDE AND FORMOTEROL FUMARATE DIHYDRATE 2 PUFF(S): 160; 4.5 AEROSOL RESPIRATORY (INHALATION) at 19:36

## 2019-11-24 RX ADMIN — HEPARIN SODIUM 5000 UNIT(S): 5000 INJECTION INTRAVENOUS; SUBCUTANEOUS at 13:16

## 2019-11-24 RX ADMIN — HEPARIN SODIUM 5000 UNIT(S): 5000 INJECTION INTRAVENOUS; SUBCUTANEOUS at 21:27

## 2019-11-24 RX ADMIN — SIMVASTATIN 20 MILLIGRAM(S): 20 TABLET, FILM COATED ORAL at 21:28

## 2019-11-24 RX ADMIN — TAMSULOSIN HYDROCHLORIDE 0.4 MILLIGRAM(S): 0.4 CAPSULE ORAL at 21:28

## 2019-11-24 RX ADMIN — OXYCODONE AND ACETAMINOPHEN 1 TABLET(S): 5; 325 TABLET ORAL at 14:59

## 2019-11-24 RX ADMIN — Medication 400 UNIT(S): at 14:57

## 2019-11-24 RX ADMIN — BUDESONIDE AND FORMOTEROL FUMARATE DIHYDRATE 2 PUFF(S): 160; 4.5 AEROSOL RESPIRATORY (INHALATION) at 07:52

## 2019-11-24 RX ADMIN — Medication 150 MILLIGRAM(S): at 11:25

## 2019-11-24 RX ADMIN — Medication 150 MILLIGRAM(S): at 21:28

## 2019-11-24 RX ADMIN — METHOCARBAMOL 750 MILLIGRAM(S): 500 TABLET, FILM COATED ORAL at 23:04

## 2019-11-24 RX ADMIN — Medication 1 MILLIGRAM(S): at 11:25

## 2019-11-24 RX ADMIN — OXYCODONE AND ACETAMINOPHEN 1 TABLET(S): 5; 325 TABLET ORAL at 10:10

## 2019-11-24 RX ADMIN — METHOCARBAMOL 750 MILLIGRAM(S): 500 TABLET, FILM COATED ORAL at 05:53

## 2019-11-24 RX ADMIN — METFORMIN HYDROCHLORIDE 500 MILLIGRAM(S): 850 TABLET ORAL at 21:28

## 2019-11-24 NOTE — PROGRESS NOTE ADULT - SUBJECTIVE AND OBJECTIVE BOX
Subjective: 79yMale with a pmhx of M48.062/[id number]  ^M48.062/[id number]                                                           PT ID SHOWN  No pertinent family history in first degree relatives  Handoff  MEWS Score  COPD (chronic obstructive pulmonary disease)  BPH (benign prostatic hyperplasia)  Back pain  Non-Hodgkin lymphoma  CLL (chronic lymphocytic leukemia)  Depression  HTN (hypertension)  DM (diabetes mellitus)  Anxiety  Lumbar stenosis with neurogenic claudication  Lumbar stenosis with neurogenic claudication  Interlaminar lumbar instrumented fusion at 3 levels  Laminectomy, decompressive, spine, lumbar, 3 levels  Port-A-Cath in place  H/O total knee replacement, bilateral      POD # 6    S/P  L2-L5 decompressive lami and coflex     Pt seen and examined at bedside. Pt c/o incisional pain at this time. Denies radicular pain or, parasthesias.   Pt c/o mild numbness to lateral b/l hands along 5th digits.  JAVI output  80cc/ 24 hrs  Allergies    No Known Allergies    Intolerances        Vital Signs Last 24 Hrs  T(C): 35.9 (24 Nov 2019 05:07), Max: 35.9 (23 Nov 2019 22:07)  T(F): 96.6 (24 Nov 2019 05:07), Max: 96.7 (23 Nov 2019 22:07)  HR: 80 (24 Nov 2019 05:07) (56 - 80)  BP: 98/62 (24 Nov 2019 05:07) (81/50 - 110/66)  BP(mean): 85 (23 Nov 2019 21:44) (85 - 85)  RR: 18 (24 Nov 2019 05:07) (17 - 19)  SpO2: 99% (23 Nov 2019 21:44) (99% - 99%)      acetaminophen   Tablet .. 650 milliGRAM(s) Oral every 6 hours PRN  budesonide 160 MICROgram(s)/formoterol 4.5 MICROgram(s) Inhaler 2 Puff(s) Inhalation two times a day  cholecalciferol 400 Unit(s) Oral daily  clonazePAM  Tablet 0.5 milliGRAM(s) Oral every 6 hours PRN  cyanocobalamin 1000 MICROGram(s) Oral daily  dextrose 40% Gel 15 Gram(s) Oral once PRN  dextrose 5% + sodium chloride 0.45%. 1000 milliLiter(s) IV Continuous <Continuous>  dextrose 5%. 1000 milliLiter(s) IV Continuous <Continuous>  dextrose 50% Injectable 12.5 Gram(s) IV Push once  dextrose 50% Injectable 25 Gram(s) IV Push once  dextrose 50% Injectable 25 Gram(s) IV Push once  folic acid 1 milliGRAM(s) Oral daily  glucagon  Injectable 1 milliGRAM(s) IntraMuscular once PRN  heparin  Injectable 5000 Unit(s) SubCutaneous every 8 hours  hydrochlorothiazide 25 milliGRAM(s) Oral daily  influenza   Vaccine 0.5 milliLiter(s) IntraMuscular once  insulin lispro (HumaLOG) corrective regimen sliding scale   SubCutaneous at bedtime  lisinopril 10 milliGRAM(s) Oral daily  metFORMIN 500 milliGRAM(s) Oral at bedtime  methocarbamol 750 milliGRAM(s) Oral every 6 hours  metoprolol tartrate 25 milliGRAM(s) Oral two times a day  morphine  - Injectable 2 milliGRAM(s) IV Push every 4 hours PRN  ondansetron Injectable 4 milliGRAM(s) IV Push every 6 hours PRN  oxycodone    5 mG/acetaminophen 325 mG 1 Tablet(s) Oral every 4 hours PRN  oxycodone    5 mG/acetaminophen 325 mG 2 Tablet(s) Oral every 4 hours PRN  pantoprazole    Tablet 40 milliGRAM(s) Oral before breakfast  senna 2 Tablet(s) Oral at bedtime PRN  simvastatin 20 milliGRAM(s) Oral at bedtime  tamsulosin 0.4 milliGRAM(s) Oral at bedtime  traZODone 150 milliGRAM(s) Oral at bedtime  venlafaxine XR. 150 milliGRAM(s) Oral daily        11-23-19 @ 07:01  -  11-24-19 @ 07:00  --------------------------------------------------------  IN: 0 mL / OUT: 780 mL / NET: -780 mL    11-24-19 @ 07:01  -  11-24-19 @ 10:12  --------------------------------------------------------  IN: 0 mL / OUT: 25 mL / NET: -25 mL            Exam:  AAOX3. Verbal function intact  follows commands  PERRL  Motor: MAEx4, 5/5 power in b/l UE and LE  Sensation: intact     dressing changed- wound clean, JAVI intact        Assessment/Plan: as above  monitor JAVI output  pain meds  PT/rehab  d/w attg

## 2019-11-25 ENCOUNTER — TRANSCRIPTION ENCOUNTER (OUTPATIENT)
Age: 79
End: 2019-11-25

## 2019-11-25 VITALS — SYSTOLIC BLOOD PRESSURE: 106 MMHG | HEART RATE: 76 BPM | DIASTOLIC BLOOD PRESSURE: 66 MMHG

## 2019-11-25 LAB
GLUCOSE BLDC GLUCOMTR-MCNC: 112 MG/DL — HIGH (ref 70–99)
GLUCOSE BLDC GLUCOMTR-MCNC: 138 MG/DL — HIGH (ref 70–99)
GLUCOSE BLDC GLUCOMTR-MCNC: 98 MG/DL — SIGNIFICANT CHANGE UP (ref 70–99)

## 2019-11-25 RX ORDER — SENNA PLUS 8.6 MG/1
2 TABLET ORAL
Qty: 0 | Refills: 0 | DISCHARGE
Start: 2019-11-25

## 2019-11-25 RX ORDER — METHOCARBAMOL 500 MG/1
1 TABLET, FILM COATED ORAL
Qty: 30 | Refills: 0
Start: 2019-11-25

## 2019-11-25 RX ORDER — ACETAMINOPHEN 500 MG
2 TABLET ORAL
Qty: 0 | Refills: 0 | DISCHARGE
Start: 2019-11-25

## 2019-11-25 RX ADMIN — Medication 1 MILLIGRAM(S): at 11:09

## 2019-11-25 RX ADMIN — PREGABALIN 1000 MICROGRAM(S): 225 CAPSULE ORAL at 11:09

## 2019-11-25 RX ADMIN — HEPARIN SODIUM 5000 UNIT(S): 5000 INJECTION INTRAVENOUS; SUBCUTANEOUS at 13:16

## 2019-11-25 RX ADMIN — Medication 25 MILLIGRAM(S): at 05:12

## 2019-11-25 RX ADMIN — Medication 400 UNIT(S): at 11:09

## 2019-11-25 RX ADMIN — PANTOPRAZOLE SODIUM 40 MILLIGRAM(S): 20 TABLET, DELAYED RELEASE ORAL at 05:12

## 2019-11-25 RX ADMIN — LISINOPRIL 10 MILLIGRAM(S): 2.5 TABLET ORAL at 05:12

## 2019-11-25 RX ADMIN — Medication 150 MILLIGRAM(S): at 11:09

## 2019-11-25 RX ADMIN — METHOCARBAMOL 750 MILLIGRAM(S): 500 TABLET, FILM COATED ORAL at 17:07

## 2019-11-25 RX ADMIN — METHOCARBAMOL 750 MILLIGRAM(S): 500 TABLET, FILM COATED ORAL at 05:12

## 2019-11-25 RX ADMIN — HEPARIN SODIUM 5000 UNIT(S): 5000 INJECTION INTRAVENOUS; SUBCUTANEOUS at 05:13

## 2019-11-25 RX ADMIN — METHOCARBAMOL 750 MILLIGRAM(S): 500 TABLET, FILM COATED ORAL at 11:09

## 2019-11-25 RX ADMIN — INFLUENZA VIRUS VACCINE 0.5 MILLILITER(S): 15; 15; 15; 15 SUSPENSION INTRAMUSCULAR at 17:25

## 2019-11-25 RX ADMIN — Medication 25 MILLIGRAM(S): at 05:13

## 2019-11-25 RX ADMIN — BUDESONIDE AND FORMOTEROL FUMARATE DIHYDRATE 2 PUFF(S): 160; 4.5 AEROSOL RESPIRATORY (INHALATION) at 07:51

## 2019-11-25 NOTE — DIETITIAN INITIAL EVALUATION ADULT. - PHYSICAL APPEARANCE
BMI: 29.9- borderline overwt/obese bmi. Pt says -185#. Surgical incisions, no edema, no pressure injuries noted. IBW: 142#+/-10%

## 2019-11-25 NOTE — DISCHARGE NOTE PROVIDER - HOSPITAL COURSE
79 year male admitted 11.18.19 for an L3-4 L4-5 Decompressive Laminectomy with Coflex Stablization and L2-3 Laminectomy. Pt did well post op with improvement in his lower extremity pain. Pt had a JAVI drain intraop. He ambulated with physical therapy. By POD#7 his JAVI drain was removed and he was discharged home.

## 2019-11-25 NOTE — DIETITIAN INITIAL EVALUATION ADULT. - OTHER INFO
Pt admitted for lumbar stenosis, s/p laminectomy. Noted hx COPD, CLL, HTN, DM, non-hodgkin lymphoma.

## 2019-11-25 NOTE — DISCHARGE NOTE PROVIDER - NSDCMRMEDTOKEN_GEN_ALL_CORE_FT
acetaminophen 325 mg oral tablet: 2 tab(s) orally every 6 hours, As needed, Temp greater or equal to 38C (100.4F)  budesonide-formoterol 160 mcg-4.5 mcg/inh inhalation aerosol: 2 puff(s) inhaled 2 times a day  buPROPion 150 mg/12 hours (SR) oral tablet, extended release: 1 tab(s) orally 2 times a day  clonazePAM 0.5 mg oral tablet: orally every 6 hours, As Needed  Effexor  mg oral capsule, extended release: 1 cap(s) orally once a day  folic acid 1 mg oral tablet: 1 tab(s) orally once a day  hydroCHLOROthiazide 25 mg oral tablet: 1 tab(s) orally once a day  metFORMIN 500 mg oral tablet: 1 tab(s) orally once a day (at bedtime)  methocarbamol 750 mg oral tablet: 1 tab(s) orally every 6 hours, As Needed -for muscle spasm   metoprolol tartrate 25 mg oral tablet: 1 tab(s) orally 2 times a day  oxycodone-acetaminophen 5 mg-325 mg oral tablet: 1-2 tab(s) orally every 4 hours, As needed, Moderate Pain (4 - 6) MDD:6  ramipril 10 mg oral capsule: 1 cap(s) orally once a day  senna oral tablet: 2 tab(s) orally once a day (at bedtime), As needed, Constipation  simvastatin 20 mg oral tablet: 1 tab(s) orally once a day (at bedtime)  tamsulosin 0.4 mg oral capsule: 1 cap(s) orally once a day  traZODone 150 mg oral tablet: 1 tab(s) orally once a day (at bedtime)  Vitamin B12 500 mcg oral tablet: 1 tab(s) orally once a day  Vitamin D3:

## 2019-11-25 NOTE — DIETITIAN INITIAL EVALUATION ADULT. - DIET TYPE
RECS: Continue CHO consistent, low Na diet modifier. Pt denies diet education. No further diet interventions at present.

## 2019-11-25 NOTE — PROGRESS NOTE ADULT - SUBJECTIVE AND OBJECTIVE BOX
Subjective: less leg pain  s/p L3-4, L4-5 decmp lami with coflex L2-3 laminectomy POD #7  T(C): 36.1 (11-25-19 @ 05:12), Max: 36.1 (11-25-19 @ 05:12)  HR: 80 (11-25-19 @ 05:12) (72 - 88)  BP: 122/67 (11-25-19 @ 05:12) (98/57 - 122/67)  RR: 18 (11-25-19 @ 05:12) (18 - 18)  SpO2: --  Wt(kg): --    Exam: afebrile, JAVI 35CC in 12 hrs, serous, dressing dry, no swelling, non tender, , less leg pain, strength good bilateral sensation good      Imaging:    Assessment- stable    Plan: d/c JAVI today, continue PT, D/c planning

## 2019-11-25 NOTE — DIETITIAN INITIAL EVALUATION ADULT. - ENERGY NEEDS
estimated energy needs: 1660-1815kcal (MSJx1.1-1.2AF) borderline bmi, wt loss desirable  estimated protein needs: 65-77g (1.0-1.2g/kgIBW)  estimated fluid needs: 1ml/kcal

## 2019-11-25 NOTE — DISCHARGE NOTE PROVIDER - NSDCCPTREATMENT_GEN_ALL_CORE_FT
PRINCIPAL PROCEDURE  Procedure: Laminectomy, decompressive, spine, lumbar, 3 levels  Findings and Treatment:       SECONDARY PROCEDURE  Procedure: Interlaminar lumbar instrumented fusion at 3 levels  Findings and Treatment:

## 2019-11-25 NOTE — DIETITIAN INITIAL EVALUATION ADULT. - PERTINENT MEDS FT
heparin, humalog, metformin, vitD3, vitB12, folic acid, zestril, hydrochlorothiazide, zestril, robaxin, lopressor, morphine, zofran, oxycodone, protonix, senna, zocor, flomax

## 2019-11-25 NOTE — DISCHARGE NOTE PROVIDER - CARE PROVIDER_API CALL
Dylan Isabel)  Neurological Surgery  [address] [place] [address], [address]  [place] [address]  Phone: [phone]  Fax: [phone]  Follow Up Time:

## 2019-11-25 NOTE — DISCHARGE NOTE NURSING/CASE MANAGEMENT/SOCIAL WORK - PATIENT PORTAL LINK FT
You can access the FollowMyHealth Patient Portal offered by [facility] by registering at the following website: [email or web address]. By joining [facility]’s FollowMyHealth portal, you will also be able to view your health information using other applications (apps) compatible with our system.

## 2019-11-25 NOTE — DISCHARGE NOTE PROVIDER - NSDCCPCAREPLAN_GEN_ALL_CORE_FT
PRINCIPAL DISCHARGE DIAGNOSIS  Diagnosis: Lumbar stenosis with neurogenic claudication  Assessment and Plan of Treatment:

## 2019-11-25 NOTE — DIETITIAN INITIAL EVALUATION ADULT. - FACTORS AFF FOOD INTAKE
Pt reports good appetite and PO intake, consuming >75% of meal trays consistently. Last BM 11/24- very little. NKFA. PTA pt supplements vit B12. Denies difficulty chewing/swallowing.

## 2019-11-25 NOTE — DISCHARGE NOTE PROVIDER - NSDCACTIVITY_GEN_ALL_CORE
Do not drive or operate machinery/Showering allowed/Do not make important decisions/Stairs allowed/No heavy lifting/straining

## 2019-11-25 NOTE — DISCHARGE NOTE NURSING/CASE MANAGEMENT/SOCIAL WORK - NSDCVIVACCINE_GEN_ALL_CORE_FT
Influenza , 2019/11/25 17:25 , Carola Hernández (RN)  Tdap , 2018/10/31 19:02 , Lior Lamb (RN)  Tdap , 2019/9/14 11:13 , Calli Hdez (RN)

## 2019-11-29 DIAGNOSIS — M47.896 OTHER SPONDYLOSIS, LUMBAR REGION: ICD-10-CM

## 2019-11-29 DIAGNOSIS — C91.90 LYMPHOID LEUKEMIA, UNSPECIFIED NOT HAVING ACHIEVED REMISSION: ICD-10-CM

## 2019-11-29 DIAGNOSIS — C85.90 NON-HODGKIN LYMPHOMA, UNSPECIFIED, UNSPECIFIED SITE: ICD-10-CM

## 2019-11-29 DIAGNOSIS — Z96.653 PRESENCE OF ARTIFICIAL KNEE JOINT, BILATERAL: ICD-10-CM

## 2019-11-29 DIAGNOSIS — M47.26 OTHER SPONDYLOSIS WITH RADICULOPATHY, LUMBAR REGION: ICD-10-CM

## 2019-11-29 DIAGNOSIS — J44.9 CHRONIC OBSTRUCTIVE PULMONARY DISEASE, UNSPECIFIED: ICD-10-CM

## 2019-11-29 DIAGNOSIS — Z95.828 PRESENCE OF OTHER VASCULAR IMPLANTS AND GRAFTS: ICD-10-CM

## 2019-11-29 DIAGNOSIS — M48.062 SPINAL STENOSIS, LUMBAR REGION WITH NEUROGENIC CLAUDICATION: ICD-10-CM

## 2019-11-29 DIAGNOSIS — F32.9 MAJOR DEPRESSIVE DISORDER, SINGLE EPISODE, UNSPECIFIED: ICD-10-CM

## 2019-11-29 DIAGNOSIS — E88.2 LIPOMATOSIS, NOT ELSEWHERE CLASSIFIED: ICD-10-CM

## 2019-11-29 DIAGNOSIS — F41.9 ANXIETY DISORDER, UNSPECIFIED: ICD-10-CM

## 2019-11-29 DIAGNOSIS — E11.9 TYPE 2 DIABETES MELLITUS WITHOUT COMPLICATIONS: ICD-10-CM

## 2019-11-29 DIAGNOSIS — N40.0 BENIGN PROSTATIC HYPERPLASIA WITHOUT LOWER URINARY TRACT SYMPTOMS: ICD-10-CM

## 2019-11-29 DIAGNOSIS — I95.9 HYPOTENSION, UNSPECIFIED: ICD-10-CM

## 2019-12-05 ENCOUNTER — APPOINTMENT (OUTPATIENT)
Dept: NEUROSURGERY | Facility: CLINIC | Age: 79
End: 2019-12-05
Payer: MEDICARE

## 2019-12-05 VITALS — HEIGHT: 63 IN | WEIGHT: 185 LBS | BODY MASS INDEX: 32.78 KG/M2

## 2019-12-05 DIAGNOSIS — Z87.898 PERSONAL HISTORY OF OTHER SPECIFIED CONDITIONS: ICD-10-CM

## 2019-12-05 DIAGNOSIS — Z87.448 PERSONAL HISTORY OF OTHER DISEASES OF URINARY SYSTEM: ICD-10-CM

## 2019-12-05 DIAGNOSIS — E66.01 MORBID (SEVERE) OBESITY DUE TO EXCESS CALORIES: ICD-10-CM

## 2019-12-05 DIAGNOSIS — M48.062 SPINAL STENOSIS, LUMBAR REGION WITH NEUROGENIC CLAUDICATION: ICD-10-CM

## 2019-12-05 DIAGNOSIS — Z86.39 PERSONAL HISTORY OF OTHER ENDOCRINE, NUTRITIONAL AND METABOLIC DISEASE: ICD-10-CM

## 2019-12-05 DIAGNOSIS — M47.26 OTHER SPONDYLOSIS WITH RADICULOPATHY, LUMBAR REGION: ICD-10-CM

## 2019-12-05 DIAGNOSIS — Z86.59 PERSONAL HISTORY OF OTHER MENTAL AND BEHAVIORAL DISORDERS: ICD-10-CM

## 2019-12-05 PROCEDURE — 99024 POSTOP FOLLOW-UP VISIT: CPT

## 2019-12-05 NOTE — HISTORY OF PRESENT ILLNESS
[FreeTextEntry1] : Mr. Zaman is doing well postoperatively. He feel's his preoperative neurogenic claudication has improved. He is able to stand straighter. He ambulates slow and cautiously with a cane. He feel's his gait is more steady now. He notes some lower back discomfort however this is tolerable. He is taking Advil PRN. He is no longer taking Percocet. His incision site is healing well. There are no signs of infection. I have removed the staples from the drain site however the remaining staples will be left in for another week.

## 2019-12-05 NOTE — ASSESSMENT
[FreeTextEntry1] : Mr. Zaman is doing well. I will see him back in 1 week for a wound check and staple removal. He will call barring any issues.

## 2019-12-05 NOTE — REASON FOR VISIT
[Family Member] : family member [de-identified] : L2/3 L3/4 L4/5 Decompressive Laminectomy and foraminotomy with interlaminar stabilization (Coflex)  [de-identified] : 11/18/19

## 2019-12-11 ENCOUNTER — APPOINTMENT (OUTPATIENT)
Dept: NEUROSURGERY | Facility: CLINIC | Age: 79
End: 2019-12-11
Payer: MEDICARE

## 2019-12-11 VITALS — HEIGHT: 63 IN | BODY MASS INDEX: 32.78 KG/M2 | WEIGHT: 185 LBS

## 2019-12-11 PROCEDURE — 99024 POSTOP FOLLOW-UP VISIT: CPT

## 2019-12-11 NOTE — REASON FOR VISIT
[de-identified] : L2/3 L3/4 L4/5 Decompressive Laminectomy and foraminotomy with interlaminar stabilization (Coflex).  [de-identified] : 11/18/19

## 2019-12-11 NOTE — HISTORY OF PRESENT ILLNESS
[FreeTextEntry1] : Mr. Zaman presents today for removal of his staples. His incision site is healing well with no signs of infection. The staples were removed today. He tolerated this well.

## 2019-12-11 NOTE — ASSESSMENT
[FreeTextEntry1] : I will see him back in 2-3 weeks for a wound check. He will call barring any issues.

## 2020-01-01 ENCOUNTER — TRANSCRIPTION ENCOUNTER (OUTPATIENT)
Age: 80
End: 2020-01-01

## 2020-01-01 ENCOUNTER — OUTPATIENT (OUTPATIENT)
Dept: OUTPATIENT SERVICES | Facility: HOSPITAL | Age: 80
LOS: 1 days | Discharge: HOME | End: 2020-01-01
Payer: MEDICARE

## 2020-01-01 ENCOUNTER — RESULT REVIEW (OUTPATIENT)
Age: 80
End: 2020-01-01

## 2020-01-01 ENCOUNTER — APPOINTMENT (OUTPATIENT)
Dept: NEUROSURGERY | Facility: CLINIC | Age: 80
End: 2020-01-01

## 2020-01-01 ENCOUNTER — APPOINTMENT (OUTPATIENT)
Dept: UROLOGY | Facility: CLINIC | Age: 80
End: 2020-01-01
Payer: MEDICARE

## 2020-01-01 ENCOUNTER — INPATIENT (INPATIENT)
Facility: HOSPITAL | Age: 80
LOS: 15 days | End: 2021-01-04
Attending: INTERNAL MEDICINE | Admitting: INTERNAL MEDICINE
Payer: MEDICARE

## 2020-01-01 ENCOUNTER — APPOINTMENT (OUTPATIENT)
Dept: UROLOGY | Facility: CLINIC | Age: 80
End: 2020-01-01

## 2020-01-01 ENCOUNTER — LABORATORY RESULT (OUTPATIENT)
Age: 80
End: 2020-01-01

## 2020-01-01 ENCOUNTER — INPATIENT (INPATIENT)
Facility: HOSPITAL | Age: 80
LOS: 6 days | Discharge: HOME HEALTH | End: 2020-03-13
Attending: HOSPITALIST | Admitting: HOSPITALIST
Payer: MEDICARE

## 2020-01-01 ENCOUNTER — APPOINTMENT (OUTPATIENT)
Dept: NEUROSURGERY | Facility: CLINIC | Age: 80
End: 2020-01-01
Payer: MEDICARE

## 2020-01-01 VITALS
HEART RATE: 81 BPM | TEMPERATURE: 97 F | HEIGHT: 66 IN | RESPIRATION RATE: 20 BRPM | SYSTOLIC BLOOD PRESSURE: 134 MMHG | DIASTOLIC BLOOD PRESSURE: 71 MMHG | OXYGEN SATURATION: 96 %

## 2020-01-01 VITALS
SYSTOLIC BLOOD PRESSURE: 168 MMHG | OXYGEN SATURATION: 99 % | HEART RATE: 116 BPM | RESPIRATION RATE: 20 BRPM | DIASTOLIC BLOOD PRESSURE: 119 MMHG

## 2020-01-01 VITALS — SYSTOLIC BLOOD PRESSURE: 120 MMHG | RESPIRATION RATE: 12 BRPM | DIASTOLIC BLOOD PRESSURE: 70 MMHG | HEART RATE: 76 BPM

## 2020-01-01 VITALS
DIASTOLIC BLOOD PRESSURE: 74 MMHG | RESPIRATION RATE: 18 BRPM | TEMPERATURE: 98 F | HEART RATE: 78 BPM | SYSTOLIC BLOOD PRESSURE: 120 MMHG

## 2020-01-01 VITALS
HEIGHT: 66 IN | WEIGHT: 190.04 LBS | SYSTOLIC BLOOD PRESSURE: 130 MMHG | DIASTOLIC BLOOD PRESSURE: 69 MMHG | RESPIRATION RATE: 18 BRPM | HEART RATE: 51 BPM | TEMPERATURE: 98 F

## 2020-01-01 VITALS
HEART RATE: 68 BPM | HEIGHT: 63 IN | WEIGHT: 185 LBS | TEMPERATURE: 96.2 F | SYSTOLIC BLOOD PRESSURE: 119 MMHG | DIASTOLIC BLOOD PRESSURE: 78 MMHG | BODY MASS INDEX: 32.78 KG/M2

## 2020-01-01 VITALS — BODY MASS INDEX: 32.78 KG/M2 | HEIGHT: 63 IN | WEIGHT: 185 LBS
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
## 2020-01-01 DIAGNOSIS — R39.13 SPLITTING OF URINARY STREAM: ICD-10-CM

## 2020-01-01 DIAGNOSIS — R54 AGE-RELATED PHYSICAL DEBILITY: ICD-10-CM

## 2020-01-01 DIAGNOSIS — T40.2X1A POISONING BY OTHER OPIOIDS, ACCIDENTAL (UNINTENTIONAL), INITIAL ENCOUNTER: ICD-10-CM

## 2020-01-01 DIAGNOSIS — Z12.11 ENCOUNTER FOR SCREENING FOR MALIGNANT NEOPLASM OF COLON: Chronic | ICD-10-CM

## 2020-01-01 DIAGNOSIS — N39.41 URGE INCONTINENCE: ICD-10-CM

## 2020-01-01 DIAGNOSIS — N32.81 OVERACTIVE BLADDER: ICD-10-CM

## 2020-01-01 DIAGNOSIS — Z96.653 PRESENCE OF ARTIFICIAL KNEE JOINT, BILATERAL: Chronic | ICD-10-CM

## 2020-01-01 DIAGNOSIS — I35.0 NONRHEUMATIC AORTIC (VALVE) STENOSIS: ICD-10-CM

## 2020-01-01 DIAGNOSIS — N40.0 BENIGN PROSTATIC HYPERPLASIA WITHOUT LOWER URINARY TRACT SYMPTOMS: ICD-10-CM

## 2020-01-01 DIAGNOSIS — I10 ESSENTIAL (PRIMARY) HYPERTENSION: ICD-10-CM

## 2020-01-01 DIAGNOSIS — Z95.828 PRESENCE OF OTHER VASCULAR IMPLANTS AND GRAFTS: Chronic | ICD-10-CM

## 2020-01-01 DIAGNOSIS — G47.33 OBSTRUCTIVE SLEEP APNEA (ADULT) (PEDIATRIC): ICD-10-CM

## 2020-01-01 DIAGNOSIS — C91.10 CHRONIC LYMPHOCYTIC LEUKEMIA OF B-CELL TYPE NOT HAVING ACHIEVED REMISSION: ICD-10-CM

## 2020-01-01 DIAGNOSIS — S51.811A LACERATION WITHOUT FOREIGN BODY OF RIGHT FOREARM, INITIAL ENCOUNTER: ICD-10-CM
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
## 2020-01-01 DIAGNOSIS — E11.9 TYPE 2 DIABETES MELLITUS WITHOUT COMPLICATIONS: ICD-10-CM

## 2020-01-01 DIAGNOSIS — E66.8 OTHER OBESITY: ICD-10-CM
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
## 2020-01-01 DIAGNOSIS — E78.5 HYPERLIPIDEMIA, UNSPECIFIED: ICD-10-CM

## 2020-01-01 DIAGNOSIS — R39.198 OTHER DIFFICULTIES WITH MICTURITION: ICD-10-CM

## 2020-01-01 DIAGNOSIS — R35.0 FREQUENCY OF MICTURITION: ICD-10-CM

## 2020-01-01 DIAGNOSIS — R29.6 REPEATED FALLS: ICD-10-CM

## 2020-01-01 DIAGNOSIS — N20.0 CALCULUS OF KIDNEY: ICD-10-CM

## 2020-01-01 DIAGNOSIS — D12.4 BENIGN NEOPLASM OF DESCENDING COLON: ICD-10-CM

## 2020-01-01 DIAGNOSIS — F41.8 OTHER SPECIFIED ANXIETY DISORDERS: ICD-10-CM

## 2020-01-01 DIAGNOSIS — J44.9 CHRONIC OBSTRUCTIVE PULMONARY DISEASE, UNSPECIFIED: ICD-10-CM

## 2020-01-01 DIAGNOSIS — Z85.72 PERSONAL HISTORY OF NON-HODGKIN LYMPHOMAS: ICD-10-CM

## 2020-01-01 DIAGNOSIS — Z79.84 LONG TERM (CURRENT) USE OF ORAL HYPOGLYCEMIC DRUGS: ICD-10-CM

## 2020-01-01 DIAGNOSIS — M48.061 SPINAL STENOSIS, LUMBAR REGION WITHOUT NEUROGENIC CLAUDICATION: ICD-10-CM

## 2020-01-01 DIAGNOSIS — Z09 ENCOUNTER FOR FOLLOW-UP EXAMINATION AFTER COMPLETED TREATMENT FOR CONDITIONS OTHER THAN MALIGNANT NEOPLASM: ICD-10-CM

## 2020-01-01 DIAGNOSIS — Z79.899 OTHER LONG TERM (CURRENT) DRUG THERAPY: ICD-10-CM

## 2020-01-01 DIAGNOSIS — R33.9 RETENTION OF URINE, UNSPECIFIED: ICD-10-CM

## 2020-01-01 DIAGNOSIS — Z96.653 PRESENCE OF ARTIFICIAL KNEE JOINT, BILATERAL: ICD-10-CM

## 2020-01-01 DIAGNOSIS — T42.4X1A POISONING BY BENZODIAZEPINES, ACCIDENTAL (UNINTENTIONAL), INITIAL ENCOUNTER: ICD-10-CM
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
## 2020-01-01 DIAGNOSIS — Z12.11 ENCOUNTER FOR SCREENING FOR MALIGNANT NEOPLASM OF COLON: ICD-10-CM

## 2020-01-01 DIAGNOSIS — E11.65 TYPE 2 DIABETES MELLITUS WITH HYPERGLYCEMIA: ICD-10-CM

## 2020-01-01 DIAGNOSIS — D12.2 BENIGN NEOPLASM OF ASCENDING COLON: ICD-10-CM

## 2020-01-01 DIAGNOSIS — S00.33XA CONTUSION OF NOSE, INITIAL ENCOUNTER: ICD-10-CM

## 2020-01-01 DIAGNOSIS — G89.29 OTHER CHRONIC PAIN: ICD-10-CM

## 2020-01-01 DIAGNOSIS — D12.0 BENIGN NEOPLASM OF CECUM: ICD-10-CM

## 2020-01-01 DIAGNOSIS — C91.90 LYMPHOID LEUKEMIA, UNSPECIFIED NOT HAVING ACHIEVED REMISSION: ICD-10-CM

## 2020-01-01 DIAGNOSIS — R35.1 NOCTURIA: ICD-10-CM

## 2020-01-01 DIAGNOSIS — R30.0 DYSURIA: ICD-10-CM

## 2020-01-01 DIAGNOSIS — F17.210 NICOTINE DEPENDENCE, CIGARETTES, UNCOMPLICATED: ICD-10-CM

## 2020-01-01 LAB
A1C WITH ESTIMATED AVERAGE GLUCOSE RESULT: 6.3 % — HIGH (ref 4–5.6)
ALBUMIN SERPL ELPH-MCNC: 2.4 G/DL — LOW (ref 3.5–5.2)
ALBUMIN SERPL ELPH-MCNC: 2.5 G/DL — LOW (ref 3.5–5.2)
ALBUMIN SERPL ELPH-MCNC: 2.5 G/DL — LOW (ref 3.5–5.2)
ALBUMIN SERPL ELPH-MCNC: 2.6 G/DL — LOW (ref 3.5–5.2)
ALBUMIN SERPL ELPH-MCNC: 2.6 G/DL — LOW (ref 3.5–5.2)
ALBUMIN SERPL ELPH-MCNC: 3.2 G/DL — LOW (ref 3.5–5.2)
ALBUMIN SERPL ELPH-MCNC: 3.6 G/DL — SIGNIFICANT CHANGE UP (ref 3.5–5.2)
ALBUMIN SERPL ELPH-MCNC: 3.7 G/DL — SIGNIFICANT CHANGE UP (ref 3.5–5.2)
ALBUMIN SERPL ELPH-MCNC: 3.8 G/DL — SIGNIFICANT CHANGE UP (ref 3.5–5.2)
ALBUMIN SERPL ELPH-MCNC: 4.1 G/DL — SIGNIFICANT CHANGE UP (ref 3.5–5.2)
ALP SERPL-CCNC: 62 U/L — SIGNIFICANT CHANGE UP (ref 30–115)
ALP SERPL-CCNC: 64 U/L — SIGNIFICANT CHANGE UP (ref 30–115)
ALP SERPL-CCNC: 65 U/L — SIGNIFICANT CHANGE UP (ref 30–115)
ALP SERPL-CCNC: 69 U/L — SIGNIFICANT CHANGE UP (ref 30–115)
ALP SERPL-CCNC: 70 U/L — SIGNIFICANT CHANGE UP (ref 30–115)
ALP SERPL-CCNC: 74 U/L — SIGNIFICANT CHANGE UP (ref 30–115)
ALP SERPL-CCNC: 78 U/L — SIGNIFICANT CHANGE UP (ref 30–115)
ALP SERPL-CCNC: 85 U/L — SIGNIFICANT CHANGE UP (ref 30–115)
ALP SERPL-CCNC: 87 U/L — SIGNIFICANT CHANGE UP (ref 30–115)
ALP SERPL-CCNC: 90 U/L — SIGNIFICANT CHANGE UP (ref 30–115)
ALT FLD-CCNC: 11 U/L — SIGNIFICANT CHANGE UP (ref 0–41)
ALT FLD-CCNC: 11 U/L — SIGNIFICANT CHANGE UP (ref 0–41)
ALT FLD-CCNC: 12 U/L — SIGNIFICANT CHANGE UP (ref 0–41)
ALT FLD-CCNC: 12 U/L — SIGNIFICANT CHANGE UP (ref 0–41)
ALT FLD-CCNC: 13 U/L — SIGNIFICANT CHANGE UP (ref 0–41)
ALT FLD-CCNC: 17 U/L — SIGNIFICANT CHANGE UP (ref 0–41)
ALT FLD-CCNC: 18 U/L — SIGNIFICANT CHANGE UP (ref 0–41)
ALT FLD-CCNC: 19 U/L — SIGNIFICANT CHANGE UP (ref 0–41)
ALT FLD-CCNC: 19 U/L — SIGNIFICANT CHANGE UP (ref 0–41)
ALT FLD-CCNC: 21 U/L — SIGNIFICANT CHANGE UP (ref 0–41)
ANION GAP SERPL CALC-SCNC: 10 MMOL/L — SIGNIFICANT CHANGE UP (ref 7–14)
ANION GAP SERPL CALC-SCNC: 11 MMOL/L — SIGNIFICANT CHANGE UP (ref 7–14)
ANION GAP SERPL CALC-SCNC: 12 MMOL/L
ANION GAP SERPL CALC-SCNC: 13 MMOL/L — SIGNIFICANT CHANGE UP (ref 7–14)
ANION GAP SERPL CALC-SCNC: 6 MMOL/L — LOW (ref 7–14)
ANION GAP SERPL CALC-SCNC: 7 MMOL/L — SIGNIFICANT CHANGE UP (ref 7–14)
ANION GAP SERPL CALC-SCNC: 8 MMOL/L — SIGNIFICANT CHANGE UP (ref 7–14)
ANION GAP SERPL CALC-SCNC: 9 MMOL/L — SIGNIFICANT CHANGE UP (ref 7–14)
APPEARANCE UR: CLEAR — SIGNIFICANT CHANGE UP
APPEARANCE: CLEAR
APTT BLD: 30.3 SEC — SIGNIFICANT CHANGE UP (ref 27–39.2)
APTT BLD: 32 SEC — SIGNIFICANT CHANGE UP (ref 27–39.2)
AST SERPL-CCNC: 11 U/L — SIGNIFICANT CHANGE UP (ref 0–41)
AST SERPL-CCNC: 12 U/L — SIGNIFICANT CHANGE UP (ref 0–41)
AST SERPL-CCNC: 14 U/L — SIGNIFICANT CHANGE UP (ref 0–41)
AST SERPL-CCNC: 15 U/L — SIGNIFICANT CHANGE UP (ref 0–41)
AST SERPL-CCNC: 16 U/L — SIGNIFICANT CHANGE UP (ref 0–41)
AST SERPL-CCNC: 16 U/L — SIGNIFICANT CHANGE UP (ref 0–41)
AST SERPL-CCNC: 20 U/L — SIGNIFICANT CHANGE UP (ref 0–41)
BACTERIA # UR AUTO: NEGATIVE — SIGNIFICANT CHANGE UP
BACTERIA UR CULT: NORMAL
BASE EXCESS BLDA CALC-SCNC: -0.1 MMOL/L — SIGNIFICANT CHANGE UP (ref -2–2)
BASE EXCESS BLDA CALC-SCNC: -1.7 MMOL/L — SIGNIFICANT CHANGE UP (ref -2–2)
BASE EXCESS BLDA CALC-SCNC: 0.7 MMOL/L — SIGNIFICANT CHANGE UP (ref -2–2)
BASE EXCESS BLDA CALC-SCNC: 1.2 MMOL/L — SIGNIFICANT CHANGE UP (ref -2–2)
BASE EXCESS BLDA CALC-SCNC: 1.9 MMOL/L — SIGNIFICANT CHANGE UP (ref -2–2)
BASE EXCESS BLDA CALC-SCNC: 2.3 MMOL/L — HIGH (ref -2–2)
BASE EXCESS BLDA CALC-SCNC: 2.4 MMOL/L — HIGH (ref -2–2)
BASE EXCESS BLDA CALC-SCNC: 2.4 MMOL/L — HIGH (ref -2–2)
BASE EXCESS BLDA CALC-SCNC: 2.9 MMOL/L — HIGH (ref -2–2)
BASE EXCESS BLDA CALC-SCNC: 4.1 MMOL/L — HIGH (ref -2–2)
BASE EXCESS BLDA CALC-SCNC: 4.3 MMOL/L — HIGH (ref -2–2)
BASE EXCESS BLDA CALC-SCNC: 4.4 MMOL/L — HIGH (ref -2–2)
BASOPHILS # BLD AUTO: 0.03 K/UL — SIGNIFICANT CHANGE UP (ref 0–0.2)
BASOPHILS # BLD AUTO: 0.04 K/UL — SIGNIFICANT CHANGE UP (ref 0–0.2)
BASOPHILS # BLD AUTO: 0.05 K/UL — SIGNIFICANT CHANGE UP (ref 0–0.2)
BASOPHILS # BLD AUTO: 0.06 K/UL — SIGNIFICANT CHANGE UP (ref 0–0.2)
BASOPHILS # BLD AUTO: 0.06 K/UL — SIGNIFICANT CHANGE UP (ref 0–0.2)
BASOPHILS # BLD AUTO: 0.07 K/UL — SIGNIFICANT CHANGE UP (ref 0–0.2)
BASOPHILS # BLD AUTO: 0.07 K/UL — SIGNIFICANT CHANGE UP (ref 0–0.2)
BASOPHILS # BLD AUTO: 0.08 K/UL
BASOPHILS NFR BLD AUTO: 0.3 % — SIGNIFICANT CHANGE UP (ref 0–1)
BASOPHILS NFR BLD AUTO: 0.4 % — SIGNIFICANT CHANGE UP (ref 0–1)
BASOPHILS NFR BLD AUTO: 0.5 % — SIGNIFICANT CHANGE UP (ref 0–1)
BASOPHILS NFR BLD AUTO: 0.6 % — SIGNIFICANT CHANGE UP (ref 0–1)
BASOPHILS NFR BLD AUTO: 0.7 % — SIGNIFICANT CHANGE UP (ref 0–1)
BASOPHILS NFR BLD AUTO: 1.2 %
BILIRUB SERPL-MCNC: 0.2 MG/DL — SIGNIFICANT CHANGE UP (ref 0.2–1.2)
BILIRUB SERPL-MCNC: 0.3 MG/DL — SIGNIFICANT CHANGE UP (ref 0.2–1.2)
BILIRUB SERPL-MCNC: 0.3 MG/DL — SIGNIFICANT CHANGE UP (ref 0.2–1.2)
BILIRUB SERPL-MCNC: 0.4 MG/DL — SIGNIFICANT CHANGE UP (ref 0.2–1.2)
BILIRUB SERPL-MCNC: 0.6 MG/DL — SIGNIFICANT CHANGE UP (ref 0.2–1.2)
BILIRUB UR-MCNC: NEGATIVE — SIGNIFICANT CHANGE UP
BILIRUBIN URINE: NEGATIVE
BLD GP AB SCN SERPL QL: SIGNIFICANT CHANGE UP
BLD GP AB SCN SERPL QL: SIGNIFICANT CHANGE UP
BLOOD URINE: NEGATIVE
BUN SERPL-MCNC: 11 MG/DL — SIGNIFICANT CHANGE UP (ref 10–20)
BUN SERPL-MCNC: 14 MG/DL — SIGNIFICANT CHANGE UP (ref 10–20)
BUN SERPL-MCNC: 16 MG/DL — SIGNIFICANT CHANGE UP (ref 10–20)
BUN SERPL-MCNC: 18 MG/DL — SIGNIFICANT CHANGE UP (ref 10–20)
BUN SERPL-MCNC: 19 MG/DL
BUN SERPL-MCNC: 19 MG/DL — SIGNIFICANT CHANGE UP (ref 10–20)
BUN SERPL-MCNC: 19 MG/DL — SIGNIFICANT CHANGE UP (ref 10–20)
BUN SERPL-MCNC: 21 MG/DL — HIGH (ref 10–20)
BUN SERPL-MCNC: 22 MG/DL — HIGH (ref 10–20)
BUN SERPL-MCNC: 23 MG/DL — HIGH (ref 10–20)
BUN SERPL-MCNC: 27 MG/DL — HIGH (ref 10–20)
CALCIUM SERPL-MCNC: 8 MG/DL — LOW (ref 8.5–10.1)
CALCIUM SERPL-MCNC: 8.2 MG/DL — LOW (ref 8.5–10.1)
CALCIUM SERPL-MCNC: 8.3 MG/DL — LOW (ref 8.5–10.1)
CALCIUM SERPL-MCNC: 8.4 MG/DL — LOW (ref 8.5–10.1)
CALCIUM SERPL-MCNC: 8.5 MG/DL — SIGNIFICANT CHANGE UP (ref 8.5–10.1)
CALCIUM SERPL-MCNC: 8.7 MG/DL — SIGNIFICANT CHANGE UP (ref 8.5–10.1)
CALCIUM SERPL-MCNC: 8.7 MG/DL — SIGNIFICANT CHANGE UP (ref 8.5–10.1)
CALCIUM SERPL-MCNC: 8.9 MG/DL — SIGNIFICANT CHANGE UP (ref 8.5–10.1)
CALCIUM SERPL-MCNC: 9.4 MG/DL
CALCIUM SERPL-MCNC: 9.4 MG/DL — SIGNIFICANT CHANGE UP (ref 8.5–10.1)
CALCIUM SERPL-MCNC: 9.5 MG/DL — SIGNIFICANT CHANGE UP (ref 8.5–10.1)
CALCIUM SERPL-MCNC: 9.6 MG/DL — SIGNIFICANT CHANGE UP (ref 8.5–10.1)
CHLORIDE SERPL-SCNC: 100 MMOL/L — SIGNIFICANT CHANGE UP (ref 98–110)
CHLORIDE SERPL-SCNC: 101 MMOL/L
CHLORIDE SERPL-SCNC: 101 MMOL/L — SIGNIFICANT CHANGE UP (ref 98–110)
CHLORIDE SERPL-SCNC: 101 MMOL/L — SIGNIFICANT CHANGE UP (ref 98–110)
CHLORIDE SERPL-SCNC: 103 MMOL/L — SIGNIFICANT CHANGE UP (ref 98–110)
CHLORIDE SERPL-SCNC: 103 MMOL/L — SIGNIFICANT CHANGE UP (ref 98–110)
CHLORIDE SERPL-SCNC: 105 MMOL/L — SIGNIFICANT CHANGE UP (ref 98–110)
CHLORIDE SERPL-SCNC: 106 MMOL/L — SIGNIFICANT CHANGE UP (ref 98–110)
CHLORIDE SERPL-SCNC: 106 MMOL/L — SIGNIFICANT CHANGE UP (ref 98–110)
CHLORIDE SERPL-SCNC: 108 MMOL/L — SIGNIFICANT CHANGE UP (ref 98–110)
CHLORIDE SERPL-SCNC: 108 MMOL/L — SIGNIFICANT CHANGE UP (ref 98–110)
CHLORIDE SERPL-SCNC: 109 MMOL/L — SIGNIFICANT CHANGE UP (ref 98–110)
CHLORIDE SERPL-SCNC: 110 MMOL/L — SIGNIFICANT CHANGE UP (ref 98–110)
CHLORIDE SERPL-SCNC: 111 MMOL/L — HIGH (ref 98–110)
CHLORIDE SERPL-SCNC: 112 MMOL/L — HIGH (ref 98–110)
CHLORIDE SERPL-SCNC: 113 MMOL/L — HIGH (ref 98–110)
CHOLEST SERPL-MCNC: 92 MG/DL — SIGNIFICANT CHANGE UP
CK SERPL-CCNC: 70 U/L — SIGNIFICANT CHANGE UP (ref 0–225)
CO2 SERPL-SCNC: 24 MMOL/L — SIGNIFICANT CHANGE UP (ref 17–32)
CO2 SERPL-SCNC: 25 MMOL/L
CO2 SERPL-SCNC: 25 MMOL/L — SIGNIFICANT CHANGE UP (ref 17–32)
CO2 SERPL-SCNC: 26 MMOL/L — SIGNIFICANT CHANGE UP (ref 17–32)
CO2 SERPL-SCNC: 27 MMOL/L — SIGNIFICANT CHANGE UP (ref 17–32)
CO2 SERPL-SCNC: 27 MMOL/L — SIGNIFICANT CHANGE UP (ref 17–32)
CO2 SERPL-SCNC: 28 MMOL/L — SIGNIFICANT CHANGE UP (ref 17–32)
CO2 SERPL-SCNC: 29 MMOL/L — SIGNIFICANT CHANGE UP (ref 17–32)
CO2 SERPL-SCNC: 29 MMOL/L — SIGNIFICANT CHANGE UP (ref 17–32)
COLOR SPEC: YELLOW — SIGNIFICANT CHANGE UP
COLOR: NORMAL
CREAT SERPL-MCNC: 0.6 MG/DL — LOW (ref 0.7–1.5)
CREAT SERPL-MCNC: 0.7 MG/DL — SIGNIFICANT CHANGE UP (ref 0.7–1.5)
CREAT SERPL-MCNC: 0.7 MG/DL — SIGNIFICANT CHANGE UP (ref 0.7–1.5)
CREAT SERPL-MCNC: 0.8 MG/DL
CREAT SERPL-MCNC: 0.8 MG/DL — SIGNIFICANT CHANGE UP (ref 0.7–1.5)
CREAT SERPL-MCNC: 0.9 MG/DL — SIGNIFICANT CHANGE UP (ref 0.7–1.5)
CULTURE RESULTS: SIGNIFICANT CHANGE UP
DIFF PNL FLD: NEGATIVE — SIGNIFICANT CHANGE UP
EOSINOPHIL # BLD AUTO: 0.02 K/UL — SIGNIFICANT CHANGE UP (ref 0–0.7)
EOSINOPHIL # BLD AUTO: 0.07 K/UL — SIGNIFICANT CHANGE UP (ref 0–0.7)
EOSINOPHIL # BLD AUTO: 0.07 K/UL — SIGNIFICANT CHANGE UP (ref 0–0.7)
EOSINOPHIL # BLD AUTO: 0.1 K/UL — SIGNIFICANT CHANGE UP (ref 0–0.7)
EOSINOPHIL # BLD AUTO: 0.11 K/UL — SIGNIFICANT CHANGE UP (ref 0–0.7)
EOSINOPHIL # BLD AUTO: 0.16 K/UL — SIGNIFICANT CHANGE UP (ref 0–0.7)
EOSINOPHIL # BLD AUTO: 0.19 K/UL — SIGNIFICANT CHANGE UP (ref 0–0.7)
EOSINOPHIL # BLD AUTO: 0.23 K/UL — SIGNIFICANT CHANGE UP (ref 0–0.7)
EOSINOPHIL # BLD AUTO: 0.25 K/UL
EOSINOPHIL # BLD AUTO: 0.3 K/UL — SIGNIFICANT CHANGE UP (ref 0–0.7)
EOSINOPHIL # BLD AUTO: 0.34 K/UL — SIGNIFICANT CHANGE UP (ref 0–0.7)
EOSINOPHIL # BLD AUTO: 0.4 K/UL — SIGNIFICANT CHANGE UP (ref 0–0.7)
EOSINOPHIL # BLD AUTO: 0.44 K/UL — SIGNIFICANT CHANGE UP (ref 0–0.7)
EOSINOPHIL # BLD AUTO: 0.5 K/UL — SIGNIFICANT CHANGE UP (ref 0–0.7)
EOSINOPHIL # BLD AUTO: 0.6 K/UL — SIGNIFICANT CHANGE UP (ref 0–0.7)
EOSINOPHIL # BLD AUTO: 0.71 K/UL — HIGH (ref 0–0.7)
EOSINOPHIL NFR BLD AUTO: 0.2 % — SIGNIFICANT CHANGE UP (ref 0–8)
EOSINOPHIL NFR BLD AUTO: 0.6 % — SIGNIFICANT CHANGE UP (ref 0–8)
EOSINOPHIL NFR BLD AUTO: 0.6 % — SIGNIFICANT CHANGE UP (ref 0–8)
EOSINOPHIL NFR BLD AUTO: 0.9 % — SIGNIFICANT CHANGE UP (ref 0–8)
EOSINOPHIL NFR BLD AUTO: 1.6 % — SIGNIFICANT CHANGE UP (ref 0–8)
EOSINOPHIL NFR BLD AUTO: 2 % — SIGNIFICANT CHANGE UP (ref 0–8)
EOSINOPHIL NFR BLD AUTO: 2.1 % — SIGNIFICANT CHANGE UP (ref 0–8)
EOSINOPHIL NFR BLD AUTO: 2.8 % — SIGNIFICANT CHANGE UP (ref 0–8)
EOSINOPHIL NFR BLD AUTO: 3.7 % — SIGNIFICANT CHANGE UP (ref 0–8)
EOSINOPHIL NFR BLD AUTO: 3.8 %
EOSINOPHIL NFR BLD AUTO: 4 % — SIGNIFICANT CHANGE UP (ref 0–8)
EOSINOPHIL NFR BLD AUTO: 4.4 % — SIGNIFICANT CHANGE UP (ref 0–8)
EOSINOPHIL NFR BLD AUTO: 4.8 % — SIGNIFICANT CHANGE UP (ref 0–8)
EOSINOPHIL NFR BLD AUTO: 4.9 % — SIGNIFICANT CHANGE UP (ref 0–8)
EOSINOPHIL NFR BLD AUTO: 5.3 % — SIGNIFICANT CHANGE UP (ref 0–8)
EOSINOPHIL NFR BLD AUTO: 7.5 % — SIGNIFICANT CHANGE UP (ref 0–8)
EPI CELLS # UR: 1 /HPF — SIGNIFICANT CHANGE UP (ref 0–5)
ESTIMATED AVERAGE GLUCOSE: 134 MG/DL — HIGH (ref 68–114)
ETHANOL SERPL-MCNC: <10 MG/DL — SIGNIFICANT CHANGE UP
GAS PNL BLDA: SIGNIFICANT CHANGE UP
GLUCOSE BLDC GLUCOMTR-MCNC: 100 MG/DL — HIGH (ref 70–99)
GLUCOSE BLDC GLUCOMTR-MCNC: 103 MG/DL — HIGH (ref 70–99)
GLUCOSE BLDC GLUCOMTR-MCNC: 105 MG/DL — HIGH (ref 70–99)
GLUCOSE BLDC GLUCOMTR-MCNC: 106 MG/DL — HIGH (ref 70–99)
GLUCOSE BLDC GLUCOMTR-MCNC: 108 MG/DL — HIGH (ref 70–99)
GLUCOSE BLDC GLUCOMTR-MCNC: 109 MG/DL — HIGH (ref 70–99)
GLUCOSE BLDC GLUCOMTR-MCNC: 111 MG/DL — HIGH (ref 70–99)
GLUCOSE BLDC GLUCOMTR-MCNC: 114 MG/DL — HIGH (ref 70–99)
GLUCOSE BLDC GLUCOMTR-MCNC: 114 MG/DL — HIGH (ref 70–99)
GLUCOSE BLDC GLUCOMTR-MCNC: 116 MG/DL — HIGH (ref 70–99)
GLUCOSE BLDC GLUCOMTR-MCNC: 117 MG/DL — HIGH (ref 70–99)
GLUCOSE BLDC GLUCOMTR-MCNC: 117 MG/DL — HIGH (ref 70–99)
GLUCOSE BLDC GLUCOMTR-MCNC: 118 MG/DL — HIGH (ref 70–99)
GLUCOSE BLDC GLUCOMTR-MCNC: 118 MG/DL — HIGH (ref 70–99)
GLUCOSE BLDC GLUCOMTR-MCNC: 119 MG/DL — HIGH (ref 70–99)
GLUCOSE BLDC GLUCOMTR-MCNC: 119 MG/DL — HIGH (ref 70–99)
GLUCOSE BLDC GLUCOMTR-MCNC: 120 MG/DL — HIGH (ref 70–99)
GLUCOSE BLDC GLUCOMTR-MCNC: 121 MG/DL — HIGH (ref 70–99)
GLUCOSE BLDC GLUCOMTR-MCNC: 123 MG/DL — HIGH (ref 70–99)
GLUCOSE BLDC GLUCOMTR-MCNC: 124 MG/DL — HIGH (ref 70–99)
GLUCOSE BLDC GLUCOMTR-MCNC: 125 MG/DL — HIGH (ref 70–99)
GLUCOSE BLDC GLUCOMTR-MCNC: 126 MG/DL — HIGH (ref 70–99)
GLUCOSE BLDC GLUCOMTR-MCNC: 130 MG/DL — HIGH (ref 70–99)
GLUCOSE BLDC GLUCOMTR-MCNC: 130 MG/DL — HIGH (ref 70–99)
GLUCOSE BLDC GLUCOMTR-MCNC: 131 MG/DL — HIGH (ref 70–99)
GLUCOSE BLDC GLUCOMTR-MCNC: 132 MG/DL — HIGH (ref 70–99)
GLUCOSE BLDC GLUCOMTR-MCNC: 133 MG/DL — HIGH (ref 70–99)
GLUCOSE BLDC GLUCOMTR-MCNC: 134 MG/DL — HIGH (ref 70–99)
GLUCOSE BLDC GLUCOMTR-MCNC: 134 MG/DL — HIGH (ref 70–99)
GLUCOSE BLDC GLUCOMTR-MCNC: 135 MG/DL — HIGH (ref 70–99)
GLUCOSE BLDC GLUCOMTR-MCNC: 136 MG/DL — HIGH (ref 70–99)
GLUCOSE BLDC GLUCOMTR-MCNC: 137 MG/DL — HIGH (ref 70–99)
GLUCOSE BLDC GLUCOMTR-MCNC: 138 MG/DL — HIGH (ref 70–99)
GLUCOSE BLDC GLUCOMTR-MCNC: 139 MG/DL — HIGH (ref 70–99)
GLUCOSE BLDC GLUCOMTR-MCNC: 140 MG/DL — HIGH (ref 70–99)
GLUCOSE BLDC GLUCOMTR-MCNC: 142 MG/DL — HIGH (ref 70–99)
GLUCOSE BLDC GLUCOMTR-MCNC: 148 MG/DL — HIGH (ref 70–99)
GLUCOSE BLDC GLUCOMTR-MCNC: 149 MG/DL — HIGH (ref 70–99)
GLUCOSE BLDC GLUCOMTR-MCNC: 152 MG/DL — HIGH (ref 70–99)
GLUCOSE BLDC GLUCOMTR-MCNC: 152 MG/DL — HIGH (ref 70–99)
GLUCOSE BLDC GLUCOMTR-MCNC: 156 MG/DL — HIGH (ref 70–99)
GLUCOSE BLDC GLUCOMTR-MCNC: 157 MG/DL — HIGH (ref 70–99)
GLUCOSE BLDC GLUCOMTR-MCNC: 157 MG/DL — HIGH (ref 70–99)
GLUCOSE BLDC GLUCOMTR-MCNC: 158 MG/DL — HIGH (ref 70–99)
GLUCOSE BLDC GLUCOMTR-MCNC: 159 MG/DL — HIGH (ref 70–99)
GLUCOSE BLDC GLUCOMTR-MCNC: 161 MG/DL — HIGH (ref 70–99)
GLUCOSE BLDC GLUCOMTR-MCNC: 167 MG/DL — HIGH (ref 70–99)
GLUCOSE BLDC GLUCOMTR-MCNC: 168 MG/DL — HIGH (ref 70–99)
GLUCOSE BLDC GLUCOMTR-MCNC: 171 MG/DL — HIGH (ref 70–99)
GLUCOSE BLDC GLUCOMTR-MCNC: 171 MG/DL — HIGH (ref 70–99)
GLUCOSE BLDC GLUCOMTR-MCNC: 172 MG/DL — HIGH (ref 70–99)
GLUCOSE BLDC GLUCOMTR-MCNC: 173 MG/DL — HIGH (ref 70–99)
GLUCOSE BLDC GLUCOMTR-MCNC: 173 MG/DL — HIGH (ref 70–99)
GLUCOSE BLDC GLUCOMTR-MCNC: 178 MG/DL — HIGH (ref 70–99)
GLUCOSE BLDC GLUCOMTR-MCNC: 178 MG/DL — HIGH (ref 70–99)
GLUCOSE BLDC GLUCOMTR-MCNC: 179 MG/DL — HIGH (ref 70–99)
GLUCOSE BLDC GLUCOMTR-MCNC: 183 MG/DL — HIGH (ref 70–99)
GLUCOSE BLDC GLUCOMTR-MCNC: 185 MG/DL — HIGH (ref 70–99)
GLUCOSE BLDC GLUCOMTR-MCNC: 189 MG/DL — HIGH (ref 70–99)
GLUCOSE BLDC GLUCOMTR-MCNC: 214 MG/DL — HIGH (ref 70–99)
GLUCOSE BLDC GLUCOMTR-MCNC: 273 MG/DL — HIGH (ref 70–99)
GLUCOSE BLDC GLUCOMTR-MCNC: 88 MG/DL — SIGNIFICANT CHANGE UP (ref 70–99)
GLUCOSE BLDC GLUCOMTR-MCNC: 89 MG/DL — SIGNIFICANT CHANGE UP (ref 70–99)
GLUCOSE BLDC GLUCOMTR-MCNC: 92 MG/DL — SIGNIFICANT CHANGE UP (ref 70–99)
GLUCOSE BLDC GLUCOMTR-MCNC: 98 MG/DL — SIGNIFICANT CHANGE UP (ref 70–99)
GLUCOSE QUALITATIVE U: NEGATIVE
GLUCOSE SERPL-MCNC: 101 MG/DL
GLUCOSE SERPL-MCNC: 110 MG/DL — HIGH (ref 70–99)
GLUCOSE SERPL-MCNC: 111 MG/DL — HIGH (ref 70–99)
GLUCOSE SERPL-MCNC: 114 MG/DL — HIGH (ref 70–99)
GLUCOSE SERPL-MCNC: 116 MG/DL — HIGH (ref 70–99)
GLUCOSE SERPL-MCNC: 121 MG/DL — HIGH (ref 70–99)
GLUCOSE SERPL-MCNC: 126 MG/DL — HIGH (ref 70–99)
GLUCOSE SERPL-MCNC: 130 MG/DL — HIGH (ref 70–99)
GLUCOSE SERPL-MCNC: 137 MG/DL — HIGH (ref 70–99)
GLUCOSE SERPL-MCNC: 138 MG/DL — HIGH (ref 70–99)
GLUCOSE SERPL-MCNC: 144 MG/DL — HIGH (ref 70–99)
GLUCOSE SERPL-MCNC: 154 MG/DL — HIGH (ref 70–99)
GLUCOSE SERPL-MCNC: 166 MG/DL — HIGH (ref 70–99)
GLUCOSE SERPL-MCNC: 174 MG/DL — HIGH (ref 70–99)
GLUCOSE SERPL-MCNC: 179 MG/DL — HIGH (ref 70–99)
GLUCOSE SERPL-MCNC: 181 MG/DL — HIGH (ref 70–99)
GLUCOSE SERPL-MCNC: 184 MG/DL — HIGH (ref 70–99)
GLUCOSE SERPL-MCNC: 208 MG/DL — HIGH (ref 70–99)
GLUCOSE SERPL-MCNC: 214 MG/DL — HIGH (ref 70–99)
GLUCOSE UR QL: NEGATIVE — SIGNIFICANT CHANGE UP
GRAM STN FLD: SIGNIFICANT CHANGE UP
HCO3 BLDA-SCNC: 24 MMOL/L — SIGNIFICANT CHANGE UP (ref 23–27)
HCO3 BLDA-SCNC: 26 MMOL/L — SIGNIFICANT CHANGE UP (ref 21–29)
HCO3 BLDA-SCNC: 26 MMOL/L — SIGNIFICANT CHANGE UP (ref 23–27)
HCO3 BLDA-SCNC: 26 MMOL/L — SIGNIFICANT CHANGE UP (ref 23–27)
HCO3 BLDA-SCNC: 27 MMOL/L — SIGNIFICANT CHANGE UP (ref 21–29)
HCO3 BLDA-SCNC: 28 MMOL/L — HIGH (ref 23–27)
HCO3 BLDA-SCNC: 28 MMOL/L — SIGNIFICANT CHANGE UP (ref 21–29)
HCO3 BLDA-SCNC: 28 MMOL/L — SIGNIFICANT CHANGE UP (ref 21–29)
HCO3 BLDA-SCNC: 29 MMOL/L — HIGH (ref 23–27)
HCO3 BLDA-SCNC: 29 MMOL/L — SIGNIFICANT CHANGE UP (ref 21–29)
HCO3 BLDA-SCNC: 30 MMOL/L — HIGH (ref 21–29)
HCO3 BLDA-SCNC: 30 MMOL/L — HIGH (ref 23–27)
HCT VFR BLD CALC: 38.7 % — LOW (ref 42–52)
HCT VFR BLD CALC: 38.9 % — LOW (ref 42–52)
HCT VFR BLD CALC: 39.4 % — LOW (ref 42–52)
HCT VFR BLD CALC: 40.3 % — LOW (ref 42–52)
HCT VFR BLD CALC: 41 % — LOW (ref 42–52)
HCT VFR BLD CALC: 41.8 %
HCT VFR BLD CALC: 42.1 % — SIGNIFICANT CHANGE UP (ref 42–52)
HCT VFR BLD CALC: 42.2 % — SIGNIFICANT CHANGE UP (ref 42–52)
HCT VFR BLD CALC: 42.3 % — SIGNIFICANT CHANGE UP (ref 42–52)
HCT VFR BLD CALC: 42.4 % — SIGNIFICANT CHANGE UP (ref 42–52)
HCT VFR BLD CALC: 42.4 % — SIGNIFICANT CHANGE UP (ref 42–52)
HCT VFR BLD CALC: 42.6 % — SIGNIFICANT CHANGE UP (ref 42–52)
HCT VFR BLD CALC: 43.1 % — SIGNIFICANT CHANGE UP (ref 42–52)
HCT VFR BLD CALC: 43.3 % — SIGNIFICANT CHANGE UP (ref 42–52)
HCT VFR BLD CALC: 43.4 % — SIGNIFICANT CHANGE UP (ref 42–52)
HCT VFR BLD CALC: 43.9 % — SIGNIFICANT CHANGE UP (ref 42–52)
HCT VFR BLD CALC: 44.2 % — SIGNIFICANT CHANGE UP (ref 42–52)
HCT VFR BLD CALC: 51.2 % — SIGNIFICANT CHANGE UP (ref 42–52)
HDLC SERPL-MCNC: 29 MG/DL — LOW
HGB BLD-MCNC: 11.7 G/DL — LOW (ref 14–18)
HGB BLD-MCNC: 11.8 G/DL — LOW (ref 14–18)
HGB BLD-MCNC: 12.1 G/DL — LOW (ref 14–18)
HGB BLD-MCNC: 12.2 G/DL — LOW (ref 14–18)
HGB BLD-MCNC: 12.3 G/DL
HGB BLD-MCNC: 12.5 G/DL — LOW (ref 14–18)
HGB BLD-MCNC: 12.7 G/DL — LOW (ref 14–18)
HGB BLD-MCNC: 12.7 G/DL — LOW (ref 14–18)
HGB BLD-MCNC: 12.8 G/DL — LOW (ref 14–18)
HGB BLD-MCNC: 12.8 G/DL — LOW (ref 14–18)
HGB BLD-MCNC: 12.9 G/DL — LOW (ref 14–18)
HGB BLD-MCNC: 13 G/DL — LOW (ref 14–18)
HGB BLD-MCNC: 13 G/DL — LOW (ref 14–18)
HGB BLD-MCNC: 13.1 G/DL — LOW (ref 14–18)
HGB BLD-MCNC: 13.2 G/DL — LOW (ref 14–18)
HGB BLD-MCNC: 13.3 G/DL — LOW (ref 14–18)
HGB BLD-MCNC: 13.4 G/DL — LOW (ref 14–18)
HGB BLD-MCNC: 15.9 G/DL — SIGNIFICANT CHANGE UP (ref 14–18)
HOROWITZ INDEX BLDA+IHG-RTO: 40 — SIGNIFICANT CHANGE UP
HOROWITZ INDEX BLDA+IHG-RTO: 50 — SIGNIFICANT CHANGE UP
HOROWITZ INDEX BLDA+IHG-RTO: 70 — SIGNIFICANT CHANGE UP
HYALINE CASTS # UR AUTO: 0 /LPF — SIGNIFICANT CHANGE UP (ref 0–7)
IMM GRANULOCYTES NFR BLD AUTO: 0.6 % — HIGH (ref 0.1–0.3)
IMM GRANULOCYTES NFR BLD AUTO: 0.7 % — HIGH (ref 0.1–0.3)
IMM GRANULOCYTES NFR BLD AUTO: 0.7 % — HIGH (ref 0.1–0.3)
IMM GRANULOCYTES NFR BLD AUTO: 0.9 % — HIGH (ref 0.1–0.3)
IMM GRANULOCYTES NFR BLD AUTO: 0.9 % — HIGH (ref 0.1–0.3)
IMM GRANULOCYTES NFR BLD AUTO: 1.1 % — HIGH (ref 0.1–0.3)
IMM GRANULOCYTES NFR BLD AUTO: 1.3 % — HIGH (ref 0.1–0.3)
IMM GRANULOCYTES NFR BLD AUTO: 1.5 % — HIGH (ref 0.1–0.3)
IMM GRANULOCYTES NFR BLD AUTO: 1.5 % — HIGH (ref 0.1–0.3)
IMM GRANULOCYTES NFR BLD AUTO: 1.6 % — HIGH (ref 0.1–0.3)
IMM GRANULOCYTES NFR BLD AUTO: 1.7 % — HIGH (ref 0.1–0.3)
IMM GRANULOCYTES NFR BLD AUTO: 1.8 %
IMM GRANULOCYTES NFR BLD AUTO: 2 % — HIGH (ref 0.1–0.3)
IMM GRANULOCYTES NFR BLD AUTO: 2.2 % — HIGH (ref 0.1–0.3)
INR BLD: 1.25 RATIO — SIGNIFICANT CHANGE UP (ref 0.65–1.3)
INR BLD: 1.31 RATIO — HIGH (ref 0.65–1.3)
INR BLD: 1.35 RATIO — HIGH (ref 0.65–1.3)
KETONES UR-MCNC: NEGATIVE — SIGNIFICANT CHANGE UP
KETONES URINE: NEGATIVE
LEUKOCYTE ESTERASE UR-ACNC: NEGATIVE — SIGNIFICANT CHANGE UP
LEUKOCYTE ESTERASE URINE: ABNORMAL
LIPID PNL WITH DIRECT LDL SERPL: 52 MG/DL — SIGNIFICANT CHANGE UP
LYMPHOCYTES # BLD AUTO: 0.5 K/UL — LOW (ref 1.2–3.4)
LYMPHOCYTES # BLD AUTO: 0.53 K/UL — LOW (ref 1.2–3.4)
LYMPHOCYTES # BLD AUTO: 0.54 K/UL — LOW (ref 1.2–3.4)
LYMPHOCYTES # BLD AUTO: 0.57 K/UL — LOW (ref 1.2–3.4)
LYMPHOCYTES # BLD AUTO: 0.58 K/UL — LOW (ref 1.2–3.4)
LYMPHOCYTES # BLD AUTO: 0.59 K/UL — LOW (ref 1.2–3.4)
LYMPHOCYTES # BLD AUTO: 0.69 K/UL — LOW (ref 1.2–3.4)
LYMPHOCYTES # BLD AUTO: 0.75 K/UL — LOW (ref 1.2–3.4)
LYMPHOCYTES # BLD AUTO: 0.76 K/UL — LOW (ref 1.2–3.4)
LYMPHOCYTES # BLD AUTO: 0.81 K/UL — LOW (ref 1.2–3.4)
LYMPHOCYTES # BLD AUTO: 0.84 K/UL — LOW (ref 1.2–3.4)
LYMPHOCYTES # BLD AUTO: 0.84 K/UL — LOW (ref 1.2–3.4)
LYMPHOCYTES # BLD AUTO: 0.97 K/UL
LYMPHOCYTES # BLD AUTO: 1.6 K/UL — SIGNIFICANT CHANGE UP (ref 1.2–3.4)
LYMPHOCYTES # BLD AUTO: 12.6 % — LOW (ref 20.5–51.1)
LYMPHOCYTES # BLD AUTO: 12.6 % — LOW (ref 20.5–51.1)
LYMPHOCYTES # BLD AUTO: 14 % — LOW (ref 20.5–51.1)
LYMPHOCYTES # BLD AUTO: 4.7 % — LOW (ref 20.5–51.1)
LYMPHOCYTES # BLD AUTO: 5.1 % — LOW (ref 20.5–51.1)
LYMPHOCYTES # BLD AUTO: 5.2 % — LOW (ref 20.5–51.1)
LYMPHOCYTES # BLD AUTO: 5.4 % — LOW (ref 20.5–51.1)
LYMPHOCYTES # BLD AUTO: 5.9 % — LOW (ref 20.5–51.1)
LYMPHOCYTES # BLD AUTO: 6.1 % — LOW (ref 20.5–51.1)
LYMPHOCYTES # BLD AUTO: 6.2 % — LOW (ref 20.5–51.1)
LYMPHOCYTES # BLD AUTO: 6.6 % — LOW (ref 20.5–51.1)
LYMPHOCYTES # BLD AUTO: 6.7 % — LOW (ref 20.5–51.1)
LYMPHOCYTES # BLD AUTO: 7 % — LOW (ref 20.5–51.1)
LYMPHOCYTES # BLD AUTO: 8.3 % — LOW (ref 20.5–51.1)
LYMPHOCYTES # BLD AUTO: 8.8 % — LOW (ref 20.5–51.1)
LYMPHOCYTES NFR BLD AUTO: 14.8 %
MAGNESIUM SERPL-MCNC: 1.5 MG/DL — LOW (ref 1.8–2.4)
MAGNESIUM SERPL-MCNC: 1.6 MG/DL — LOW (ref 1.8–2.4)
MAGNESIUM SERPL-MCNC: 1.6 MG/DL — LOW (ref 1.8–2.4)
MAGNESIUM SERPL-MCNC: 1.7 MG/DL — LOW (ref 1.8–2.4)
MAGNESIUM SERPL-MCNC: 1.7 MG/DL — LOW (ref 1.8–2.4)
MAGNESIUM SERPL-MCNC: 1.8 MG/DL — SIGNIFICANT CHANGE UP (ref 1.8–2.4)
MAGNESIUM SERPL-MCNC: 1.9 MG/DL — SIGNIFICANT CHANGE UP (ref 1.8–2.4)
MAGNESIUM SERPL-MCNC: 2.2 MG/DL — SIGNIFICANT CHANGE UP (ref 1.8–2.4)
MAN DIFF?: NORMAL
MCHC RBC-ENTMCNC: 22.8 PG
MCHC RBC-ENTMCNC: 23.5 PG — LOW (ref 27–31)
MCHC RBC-ENTMCNC: 23.8 PG — LOW (ref 27–31)
MCHC RBC-ENTMCNC: 24.1 PG — LOW (ref 27–31)
MCHC RBC-ENTMCNC: 25.2 PG — LOW (ref 27–31)
MCHC RBC-ENTMCNC: 25.4 PG — LOW (ref 27–31)
MCHC RBC-ENTMCNC: 25.4 PG — LOW (ref 27–31)
MCHC RBC-ENTMCNC: 25.5 PG — LOW (ref 27–31)
MCHC RBC-ENTMCNC: 25.6 PG — LOW (ref 27–31)
MCHC RBC-ENTMCNC: 25.7 PG — LOW (ref 27–31)
MCHC RBC-ENTMCNC: 25.7 PG — LOW (ref 27–31)
MCHC RBC-ENTMCNC: 25.8 PG — LOW (ref 27–31)
MCHC RBC-ENTMCNC: 25.9 PG — LOW (ref 27–31)
MCHC RBC-ENTMCNC: 26.2 PG — LOW (ref 27–31)
MCHC RBC-ENTMCNC: 26.3 PG — LOW (ref 27–31)
MCHC RBC-ENTMCNC: 26.3 PG — LOW (ref 27–31)
MCHC RBC-ENTMCNC: 29.4 G/DL
MCHC RBC-ENTMCNC: 29.8 G/DL — LOW (ref 32–37)
MCHC RBC-ENTMCNC: 29.9 G/DL — LOW (ref 32–37)
MCHC RBC-ENTMCNC: 30 G/DL — LOW (ref 32–37)
MCHC RBC-ENTMCNC: 30.2 G/DL — LOW (ref 32–37)
MCHC RBC-ENTMCNC: 30.2 G/DL — LOW (ref 32–37)
MCHC RBC-ENTMCNC: 30.3 G/DL — LOW (ref 32–37)
MCHC RBC-ENTMCNC: 30.5 G/DL — LOW (ref 32–37)
MCHC RBC-ENTMCNC: 30.7 G/DL — LOW (ref 32–37)
MCHC RBC-ENTMCNC: 31.1 G/DL — LOW (ref 32–37)
MCHC RBC-ENTMCNC: 31.3 G/DL — LOW (ref 32–37)
MCV RBC AUTO: 77.6 FL
MCV RBC AUTO: 77.9 FL — LOW (ref 80–94)
MCV RBC AUTO: 78.3 FL — LOW (ref 80–94)
MCV RBC AUTO: 78.4 FL — LOW (ref 80–94)
MCV RBC AUTO: 82.6 FL — SIGNIFICANT CHANGE UP (ref 80–94)
MCV RBC AUTO: 82.7 FL — SIGNIFICANT CHANGE UP (ref 80–94)
MCV RBC AUTO: 82.8 FL — SIGNIFICANT CHANGE UP (ref 80–94)
MCV RBC AUTO: 83.4 FL — SIGNIFICANT CHANGE UP (ref 80–94)
MCV RBC AUTO: 84.1 FL — SIGNIFICANT CHANGE UP (ref 80–94)
MCV RBC AUTO: 84.5 FL — SIGNIFICANT CHANGE UP (ref 80–94)
MCV RBC AUTO: 84.9 FL — SIGNIFICANT CHANGE UP (ref 80–94)
MCV RBC AUTO: 84.9 FL — SIGNIFICANT CHANGE UP (ref 80–94)
MCV RBC AUTO: 85 FL — SIGNIFICANT CHANGE UP (ref 80–94)
MCV RBC AUTO: 85.9 FL — SIGNIFICANT CHANGE UP (ref 80–94)
MCV RBC AUTO: 86.1 FL — SIGNIFICANT CHANGE UP (ref 80–94)
MCV RBC AUTO: 86.3 FL — SIGNIFICANT CHANGE UP (ref 80–94)
MCV RBC AUTO: 86.8 FL — SIGNIFICANT CHANGE UP (ref 80–94)
MCV RBC AUTO: 87 FL — SIGNIFICANT CHANGE UP (ref 80–94)
MONOCYTES # BLD AUTO: 0.49 K/UL — SIGNIFICANT CHANGE UP (ref 0.1–0.6)
MONOCYTES # BLD AUTO: 0.5 K/UL — SIGNIFICANT CHANGE UP (ref 0.1–0.6)
MONOCYTES # BLD AUTO: 0.55 K/UL — SIGNIFICANT CHANGE UP (ref 0.1–0.6)
MONOCYTES # BLD AUTO: 0.56 K/UL — SIGNIFICANT CHANGE UP (ref 0.1–0.6)
MONOCYTES # BLD AUTO: 0.59 K/UL — SIGNIFICANT CHANGE UP (ref 0.1–0.6)
MONOCYTES # BLD AUTO: 0.59 K/UL — SIGNIFICANT CHANGE UP (ref 0.1–0.6)
MONOCYTES # BLD AUTO: 0.64 K/UL — HIGH (ref 0.1–0.6)
MONOCYTES # BLD AUTO: 0.64 K/UL — HIGH (ref 0.1–0.6)
MONOCYTES # BLD AUTO: 0.72 K/UL — HIGH (ref 0.1–0.6)
MONOCYTES # BLD AUTO: 0.75 K/UL
MONOCYTES # BLD AUTO: 0.78 K/UL — HIGH (ref 0.1–0.6)
MONOCYTES # BLD AUTO: 0.8 K/UL — HIGH (ref 0.1–0.6)
MONOCYTES # BLD AUTO: 0.83 K/UL — HIGH (ref 0.1–0.6)
MONOCYTES # BLD AUTO: 0.83 K/UL — HIGH (ref 0.1–0.6)
MONOCYTES # BLD AUTO: 0.84 K/UL — HIGH (ref 0.1–0.6)
MONOCYTES # BLD AUTO: 0.97 K/UL — HIGH (ref 0.1–0.6)
MONOCYTES NFR BLD AUTO: 10.4 % — HIGH (ref 1.7–9.3)
MONOCYTES NFR BLD AUTO: 11.5 %
MONOCYTES NFR BLD AUTO: 11.7 % — HIGH (ref 1.7–9.3)
MONOCYTES NFR BLD AUTO: 3.9 % — SIGNIFICANT CHANGE UP (ref 1.7–9.3)
MONOCYTES NFR BLD AUTO: 5.6 % — SIGNIFICANT CHANGE UP (ref 1.7–9.3)
MONOCYTES NFR BLD AUTO: 6.1 % — SIGNIFICANT CHANGE UP (ref 1.7–9.3)
MONOCYTES NFR BLD AUTO: 6.2 % — SIGNIFICANT CHANGE UP (ref 1.7–9.3)
MONOCYTES NFR BLD AUTO: 6.4 % — SIGNIFICANT CHANGE UP (ref 1.7–9.3)
MONOCYTES NFR BLD AUTO: 6.4 % — SIGNIFICANT CHANGE UP (ref 1.7–9.3)
MONOCYTES NFR BLD AUTO: 7.2 % — SIGNIFICANT CHANGE UP (ref 1.7–9.3)
MONOCYTES NFR BLD AUTO: 7.3 % — SIGNIFICANT CHANGE UP (ref 1.7–9.3)
MONOCYTES NFR BLD AUTO: 7.5 % — SIGNIFICANT CHANGE UP (ref 1.7–9.3)
MONOCYTES NFR BLD AUTO: 7.6 % — SIGNIFICANT CHANGE UP (ref 1.7–9.3)
MONOCYTES NFR BLD AUTO: 7.7 % — SIGNIFICANT CHANGE UP (ref 1.7–9.3)
MONOCYTES NFR BLD AUTO: 7.7 % — SIGNIFICANT CHANGE UP (ref 1.7–9.3)
MONOCYTES NFR BLD AUTO: 8.1 % — SIGNIFICANT CHANGE UP (ref 1.7–9.3)
MRSA PCR RESULT.: NEGATIVE — SIGNIFICANT CHANGE UP
NEUTROPHILS # BLD AUTO: 10.36 K/UL — HIGH (ref 1.4–6.5)
NEUTROPHILS # BLD AUTO: 3.22 K/UL — SIGNIFICANT CHANGE UP (ref 1.4–6.5)
NEUTROPHILS # BLD AUTO: 3.82 K/UL — SIGNIFICANT CHANGE UP (ref 1.4–6.5)
NEUTROPHILS # BLD AUTO: 4.37 K/UL
NEUTROPHILS # BLD AUTO: 6.67 K/UL — HIGH (ref 1.4–6.5)
NEUTROPHILS # BLD AUTO: 7.14 K/UL — HIGH (ref 1.4–6.5)
NEUTROPHILS # BLD AUTO: 7.36 K/UL — HIGH (ref 1.4–6.5)
NEUTROPHILS # BLD AUTO: 7.88 K/UL — HIGH (ref 1.4–6.5)
NEUTROPHILS # BLD AUTO: 8.21 K/UL — HIGH (ref 1.4–6.5)
NEUTROPHILS # BLD AUTO: 8.37 K/UL — HIGH (ref 1.4–6.5)
NEUTROPHILS # BLD AUTO: 9.04 K/UL — HIGH (ref 1.4–6.5)
NEUTROPHILS # BLD AUTO: 9.18 K/UL — HIGH (ref 1.4–6.5)
NEUTROPHILS # BLD AUTO: 9.36 K/UL — HIGH (ref 1.4–6.5)
NEUTROPHILS # BLD AUTO: 9.57 K/UL — HIGH (ref 1.4–6.5)
NEUTROPHILS # BLD AUTO: 9.59 K/UL — HIGH (ref 1.4–6.5)
NEUTROPHILS # BLD AUTO: 9.6 K/UL — HIGH (ref 1.4–6.5)
NEUTROPHILS NFR BLD AUTO: 66.9 %
NEUTROPHILS NFR BLD AUTO: 68.5 % — SIGNIFICANT CHANGE UP (ref 42.2–75.2)
NEUTROPHILS NFR BLD AUTO: 71.3 % — SIGNIFICANT CHANGE UP (ref 42.2–75.2)
NEUTROPHILS NFR BLD AUTO: 77.8 % — HIGH (ref 42.2–75.2)
NEUTROPHILS NFR BLD AUTO: 77.9 % — HIGH (ref 42.2–75.2)
NEUTROPHILS NFR BLD AUTO: 78.1 % — HIGH (ref 42.2–75.2)
NEUTROPHILS NFR BLD AUTO: 79.5 % — HIGH (ref 42.2–75.2)
NEUTROPHILS NFR BLD AUTO: 80.1 % — HIGH (ref 42.2–75.2)
NEUTROPHILS NFR BLD AUTO: 81.6 % — HIGH (ref 42.2–75.2)
NEUTROPHILS NFR BLD AUTO: 81.9 % — HIGH (ref 42.2–75.2)
NEUTROPHILS NFR BLD AUTO: 82.4 % — HIGH (ref 42.2–75.2)
NEUTROPHILS NFR BLD AUTO: 83.4 % — HIGH (ref 42.2–75.2)
NEUTROPHILS NFR BLD AUTO: 84.9 % — HIGH (ref 42.2–75.2)
NEUTROPHILS NFR BLD AUTO: 85.2 % — HIGH (ref 42.2–75.2)
NEUTROPHILS NFR BLD AUTO: 86 % — HIGH (ref 42.2–75.2)
NEUTROPHILS NFR BLD AUTO: 86 % — HIGH (ref 42.2–75.2)
NIGHT BLUE STAIN TISS: SIGNIFICANT CHANGE UP
NIGHT BLUE STAIN TISS: SIGNIFICANT CHANGE UP
NITRITE UR-MCNC: NEGATIVE — SIGNIFICANT CHANGE UP
NITRITE URINE: POSITIVE
NON HDL CHOLESTEROL: 63 MG/DL — SIGNIFICANT CHANGE UP
NRBC # BLD: 0 /100 WBCS — SIGNIFICANT CHANGE UP (ref 0–0)
PCO2 BLDA: 43 MMHG — HIGH (ref 38–42)
PCO2 BLDA: 44 MMHG — HIGH (ref 38–42)
PCO2 BLDA: 45 MMHG — HIGH (ref 38–42)
PCO2 BLDA: 47 MMHG — HIGH (ref 38–42)
PCO2 BLDA: 48 MMHG — HIGH (ref 38–42)
PCO2 BLDA: 49 MMHG — HIGH (ref 38–42)
PH BLDA: 7.35 — LOW (ref 7.38–7.42)
PH BLDA: 7.36 — LOW (ref 7.38–7.42)
PH BLDA: 7.37 — LOW (ref 7.38–7.42)
PH BLDA: 7.38 — SIGNIFICANT CHANGE UP (ref 7.38–7.42)
PH BLDA: 7.39 — SIGNIFICANT CHANGE UP (ref 7.38–7.42)
PH BLDA: 7.39 — SIGNIFICANT CHANGE UP (ref 7.38–7.42)
PH BLDA: 7.4 — SIGNIFICANT CHANGE UP (ref 7.38–7.42)
PH BLDA: 7.41 — SIGNIFICANT CHANGE UP (ref 7.38–7.42)
PH BLDA: 7.42 — SIGNIFICANT CHANGE UP (ref 7.38–7.42)
PH BLDA: 7.44 — HIGH (ref 7.38–7.42)
PH UR: 6 — SIGNIFICANT CHANGE UP (ref 5–8)
PH URINE: 6
PHOSPHATE SERPL-MCNC: 1.8 MG/DL — LOW (ref 2.1–4.9)
PHOSPHATE SERPL-MCNC: 2.6 MG/DL — SIGNIFICANT CHANGE UP (ref 2.1–4.9)
PLATELET # BLD AUTO: 144 K/UL — SIGNIFICANT CHANGE UP (ref 130–400)
PLATELET # BLD AUTO: 153 K/UL — SIGNIFICANT CHANGE UP (ref 130–400)
PLATELET # BLD AUTO: 155 K/UL — SIGNIFICANT CHANGE UP (ref 130–400)
PLATELET # BLD AUTO: 165 K/UL — SIGNIFICANT CHANGE UP (ref 130–400)
PLATELET # BLD AUTO: 167 K/UL — SIGNIFICANT CHANGE UP (ref 130–400)
PLATELET # BLD AUTO: 168 K/UL — SIGNIFICANT CHANGE UP (ref 130–400)
PLATELET # BLD AUTO: 168 K/UL — SIGNIFICANT CHANGE UP (ref 130–400)
PLATELET # BLD AUTO: 169 K/UL — SIGNIFICANT CHANGE UP (ref 130–400)
PLATELET # BLD AUTO: 171 K/UL — SIGNIFICANT CHANGE UP (ref 130–400)
PLATELET # BLD AUTO: 175 K/UL — SIGNIFICANT CHANGE UP (ref 130–400)
PLATELET # BLD AUTO: 194 K/UL — SIGNIFICANT CHANGE UP (ref 130–400)
PLATELET # BLD AUTO: 197 K/UL — SIGNIFICANT CHANGE UP (ref 130–400)
PLATELET # BLD AUTO: 199 K/UL — SIGNIFICANT CHANGE UP (ref 130–400)
PLATELET # BLD AUTO: 207 K/UL — SIGNIFICANT CHANGE UP (ref 130–400)
PLATELET # BLD AUTO: 208 K/UL — SIGNIFICANT CHANGE UP (ref 130–400)
PLATELET # BLD AUTO: 210 K/UL
PLATELET # BLD AUTO: 210 K/UL — SIGNIFICANT CHANGE UP (ref 130–400)
PLATELET # BLD AUTO: 217 K/UL — SIGNIFICANT CHANGE UP (ref 130–400)
PO2 BLDA: 107 MMHG — HIGH (ref 78–95)
PO2 BLDA: 117 MMHG — HIGH (ref 78–95)
PO2 BLDA: 143 MMHG — HIGH (ref 78–95)
PO2 BLDA: 161 MMHG — HIGH (ref 78–95)
PO2 BLDA: 59 MMHG — LOW (ref 78–95)
PO2 BLDA: 72 MMHG — LOW (ref 78–95)
PO2 BLDA: 78 MMHG — SIGNIFICANT CHANGE UP (ref 78–95)
PO2 BLDA: 82 MMHG — SIGNIFICANT CHANGE UP (ref 78–95)
PO2 BLDA: 87 MMHG — SIGNIFICANT CHANGE UP (ref 78–95)
PO2 BLDA: 88 MMHG — SIGNIFICANT CHANGE UP (ref 78–95)
PO2 BLDA: 89 MMHG — SIGNIFICANT CHANGE UP (ref 78–95)
PO2 BLDA: 92 MMHG — SIGNIFICANT CHANGE UP (ref 78–95)
POTASSIUM SERPL-MCNC: 3.5 MMOL/L — SIGNIFICANT CHANGE UP (ref 3.5–5)
POTASSIUM SERPL-MCNC: 3.6 MMOL/L — SIGNIFICANT CHANGE UP (ref 3.5–5)
POTASSIUM SERPL-MCNC: 3.9 MMOL/L — SIGNIFICANT CHANGE UP (ref 3.5–5)
POTASSIUM SERPL-MCNC: 4 MMOL/L — SIGNIFICANT CHANGE UP (ref 3.5–5)
POTASSIUM SERPL-MCNC: 4.1 MMOL/L — SIGNIFICANT CHANGE UP (ref 3.5–5)
POTASSIUM SERPL-MCNC: 4.2 MMOL/L — SIGNIFICANT CHANGE UP (ref 3.5–5)
POTASSIUM SERPL-MCNC: 4.3 MMOL/L — SIGNIFICANT CHANGE UP (ref 3.5–5)
POTASSIUM SERPL-MCNC: 4.3 MMOL/L — SIGNIFICANT CHANGE UP (ref 3.5–5)
POTASSIUM SERPL-MCNC: 4.4 MMOL/L — SIGNIFICANT CHANGE UP (ref 3.5–5)
POTASSIUM SERPL-MCNC: 4.4 MMOL/L — SIGNIFICANT CHANGE UP (ref 3.5–5)
POTASSIUM SERPL-MCNC: 4.6 MMOL/L — SIGNIFICANT CHANGE UP (ref 3.5–5)
POTASSIUM SERPL-SCNC: 3.5 MMOL/L — SIGNIFICANT CHANGE UP (ref 3.5–5)
POTASSIUM SERPL-SCNC: 3.6 MMOL/L — SIGNIFICANT CHANGE UP (ref 3.5–5)
POTASSIUM SERPL-SCNC: 3.9 MMOL/L — SIGNIFICANT CHANGE UP (ref 3.5–5)
POTASSIUM SERPL-SCNC: 4 MMOL/L — SIGNIFICANT CHANGE UP (ref 3.5–5)
POTASSIUM SERPL-SCNC: 4.1 MMOL/L — SIGNIFICANT CHANGE UP (ref 3.5–5)
POTASSIUM SERPL-SCNC: 4.2 MMOL/L — SIGNIFICANT CHANGE UP (ref 3.5–5)
POTASSIUM SERPL-SCNC: 4.3 MMOL/L — SIGNIFICANT CHANGE UP (ref 3.5–5)
POTASSIUM SERPL-SCNC: 4.3 MMOL/L — SIGNIFICANT CHANGE UP (ref 3.5–5)
POTASSIUM SERPL-SCNC: 4.4 MMOL/L — SIGNIFICANT CHANGE UP (ref 3.5–5)
POTASSIUM SERPL-SCNC: 4.4 MMOL/L — SIGNIFICANT CHANGE UP (ref 3.5–5)
POTASSIUM SERPL-SCNC: 4.6 MMOL/L — SIGNIFICANT CHANGE UP (ref 3.5–5)
POTASSIUM SERPL-SCNC: 4.7 MMOL/L
PROCALCITONIN SERPL-MCNC: 0.22 NG/ML — HIGH (ref 0.02–0.1)
PROT SERPL-MCNC: 4.2 G/DL — LOW (ref 6–8)
PROT SERPL-MCNC: 4.2 G/DL — LOW (ref 6–8)
PROT SERPL-MCNC: 4.3 G/DL — LOW (ref 6–8)
PROT SERPL-MCNC: 4.5 G/DL — LOW (ref 6–8)
PROT SERPL-MCNC: 4.5 G/DL — LOW (ref 6–8)
PROT SERPL-MCNC: 4.6 G/DL — LOW (ref 6–8)
PROT SERPL-MCNC: 5.5 G/DL — LOW (ref 6–8)
PROT SERPL-MCNC: 5.8 G/DL — LOW (ref 6–8)
PROT SERPL-MCNC: 5.9 G/DL — LOW (ref 6–8)
PROT SERPL-MCNC: 6.4 G/DL — SIGNIFICANT CHANGE UP (ref 6–8)
PROT UR-MCNC: ABNORMAL
PROTEIN URINE: NEGATIVE
PROTHROM AB SERPL-ACNC: 14.4 SEC — HIGH (ref 9.95–12.87)
PROTHROM AB SERPL-ACNC: 15.1 SEC — HIGH (ref 9.95–12.87)
PROTHROM AB SERPL-ACNC: 15.5 SEC — HIGH (ref 9.95–12.87)
PSA FREE FLD-MCNC: 14 %
PSA FREE SERPL-MCNC: 0.21 NG/ML
PSA SERPL-MCNC: 1.48 NG/ML
RAPID RVP RESULT: SIGNIFICANT CHANGE UP
RBC # BLD: 4.45 M/UL — LOW (ref 4.7–6.1)
RBC # BLD: 4.51 M/UL — LOW (ref 4.7–6.1)
RBC # BLD: 4.77 M/UL — SIGNIFICANT CHANGE UP (ref 4.7–6.1)
RBC # BLD: 4.96 M/UL — SIGNIFICANT CHANGE UP (ref 4.7–6.1)
RBC # BLD: 5.01 M/UL — SIGNIFICANT CHANGE UP (ref 4.7–6.1)
RBC # BLD: 5.02 M/UL — SIGNIFICANT CHANGE UP (ref 4.7–6.1)
RBC # BLD: 5.03 M/UL — SIGNIFICANT CHANGE UP (ref 4.7–6.1)
RBC # BLD: 5.07 M/UL — SIGNIFICANT CHANGE UP (ref 4.7–6.1)
RBC # BLD: 5.09 M/UL — SIGNIFICANT CHANGE UP (ref 4.7–6.1)
RBC # BLD: 5.1 M/UL — SIGNIFICANT CHANGE UP (ref 4.7–6.1)
RBC # BLD: 5.1 M/UL — SIGNIFICANT CHANGE UP (ref 4.7–6.1)
RBC # BLD: 5.11 M/UL — SIGNIFICANT CHANGE UP (ref 4.7–6.1)
RBC # BLD: 5.12 M/UL — SIGNIFICANT CHANGE UP (ref 4.7–6.1)
RBC # BLD: 5.15 M/UL — SIGNIFICANT CHANGE UP (ref 4.7–6.1)
RBC # BLD: 5.26 M/UL — SIGNIFICANT CHANGE UP (ref 4.7–6.1)
RBC # BLD: 5.39 M/UL
RBC # BLD: 5.41 M/UL — SIGNIFICANT CHANGE UP (ref 4.7–6.1)
RBC # BLD: 6.2 M/UL — HIGH (ref 4.7–6.1)
RBC # FLD: 16.3 % — HIGH (ref 11.5–14.5)
RBC # FLD: 16.4 % — HIGH (ref 11.5–14.5)
RBC # FLD: 16.5 % — HIGH (ref 11.5–14.5)
RBC # FLD: 16.5 % — HIGH (ref 11.5–14.5)
RBC # FLD: 16.6 % — HIGH (ref 11.5–14.5)
RBC # FLD: 16.6 % — HIGH (ref 11.5–14.5)
RBC # FLD: 16.7 % — HIGH (ref 11.5–14.5)
RBC # FLD: 16.7 % — HIGH (ref 11.5–14.5)
RBC # FLD: 16.8 % — HIGH (ref 11.5–14.5)
RBC # FLD: 16.9 % — HIGH (ref 11.5–14.5)
RBC # FLD: 17 % — HIGH (ref 11.5–14.5)
RBC # FLD: 17.2 % — HIGH (ref 11.5–14.5)
RBC # FLD: 17.2 % — HIGH (ref 11.5–14.5)
RBC # FLD: 17.5 % — HIGH (ref 11.5–14.5)
RBC # FLD: 17.7 %
RBC # FLD: 18.1 % — HIGH (ref 11.5–14.5)
RBC CASTS # UR COMP ASSIST: 1 /HPF — SIGNIFICANT CHANGE UP (ref 0–4)
SAO2 % BLDA: 88 % — LOW (ref 92–96)
SAO2 % BLDA: 92 % — SIGNIFICANT CHANGE UP (ref 92–96)
SAO2 % BLDA: 92 % — SIGNIFICANT CHANGE UP (ref 92–96)
SAO2 % BLDA: 94 % — SIGNIFICANT CHANGE UP (ref 92–96)
SAO2 % BLDA: 95 % — SIGNIFICANT CHANGE UP (ref 94–98)
SAO2 % BLDA: 96 % — SIGNIFICANT CHANGE UP (ref 92–96)
SAO2 % BLDA: 98 % — SIGNIFICANT CHANGE UP (ref 94–98)
SAO2 % BLDA: 99 % — HIGH (ref 94–98)
SARS-COV-2 RNA SPEC QL NAA+PROBE: SIGNIFICANT CHANGE UP
SARS-COV-2 RNA SPEC QL NAA+PROBE: SIGNIFICANT CHANGE UP
SODIUM SERPL-SCNC: 135 MMOL/L — SIGNIFICANT CHANGE UP (ref 135–146)
SODIUM SERPL-SCNC: 138 MMOL/L
SODIUM SERPL-SCNC: 138 MMOL/L — SIGNIFICANT CHANGE UP (ref 135–146)
SODIUM SERPL-SCNC: 138 MMOL/L — SIGNIFICANT CHANGE UP (ref 135–146)
SODIUM SERPL-SCNC: 139 MMOL/L — SIGNIFICANT CHANGE UP (ref 135–146)
SODIUM SERPL-SCNC: 140 MMOL/L — SIGNIFICANT CHANGE UP (ref 135–146)
SODIUM SERPL-SCNC: 141 MMOL/L — SIGNIFICANT CHANGE UP (ref 135–146)
SODIUM SERPL-SCNC: 142 MMOL/L — SIGNIFICANT CHANGE UP (ref 135–146)
SODIUM SERPL-SCNC: 143 MMOL/L — SIGNIFICANT CHANGE UP (ref 135–146)
SODIUM SERPL-SCNC: 145 MMOL/L — SIGNIFICANT CHANGE UP (ref 135–146)
SODIUM SERPL-SCNC: 146 MMOL/L — SIGNIFICANT CHANGE UP (ref 135–146)
SODIUM SERPL-SCNC: 146 MMOL/L — SIGNIFICANT CHANGE UP (ref 135–146)
SODIUM SERPL-SCNC: 148 MMOL/L — HIGH (ref 135–146)
SP GR SPEC: 1.04 — HIGH (ref 1.01–1.03)
SPECIFIC GRAVITY URINE: 1.01
SPECIMEN SOURCE: SIGNIFICANT CHANGE UP
SURGICAL PATHOLOGY STUDY: SIGNIFICANT CHANGE UP
TRIGL SERPL-MCNC: 98 MG/DL — SIGNIFICANT CHANGE UP
TROPONIN T SERPL-MCNC: 0.01 NG/ML — SIGNIFICANT CHANGE UP
TROPONIN T SERPL-MCNC: 0.01 NG/ML — SIGNIFICANT CHANGE UP
TROPONIN T SERPL-MCNC: <0.01 NG/ML — SIGNIFICANT CHANGE UP
TSH SERPL-MCNC: 1.95 UIU/ML — SIGNIFICANT CHANGE UP (ref 0.27–4.2)
URINE CYTOLOGY: NORMAL
UROBILINOGEN FLD QL: ABNORMAL
UROBILINOGEN URINE: NORMAL
WBC # BLD: 10.03 K/UL — SIGNIFICANT CHANGE UP (ref 4.8–10.8)
WBC # BLD: 10.09 K/UL — SIGNIFICANT CHANGE UP (ref 4.8–10.8)
WBC # BLD: 10.8 K/UL — SIGNIFICANT CHANGE UP (ref 4.8–10.8)
WBC # BLD: 11.15 K/UL — HIGH (ref 4.8–10.8)
WBC # BLD: 11.17 K/UL — HIGH (ref 4.8–10.8)
WBC # BLD: 11.3 K/UL — HIGH (ref 4.8–10.8)
WBC # BLD: 11.43 K/UL — HIGH (ref 4.8–10.8)
WBC # BLD: 12.03 K/UL — HIGH (ref 4.8–10.8)
WBC # BLD: 12.68 K/UL — HIGH (ref 4.8–10.8)
WBC # BLD: 4.7 K/UL — LOW (ref 4.8–10.8)
WBC # BLD: 4.72 K/UL — LOW (ref 4.8–10.8)
WBC # BLD: 5.36 K/UL — SIGNIFICANT CHANGE UP (ref 4.8–10.8)
WBC # BLD: 8.09 K/UL — SIGNIFICANT CHANGE UP (ref 4.8–10.8)
WBC # BLD: 9.18 K/UL — SIGNIFICANT CHANGE UP (ref 4.8–10.8)
WBC # BLD: 9.47 K/UL — SIGNIFICANT CHANGE UP (ref 4.8–10.8)
WBC # BLD: 9.51 K/UL — SIGNIFICANT CHANGE UP (ref 4.8–10.8)
WBC # BLD: 9.64 K/UL — SIGNIFICANT CHANGE UP (ref 4.8–10.8)
WBC # FLD AUTO: 10.03 K/UL — SIGNIFICANT CHANGE UP (ref 4.8–10.8)
WBC # FLD AUTO: 10.09 K/UL — SIGNIFICANT CHANGE UP (ref 4.8–10.8)
WBC # FLD AUTO: 10.8 K/UL — SIGNIFICANT CHANGE UP (ref 4.8–10.8)
WBC # FLD AUTO: 11.15 K/UL — HIGH (ref 4.8–10.8)
WBC # FLD AUTO: 11.17 K/UL — HIGH (ref 4.8–10.8)
WBC # FLD AUTO: 11.3 K/UL — HIGH (ref 4.8–10.8)
WBC # FLD AUTO: 11.43 K/UL — HIGH (ref 4.8–10.8)
WBC # FLD AUTO: 12.03 K/UL — HIGH (ref 4.8–10.8)
WBC # FLD AUTO: 12.68 K/UL — HIGH (ref 4.8–10.8)
WBC # FLD AUTO: 4.7 K/UL — LOW (ref 4.8–10.8)
WBC # FLD AUTO: 4.72 K/UL — LOW (ref 4.8–10.8)
WBC # FLD AUTO: 5.36 K/UL — SIGNIFICANT CHANGE UP (ref 4.8–10.8)
WBC # FLD AUTO: 6.54 K/UL
WBC # FLD AUTO: 8.09 K/UL — SIGNIFICANT CHANGE UP (ref 4.8–10.8)
WBC # FLD AUTO: 9.18 K/UL — SIGNIFICANT CHANGE UP (ref 4.8–10.8)
WBC # FLD AUTO: 9.47 K/UL — SIGNIFICANT CHANGE UP (ref 4.8–10.8)
WBC # FLD AUTO: 9.51 K/UL — SIGNIFICANT CHANGE UP (ref 4.8–10.8)
WBC # FLD AUTO: 9.64 K/UL — SIGNIFICANT CHANGE UP (ref 4.8–10.8)
WBC UR QL: 1 /HPF — SIGNIFICANT CHANGE UP (ref 0–5)

## 2020-01-01 PROCEDURE — 99232 SBSQ HOSP IP/OBS MODERATE 35: CPT

## 2020-01-01 PROCEDURE — 71045 X-RAY EXAM CHEST 1 VIEW: CPT | Mod: 26

## 2020-01-01 PROCEDURE — 99222 1ST HOSP IP/OBS MODERATE 55: CPT

## 2020-01-01 PROCEDURE — 12002 RPR S/N/AX/GEN/TRNK2.6-7.5CM: CPT

## 2020-01-01 PROCEDURE — 99285 EMERGENCY DEPT VISIT HI MDM: CPT | Mod: 25

## 2020-01-01 PROCEDURE — 51798 US URINE CAPACITY MEASURE: CPT

## 2020-01-01 PROCEDURE — 70450 CT HEAD/BRAIN W/O DYE: CPT | Mod: 26,77

## 2020-01-01 PROCEDURE — 70450 CT HEAD/BRAIN W/O DYE: CPT | Mod: 26

## 2020-01-01 PROCEDURE — 93010 ELECTROCARDIOGRAM REPORT: CPT

## 2020-01-01 PROCEDURE — 37191 INS ENDOVAS VENA CAVA FILTR: CPT

## 2020-01-01 PROCEDURE — 36556 INSERT NON-TUNNEL CV CATH: CPT

## 2020-01-01 PROCEDURE — 71045 X-RAY EXAM CHEST 1 VIEW: CPT | Mod: 26,77

## 2020-01-01 PROCEDURE — 99231 SBSQ HOSP IP/OBS SF/LOW 25: CPT

## 2020-01-01 PROCEDURE — 99291 CRITICAL CARE FIRST HOUR: CPT

## 2020-01-01 PROCEDURE — 70551 MRI BRAIN STEM W/O DYE: CPT | Mod: 26

## 2020-01-01 PROCEDURE — 99233 SBSQ HOSP IP/OBS HIGH 50: CPT

## 2020-01-01 PROCEDURE — 99024 POSTOP FOLLOW-UP VISIT: CPT

## 2020-01-01 PROCEDURE — 74177 CT ABD & PELVIS W/CONTRAST: CPT | Mod: 26

## 2020-01-01 PROCEDURE — 0042T: CPT

## 2020-01-01 PROCEDURE — 93306 TTE W/DOPPLER COMPLETE: CPT | Mod: 26

## 2020-01-01 PROCEDURE — 70553 MRI BRAIN STEM W/O & W/DYE: CPT | Mod: 26

## 2020-01-01 PROCEDURE — 99215 OFFICE O/P EST HI 40 MIN: CPT

## 2020-01-01 PROCEDURE — 72170 X-RAY EXAM OF PELVIS: CPT | Mod: 26

## 2020-01-01 PROCEDURE — 36620 INSERTION CATHETER ARTERY: CPT

## 2020-01-01 PROCEDURE — 95720 EEG PHY/QHP EA INCR W/VEEG: CPT

## 2020-01-01 PROCEDURE — 70486 CT MAXILLOFACIAL W/O DYE: CPT | Mod: 26

## 2020-01-01 PROCEDURE — 76937 US GUIDE VASCULAR ACCESS: CPT | Mod: 26

## 2020-01-01 PROCEDURE — 71260 CT THORAX DX C+: CPT | Mod: 26

## 2020-01-01 PROCEDURE — 88305 TISSUE EXAM BY PATHOLOGIST: CPT | Mod: 26

## 2020-01-01 PROCEDURE — 74019 RADEX ABDOMEN 2 VIEWS: CPT | Mod: 26

## 2020-01-01 PROCEDURE — 76770 US EXAM ABDO BACK WALL COMP: CPT | Mod: 26

## 2020-01-01 PROCEDURE — 31500 INSERT EMERGENCY AIRWAY: CPT | Mod: 59

## 2020-01-01 PROCEDURE — 72125 CT NECK SPINE W/O DYE: CPT | Mod: 26

## 2020-01-01 PROCEDURE — 93970 EXTREMITY STUDY: CPT | Mod: 26

## 2020-01-01 PROCEDURE — 99291 CRITICAL CARE FIRST HOUR: CPT | Mod: 25

## 2020-01-01 PROCEDURE — 99221 1ST HOSP IP/OBS SF/LOW 40: CPT | Mod: GC,AI

## 2020-01-01 RX ORDER — TAMSULOSIN HYDROCHLORIDE 0.4 MG/1
0.4 CAPSULE ORAL AT BEDTIME
Refills: 0 | Status: DISCONTINUED | OUTPATIENT
Start: 2020-01-01 | End: 2021-01-01

## 2020-01-01 RX ORDER — MIDAZOLAM HYDROCHLORIDE 1 MG/ML
5 INJECTION, SOLUTION INTRAMUSCULAR; INTRAVENOUS ONCE
Refills: 0 | Status: DISCONTINUED | OUTPATIENT
Start: 2020-01-01 | End: 2020-01-01

## 2020-01-01 RX ORDER — SODIUM CHLORIDE 9 MG/ML
1000 INJECTION INTRAMUSCULAR; INTRAVENOUS; SUBCUTANEOUS ONCE
Refills: 0 | Status: DISCONTINUED | OUTPATIENT
Start: 2020-01-01 | End: 2021-01-01

## 2020-01-01 RX ORDER — MAGNESIUM SULFATE 500 MG/ML
1 VIAL (ML) INJECTION ONCE
Refills: 0 | Status: COMPLETED | OUTPATIENT
Start: 2020-01-01 | End: 2020-01-01

## 2020-01-01 RX ORDER — TAMSULOSIN HYDROCHLORIDE 0.4 MG/1
1 CAPSULE ORAL
Qty: 0 | Refills: 0 | DISCHARGE

## 2020-01-01 RX ORDER — NOREPINEPHRINE BITARTRATE/D5W 8 MG/250ML
0.05 PLASTIC BAG, INJECTION (ML) INTRAVENOUS
Qty: 8 | Refills: 0 | Status: DISCONTINUED | OUTPATIENT
Start: 2020-01-01 | End: 2021-01-01

## 2020-01-01 RX ORDER — TAMSULOSIN HYDROCHLORIDE 0.4 MG/1
0.4 CAPSULE ORAL AT BEDTIME
Refills: 0 | Status: DISCONTINUED | OUTPATIENT
Start: 2020-01-01 | End: 2020-01-01

## 2020-01-01 RX ORDER — AMPICILLIN SODIUM AND SULBACTAM SODIUM 250; 125 MG/ML; MG/ML
1.5 INJECTION, POWDER, FOR SUSPENSION INTRAMUSCULAR; INTRAVENOUS ONCE
Refills: 0 | Status: COMPLETED | OUTPATIENT
Start: 2020-01-01 | End: 2020-01-01

## 2020-01-01 RX ORDER — FENTANYL CITRATE 50 UG/ML
25 INJECTION INTRAVENOUS EVERY 6 HOURS
Refills: 0 | Status: DISCONTINUED | OUTPATIENT
Start: 2020-01-01 | End: 2020-01-01

## 2020-01-01 RX ORDER — CHOLECALCIFEROL (VITAMIN D3) 125 MCG
0 CAPSULE ORAL
Qty: 0 | Refills: 0 | DISCHARGE

## 2020-01-01 RX ORDER — POTASSIUM CHLORIDE 20 MEQ
10 PACKET (EA) ORAL ONCE
Refills: 0 | Status: DISCONTINUED | OUTPATIENT
Start: 2020-01-01 | End: 2020-01-01

## 2020-01-01 RX ORDER — MIDAZOLAM HYDROCHLORIDE 1 MG/ML
2 INJECTION, SOLUTION INTRAMUSCULAR; INTRAVENOUS ONCE
Refills: 0 | Status: DISCONTINUED | OUTPATIENT
Start: 2020-01-01 | End: 2020-01-01

## 2020-01-01 RX ORDER — MEROPENEM 1 G/30ML
500 INJECTION INTRAVENOUS ONCE
Refills: 0 | Status: COMPLETED | OUTPATIENT
Start: 2020-01-01 | End: 2020-01-01

## 2020-01-01 RX ORDER — CHLORHEXIDINE GLUCONATE 213 G/1000ML
15 SOLUTION TOPICAL
Refills: 0 | Status: DISCONTINUED | OUTPATIENT
Start: 2020-01-01 | End: 2021-01-01

## 2020-01-01 RX ORDER — DEXTROSE 50 % IN WATER 50 %
15 SYRINGE (ML) INTRAVENOUS ONCE
Refills: 0 | Status: DISCONTINUED | OUTPATIENT
Start: 2020-01-01 | End: 2021-01-01

## 2020-01-01 RX ORDER — SODIUM CHLORIDE 9 MG/ML
500 INJECTION INTRAMUSCULAR; INTRAVENOUS; SUBCUTANEOUS ONCE
Refills: 0 | Status: COMPLETED | OUTPATIENT
Start: 2020-01-01 | End: 2020-01-01

## 2020-01-01 RX ORDER — DEXMEDETOMIDINE HYDROCHLORIDE IN 0.9% SODIUM CHLORIDE 4 UG/ML
0.2 INJECTION INTRAVENOUS
Qty: 400 | Refills: 0 | Status: DISCONTINUED | OUTPATIENT
Start: 2020-01-01 | End: 2020-01-01

## 2020-01-01 RX ORDER — MEROPENEM 1 G/30ML
500 INJECTION INTRAVENOUS EVERY 8 HOURS
Refills: 0 | Status: DISCONTINUED | OUTPATIENT
Start: 2020-01-01 | End: 2020-01-01

## 2020-01-01 RX ORDER — SIMVASTATIN 20 MG/1
20 TABLET, FILM COATED ORAL AT BEDTIME
Refills: 0 | Status: DISCONTINUED | OUTPATIENT
Start: 2020-01-01 | End: 2020-01-01

## 2020-01-01 RX ORDER — PREGABALIN 225 MG/1
1000 CAPSULE ORAL DAILY
Refills: 0 | Status: DISCONTINUED | OUTPATIENT
Start: 2020-01-01 | End: 2020-01-01

## 2020-01-01 RX ORDER — SODIUM CHLORIDE 9 MG/ML
1000 INJECTION, SOLUTION INTRAVENOUS ONCE
Refills: 0 | Status: COMPLETED | OUTPATIENT
Start: 2020-01-01 | End: 2020-01-01

## 2020-01-01 RX ORDER — DEXTROSE 50 % IN WATER 50 %
25 SYRINGE (ML) INTRAVENOUS ONCE
Refills: 0 | Status: DISCONTINUED | OUTPATIENT
Start: 2020-01-01 | End: 2020-01-01

## 2020-01-01 RX ORDER — SENNA PLUS 8.6 MG/1
2 TABLET ORAL AT BEDTIME
Refills: 0 | Status: DISCONTINUED | OUTPATIENT
Start: 2020-01-01 | End: 2020-01-01

## 2020-01-01 RX ORDER — CHOLECALCIFEROL (VITAMIN D3) 125 MCG
1 CAPSULE ORAL
Qty: 0 | Refills: 0 | DISCHARGE

## 2020-01-01 RX ORDER — VENLAFAXINE HCL 75 MG
150 CAPSULE, EXT RELEASE 24 HR ORAL DAILY
Refills: 0 | Status: DISCONTINUED | OUTPATIENT
Start: 2020-01-01 | End: 2020-01-01

## 2020-01-01 RX ORDER — FOLIC ACID 0.8 MG
1 TABLET ORAL
Qty: 0 | Refills: 0 | DISCHARGE

## 2020-01-01 RX ORDER — POLYETHYLENE GLYCOL 3350 17 G/17G
17 POWDER, FOR SOLUTION ORAL DAILY
Refills: 0 | Status: DISCONTINUED | OUTPATIENT
Start: 2020-01-01 | End: 2020-01-01

## 2020-01-01 RX ORDER — POTASSIUM CHLORIDE 20 MEQ
20 PACKET (EA) ORAL ONCE
Refills: 0 | Status: COMPLETED | OUTPATIENT
Start: 2020-01-01 | End: 2020-01-01

## 2020-01-01 RX ORDER — MIDAZOLAM HYDROCHLORIDE 1 MG/ML
4 INJECTION, SOLUTION INTRAMUSCULAR; INTRAVENOUS ONCE
Refills: 0 | Status: DISCONTINUED | OUTPATIENT
Start: 2020-01-01 | End: 2020-01-01

## 2020-01-01 RX ORDER — DEXTROSE 50 % IN WATER 50 %
12.5 SYRINGE (ML) INTRAVENOUS ONCE
Refills: 0 | Status: DISCONTINUED | OUTPATIENT
Start: 2020-01-01 | End: 2020-01-01

## 2020-01-01 RX ORDER — POTASSIUM CHLORIDE 20 MEQ
20 PACKET (EA) ORAL
Refills: 0 | Status: COMPLETED | OUTPATIENT
Start: 2020-01-01 | End: 2020-01-01

## 2020-01-01 RX ORDER — MIDAZOLAM HYDROCHLORIDE 1 MG/ML
0.02 INJECTION, SOLUTION INTRAMUSCULAR; INTRAVENOUS
Qty: 100 | Refills: 0 | Status: DISCONTINUED | OUTPATIENT
Start: 2020-01-01 | End: 2020-01-01

## 2020-01-01 RX ORDER — SODIUM CHLORIDE 9 MG/ML
500 INJECTION, SOLUTION INTRAVENOUS ONCE
Refills: 0 | Status: COMPLETED | OUTPATIENT
Start: 2020-01-01 | End: 2020-01-01

## 2020-01-01 RX ORDER — CLEVIDIPINE BUTYRATE 50MG/100ML
1 VIAL (ML) INTRAVENOUS
Qty: 25 | Refills: 0 | Status: DISCONTINUED | OUTPATIENT
Start: 2020-01-01 | End: 2020-01-01

## 2020-01-01 RX ORDER — ENOXAPARIN SODIUM 100 MG/ML
40 INJECTION SUBCUTANEOUS DAILY
Refills: 0 | Status: DISCONTINUED | OUTPATIENT
Start: 2020-01-01 | End: 2020-01-01

## 2020-01-01 RX ORDER — FENTANYL CITRATE 50 UG/ML
0.5 INJECTION INTRAVENOUS
Qty: 2500 | Refills: 0 | Status: DISCONTINUED | OUTPATIENT
Start: 2020-01-01 | End: 2021-01-01

## 2020-01-01 RX ORDER — MAGNESIUM SULFATE 500 MG/ML
2 VIAL (ML) INJECTION ONCE
Refills: 0 | Status: COMPLETED | OUTPATIENT
Start: 2020-01-01 | End: 2020-01-01

## 2020-01-01 RX ORDER — DEXTROSE 50 % IN WATER 50 %
25 SYRINGE (ML) INTRAVENOUS ONCE
Refills: 0 | Status: DISCONTINUED | OUTPATIENT
Start: 2020-01-01 | End: 2021-01-01

## 2020-01-01 RX ORDER — SIMVASTATIN 20 MG/1
1 TABLET, FILM COATED ORAL
Qty: 0 | Refills: 0 | DISCHARGE

## 2020-01-01 RX ORDER — LISINOPRIL 2.5 MG/1
40 TABLET ORAL DAILY
Refills: 0 | Status: DISCONTINUED | OUTPATIENT
Start: 2020-01-01 | End: 2020-01-01

## 2020-01-01 RX ORDER — SODIUM CHLORIDE 9 MG/ML
1000 INJECTION, SOLUTION INTRAVENOUS
Refills: 0 | Status: DISCONTINUED | OUTPATIENT
Start: 2020-01-01 | End: 2020-01-01

## 2020-01-01 RX ORDER — GLUCAGON INJECTION, SOLUTION 0.5 MG/.1ML
1 INJECTION, SOLUTION SUBCUTANEOUS ONCE
Refills: 0 | Status: DISCONTINUED | OUTPATIENT
Start: 2020-01-01 | End: 2020-01-01

## 2020-01-01 RX ORDER — METOPROLOL TARTRATE 50 MG
12.5 TABLET ORAL ONCE
Refills: 0 | Status: COMPLETED | OUTPATIENT
Start: 2020-01-01 | End: 2020-01-01

## 2020-01-01 RX ORDER — TRAZODONE HCL 50 MG
150 TABLET ORAL DAILY
Refills: 0 | Status: DISCONTINUED | OUTPATIENT
Start: 2020-01-01 | End: 2020-01-01

## 2020-01-01 RX ORDER — DEXTROSE 50 % IN WATER 50 %
15 SYRINGE (ML) INTRAVENOUS ONCE
Refills: 0 | Status: DISCONTINUED | OUTPATIENT
Start: 2020-01-01 | End: 2020-01-01

## 2020-01-01 RX ORDER — INSULIN LISPRO 100/ML
VIAL (ML) SUBCUTANEOUS
Refills: 0 | Status: DISCONTINUED | OUTPATIENT
Start: 2020-01-01 | End: 2021-01-01

## 2020-01-01 RX ORDER — LEVETIRACETAM 250 MG/1
500 TABLET, FILM COATED ORAL EVERY 12 HOURS
Refills: 0 | Status: DISCONTINUED | OUTPATIENT
Start: 2020-01-01 | End: 2021-01-01

## 2020-01-01 RX ORDER — PREGABALIN 225 MG/1
1000 CAPSULE ORAL DAILY
Refills: 0 | Status: DISCONTINUED | OUTPATIENT
Start: 2020-01-01 | End: 2021-01-01

## 2020-01-01 RX ORDER — PROPOFOL 10 MG/ML
23.1 INJECTION, EMULSION INTRAVENOUS
Qty: 1000 | Refills: 0 | Status: DISCONTINUED | OUTPATIENT
Start: 2020-01-01 | End: 2020-01-01

## 2020-01-01 RX ORDER — METOPROLOL TARTRATE 50 MG
12.5 TABLET ORAL
Refills: 0 | Status: DISCONTINUED | OUTPATIENT
Start: 2020-01-01 | End: 2020-01-01

## 2020-01-01 RX ORDER — MEROPENEM 1 G/30ML
INJECTION INTRAVENOUS
Refills: 0 | Status: DISCONTINUED | OUTPATIENT
Start: 2020-01-01 | End: 2020-01-01

## 2020-01-01 RX ORDER — TRAZODONE HCL 50 MG
150 TABLET ORAL DAILY
Refills: 0 | Status: DISCONTINUED | OUTPATIENT
Start: 2020-01-01 | End: 2021-01-01

## 2020-01-01 RX ORDER — INSULIN LISPRO 100/ML
6 VIAL (ML) SUBCUTANEOUS EVERY 6 HOURS
Refills: 0 | Status: DISCONTINUED | OUTPATIENT
Start: 2020-01-01 | End: 2020-01-01

## 2020-01-01 RX ORDER — INSULIN LISPRO 100/ML
VIAL (ML) SUBCUTANEOUS
Refills: 0 | Status: DISCONTINUED | OUTPATIENT
Start: 2020-01-01 | End: 2020-01-01

## 2020-01-01 RX ORDER — TETANUS TOXOID, REDUCED DIPHTHERIA TOXOID AND ACELLULAR PERTUSSIS VACCINE, ADSORBED 5; 2.5; 8; 8; 2.5 [IU]/.5ML; [IU]/.5ML; UG/.5ML; UG/.5ML; UG/.5ML
0.5 SUSPENSION INTRAMUSCULAR ONCE
Refills: 0 | Status: COMPLETED | OUTPATIENT
Start: 2020-01-01 | End: 2020-01-01

## 2020-01-01 RX ORDER — HEPARIN SODIUM 5000 [USP'U]/ML
5000 INJECTION INTRAVENOUS; SUBCUTANEOUS EVERY 8 HOURS
Refills: 0 | Status: DISCONTINUED | OUTPATIENT
Start: 2020-01-01 | End: 2020-01-01

## 2020-01-01 RX ORDER — DEXTROSE 50 % IN WATER 50 %
12.5 SYRINGE (ML) INTRAVENOUS ONCE
Refills: 0 | Status: DISCONTINUED | OUTPATIENT
Start: 2020-01-01 | End: 2021-01-01

## 2020-01-01 RX ORDER — ACETAMINOPHEN 500 MG
650 TABLET ORAL EVERY 6 HOURS
Refills: 0 | Status: DISCONTINUED | OUTPATIENT
Start: 2020-01-01 | End: 2020-01-01

## 2020-01-01 RX ORDER — FENTANYL CITRATE 50 UG/ML
0.5 INJECTION INTRAVENOUS
Qty: 2500 | Refills: 0 | Status: DISCONTINUED | OUTPATIENT
Start: 2020-01-01 | End: 2020-01-01

## 2020-01-01 RX ORDER — CLONAZEPAM 1 MG
0.5 TABLET ORAL EVERY 6 HOURS
Refills: 0 | Status: DISCONTINUED | OUTPATIENT
Start: 2020-01-01 | End: 2020-01-01

## 2020-01-01 RX ORDER — CHLORHEXIDINE GLUCONATE 213 G/1000ML
1 SOLUTION TOPICAL
Refills: 0 | Status: DISCONTINUED | OUTPATIENT
Start: 2020-01-01 | End: 2020-01-01

## 2020-01-01 RX ORDER — NOREPINEPHRINE BITARTRATE/D5W 8 MG/250ML
0.05 PLASTIC BAG, INJECTION (ML) INTRAVENOUS
Qty: 8 | Refills: 0 | Status: DISCONTINUED | OUTPATIENT
Start: 2020-01-01 | End: 2020-01-01

## 2020-01-01 RX ORDER — HEPARIN SODIUM 5000 [USP'U]/ML
5000 INJECTION INTRAVENOUS; SUBCUTANEOUS EVERY 8 HOURS
Refills: 0 | Status: DISCONTINUED | OUTPATIENT
Start: 2020-01-01 | End: 2021-01-01

## 2020-01-01 RX ORDER — VENLAFAXINE HCL 75 MG
1 CAPSULE, EXT RELEASE 24 HR ORAL
Qty: 0 | Refills: 0 | DISCHARGE

## 2020-01-01 RX ORDER — INSULIN GLARGINE 100 [IU]/ML
25 INJECTION, SOLUTION SUBCUTANEOUS EVERY MORNING
Refills: 0 | Status: DISCONTINUED | OUTPATIENT
Start: 2020-01-01 | End: 2020-01-01

## 2020-01-01 RX ORDER — NICOTINE POLACRILEX 2 MG
1 GUM BUCCAL
Qty: 0 | Refills: 0 | DISCHARGE
Start: 2020-01-01

## 2020-01-01 RX ORDER — METOPROLOL TARTRATE 50 MG
12.5 TABLET ORAL
Refills: 0 | Status: DISCONTINUED | OUTPATIENT
Start: 2020-01-01 | End: 2021-01-01

## 2020-01-01 RX ORDER — HYDROCHLOROTHIAZIDE 25 MG
25 TABLET ORAL DAILY
Refills: 0 | Status: DISCONTINUED | OUTPATIENT
Start: 2020-01-01 | End: 2020-01-01

## 2020-01-01 RX ORDER — BUPROPION HYDROCHLORIDE 150 MG/1
150 TABLET, EXTENDED RELEASE ORAL
Refills: 0 | Status: DISCONTINUED | OUTPATIENT
Start: 2020-01-01 | End: 2020-01-01

## 2020-01-01 RX ORDER — LEVETIRACETAM 250 MG/1
1000 TABLET, FILM COATED ORAL ONCE
Refills: 0 | Status: COMPLETED | OUTPATIENT
Start: 2020-01-01 | End: 2020-01-01

## 2020-01-01 RX ORDER — SODIUM CHLORIDE 9 MG/ML
1000 INJECTION, SOLUTION INTRAVENOUS
Refills: 0 | Status: DISCONTINUED | OUTPATIENT
Start: 2020-01-01 | End: 2021-01-01

## 2020-01-01 RX ORDER — INSULIN LISPRO 100/ML
6 VIAL (ML) SUBCUTANEOUS EVERY 6 HOURS
Refills: 0 | Status: DISCONTINUED | OUTPATIENT
Start: 2020-01-01 | End: 2021-01-01

## 2020-01-01 RX ORDER — LABETALOL HCL 100 MG
10 TABLET ORAL ONCE
Refills: 0 | Status: COMPLETED | OUTPATIENT
Start: 2020-01-01 | End: 2020-01-01

## 2020-01-01 RX ORDER — BUPROPION HYDROCHLORIDE 150 MG/1
300 TABLET, EXTENDED RELEASE ORAL DAILY
Refills: 0 | Status: DISCONTINUED | OUTPATIENT
Start: 2020-01-01 | End: 2020-01-01

## 2020-01-01 RX ORDER — SENNA PLUS 8.6 MG/1
2 TABLET ORAL AT BEDTIME
Refills: 0 | Status: DISCONTINUED | OUTPATIENT
Start: 2020-01-01 | End: 2021-01-01

## 2020-01-01 RX ORDER — IOHEXOL 300 MG/ML
30 INJECTION, SOLUTION INTRAVENOUS ONCE
Refills: 0 | Status: DISCONTINUED | OUTPATIENT
Start: 2020-01-01 | End: 2020-01-01

## 2020-01-01 RX ORDER — TRAZODONE HCL 50 MG
150 TABLET ORAL AT BEDTIME
Refills: 0 | Status: DISCONTINUED | OUTPATIENT
Start: 2020-01-01 | End: 2020-01-01

## 2020-01-01 RX ORDER — SIMVASTATIN 20 MG/1
20 TABLET, FILM COATED ORAL AT BEDTIME
Refills: 0 | Status: DISCONTINUED | OUTPATIENT
Start: 2020-01-01 | End: 2021-01-01

## 2020-01-01 RX ORDER — MIDAZOLAM HYDROCHLORIDE 1 MG/ML
0.02 INJECTION, SOLUTION INTRAMUSCULAR; INTRAVENOUS
Qty: 100 | Refills: 0 | Status: DISCONTINUED | OUTPATIENT
Start: 2020-01-01 | End: 2021-01-01

## 2020-01-01 RX ORDER — LEVETIRACETAM 250 MG/1
500 TABLET, FILM COATED ORAL EVERY 12 HOURS
Refills: 0 | Status: DISCONTINUED | OUTPATIENT
Start: 2020-01-01 | End: 2020-01-01

## 2020-01-01 RX ORDER — CHLORHEXIDINE GLUCONATE 213 G/1000ML
15 SOLUTION TOPICAL
Refills: 0 | Status: DISCONTINUED | OUTPATIENT
Start: 2020-01-01 | End: 2020-01-01

## 2020-01-01 RX ORDER — METFORMIN HYDROCHLORIDE 850 MG/1
1 TABLET ORAL
Qty: 0 | Refills: 0 | DISCHARGE

## 2020-01-01 RX ORDER — FOLIC ACID 0.8 MG
1 TABLET ORAL DAILY
Refills: 0 | Status: DISCONTINUED | OUTPATIENT
Start: 2020-01-01 | End: 2020-01-01

## 2020-01-01 RX ORDER — INSULIN GLARGINE 100 [IU]/ML
25 INJECTION, SOLUTION SUBCUTANEOUS EVERY MORNING
Refills: 0 | Status: DISCONTINUED | OUTPATIENT
Start: 2020-01-01 | End: 2021-01-01

## 2020-01-01 RX ORDER — ACETAMINOPHEN 500 MG
650 TABLET ORAL EVERY 6 HOURS
Refills: 0 | Status: DISCONTINUED | OUTPATIENT
Start: 2020-01-01 | End: 2021-01-01

## 2020-01-01 RX ORDER — HYDROMORPHONE HYDROCHLORIDE 2 MG/ML
1 INJECTION INTRAMUSCULAR; INTRAVENOUS; SUBCUTANEOUS ONCE
Refills: 0 | Status: DISCONTINUED | OUTPATIENT
Start: 2020-01-01 | End: 2020-01-01

## 2020-01-01 RX ORDER — PROPOFOL 10 MG/ML
20 INJECTION, EMULSION INTRAVENOUS
Qty: 1000 | Refills: 0 | Status: DISCONTINUED | OUTPATIENT
Start: 2020-01-01 | End: 2020-01-01

## 2020-01-01 RX ORDER — BUDESONIDE AND FORMOTEROL FUMARATE DIHYDRATE 160; 4.5 UG/1; UG/1
2 AEROSOL RESPIRATORY (INHALATION)
Qty: 0 | Refills: 0 | DISCHARGE

## 2020-01-01 RX ORDER — METOPROLOL TARTRATE 50 MG
25 TABLET ORAL
Refills: 0 | Status: DISCONTINUED | OUTPATIENT
Start: 2020-01-01 | End: 2020-01-01

## 2020-01-01 RX ORDER — FENTANYL CITRATE 50 UG/ML
75 INJECTION INTRAVENOUS ONCE
Refills: 0 | Status: DISCONTINUED | OUTPATIENT
Start: 2020-01-01 | End: 2020-01-01

## 2020-01-01 RX ORDER — FENTANYL CITRATE 50 UG/ML
25 INJECTION INTRAVENOUS ONCE
Refills: 0 | Status: DISCONTINUED | OUTPATIENT
Start: 2020-01-01 | End: 2020-01-01

## 2020-01-01 RX ORDER — BUDESONIDE AND FORMOTEROL FUMARATE DIHYDRATE 160; 4.5 UG/1; UG/1
2 AEROSOL RESPIRATORY (INHALATION)
Refills: 0 | Status: DISCONTINUED | OUTPATIENT
Start: 2020-01-01 | End: 2020-01-01

## 2020-01-01 RX ORDER — ETOMIDATE 2 MG/ML
20 INJECTION INTRAVENOUS ONCE
Refills: 0 | Status: COMPLETED | OUTPATIENT
Start: 2020-01-01 | End: 2020-01-01

## 2020-01-01 RX ORDER — RAMIPRIL 5 MG
1 CAPSULE ORAL
Qty: 0 | Refills: 0 | DISCHARGE

## 2020-01-01 RX ORDER — LACTULOSE 10 G/15ML
15 SOLUTION ORAL
Refills: 0 | Status: COMPLETED | OUTPATIENT
Start: 2020-01-01 | End: 2021-01-01

## 2020-01-01 RX ORDER — TRAZODONE HCL 50 MG
1 TABLET ORAL
Qty: 0 | Refills: 0 | DISCHARGE

## 2020-01-01 RX ORDER — ROCURONIUM BROMIDE 10 MG/ML
100 VIAL (ML) INTRAVENOUS ONCE
Refills: 0 | Status: COMPLETED | OUTPATIENT
Start: 2020-01-01 | End: 2020-01-01

## 2020-01-01 RX ORDER — AMPICILLIN SODIUM AND SULBACTAM SODIUM 250; 125 MG/ML; MG/ML
INJECTION, POWDER, FOR SUSPENSION INTRAMUSCULAR; INTRAVENOUS
Refills: 0 | Status: DISCONTINUED | OUTPATIENT
Start: 2020-01-01 | End: 2020-01-01

## 2020-01-01 RX ORDER — HEPARIN SODIUM 5000 [USP'U]/ML
5000 INJECTION INTRAVENOUS; SUBCUTANEOUS EVERY 12 HOURS
Refills: 0 | Status: DISCONTINUED | OUTPATIENT
Start: 2020-01-01 | End: 2020-01-01

## 2020-01-01 RX ORDER — BUPROPION HYDROCHLORIDE 150 MG/1
1 TABLET, EXTENDED RELEASE ORAL
Qty: 0 | Refills: 0 | DISCHARGE

## 2020-01-01 RX ORDER — DEXMEDETOMIDINE HYDROCHLORIDE IN 0.9% SODIUM CHLORIDE 4 UG/ML
0.2 INJECTION INTRAVENOUS
Qty: 400 | Refills: 0 | Status: DISCONTINUED | OUTPATIENT
Start: 2020-01-01 | End: 2021-01-01

## 2020-01-01 RX ORDER — ATROPINE SULFATE 0.1 MG/ML
0.5 SYRINGE (ML) INJECTION ONCE
Refills: 0 | Status: COMPLETED | OUTPATIENT
Start: 2020-01-01 | End: 2020-01-01

## 2020-01-01 RX ORDER — LACTULOSE 10 G/15ML
15 SOLUTION ORAL
Refills: 0 | Status: DISCONTINUED | OUTPATIENT
Start: 2020-01-01 | End: 2020-01-01

## 2020-01-01 RX ORDER — CHLORHEXIDINE GLUCONATE 213 G/1000ML
1 SOLUTION TOPICAL DAILY
Refills: 0 | Status: DISCONTINUED | OUTPATIENT
Start: 2020-01-01 | End: 2020-01-01

## 2020-01-01 RX ORDER — METOPROLOL TARTRATE 50 MG
0.5 TABLET ORAL
Qty: 20 | Refills: 0
Start: 2020-01-01 | End: 2020-01-01

## 2020-01-01 RX ORDER — QUETIAPINE FUMARATE 200 MG/1
50 TABLET, FILM COATED ORAL DAILY
Refills: 0 | Status: DISCONTINUED | OUTPATIENT
Start: 2020-01-01 | End: 2021-01-01

## 2020-01-01 RX ORDER — OXYCODONE AND ACETAMINOPHEN 5; 325 MG/1; MG/1
5-325 TABLET ORAL
Qty: 28 | Refills: 0 | Status: COMPLETED | COMMUNITY
Start: 2019-12-11 | End: 2020-01-01

## 2020-01-01 RX ORDER — FOLIC ACID 0.8 MG
1 TABLET ORAL DAILY
Refills: 0 | Status: DISCONTINUED | OUTPATIENT
Start: 2020-01-01 | End: 2021-01-01

## 2020-01-01 RX ORDER — CLONAZEPAM 1 MG
0 TABLET ORAL
Qty: 0 | Refills: 0 | DISCHARGE

## 2020-01-01 RX ORDER — PREGABALIN 225 MG/1
1 CAPSULE ORAL
Qty: 0 | Refills: 0 | DISCHARGE

## 2020-01-01 RX ORDER — MIDAZOLAM HYDROCHLORIDE 1 MG/ML
2 INJECTION, SOLUTION INTRAMUSCULAR; INTRAVENOUS
Refills: 0 | Status: DISCONTINUED | OUTPATIENT
Start: 2020-01-01 | End: 2020-01-01

## 2020-01-01 RX ORDER — POLYETHYLENE GLYCOL 3350 17 G/17G
17 POWDER, FOR SOLUTION ORAL DAILY
Refills: 0 | Status: DISCONTINUED | OUTPATIENT
Start: 2020-01-01 | End: 2021-01-01

## 2020-01-01 RX ORDER — CLONAZEPAM 1 MG
0.5 TABLET ORAL EVERY 24 HOURS
Refills: 0 | Status: DISCONTINUED | OUTPATIENT
Start: 2020-01-01 | End: 2020-01-01

## 2020-01-01 RX ORDER — AMPICILLIN SODIUM AND SULBACTAM SODIUM 250; 125 MG/ML; MG/ML
1.5 INJECTION, POWDER, FOR SUSPENSION INTRAMUSCULAR; INTRAVENOUS EVERY 6 HOURS
Refills: 0 | Status: DISCONTINUED | OUTPATIENT
Start: 2020-01-01 | End: 2020-01-01

## 2020-01-01 RX ORDER — NICOTINE POLACRILEX 2 MG
1 GUM BUCCAL DAILY
Refills: 0 | Status: DISCONTINUED | OUTPATIENT
Start: 2020-01-01 | End: 2020-01-01

## 2020-01-01 RX ORDER — MEROPENEM 1 G/30ML
1000 INJECTION INTRAVENOUS EVERY 8 HOURS
Refills: 0 | Status: DISCONTINUED | OUTPATIENT
Start: 2020-01-01 | End: 2020-01-01

## 2020-01-01 RX ORDER — SODIUM CHLORIDE 9 MG/ML
250 INJECTION INTRAMUSCULAR; INTRAVENOUS; SUBCUTANEOUS ONCE
Refills: 0 | Status: DISCONTINUED | OUTPATIENT
Start: 2020-01-01 | End: 2020-01-01

## 2020-01-01 RX ADMIN — MEROPENEM 100 MILLIGRAM(S): 1 INJECTION INTRAVENOUS at 21:08

## 2020-01-01 RX ADMIN — MEROPENEM 100 MILLIGRAM(S): 1 INJECTION INTRAVENOUS at 05:21

## 2020-01-01 RX ADMIN — Medication 1 MILLIGRAM(S): at 11:05

## 2020-01-01 RX ADMIN — LEVETIRACETAM 420 MILLIGRAM(S): 250 TABLET, FILM COATED ORAL at 17:59

## 2020-01-01 RX ADMIN — Medication 25 MILLIGRAM(S): at 05:40

## 2020-01-01 RX ADMIN — CHLORHEXIDINE GLUCONATE 1 APPLICATION(S): 213 SOLUTION TOPICAL at 05:38

## 2020-01-01 RX ADMIN — PROPOFOL 14.4 MICROGRAM(S)/KG/MIN: 10 INJECTION, EMULSION INTRAVENOUS at 09:00

## 2020-01-01 RX ADMIN — CHLORHEXIDINE GLUCONATE 15 MILLILITER(S): 213 SOLUTION TOPICAL at 05:14

## 2020-01-01 RX ADMIN — SIMVASTATIN 20 MILLIGRAM(S): 20 TABLET, FILM COATED ORAL at 21:14

## 2020-01-01 RX ADMIN — CHLORHEXIDINE GLUCONATE 15 MILLILITER(S): 213 SOLUTION TOPICAL at 17:05

## 2020-01-01 RX ADMIN — SODIUM CHLORIDE 500 MILLILITER(S): 9 INJECTION, SOLUTION INTRAVENOUS at 09:07

## 2020-01-01 RX ADMIN — Medication 12.5 MILLIGRAM(S): at 12:37

## 2020-01-01 RX ADMIN — Medication 1 MILLIGRAM(S): at 12:15

## 2020-01-01 RX ADMIN — PREGABALIN 1000 MICROGRAM(S): 225 CAPSULE ORAL at 12:43

## 2020-01-01 RX ADMIN — ENOXAPARIN SODIUM 40 MILLIGRAM(S): 100 INJECTION SUBCUTANEOUS at 11:28

## 2020-01-01 RX ADMIN — Medication 1 MILLIGRAM(S): at 11:15

## 2020-01-01 RX ADMIN — LACTULOSE 15 GRAM(S): 10 SOLUTION ORAL at 17:09

## 2020-01-01 RX ADMIN — CHLORHEXIDINE GLUCONATE 15 MILLILITER(S): 213 SOLUTION TOPICAL at 05:20

## 2020-01-01 RX ADMIN — Medication 150 MILLIGRAM(S): at 11:07

## 2020-01-01 RX ADMIN — PREGABALIN 1000 MICROGRAM(S): 225 CAPSULE ORAL at 11:15

## 2020-01-01 RX ADMIN — Medication 150 MILLIGRAM(S): at 12:49

## 2020-01-01 RX ADMIN — ETOMIDATE 20 MILLIGRAM(S): 2 INJECTION INTRAVENOUS at 08:51

## 2020-01-01 RX ADMIN — Medication 150 MILLIGRAM(S): at 12:36

## 2020-01-01 RX ADMIN — PROPOFOL 14.4 MICROGRAM(S)/KG/MIN: 10 INJECTION, EMULSION INTRAVENOUS at 13:05

## 2020-01-01 RX ADMIN — SIMVASTATIN 20 MILLIGRAM(S): 20 TABLET, FILM COATED ORAL at 21:08

## 2020-01-01 RX ADMIN — TETANUS TOXOID, REDUCED DIPHTHERIA TOXOID AND ACELLULAR PERTUSSIS VACCINE, ADSORBED 0.5 MILLILITER(S): 5; 2.5; 8; 8; 2.5 SUSPENSION INTRAMUSCULAR at 14:34

## 2020-01-01 RX ADMIN — Medication 6 UNIT(S): at 17:17

## 2020-01-01 RX ADMIN — Medication 650 MILLIGRAM(S): at 16:30

## 2020-01-01 RX ADMIN — FENTANYL CITRATE 75 MICROGRAM(S): 50 INJECTION INTRAVENOUS at 16:30

## 2020-01-01 RX ADMIN — BUDESONIDE AND FORMOTEROL FUMARATE DIHYDRATE 2 PUFF(S): 160; 4.5 AEROSOL RESPIRATORY (INHALATION) at 11:14

## 2020-01-01 RX ADMIN — TAMSULOSIN HYDROCHLORIDE 0.4 MILLIGRAM(S): 0.4 CAPSULE ORAL at 21:23

## 2020-01-01 RX ADMIN — Medication 150 MILLIGRAM(S): at 21:39

## 2020-01-01 RX ADMIN — PROPOFOL 14.4 MICROGRAM(S)/KG/MIN: 10 INJECTION, EMULSION INTRAVENOUS at 06:11

## 2020-01-01 RX ADMIN — Medication 1 MILLIGRAM(S): at 11:44

## 2020-01-01 RX ADMIN — SIMVASTATIN 20 MILLIGRAM(S): 20 TABLET, FILM COATED ORAL at 21:24

## 2020-01-01 RX ADMIN — MIDAZOLAM HYDROCHLORIDE 2 MILLIGRAM(S): 1 INJECTION, SOLUTION INTRAMUSCULAR; INTRAVENOUS at 10:20

## 2020-01-01 RX ADMIN — SIMVASTATIN 20 MILLIGRAM(S): 20 TABLET, FILM COATED ORAL at 21:39

## 2020-01-01 RX ADMIN — HEPARIN SODIUM 5000 UNIT(S): 5000 INJECTION INTRAVENOUS; SUBCUTANEOUS at 13:12

## 2020-01-01 RX ADMIN — CHLORHEXIDINE GLUCONATE 1 APPLICATION(S): 213 SOLUTION TOPICAL at 21:08

## 2020-01-01 RX ADMIN — CHLORHEXIDINE GLUCONATE 15 MILLILITER(S): 213 SOLUTION TOPICAL at 05:00

## 2020-01-01 RX ADMIN — BUPROPION HYDROCHLORIDE 300 MILLIGRAM(S): 150 TABLET, EXTENDED RELEASE ORAL at 11:19

## 2020-01-01 RX ADMIN — Medication 12.5 MILLIGRAM(S): at 17:19

## 2020-01-01 RX ADMIN — Medication 6 UNIT(S): at 06:06

## 2020-01-01 RX ADMIN — Medication 650 MILLIGRAM(S): at 04:30

## 2020-01-01 RX ADMIN — CHLORHEXIDINE GLUCONATE 15 MILLILITER(S): 213 SOLUTION TOPICAL at 05:52

## 2020-01-01 RX ADMIN — FENTANYL CITRATE 5.2 MICROGRAM(S)/KG/HR: 50 INJECTION INTRAVENOUS at 08:02

## 2020-01-01 RX ADMIN — Medication 9.74 MICROGRAM(S)/KG/MIN: at 15:17

## 2020-01-01 RX ADMIN — QUETIAPINE FUMARATE 50 MILLIGRAM(S): 200 TABLET, FILM COATED ORAL at 11:14

## 2020-01-01 RX ADMIN — ENOXAPARIN SODIUM 40 MILLIGRAM(S): 100 INJECTION SUBCUTANEOUS at 12:12

## 2020-01-01 RX ADMIN — LEVETIRACETAM 420 MILLIGRAM(S): 250 TABLET, FILM COATED ORAL at 17:05

## 2020-01-01 RX ADMIN — ENOXAPARIN SODIUM 40 MILLIGRAM(S): 100 INJECTION SUBCUTANEOUS at 11:14

## 2020-01-01 RX ADMIN — Medication 150 MILLIGRAM(S): at 11:20

## 2020-01-01 RX ADMIN — Medication 150 MILLIGRAM(S): at 12:06

## 2020-01-01 RX ADMIN — Medication 1 MILLIGRAM(S): at 12:05

## 2020-01-01 RX ADMIN — TAMSULOSIN HYDROCHLORIDE 0.4 MILLIGRAM(S): 0.4 CAPSULE ORAL at 21:14

## 2020-01-01 RX ADMIN — Medication 1 PATCH: at 19:25

## 2020-01-01 RX ADMIN — ENOXAPARIN SODIUM 40 MILLIGRAM(S): 100 INJECTION SUBCUTANEOUS at 11:08

## 2020-01-01 RX ADMIN — LISINOPRIL 40 MILLIGRAM(S): 2.5 TABLET ORAL at 05:29

## 2020-01-01 RX ADMIN — LEVETIRACETAM 420 MILLIGRAM(S): 250 TABLET, FILM COATED ORAL at 05:00

## 2020-01-01 RX ADMIN — MEROPENEM 100 MILLIGRAM(S): 1 INJECTION INTRAVENOUS at 21:25

## 2020-01-01 RX ADMIN — SENNA PLUS 2 TABLET(S): 8.6 TABLET ORAL at 21:50

## 2020-01-01 RX ADMIN — BUDESONIDE AND FORMOTEROL FUMARATE DIHYDRATE 2 PUFF(S): 160; 4.5 AEROSOL RESPIRATORY (INHALATION) at 21:36

## 2020-01-01 RX ADMIN — HEPARIN SODIUM 5000 UNIT(S): 5000 INJECTION INTRAVENOUS; SUBCUTANEOUS at 21:31

## 2020-01-01 RX ADMIN — Medication 150 MILLIGRAM(S): at 21:58

## 2020-01-01 RX ADMIN — Medication 150 MILLIGRAM(S): at 21:36

## 2020-01-01 RX ADMIN — FENTANYL CITRATE 5.2 MICROGRAM(S)/KG/HR: 50 INJECTION INTRAVENOUS at 23:01

## 2020-01-01 RX ADMIN — Medication 150 MILLIGRAM(S): at 21:23

## 2020-01-01 RX ADMIN — Medication 6 UNIT(S): at 23:34

## 2020-01-01 RX ADMIN — DEXMEDETOMIDINE HYDROCHLORIDE IN 0.9% SODIUM CHLORIDE 5.2 MICROGRAM(S)/KG/HR: 4 INJECTION INTRAVENOUS at 10:52

## 2020-01-01 RX ADMIN — CHLORHEXIDINE GLUCONATE 1 APPLICATION(S): 213 SOLUTION TOPICAL at 05:22

## 2020-01-01 RX ADMIN — BUDESONIDE AND FORMOTEROL FUMARATE DIHYDRATE 2 PUFF(S): 160; 4.5 AEROSOL RESPIRATORY (INHALATION) at 21:24

## 2020-01-01 RX ADMIN — LEVETIRACETAM 420 MILLIGRAM(S): 250 TABLET, FILM COATED ORAL at 06:13

## 2020-01-01 RX ADMIN — Medication 1 PATCH: at 12:13

## 2020-01-01 RX ADMIN — TAMSULOSIN HYDROCHLORIDE 0.4 MILLIGRAM(S): 0.4 CAPSULE ORAL at 21:37

## 2020-01-01 RX ADMIN — FENTANYL CITRATE 25 MICROGRAM(S): 50 INJECTION INTRAVENOUS at 10:52

## 2020-01-01 RX ADMIN — CHLORHEXIDINE GLUCONATE 1 APPLICATION(S): 213 SOLUTION TOPICAL at 05:53

## 2020-01-01 RX ADMIN — Medication 650 MILLIGRAM(S): at 12:12

## 2020-01-01 RX ADMIN — Medication 150 MILLIGRAM(S): at 11:13

## 2020-01-01 RX ADMIN — Medication 9.74 MICROGRAM(S)/KG/MIN: at 13:06

## 2020-01-01 RX ADMIN — AMPICILLIN SODIUM AND SULBACTAM SODIUM 100 GRAM(S): 250; 125 INJECTION, POWDER, FOR SUSPENSION INTRAMUSCULAR; INTRAVENOUS at 05:31

## 2020-01-01 RX ADMIN — PREGABALIN 1000 MICROGRAM(S): 225 CAPSULE ORAL at 13:00

## 2020-01-01 RX ADMIN — Medication 1 PATCH: at 17:51

## 2020-01-01 RX ADMIN — MIDAZOLAM HYDROCHLORIDE 2 MILLIGRAM(S): 1 INJECTION, SOLUTION INTRAMUSCULAR; INTRAVENOUS at 17:25

## 2020-01-01 RX ADMIN — Medication 650 MILLIGRAM(S): at 16:00

## 2020-01-01 RX ADMIN — Medication 25 MILLIGRAM(S): at 05:37

## 2020-01-01 RX ADMIN — CHLORHEXIDINE GLUCONATE 15 MILLILITER(S): 213 SOLUTION TOPICAL at 18:16

## 2020-01-01 RX ADMIN — Medication 1 PATCH: at 06:22

## 2020-01-01 RX ADMIN — BUDESONIDE AND FORMOTEROL FUMARATE DIHYDRATE 2 PUFF(S): 160; 4.5 AEROSOL RESPIRATORY (INHALATION) at 18:13

## 2020-01-01 RX ADMIN — AMPICILLIN SODIUM AND SULBACTAM SODIUM 100 GRAM(S): 250; 125 INJECTION, POWDER, FOR SUSPENSION INTRAMUSCULAR; INTRAVENOUS at 11:04

## 2020-01-01 RX ADMIN — HEPARIN SODIUM 5000 UNIT(S): 5000 INJECTION INTRAVENOUS; SUBCUTANEOUS at 15:00

## 2020-01-01 RX ADMIN — CHLORHEXIDINE GLUCONATE 1 APPLICATION(S): 213 SOLUTION TOPICAL at 05:59

## 2020-01-01 RX ADMIN — Medication 100 GRAM(S): at 09:11

## 2020-01-01 RX ADMIN — SIMVASTATIN 20 MILLIGRAM(S): 20 TABLET, FILM COATED ORAL at 21:32

## 2020-01-01 RX ADMIN — CHLORHEXIDINE GLUCONATE 1 APPLICATION(S): 213 SOLUTION TOPICAL at 21:14

## 2020-01-01 RX ADMIN — Medication 150 MILLIGRAM(S): at 22:02

## 2020-01-01 RX ADMIN — AMPICILLIN SODIUM AND SULBACTAM SODIUM 100 GRAM(S): 250; 125 INJECTION, POWDER, FOR SUSPENSION INTRAMUSCULAR; INTRAVENOUS at 12:35

## 2020-01-01 RX ADMIN — SIMVASTATIN 20 MILLIGRAM(S): 20 TABLET, FILM COATED ORAL at 21:58

## 2020-01-01 RX ADMIN — INSULIN GLARGINE 25 UNIT(S): 100 INJECTION, SOLUTION SUBCUTANEOUS at 07:27

## 2020-01-01 RX ADMIN — Medication 6 UNIT(S): at 23:31

## 2020-01-01 RX ADMIN — PREGABALIN 1000 MICROGRAM(S): 225 CAPSULE ORAL at 12:35

## 2020-01-01 RX ADMIN — PROPOFOL 14.4 MICROGRAM(S)/KG/MIN: 10 INJECTION, EMULSION INTRAVENOUS at 18:39

## 2020-01-01 RX ADMIN — DEXMEDETOMIDINE HYDROCHLORIDE IN 0.9% SODIUM CHLORIDE 5.2 MICROGRAM(S)/KG/HR: 4 INJECTION INTRAVENOUS at 22:14

## 2020-01-01 RX ADMIN — FENTANYL CITRATE 5.2 MICROGRAM(S)/KG/HR: 50 INJECTION INTRAVENOUS at 21:17

## 2020-01-01 RX ADMIN — HEPARIN SODIUM 5000 UNIT(S): 5000 INJECTION INTRAVENOUS; SUBCUTANEOUS at 15:05

## 2020-01-01 RX ADMIN — CHLORHEXIDINE GLUCONATE 15 MILLILITER(S): 213 SOLUTION TOPICAL at 06:13

## 2020-01-01 RX ADMIN — Medication 6 UNIT(S): at 17:39

## 2020-01-01 RX ADMIN — DEXMEDETOMIDINE HYDROCHLORIDE IN 0.9% SODIUM CHLORIDE 5.2 MICROGRAM(S)/KG/HR: 4 INJECTION INTRAVENOUS at 06:46

## 2020-01-01 RX ADMIN — MIDAZOLAM HYDROCHLORIDE 2.08 MG/KG/HR: 1 INJECTION, SOLUTION INTRAMUSCULAR; INTRAVENOUS at 21:08

## 2020-01-01 RX ADMIN — BUDESONIDE AND FORMOTEROL FUMARATE DIHYDRATE 2 PUFF(S): 160; 4.5 AEROSOL RESPIRATORY (INHALATION) at 08:02

## 2020-01-01 RX ADMIN — ENOXAPARIN SODIUM 40 MILLIGRAM(S): 100 INJECTION SUBCUTANEOUS at 11:25

## 2020-01-01 RX ADMIN — DEXMEDETOMIDINE HYDROCHLORIDE IN 0.9% SODIUM CHLORIDE 5.2 MICROGRAM(S)/KG/HR: 4 INJECTION INTRAVENOUS at 18:13

## 2020-01-01 RX ADMIN — Medication 150 MILLIGRAM(S): at 11:14

## 2020-01-01 RX ADMIN — SIMVASTATIN 20 MILLIGRAM(S): 20 TABLET, FILM COATED ORAL at 21:23

## 2020-01-01 RX ADMIN — DEXMEDETOMIDINE HYDROCHLORIDE IN 0.9% SODIUM CHLORIDE 5.2 MICROGRAM(S)/KG/HR: 4 INJECTION INTRAVENOUS at 08:50

## 2020-01-01 RX ADMIN — LEVETIRACETAM 420 MILLIGRAM(S): 250 TABLET, FILM COATED ORAL at 05:14

## 2020-01-01 RX ADMIN — SIMVASTATIN 20 MILLIGRAM(S): 20 TABLET, FILM COATED ORAL at 21:13

## 2020-01-01 RX ADMIN — MIDAZOLAM HYDROCHLORIDE 5 MILLIGRAM(S): 1 INJECTION, SOLUTION INTRAMUSCULAR; INTRAVENOUS at 16:40

## 2020-01-01 RX ADMIN — DEXMEDETOMIDINE HYDROCHLORIDE IN 0.9% SODIUM CHLORIDE 5.2 MICROGRAM(S)/KG/HR: 4 INJECTION INTRAVENOUS at 06:34

## 2020-01-01 RX ADMIN — LEVETIRACETAM 420 MILLIGRAM(S): 250 TABLET, FILM COATED ORAL at 06:01

## 2020-01-01 RX ADMIN — Medication 1 MILLIGRAM(S): at 12:35

## 2020-01-01 RX ADMIN — Medication 650 MILLIGRAM(S): at 06:01

## 2020-01-01 RX ADMIN — Medication 150 MILLIGRAM(S): at 11:06

## 2020-01-01 RX ADMIN — Medication 9.74 MICROGRAM(S)/KG/MIN: at 20:15

## 2020-01-01 RX ADMIN — CHLORHEXIDINE GLUCONATE 1 APPLICATION(S): 213 SOLUTION TOPICAL at 12:36

## 2020-01-01 RX ADMIN — HEPARIN SODIUM 5000 UNIT(S): 5000 INJECTION INTRAVENOUS; SUBCUTANEOUS at 21:14

## 2020-01-01 RX ADMIN — CHLORHEXIDINE GLUCONATE 15 MILLILITER(S): 213 SOLUTION TOPICAL at 17:25

## 2020-01-01 RX ADMIN — Medication 25 GRAM(S): at 22:23

## 2020-01-01 RX ADMIN — AMPICILLIN SODIUM AND SULBACTAM SODIUM 100 GRAM(S): 250; 125 INJECTION, POWDER, FOR SUSPENSION INTRAMUSCULAR; INTRAVENOUS at 23:22

## 2020-01-01 RX ADMIN — Medication 1 PATCH: at 20:24

## 2020-01-01 RX ADMIN — Medication 10 MILLIGRAM(S): at 08:40

## 2020-01-01 RX ADMIN — LEVETIRACETAM 420 MILLIGRAM(S): 250 TABLET, FILM COATED ORAL at 18:15

## 2020-01-01 RX ADMIN — SIMVASTATIN 20 MILLIGRAM(S): 20 TABLET, FILM COATED ORAL at 21:36

## 2020-01-01 RX ADMIN — ENOXAPARIN SODIUM 40 MILLIGRAM(S): 100 INJECTION SUBCUTANEOUS at 11:21

## 2020-01-01 RX ADMIN — Medication 25 MILLIGRAM(S): at 05:45

## 2020-01-01 RX ADMIN — AMPICILLIN SODIUM AND SULBACTAM SODIUM 100 GRAM(S): 250; 125 INJECTION, POWDER, FOR SUSPENSION INTRAMUSCULAR; INTRAVENOUS at 06:45

## 2020-01-01 RX ADMIN — Medication 12.5 MILLIGRAM(S): at 17:04

## 2020-01-01 RX ADMIN — CHLORHEXIDINE GLUCONATE 1 APPLICATION(S): 213 SOLUTION TOPICAL at 05:45

## 2020-01-01 RX ADMIN — MEROPENEM 100 MILLIGRAM(S): 1 INJECTION INTRAVENOUS at 13:01

## 2020-01-01 RX ADMIN — LEVETIRACETAM 420 MILLIGRAM(S): 250 TABLET, FILM COATED ORAL at 06:45

## 2020-01-01 RX ADMIN — LEVETIRACETAM 420 MILLIGRAM(S): 250 TABLET, FILM COATED ORAL at 17:21

## 2020-01-01 RX ADMIN — MIDAZOLAM HYDROCHLORIDE 4 MILLIGRAM(S): 1 INJECTION, SOLUTION INTRAMUSCULAR; INTRAVENOUS at 10:08

## 2020-01-01 RX ADMIN — LEVETIRACETAM 420 MILLIGRAM(S): 250 TABLET, FILM COATED ORAL at 17:34

## 2020-01-01 RX ADMIN — Medication 50 MILLIEQUIVALENT(S): at 09:20

## 2020-01-01 RX ADMIN — CHLORHEXIDINE GLUCONATE 15 MILLILITER(S): 213 SOLUTION TOPICAL at 17:57

## 2020-01-01 RX ADMIN — HEPARIN SODIUM 5000 UNIT(S): 5000 INJECTION INTRAVENOUS; SUBCUTANEOUS at 21:08

## 2020-01-01 RX ADMIN — Medication 6 UNIT(S): at 12:00

## 2020-01-01 RX ADMIN — Medication 50 GRAM(S): at 16:55

## 2020-01-01 RX ADMIN — FENTANYL CITRATE 5.2 MICROGRAM(S)/KG/HR: 50 INJECTION INTRAVENOUS at 15:56

## 2020-01-01 RX ADMIN — INSULIN GLARGINE 25 UNIT(S): 100 INJECTION, SOLUTION SUBCUTANEOUS at 15:45

## 2020-01-01 RX ADMIN — Medication 1 MILLIGRAM(S): at 12:49

## 2020-01-01 RX ADMIN — Medication 150 MILLIGRAM(S): at 12:12

## 2020-01-01 RX ADMIN — Medication 650 MILLIGRAM(S): at 02:11

## 2020-01-01 RX ADMIN — LEVETIRACETAM 420 MILLIGRAM(S): 250 TABLET, FILM COATED ORAL at 05:32

## 2020-01-01 RX ADMIN — MEROPENEM 100 MILLIGRAM(S): 1 INJECTION INTRAVENOUS at 21:18

## 2020-01-01 RX ADMIN — Medication 1 PATCH: at 11:13

## 2020-01-01 RX ADMIN — LEVETIRACETAM 400 MILLIGRAM(S): 250 TABLET, FILM COATED ORAL at 08:55

## 2020-01-01 RX ADMIN — Medication 1 MILLIGRAM(S): at 12:38

## 2020-01-01 RX ADMIN — Medication 100 MILLIGRAM(S): at 08:52

## 2020-01-01 RX ADMIN — Medication 25 MILLIGRAM(S): at 05:34

## 2020-01-01 RX ADMIN — LEVETIRACETAM 420 MILLIGRAM(S): 250 TABLET, FILM COATED ORAL at 05:19

## 2020-01-01 RX ADMIN — LEVETIRACETAM 420 MILLIGRAM(S): 250 TABLET, FILM COATED ORAL at 05:06

## 2020-01-01 RX ADMIN — BUPROPION HYDROCHLORIDE 300 MILLIGRAM(S): 150 TABLET, EXTENDED RELEASE ORAL at 11:26

## 2020-01-01 RX ADMIN — Medication 1 MILLIGRAM(S): at 11:13

## 2020-01-01 RX ADMIN — TAMSULOSIN HYDROCHLORIDE 0.4 MILLIGRAM(S): 0.4 CAPSULE ORAL at 12:38

## 2020-01-01 RX ADMIN — Medication 9.74 MICROGRAM(S)/KG/MIN: at 15:08

## 2020-01-01 RX ADMIN — Medication 6 UNIT(S): at 17:13

## 2020-01-01 RX ADMIN — INSULIN GLARGINE 25 UNIT(S): 100 INJECTION, SOLUTION SUBCUTANEOUS at 08:26

## 2020-01-01 RX ADMIN — CHLORHEXIDINE GLUCONATE 15 MILLILITER(S): 213 SOLUTION TOPICAL at 16:09

## 2020-01-01 RX ADMIN — HEPARIN SODIUM 5000 UNIT(S): 5000 INJECTION INTRAVENOUS; SUBCUTANEOUS at 06:00

## 2020-01-01 RX ADMIN — CHLORHEXIDINE GLUCONATE 1 APPLICATION(S): 213 SOLUTION TOPICAL at 11:06

## 2020-01-01 RX ADMIN — BUPROPION HYDROCHLORIDE 300 MILLIGRAM(S): 150 TABLET, EXTENDED RELEASE ORAL at 12:12

## 2020-01-01 RX ADMIN — Medication 650 MILLIGRAM(S): at 15:26

## 2020-01-01 RX ADMIN — CHLORHEXIDINE GLUCONATE 1 APPLICATION(S): 213 SOLUTION TOPICAL at 05:29

## 2020-01-01 RX ADMIN — FENTANYL CITRATE 5.2 MICROGRAM(S)/KG/HR: 50 INJECTION INTRAVENOUS at 06:12

## 2020-01-01 RX ADMIN — LACTULOSE 15 GRAM(S): 10 SOLUTION ORAL at 17:25

## 2020-01-01 RX ADMIN — Medication 1 PATCH: at 11:12

## 2020-01-01 RX ADMIN — AMPICILLIN SODIUM AND SULBACTAM SODIUM 100 GRAM(S): 250; 125 INJECTION, POWDER, FOR SUSPENSION INTRAMUSCULAR; INTRAVENOUS at 23:50

## 2020-01-01 RX ADMIN — Medication 12.5 MILLIGRAM(S): at 17:30

## 2020-01-01 RX ADMIN — Medication 25 MILLIGRAM(S): at 17:50

## 2020-01-01 RX ADMIN — INSULIN GLARGINE 25 UNIT(S): 100 INJECTION, SOLUTION SUBCUTANEOUS at 10:35

## 2020-01-01 RX ADMIN — LISINOPRIL 40 MILLIGRAM(S): 2.5 TABLET ORAL at 05:38

## 2020-01-01 RX ADMIN — TAMSULOSIN HYDROCHLORIDE 0.4 MILLIGRAM(S): 0.4 CAPSULE ORAL at 21:25

## 2020-01-01 RX ADMIN — MEROPENEM 100 MILLIGRAM(S): 1 INJECTION INTRAVENOUS at 05:14

## 2020-01-01 RX ADMIN — MEROPENEM 100 MILLIGRAM(S): 1 INJECTION INTRAVENOUS at 13:15

## 2020-01-01 RX ADMIN — LISINOPRIL 40 MILLIGRAM(S): 2.5 TABLET ORAL at 05:45

## 2020-01-01 RX ADMIN — Medication 150 MILLIGRAM(S): at 12:35

## 2020-01-01 RX ADMIN — LEVETIRACETAM 420 MILLIGRAM(S): 250 TABLET, FILM COATED ORAL at 06:02

## 2020-01-01 RX ADMIN — Medication 6 UNIT(S): at 12:26

## 2020-01-01 RX ADMIN — MIDAZOLAM HYDROCHLORIDE 4 MILLIGRAM(S): 1 INJECTION, SOLUTION INTRAMUSCULAR; INTRAVENOUS at 22:49

## 2020-01-01 RX ADMIN — Medication 50 MILLIEQUIVALENT(S): at 12:06

## 2020-01-01 RX ADMIN — CHLORHEXIDINE GLUCONATE 15 MILLILITER(S): 213 SOLUTION TOPICAL at 06:04

## 2020-01-01 RX ADMIN — Medication 150 MILLIGRAM(S): at 12:50

## 2020-01-01 RX ADMIN — HEPARIN SODIUM 5000 UNIT(S): 5000 INJECTION INTRAVENOUS; SUBCUTANEOUS at 05:14

## 2020-01-01 RX ADMIN — Medication 12.5 MILLIGRAM(S): at 05:52

## 2020-01-01 RX ADMIN — PREGABALIN 1000 MICROGRAM(S): 225 CAPSULE ORAL at 13:10

## 2020-01-01 RX ADMIN — FENTANYL CITRATE 5.2 MICROGRAM(S)/KG/HR: 50 INJECTION INTRAVENOUS at 12:46

## 2020-01-01 RX ADMIN — BUDESONIDE AND FORMOTEROL FUMARATE DIHYDRATE 2 PUFF(S): 160; 4.5 AEROSOL RESPIRATORY (INHALATION) at 21:38

## 2020-01-01 RX ADMIN — Medication 12.5 MILLIGRAM(S): at 17:18

## 2020-01-01 RX ADMIN — PREGABALIN 1000 MICROGRAM(S): 225 CAPSULE ORAL at 12:48

## 2020-01-01 RX ADMIN — AMPICILLIN SODIUM AND SULBACTAM SODIUM 100 GRAM(S): 250; 125 INJECTION, POWDER, FOR SUSPENSION INTRAMUSCULAR; INTRAVENOUS at 18:40

## 2020-01-01 RX ADMIN — CHLORHEXIDINE GLUCONATE 15 MILLILITER(S): 213 SOLUTION TOPICAL at 17:21

## 2020-01-01 RX ADMIN — TAMSULOSIN HYDROCHLORIDE 0.4 MILLIGRAM(S): 0.4 CAPSULE ORAL at 21:08

## 2020-01-01 RX ADMIN — LEVETIRACETAM 420 MILLIGRAM(S): 250 TABLET, FILM COATED ORAL at 05:53

## 2020-01-01 RX ADMIN — SODIUM CHLORIDE 2000 MILLILITER(S): 9 INJECTION INTRAMUSCULAR; INTRAVENOUS; SUBCUTANEOUS at 15:56

## 2020-01-01 RX ADMIN — PREGABALIN 1000 MICROGRAM(S): 225 CAPSULE ORAL at 12:38

## 2020-01-01 RX ADMIN — MEROPENEM 100 MILLIGRAM(S): 1 INJECTION INTRAVENOUS at 21:15

## 2020-01-01 RX ADMIN — Medication 650 MILLIGRAM(S): at 06:00

## 2020-01-01 RX ADMIN — FENTANYL CITRATE 25 MICROGRAM(S): 50 INJECTION INTRAVENOUS at 13:30

## 2020-01-01 RX ADMIN — FENTANYL CITRATE 25 MICROGRAM(S): 50 INJECTION INTRAVENOUS at 11:06

## 2020-01-01 RX ADMIN — Medication 150 MILLIGRAM(S): at 17:10

## 2020-01-01 RX ADMIN — MIDAZOLAM HYDROCHLORIDE 4 MILLIGRAM(S): 1 INJECTION, SOLUTION INTRAMUSCULAR; INTRAVENOUS at 07:50

## 2020-01-01 RX ADMIN — CHLORHEXIDINE GLUCONATE 15 MILLILITER(S): 213 SOLUTION TOPICAL at 17:30

## 2020-01-01 RX ADMIN — FENTANYL CITRATE 25 MICROGRAM(S): 50 INJECTION INTRAVENOUS at 08:00

## 2020-01-01 RX ADMIN — Medication 150 MILLIGRAM(S): at 11:25

## 2020-01-01 RX ADMIN — MIDAZOLAM HYDROCHLORIDE 2 MILLIGRAM(S): 1 INJECTION, SOLUTION INTRAMUSCULAR; INTRAVENOUS at 15:55

## 2020-01-01 RX ADMIN — Medication 1 MILLIGRAM(S): at 11:14

## 2020-01-01 RX ADMIN — Medication 150 MILLIGRAM(S): at 12:16

## 2020-01-01 RX ADMIN — MEROPENEM 100 MILLIGRAM(S): 1 INJECTION INTRAVENOUS at 05:06

## 2020-01-01 RX ADMIN — CHLORHEXIDINE GLUCONATE 1 APPLICATION(S): 213 SOLUTION TOPICAL at 12:49

## 2020-01-01 RX ADMIN — Medication 1 PATCH: at 16:23

## 2020-01-01 RX ADMIN — Medication 150 MILLIGRAM(S): at 11:12

## 2020-01-01 RX ADMIN — Medication 6 UNIT(S): at 06:16

## 2020-01-01 RX ADMIN — Medication 650 MILLIGRAM(S): at 04:15

## 2020-01-01 RX ADMIN — MEROPENEM 100 MILLIGRAM(S): 1 INJECTION INTRAVENOUS at 06:00

## 2020-01-01 RX ADMIN — Medication 1 MILLIGRAM(S): at 11:19

## 2020-01-01 RX ADMIN — Medication 50 GRAM(S): at 08:14

## 2020-01-01 RX ADMIN — BUDESONIDE AND FORMOTEROL FUMARATE DIHYDRATE 2 PUFF(S): 160; 4.5 AEROSOL RESPIRATORY (INHALATION) at 22:02

## 2020-01-01 RX ADMIN — BUPROPION HYDROCHLORIDE 300 MILLIGRAM(S): 150 TABLET, EXTENDED RELEASE ORAL at 11:13

## 2020-01-01 RX ADMIN — CHLORHEXIDINE GLUCONATE 1 APPLICATION(S): 213 SOLUTION TOPICAL at 12:06

## 2020-01-01 RX ADMIN — Medication 1 MILLIGRAM(S): at 12:25

## 2020-01-01 RX ADMIN — AMPICILLIN SODIUM AND SULBACTAM SODIUM 100 GRAM(S): 250; 125 INJECTION, POWDER, FOR SUSPENSION INTRAMUSCULAR; INTRAVENOUS at 00:14

## 2020-01-01 RX ADMIN — Medication 1 PATCH: at 11:06

## 2020-01-01 RX ADMIN — ENOXAPARIN SODIUM 40 MILLIGRAM(S): 100 INJECTION SUBCUTANEOUS at 11:43

## 2020-01-01 RX ADMIN — INSULIN GLARGINE 25 UNIT(S): 100 INJECTION, SOLUTION SUBCUTANEOUS at 13:12

## 2020-01-01 RX ADMIN — SIMVASTATIN 20 MILLIGRAM(S): 20 TABLET, FILM COATED ORAL at 22:02

## 2020-01-01 RX ADMIN — FENTANYL CITRATE 25 MICROGRAM(S): 50 INJECTION INTRAVENOUS at 07:45

## 2020-01-01 RX ADMIN — LEVETIRACETAM 420 MILLIGRAM(S): 250 TABLET, FILM COATED ORAL at 05:21

## 2020-01-01 RX ADMIN — Medication 1 PATCH: at 21:38

## 2020-01-01 RX ADMIN — Medication 1 MILLIGRAM(S): at 11:26

## 2020-01-01 RX ADMIN — BUPROPION HYDROCHLORIDE 300 MILLIGRAM(S): 150 TABLET, EXTENDED RELEASE ORAL at 11:06

## 2020-01-01 RX ADMIN — PROPOFOL 14.4 MICROGRAM(S)/KG/MIN: 10 INJECTION, EMULSION INTRAVENOUS at 16:00

## 2020-01-01 RX ADMIN — TAMSULOSIN HYDROCHLORIDE 0.4 MILLIGRAM(S): 0.4 CAPSULE ORAL at 21:39

## 2020-01-01 RX ADMIN — HEPARIN SODIUM 5000 UNIT(S): 5000 INJECTION INTRAVENOUS; SUBCUTANEOUS at 13:02

## 2020-01-01 RX ADMIN — MEROPENEM 100 MILLIGRAM(S): 1 INJECTION INTRAVENOUS at 13:03

## 2020-01-01 RX ADMIN — HEPARIN SODIUM 5000 UNIT(S): 5000 INJECTION INTRAVENOUS; SUBCUTANEOUS at 21:13

## 2020-01-01 RX ADMIN — Medication 25 MILLIGRAM(S): at 05:29

## 2020-01-01 RX ADMIN — CHLORHEXIDINE GLUCONATE 15 MILLILITER(S): 213 SOLUTION TOPICAL at 05:37

## 2020-01-01 RX ADMIN — AMPICILLIN SODIUM AND SULBACTAM SODIUM 100 GRAM(S): 250; 125 INJECTION, POWDER, FOR SUSPENSION INTRAMUSCULAR; INTRAVENOUS at 17:56

## 2020-01-01 RX ADMIN — SODIUM CHLORIDE 70 MILLILITER(S): 9 INJECTION, SOLUTION INTRAVENOUS at 05:31

## 2020-01-01 RX ADMIN — HEPARIN SODIUM 5000 UNIT(S): 5000 INJECTION INTRAVENOUS; SUBCUTANEOUS at 06:06

## 2020-01-01 RX ADMIN — INSULIN GLARGINE 25 UNIT(S): 100 INJECTION, SOLUTION SUBCUTANEOUS at 23:31

## 2020-01-01 RX ADMIN — FENTANYL CITRATE 5.2 MICROGRAM(S)/KG/HR: 50 INJECTION INTRAVENOUS at 14:02

## 2020-01-01 RX ADMIN — AMPICILLIN SODIUM AND SULBACTAM SODIUM 100 GRAM(S): 250; 125 INJECTION, POWDER, FOR SUSPENSION INTRAMUSCULAR; INTRAVENOUS at 11:11

## 2020-01-01 RX ADMIN — HEPARIN SODIUM 5000 UNIT(S): 5000 INJECTION INTRAVENOUS; SUBCUTANEOUS at 21:26

## 2020-01-01 RX ADMIN — Medication 1 MILLIGRAM(S): at 13:10

## 2020-01-01 RX ADMIN — HEPARIN SODIUM 5000 UNIT(S): 5000 INJECTION INTRAVENOUS; SUBCUTANEOUS at 05:21

## 2020-01-01 RX ADMIN — Medication 1 PATCH: at 17:15

## 2020-01-01 RX ADMIN — Medication 150 MILLIGRAM(S): at 11:27

## 2020-01-01 RX ADMIN — CHLORHEXIDINE GLUCONATE 15 MILLILITER(S): 213 SOLUTION TOPICAL at 05:07

## 2020-01-01 RX ADMIN — Medication 0: at 17:24

## 2020-01-01 RX ADMIN — Medication 25 GRAM(S): at 09:20

## 2020-01-01 RX ADMIN — MEROPENEM 100 MILLIGRAM(S): 1 INJECTION INTRAVENOUS at 06:38

## 2020-01-01 RX ADMIN — CHLORHEXIDINE GLUCONATE 15 MILLILITER(S): 213 SOLUTION TOPICAL at 06:45

## 2020-01-01 RX ADMIN — HEPARIN SODIUM 5000 UNIT(S): 5000 INJECTION INTRAVENOUS; SUBCUTANEOUS at 05:06

## 2020-01-01 RX ADMIN — DEXMEDETOMIDINE HYDROCHLORIDE IN 0.9% SODIUM CHLORIDE 5.2 MICROGRAM(S)/KG/HR: 4 INJECTION INTRAVENOUS at 08:23

## 2020-01-01 RX ADMIN — AMPICILLIN SODIUM AND SULBACTAM SODIUM 100 GRAM(S): 250; 125 INJECTION, POWDER, FOR SUSPENSION INTRAMUSCULAR; INTRAVENOUS at 05:00

## 2020-01-01 RX ADMIN — Medication 150 MILLIGRAM(S): at 21:38

## 2020-01-01 RX ADMIN — Medication 1 MILLIGRAM(S): at 11:06

## 2020-01-01 RX ADMIN — DEXMEDETOMIDINE HYDROCHLORIDE IN 0.9% SODIUM CHLORIDE 5.2 MICROGRAM(S)/KG/HR: 4 INJECTION INTRAVENOUS at 00:53

## 2020-01-01 RX ADMIN — PREGABALIN 1000 MICROGRAM(S): 225 CAPSULE ORAL at 12:15

## 2020-01-01 RX ADMIN — SENNA PLUS 2 TABLET(S): 8.6 TABLET ORAL at 21:32

## 2020-01-01 RX ADMIN — LEVETIRACETAM 420 MILLIGRAM(S): 250 TABLET, FILM COATED ORAL at 17:25

## 2020-01-01 RX ADMIN — TAMSULOSIN HYDROCHLORIDE 0.4 MILLIGRAM(S): 0.4 CAPSULE ORAL at 21:24

## 2020-01-01 RX ADMIN — Medication 6 UNIT(S): at 12:25

## 2020-01-01 RX ADMIN — TAMSULOSIN HYDROCHLORIDE 0.4 MILLIGRAM(S): 0.4 CAPSULE ORAL at 22:02

## 2020-01-01 RX ADMIN — Medication 1 PATCH: at 19:36

## 2020-01-01 RX ADMIN — MEROPENEM 100 MILLIGRAM(S): 1 INJECTION INTRAVENOUS at 15:04

## 2020-01-01 RX ADMIN — CHLORHEXIDINE GLUCONATE 15 MILLILITER(S): 213 SOLUTION TOPICAL at 05:59

## 2020-01-01 RX ADMIN — FENTANYL CITRATE 25 MICROGRAM(S): 50 INJECTION INTRAVENOUS at 21:23

## 2020-01-01 RX ADMIN — AMPICILLIN SODIUM AND SULBACTAM SODIUM 100 GRAM(S): 250; 125 INJECTION, POWDER, FOR SUSPENSION INTRAMUSCULAR; INTRAVENOUS at 12:05

## 2020-01-01 RX ADMIN — CHLORHEXIDINE GLUCONATE 15 MILLILITER(S): 213 SOLUTION TOPICAL at 17:58

## 2020-01-01 RX ADMIN — LEVETIRACETAM 420 MILLIGRAM(S): 250 TABLET, FILM COATED ORAL at 17:18

## 2020-01-01 RX ADMIN — Medication 1 MILLIGRAM(S): at 12:12

## 2020-01-01 RX ADMIN — Medication 650 MILLIGRAM(S): at 14:51

## 2020-01-01 RX ADMIN — Medication 6 UNIT(S): at 00:00

## 2020-01-01 RX ADMIN — Medication 6 UNIT(S): at 05:38

## 2020-01-01 RX ADMIN — LEVETIRACETAM 420 MILLIGRAM(S): 250 TABLET, FILM COATED ORAL at 17:31

## 2020-01-01 RX ADMIN — Medication 25 MILLIGRAM(S): at 17:15

## 2020-01-01 RX ADMIN — Medication 1 MILLIGRAM(S): at 11:25

## 2020-01-01 RX ADMIN — HEPARIN SODIUM 5000 UNIT(S): 5000 INJECTION INTRAVENOUS; SUBCUTANEOUS at 21:49

## 2020-01-01 RX ADMIN — Medication 1 PATCH: at 20:59

## 2020-01-01 RX ADMIN — TAMSULOSIN HYDROCHLORIDE 0.4 MILLIGRAM(S): 0.4 CAPSULE ORAL at 21:38

## 2020-01-01 RX ADMIN — Medication 6 UNIT(S): at 23:08

## 2020-01-01 RX ADMIN — FENTANYL CITRATE 5.2 MICROGRAM(S)/KG/HR: 50 INJECTION INTRAVENOUS at 10:52

## 2020-01-01 RX ADMIN — PREGABALIN 1000 MICROGRAM(S): 225 CAPSULE ORAL at 11:13

## 2020-01-01 RX ADMIN — SODIUM CHLORIDE 1000 MILLILITER(S): 9 INJECTION, SOLUTION INTRAVENOUS at 12:29

## 2020-01-01 RX ADMIN — CHLORHEXIDINE GLUCONATE 1 APPLICATION(S): 213 SOLUTION TOPICAL at 05:15

## 2020-01-01 RX ADMIN — CHLORHEXIDINE GLUCONATE 15 MILLILITER(S): 213 SOLUTION TOPICAL at 06:01

## 2020-01-01 RX ADMIN — FENTANYL CITRATE 5.2 MICROGRAM(S)/KG/HR: 50 INJECTION INTRAVENOUS at 05:17

## 2020-01-01 RX ADMIN — PREGABALIN 1000 MICROGRAM(S): 225 CAPSULE ORAL at 12:50

## 2020-01-01 RX ADMIN — Medication 1 PATCH: at 08:03

## 2020-01-01 RX ADMIN — HEPARIN SODIUM 5000 UNIT(S): 5000 INJECTION INTRAVENOUS; SUBCUTANEOUS at 13:04

## 2020-01-01 RX ADMIN — Medication 12.5 MILLIGRAM(S): at 05:14

## 2020-01-01 RX ADMIN — SIMVASTATIN 20 MILLIGRAM(S): 20 TABLET, FILM COATED ORAL at 22:23

## 2020-01-01 RX ADMIN — LISINOPRIL 40 MILLIGRAM(S): 2.5 TABLET ORAL at 05:34

## 2020-01-01 RX ADMIN — BUDESONIDE AND FORMOTEROL FUMARATE DIHYDRATE 2 PUFF(S): 160; 4.5 AEROSOL RESPIRATORY (INHALATION) at 07:27

## 2020-01-01 RX ADMIN — SIMVASTATIN 20 MILLIGRAM(S): 20 TABLET, FILM COATED ORAL at 12:37

## 2020-01-01 RX ADMIN — Medication 1 PATCH: at 11:26

## 2020-01-01 RX ADMIN — Medication 6 UNIT(S): at 00:44

## 2020-01-01 RX ADMIN — HEPARIN SODIUM 5000 UNIT(S): 5000 INJECTION INTRAVENOUS; SUBCUTANEOUS at 05:52

## 2020-01-01 RX ADMIN — CHLORHEXIDINE GLUCONATE 1 APPLICATION(S): 213 SOLUTION TOPICAL at 12:42

## 2020-01-01 RX ADMIN — Medication 25 MILLIGRAM(S): at 17:35

## 2020-01-01 RX ADMIN — CHLORHEXIDINE GLUCONATE 15 MILLILITER(S): 213 SOLUTION TOPICAL at 17:19

## 2020-01-01 RX ADMIN — MEROPENEM 100 MILLIGRAM(S): 1 INJECTION INTRAVENOUS at 15:20

## 2020-01-01 RX ADMIN — Medication 650 MILLIGRAM(S): at 09:00

## 2020-01-01 RX ADMIN — HYDROMORPHONE HYDROCHLORIDE 1 MILLIGRAM(S): 2 INJECTION INTRAMUSCULAR; INTRAVENOUS; SUBCUTANEOUS at 10:50

## 2020-01-01 RX ADMIN — Medication 1 MILLIGRAM(S): at 12:48

## 2020-01-01 RX ADMIN — Medication 150 MILLIGRAM(S): at 21:24

## 2020-01-01 RX ADMIN — Medication 1 MILLIGRAM(S): at 12:43

## 2020-01-01 RX ADMIN — MIDAZOLAM HYDROCHLORIDE 2.08 MG/KG/HR: 1 INJECTION, SOLUTION INTRAMUSCULAR; INTRAVENOUS at 10:40

## 2020-01-01 RX ADMIN — TAMSULOSIN HYDROCHLORIDE 0.4 MILLIGRAM(S): 0.4 CAPSULE ORAL at 21:58

## 2020-01-01 RX ADMIN — FENTANYL CITRATE 25 MICROGRAM(S): 50 INJECTION INTRAVENOUS at 21:29

## 2020-01-01 RX ADMIN — BUPROPION HYDROCHLORIDE 300 MILLIGRAM(S): 150 TABLET, EXTENDED RELEASE ORAL at 11:43

## 2020-01-01 RX ADMIN — SIMVASTATIN 20 MILLIGRAM(S): 20 TABLET, FILM COATED ORAL at 21:37

## 2020-01-01 RX ADMIN — MIDAZOLAM HYDROCHLORIDE 2 MILLIGRAM(S): 1 INJECTION, SOLUTION INTRAMUSCULAR; INTRAVENOUS at 17:24

## 2020-01-01 RX ADMIN — PREGABALIN 1000 MICROGRAM(S): 225 CAPSULE ORAL at 12:07

## 2020-01-01 RX ADMIN — TAMSULOSIN HYDROCHLORIDE 0.4 MILLIGRAM(S): 0.4 CAPSULE ORAL at 22:23

## 2020-01-01 RX ADMIN — Medication 9.74 MICROGRAM(S)/KG/MIN: at 06:36

## 2020-01-01 RX ADMIN — AMPICILLIN SODIUM AND SULBACTAM SODIUM 100 GRAM(S): 250; 125 INJECTION, POWDER, FOR SUSPENSION INTRAMUSCULAR; INTRAVENOUS at 17:30

## 2020-01-01 RX ADMIN — HEPARIN SODIUM 5000 UNIT(S): 5000 INJECTION INTRAVENOUS; SUBCUTANEOUS at 13:26

## 2020-01-01 RX ADMIN — CHLORHEXIDINE GLUCONATE 15 MILLILITER(S): 213 SOLUTION TOPICAL at 17:34

## 2020-01-01 RX ADMIN — Medication 1 PATCH: at 06:25

## 2020-01-01 RX ADMIN — CHLORHEXIDINE GLUCONATE 1 APPLICATION(S): 213 SOLUTION TOPICAL at 12:34

## 2020-01-01 RX ADMIN — BUPROPION HYDROCHLORIDE 300 MILLIGRAM(S): 150 TABLET, EXTENDED RELEASE ORAL at 11:25

## 2020-01-01 RX ADMIN — TAMSULOSIN HYDROCHLORIDE 0.4 MILLIGRAM(S): 0.4 CAPSULE ORAL at 21:36

## 2020-01-01 RX ADMIN — LEVETIRACETAM 420 MILLIGRAM(S): 250 TABLET, FILM COATED ORAL at 17:58

## 2020-01-01 RX ADMIN — MEROPENEM 100 MILLIGRAM(S): 1 INJECTION INTRAVENOUS at 16:43

## 2020-01-01 RX ADMIN — BUDESONIDE AND FORMOTEROL FUMARATE DIHYDRATE 2 PUFF(S): 160; 4.5 AEROSOL RESPIRATORY (INHALATION) at 07:42

## 2020-01-01 RX ADMIN — BUDESONIDE AND FORMOTEROL FUMARATE DIHYDRATE 2 PUFF(S): 160; 4.5 AEROSOL RESPIRATORY (INHALATION) at 21:58

## 2020-01-01 RX ADMIN — TAMSULOSIN HYDROCHLORIDE 0.4 MILLIGRAM(S): 0.4 CAPSULE ORAL at 13:10

## 2020-01-01 RX ADMIN — BUDESONIDE AND FORMOTEROL FUMARATE DIHYDRATE 2 PUFF(S): 160; 4.5 AEROSOL RESPIRATORY (INHALATION) at 10:10

## 2020-01-01 RX ADMIN — LEVETIRACETAM 420 MILLIGRAM(S): 250 TABLET, FILM COATED ORAL at 06:04

## 2020-01-01 RX ADMIN — SIMVASTATIN 20 MILLIGRAM(S): 20 TABLET, FILM COATED ORAL at 21:38

## 2020-01-01 RX ADMIN — Medication 1: at 06:16

## 2020-01-01 RX ADMIN — Medication 3: at 23:30

## 2020-01-01 RX ADMIN — DEXMEDETOMIDINE HYDROCHLORIDE IN 0.9% SODIUM CHLORIDE 5.2 MICROGRAM(S)/KG/HR: 4 INJECTION INTRAVENOUS at 01:35

## 2020-01-01 RX ADMIN — Medication 650 MILLIGRAM(S): at 11:11

## 2020-01-01 RX ADMIN — MEROPENEM 100 MILLIGRAM(S): 1 INJECTION INTRAVENOUS at 21:52

## 2020-01-01 RX ADMIN — Medication 50 GRAM(S): at 10:20

## 2020-01-01 RX ADMIN — CHLORHEXIDINE GLUCONATE 15 MILLILITER(S): 213 SOLUTION TOPICAL at 05:21

## 2020-01-01 RX ADMIN — SIMVASTATIN 20 MILLIGRAM(S): 20 TABLET, FILM COATED ORAL at 13:10

## 2020-01-01 RX ADMIN — DEXMEDETOMIDINE HYDROCHLORIDE IN 0.9% SODIUM CHLORIDE 5.2 MICROGRAM(S)/KG/HR: 4 INJECTION INTRAVENOUS at 23:01

## 2020-01-01 RX ADMIN — TAMSULOSIN HYDROCHLORIDE 0.4 MILLIGRAM(S): 0.4 CAPSULE ORAL at 21:13

## 2020-01-01 RX ADMIN — LEVETIRACETAM 420 MILLIGRAM(S): 250 TABLET, FILM COATED ORAL at 13:16

## 2020-01-01 RX ADMIN — Medication 100 MILLIEQUIVALENT(S): at 11:37

## 2020-01-01 RX ADMIN — MEROPENEM 100 MILLIGRAM(S): 1 INJECTION INTRAVENOUS at 05:52

## 2020-01-01 RX ADMIN — FENTANYL CITRATE 5.2 MICROGRAM(S)/KG/HR: 50 INJECTION INTRAVENOUS at 22:27

## 2020-01-01 RX ADMIN — Medication 1: at 12:27

## 2020-01-01 RX ADMIN — Medication 1 PATCH: at 11:28

## 2020-01-01 RX ADMIN — LACTULOSE 15 GRAM(S): 10 SOLUTION ORAL at 06:06

## 2020-01-01 RX ADMIN — FENTANYL CITRATE 5.2 MICROGRAM(S)/KG/HR: 50 INJECTION INTRAVENOUS at 06:14

## 2020-01-01 RX ADMIN — Medication 12.5 MILLIGRAM(S): at 17:10

## 2020-01-01 RX ADMIN — Medication 650 MILLIGRAM(S): at 08:00

## 2020-01-01 RX ADMIN — DEXMEDETOMIDINE HYDROCHLORIDE IN 0.9% SODIUM CHLORIDE 5.2 MICROGRAM(S)/KG/HR: 4 INJECTION INTRAVENOUS at 13:05

## 2020-01-01 RX ADMIN — LISINOPRIL 40 MILLIGRAM(S): 2.5 TABLET ORAL at 05:40

## 2020-01-01 RX ADMIN — FENTANYL CITRATE 5.2 MICROGRAM(S)/KG/HR: 50 INJECTION INTRAVENOUS at 21:38

## 2020-01-01 RX ADMIN — SIMVASTATIN 20 MILLIGRAM(S): 20 TABLET, FILM COATED ORAL at 21:25

## 2020-01-01 RX ADMIN — Medication 150 MILLIGRAM(S): at 11:44

## 2020-01-01 RX ADMIN — CHLORHEXIDINE GLUCONATE 15 MILLILITER(S): 213 SOLUTION TOPICAL at 17:09

## 2020-01-01 RX ADMIN — Medication 6 UNIT(S): at 17:24

## 2020-01-01 RX ADMIN — PREGABALIN 1000 MICROGRAM(S): 225 CAPSULE ORAL at 11:05

## 2020-01-01 RX ADMIN — HEPARIN SODIUM 5000 UNIT(S): 5000 INJECTION INTRAVENOUS; SUBCUTANEOUS at 17:05

## 2020-01-01 RX ADMIN — BUDESONIDE AND FORMOTEROL FUMARATE DIHYDRATE 2 PUFF(S): 160; 4.5 AEROSOL RESPIRATORY (INHALATION) at 07:45

## 2020-01-01 RX ADMIN — DEXMEDETOMIDINE HYDROCHLORIDE IN 0.9% SODIUM CHLORIDE 5.2 MICROGRAM(S)/KG/HR: 4 INJECTION INTRAVENOUS at 09:06

## 2020-01-01 RX ADMIN — Medication 25 MILLIGRAM(S): at 18:13

## 2020-01-01 RX ADMIN — Medication 150 MILLIGRAM(S): at 13:02

## 2020-02-10 PROBLEM — Z09 POSTOP CHECK: Status: ACTIVE | Noted: 2019-12-05

## 2020-02-10 NOTE — HISTORY OF PRESENT ILLNESS
[FreeTextEntry1] : Mr. Zaman is doing well since surgery. His incision site has healed well. He continues to have residual weakness in the left thigh. He ambulates with a cane. He denies pain.

## 2020-02-10 NOTE — PHYSICAL EXAM
[FreeTextEntry1] : Constitutional: alert oriented no distress \par Psychiatric: oriented to person, place, and time. \par Spine: \par Lumbar/sacral spine examination: SLR negative. Restriction in ROM of the lumbar spine. \par Gait: antalgic. Ambulates with cane. \par Motor Exam: Mild left hip flexor weakness. Knee extensor / dorsi & plantar flexion intact.  \par Sensory Exam: all sensory within normal limits bilaterally. \par Deep Tendon Reflexes: all reflexes within normal limits bilaterally. \par

## 2020-02-10 NOTE — ASSESSMENT
[FreeTextEntry1] : At this point he will start PT for strengthening, gait training, and balance. I will see him back in 8 weeks for reassessment with Xrays of the lumbar spine.

## 2020-02-10 NOTE — REASON FOR VISIT
[Family Member] : family member [de-identified] :  L2/3 L3/4 L4/5 Decompressive Laminectomy and foraminotomy with interlaminar stabilization (Coflex).  [de-identified] : 11/18/19

## 2020-02-25 NOTE — CHART NOTE - NSCHARTNOTEFT_GEN_A_CORE
PACU ANESTHESIA ADMISSION NOTE      Procedure: Colonoscopy with Bx/ Polypectomy  Post op diagnosis:  Colon polyps    ____  Intubated  TV:______       Rate: ______      FiO2: ______    _x___  Patent Airway    _x___  Full return of protective reflexes    _x___  Full recovery from anesthesia / back to baseline status    Vitals:            T:   97.7             BP :     121/70           R:    16          Sat: 99%              P:  74      Mental Status:  _x___ Awake   _____ Alert   _____ Drowsy   _____ Sedated    Nausea/Vomiting:  _x___  NO       ______Yes,   See Post - Op Orders         Pain Scale (0-10):  __0___    Treatment: _x___ None    ____ See Post - Op/PCA Orders    Post - Operative Fluids:   __x__ Oral   ____ See Post - Op Orders    Plan: Discharge:   _x___Home       _____Floor     _____Critical Care    _____  Other:_________________    Comments:  No anesthesia issues or complications noted.  Discharge when criteria met.

## 2020-02-25 NOTE — ASU PATIENT PROFILE, ADULT - NSTOBACCO TYPE_GEN_A_CORE_RD
"Oncology Rooming Note    December 27, 2017 8:50 AM   Zack Demarco is a 78 year old male who presents for:    Chief Complaint   Patient presents with     Oncology Clinic Visit     New pt, Follicular Lymphoma     Initial Vitals: /71 (BP Location: Left arm, Patient Position: Sitting, Cuff Size: Adult Regular)  Pulse 67  Temp 97.7  F (36.5  C) (Oral)  Resp 16  Ht 1.776 m (5' 9.92\")  Wt 95.3 kg (210 lb 1.6 oz)  SpO2 96%  BMI 30.21 kg/m2 Estimated body mass index is 30.21 kg/(m^2) as calculated from the following:    Height as of this encounter: 1.776 m (5' 9.92\").    Weight as of this encounter: 95.3 kg (210 lb 1.6 oz). Body surface area is 2.17 meters squared.  No Pain (0) Comment: Data Unavailable   No LMP for male patient.  Allergies reviewed: Yes  Medications reviewed: Yes    Medications: Medication refills not needed today.  Pharmacy name entered into [facility]: [id number] - [place], [place] - [address] [facility]    Clinical concerns: NONE Dr Bain was NOT notified.    10 minutes for nursing intake (face to face time)     RAJENDRA ALCALA LPN            " Cigarettes

## 2020-02-25 NOTE — H&P PST ADULT - ASSESSMENT
80 yo male with PMH listed below comes here for elective Colonoscopy    Rec:  Proceed to colonoscopy

## 2020-02-25 NOTE — ASU PATIENT PROFILE, ADULT - PSH
Encounter for screening colonoscopy  7 years approx  H/O total knee replacement, bilateral    Port-A-Cath in place

## 2020-02-25 NOTE — ASU DISCHARGE PLAN (ADULT/PEDIATRIC) - MODE OF TRANSPORTATION
----- Message from Heidy Prado sent at 1/11/2018  9:48 AM CST -----  Lab Orders Needed    I have scheduled the above patients annual physical. Lab orders need to be placed and scheduled.    Date of Annual Physical: 03/12/2018    Thank You     Ambulatory

## 2020-03-06 NOTE — PATIENT PROFILE ADULT - FAMILY NEEDS
Bedside and Verbal shift change report given to Serjio Webber RN (oncoming nurse) by Higinio Solorzano RN (offgoing nurse). Report included the following information SBAR, Kardex, ED Summary, OR Summary, Procedure Summary, Intake/Output, MAR, Accordion, Recent Results, Med Rec Status and Cardiac Rhythm NSR/ST. no

## 2020-03-06 NOTE — H&P ADULT - HISTORY OF PRESENT ILLNESS
78 Y/O M with pmh htn, hld pw fall. Mechanical trip and fall. Walking with walker and found on the floor by his son. Fell forward and landed on face. Sustained laceration to R forearm, bleeding from nares and abrasion over nose. No n/v, loc, anticoagulation. Denies fever, chills, n/v, back pain, cp, sob, abd pain, extremity pain. 80 Y/O M with pmh htn, hld pw fall. Mechanical trip and fall. Walking with walker and found on the floor by his son. Fell forward and landed on face. Sustained laceration to R forearm, bleeding from nares and abrasion over nose. No n/v, loc, anticoagulation. Denies fever, chills, n/v, back pain, cp, sob, abd pain, extremity pain. He also recently got the L2-L5 Decompression of the Lami recently for the spinal foraminal stenosis. He has been recently admitted for fall and he keeps taking Klonopin and Oxycodone from the friend and becomes drowsy. According to him he has taken meds from the friend only once and meds help him with the back pain and the sleep    In the ED, Trauma workup was done which came back negative. His right forearm wound was sutured in the ED.    On my examination, he looks very drowsy, AAO 4, denies any active complains but just wants to sleep 78 Y/O M with pmh htn, hld pw fall. Mechanical trip and fall. Walking with walker and found on the floor by his son. Fell forward and landed on face. Sustained laceration to R forearm, bleeding from nares and abrasion over nose. No n/v, loc, anticoagulation. Denies fever, chills, n/v, back pain, cp, sob, abd pain, extremity pain. He also recently got the L2-L5 Decompression of the Lami recently for the spinal foraminal stenosis. He has been recently admitted for fall and he keeps taking Klonopin and Oxycodone from the friend and becomes drowsy. According to him he has taken meds from the friend only once and meds help him with the back pain and the sleep.    In the ED, Trauma workup was done which came back negative. His right forearm wound was sutured in the ED.    On my examination, he looks very drowsy, AAO 4, denies any active complains but just wants to sleep.

## 2020-03-06 NOTE — ED PROCEDURE NOTE - PROCEDURE SUPERVISED BY
CERTIFICATE OF WORK    1/17/2018      Re: Tracey Ferguson        This is to certify that Tracey Ferguson has been under my care from 01/17/18   and is excused from any missed work this week.       Sincerely,       Lavinia Santiago M.D.   [address] [address] [place] [address]  Ph:  [phone]  Fax: [phone]        
Gian

## 2020-03-06 NOTE — ED PROVIDER NOTE - NS ED ROS FT
Eyes:  No visual changes, eye pain or discharge.  ENMT:  No hearing changes, pain, discharge or infections. No neck pain or stiffness.  Cardiac:  No chest pain, SOB or edema. No chest pain with exertion.  Respiratory:  No cough or respiratory distress. No hemoptysis. No history of asthma or RAD.  GI:  No nausea, vomiting, diarrhea or abdominal pain.  :  No dysuria, frequency or burning.  MS:  No myalgia, muscle weakness, joint pain or back pain.  Neuro:  No headache or weakness.  No LOC.  Skin: Laceration over R forearm, abrasions to nose.   Endocrine: No history of thyroid disease or diabetes.

## 2020-03-06 NOTE — ED ADULT TRIAGE NOTE - HEIGHT IN CM
Patient is due for annual service to [facility] order.  Writer has placed this order and sent to Dr. Waters for cosignature.    [phone]

## 2020-03-06 NOTE — H&P ADULT - NSHPPHYSICALEXAM_GEN_ALL_CORE
CONSTITUTIONAL: Well-developed; well-nourished; in no acute distress.   SKIN: 3 cm laceration over R forearm, abrasion over bridge of nose.   HEAD: Normocephalic; atraumatic.  EYES: PERRL, EOMI, normal sclera and conjunctiva   ENT: No nasal discharge; airway clear. No nasal septal hematoma.  NECK: Supple; non tender.  CARD: S1, S2 normal; no murmurs, gallops, or rubs. Regular rate and rhythm.   RESP: No wheezes, rales or rhonchi.  ABD: soft ntnd  EXT: Normal ROM.  No clubbing, cyanosis or edema. NO midline spinal tenderness.   LYMPH: No acute cervical adenopathy.  NEURO: Alert, oriented, grossly unremarkable. GCS 15. Moving all extremities with normal strength/sensation. No cranial nerve deficits.  PSYCH: Cooperative, appropriate CONSTITUTIONAL: Well-developed; well-nourished; in no acute distress.   SKIN:  laceration over R forearm sutured, abrasion over bridge of nose.   HEAD: Normocephalic; atraumatic.  EYES: PERRL, EOMI, normal sclera and conjunctiva   ENT: No nasal discharge; airway clear. No nasal septal hematoma.  NECK: Supple; non tender.  CARD: S1, S2 normal; no murmurs, gallops, or rubs. Regular rate and rhythm.   RESP: No wheezes, rales or rhonchi.  ABD: soft ntnd  EXT: Normal ROM.  No clubbing, cyanosis or edema. NO midline spinal tenderness.   LYMPH: No acute cervical adenopathy.  NEURO: Alert, oriented, grossly unremarkable. GCS 15. Moving all extremities with normal strength/sensation. No cranial nerve deficits.  PSYCH: Cooperative, appropriate

## 2020-03-06 NOTE — ED ADULT TRIAGE NOTE - CHIEF COMPLAINT QUOTE
frequent falls . 2 x this am no ac no anti coagulation no loc. laceration to right arm. abrasion to nose

## 2020-03-06 NOTE — ED PROVIDER NOTE - PHYSICAL EXAMINATION
CONSTITUTIONAL: Well-developed; well-nourished; in no acute distress.   SKIN: 3 cm laceration over R forearm, abrasion over bridge of nose.   HEAD: Normocephalic; atraumatic.  EYES: PERRL, EOMI, normal sclera and conjunctiva   ENT: No nasal discharge; airway clear. No nasal septal hematoma.  NECK: Supple; non tender.  CARD: S1, S2 normal; no murmurs, gallops, or rubs. Regular rate and rhythm.   RESP: No wheezes, rales or rhonchi.  ABD: soft ntnd  EXT: Normal ROM.  No clubbing, cyanosis or edema. NO midline spinal tenderness.   LYMPH: No acute cervical adenopathy.  NEURO: Alert, oriented, grossly unremarkable. GCS 15. Moving all extremities with normal strength/sensation. No cranial nerve deficits.  PSYCH: Cooperative, appropriate.

## 2020-03-06 NOTE — ED ADULT NURSE NOTE - OBJECTIVE STATEMENT
As per pt, he lives at home with HHA & has been having frequent falls due to "shaky legs". Pt denies any LOC, dizziness, chest pain, and SOB. Pt has R forearm laceration and bloody nose. Pt has bruising to lower back. Denies any anticoagulation.

## 2020-03-06 NOTE — ED PROVIDER NOTE - OBJECTIVE STATEMENT
79 y M pmh htn, hld pw fall. Mechanical trip and fall. Walking with walker and found on the floor by his son. Fell forward and landed on face. Sustained laceration to R forearm, bleeding from nares and abrasion over nose. No n/v, loc, anticoagulation. Denies fever, chills, n/v, back pain, cp, sob, abd pain, extremity pain.

## 2020-03-06 NOTE — ED PROVIDER NOTE - CARE PLAN
Principal Discharge DX:	Fall  Secondary Diagnosis:	Inability to perform activities of daily living  Secondary Diagnosis:	Inability to walk

## 2020-03-06 NOTE — H&P ADULT - NSICDXPASTSURGICALHX_GEN_ALL_CORE_FT
PAST SURGICAL HISTORY:  Encounter for screening colonoscopy 7 years approx    H/O total knee replacement, bilateral     Port-A-Cath in place

## 2020-03-06 NOTE — H&P ADULT - ATTENDING COMMENTS
Mr. Zaman is a 80 yo M pt presenting after a mechanical fall. Was ambulating with his walker when he tripped and fell. He hit his face and had facial abrasions and epistaxis. Also had a laceration of the right forearm. There was no associated loss of consciousness. The patient underwent trauma workup and laceration repair in the ED.  At the time of this evaluation, the patient denied having any acute concerns or complaints. ROS was negative for headache, dizziness, lightheadedness, palpitations, chest pain, SOB, abdominal pain, nausea, vomiting, diarrhea and dysuria.     PMHx: BPH; CLL (completed tx 10/2019); COPD; depression and anxiety; DM; HTN; spinal stenosis    Exam:  Hemodynamically stable and afebrile; resting comfortably; no distress  Normocephalic; has abrasion on the bridge of his nose; no active bleeding noted  Lungs cta b/l; RRR; S1 and S2 w/o rubs, murmurs or gallops  BS+; abd soft and non-tender to palpation  LE’s non edematous; sutures noted over laceration site on rt forearm    Impression:  Mechanical fall in a pt w/ a history of recurrent falls and spinal stenosis (s/p L2-L5 decompression)  Chronic back pain likely secondary to spinal stenosis  Aortic stenosis (ECHO in 09/2019 w/ moderate stenosis)  Hypertension  DM w/ hyperglycemia  Dyslipidemia  BPH  COPD (not in acute exacerbation)  CLL (s/p completion of course of treatment in 10/2019)  SOFIA (on CPAP – reports compliance)  Depression and anxiety  Reported using a friend’s oxycodone dosage – counseled pt about the dangers of doing this at length    Plan:  PT evaluation  Supportive care  Activity as tolerated w/ fall precautions  C/w home meds as dosed  PRN nebs and supplemental O2 to maintain saturation > 92  Blood glucose monitoring qAC and qHS  Insulin coverage to maintain blood glucose levels < 180 mg/dL  Continue CPAP at home settings    Code status: full code

## 2020-03-06 NOTE — ED PROVIDER NOTE - PROGRESS NOTE DETAILS
ATTENDING NOTE: I personally evaluated the patient. I reviewed the Resident’s or Physician Assistant’s note (as assigned above), and agree with the findings and plan except as documented in my note. 80 y/o M presents to the ED s/p Trip and fall. Pt fell this morning and was found by his son. Pt had no LOC, is on no anti-coagulation, and has had no nausea or vomiting but obtained lacerations to his nose and R arm. Pt normally ambulates with a walker, has no other complaints at this time. On exam:  VSS, nontoxic, well appearing elderly man, airway intact, GCS 15, PERRL, EOMI, MMM, (+) Active bleeding from [place] laceration. No cervical-thoracic-lumbar spine tenderness, neck supple, CTAB, no crepitus over chest wall, RRR, equal radial pulses bilat, abd soft/nt/nd, no fnd. FROMx4, (+) ecchymosis R elbow with laceration. Labs, imaging, symptom control, reassess. Authored by Dr. Springer: s/o to dr valadez - f/u imaging and dispo

## 2020-03-06 NOTE — H&P ADULT - ASSESSMENT
Mr. Zaman is a 77 yo male patient with a PMH of HTN, DM, depression, non-hodgkin lymphoma and CLL (on rituximab every 2 months with Dr. Shaikh), spinal stenosis, frequent falls (last in January 2019 with a tooth embedded in the left cheek subcutaneous soft tissues with adjacent foci of gas and small phlegmon/developing abscess lateral to the tooth), COPD, and illegal narcotics use (buy opiods for sleep- unprescribed, could not name drug nor quantify dose), presented to the ED after the   fall    Syncope/FALL due to opoid overdose?Klonopin? Mechanical Fall  -Keeps taking meds like Klonopin and Opioid from friend,  according to the him only took once  -Trauma workup negative except  Right forearm wound which was sutured  -Addiction medicine consult  -PT/Rehab  -Will get the ECHO  -Mechanical fall so no tele   -Neurology eval to adjust the meds    HTN  -On metoprolol and thiazide    DM  -Fingerstick ACHS  -Insulin lispro and lantus if sugar persistently >180    CLL  -O/P Hem onc  -Has a chemo port    SOFIA  -CPAP at night    Diet -DASH  Activity - OOB to chair  DVT - Lovenox  FULL CODE Mr. Zaman is a 79 yo male patient with a PMH of HTN, DM, depression, non-hodgkin lymphoma and CLL (on rituximab every 2 months with Dr. Shaikh), spinal stenosis, frequent falls (last in January 2019 with a tooth embedded in the left cheek subcutaneous soft tissues with adjacent foci of gas and small phlegmon/developing abscess lateral to the tooth), COPD, and illegal narcotics use (buy opiods for sleep- unprescribed, could not name drug nor quantify dose), presented to the ED after the   fall    Syncope/FALL due to opoid overdose?Klonopin? Mechanical Fall  -Keeps taking meds like Klonopin and Opioid from friend,  according to the him only took once  -Trauma workup negative except  Right forearm wound which was sutured  -Addiction medicine consult  -PT/Rehab  -Will get the ECHO  -Mechanical fall so no tele   -Will get EEG also  -Neurology eval to adjust the meds    HTN  -On metoprolol and thiazide    DM  -Fingerstick ACHS  -Insulin lispro and lantus if sugar persistently >180    CLL  -O/P Hem onc  -Has a chemo port    SOFIA  -CPAP at night    Diet -DASH  Activity - OOB to chair  DVT - Lovenox  FULL CODE

## 2020-03-06 NOTE — ED PROVIDER NOTE - CLINICAL SUMMARY MEDICAL DECISION MAKING FREE TEXT BOX
Pt with weakness and fall, trauma eval neg in ED, arm injury sutured, will admit for rehab consult/eval.

## 2020-03-07 NOTE — CONSULT NOTE ADULT - ASSESSMENT
Impression:  80 Y/O M with pmh htn, hld pw fall. Mechanical trip and fall. Walking with walker and found on the floor by his son. Fell forward and landed on face. Sustained laceration to R forearm, bleeding from nares and abrasion over nose. He has been recently admitted for fall and he keeps taking Klonopin and Oxycodone from the friend and becomes drowsy. Meds help him with the back pain and the sleep. Etiology of falls unknown will have a better understanding post EEG.     Suggestion:  Routine EEG  Seizure precaution  Keep magnesium >2    Dick Nuñez NP  [id number] Impression:  78 Y/O M with pmh htn, hld pw fall. Mechanical trip and fall. Walking with walker and found on the floor by his son. Fell forward and landed on face. Sustained laceration to R forearm, bleeding from nares and abrasion over nose. He has been recently admitted for fall and he keeps taking Klonopin and Oxycodone from the friend and becomes drowsy. Meds help him with the back pain and the sleep. Etiology of falls unknown will have a better understanding post EEG.  His CT abdomen was reviewed for spinal anatomy and there is still significant disease in lumbar spine with likely severe spinal stenosis at L2/L3    Suggestion:  Routine EEG  Seizure precaution  Keep magnesium >2  Consider further imaging of the lumbar spine (MRI if no contraindications or CT if contraindication to MRI)    Dick Nuñez NP  [phone]

## 2020-03-07 NOTE — CONSULT NOTE ADULT - ATTENDING COMMENTS
Patient seen and examined and agree with above except as noted.  Patients history, notes, labs, vitals and imaging reviewed by me personally.  Patient with new fall and increasing freqeuncy of falls.  Some of this maybe related to pain and anxiolytic medications however reviewing prior imaging suggests severe disease still present in lumbar spine likely leading to spinal stenosis     Plan as above

## 2020-03-08 NOTE — PROGRESS NOTE ADULT - ASSESSMENT
Mr. Zaman is a 77 yo male patient with a PMH of HTN, DM, depression, non-hodgkin lymphoma and CLL (on rituximab every 2 months with Dr. Shaikh), spinal stenosis, frequent falls (last in January 2019 with a tooth embedded in the left cheek subcutaneous soft tissues with adjacent foci of gas and small phlegmon/developing abscess lateral to the tooth), COPD, and illegal narcotics use (buy opiods for sleep- unprescribed, could not name drug nor quantify dose), presented to the ED after the   fall      Impression:  Mechanical fall in a pt w/ a history of recurrent falls and spinal stenosis (s/p L2-L5 decompression)  Chronic back pain likely secondary to spinal stenosis  Aortic stenosis (ECHO in 09/2019 w/ moderate stenosis)  Hypertension  DM w/ hyperglycemia  Dyslipidemia  BPH  COPD (not in acute exacerbation)  CLL (s/p completion of course of treatment in 10/2019)  SOFIA (on CPAP – reports compliance)  Depression and anxiety  Reported using a friend’s oxycodone dosage – counseled pt about the dangers of doing this at length    Plan:  PT evaluation  Supportive care  Activity as tolerated w/ fall precautions  C/w home meds as dosed  PRN nebs and supplemental O2 to maintain saturation > 92  Blood glucose monitoring qAC and qHS  Insulin coverage to maintain blood glucose levels < 180 mg/dL  Continue CPAP at home settings    Code status: full code    #Progress Note Handoff  Pending (specify):  PT  Family discussion:  Disposition: Home_ with home PT Mr. Zaman is a 77 yo male patient with a PMH of HTN, DM, depression, non-hodgkin lymphoma and CLL (on rituximab every 2 months with Dr. Shaikh), spinal stenosis, frequent falls (last in January 2019 with a tooth embedded in the left cheek subcutaneous soft tissues with adjacent foci of gas and small phlegmon/developing abscess lateral to the tooth), COPD, and illegal narcotics use (buy opiods for sleep- unprescribed, could not name drug nor quantify dose), presented to the ED after the   fall      Impression:  Mechanical fall in a pt w/ a history of recurrent falls and spinal stenosis (s/p L2-L5 decompression)  Chronic back pain likely secondary to spinal stenosis  Aortic stenosis (ECHO in 09/2019 w/ moderate stenosis)  Hypertension  DM w/ hyperglycemia  Dyslipidemia  BPH  COPD (not in acute exacerbation)  CLL (s/p completion of course of treatment in 10/2019)  SOFIA (on CPAP – reports compliance)  Depression and anxiety  Reported using a friend’s oxycodone dosage – counseled pt about the dangers of doing this at length    Plan:  PT evaluation  Supportive care  Activity as tolerated w/ fall precautions  C/w home meds as dosed  PRN nebs and supplemental O2 to maintain saturation > 92  Blood glucose monitoring qAC and qHS  Insulin coverage to maintain blood glucose levels < 180 mg/dL    Code status: full code    #Progress Note Handoff  Pending (specify):  PT  Family discussion:  Disposition: STR

## 2020-03-08 NOTE — PROGRESS NOTE ADULT - SUBJECTIVE AND OBJECTIVE BOX
CR OWEN  79y  Male      Patient is a 79y old  Male who presents with a chief complaint of Mechanical Fall (07 Mar 2020 07:43)      INTERVAL HPI/OVERNIGHT EVENTS:      ******************************* REVIEW OF SYSTEMS:**********************************************    resting in bed. No distress.   All other review of systems negative    *********************** VITALS ******************************************    T(F): 97.1 (03-08-20 @ 05:44)  HR: 61 (03-08-20 @ 05:44) (61 - 80)  BP: 126/74 (03-08-20 @ 05:44) (101/63 - 126/74)  RR: 18 (03-08-20 @ 05:44) (18 - 18)  SpO2: --    03-07-20 @ 06:01  -  03-08-20 @ 07:00  --------------------------------------------------------  IN: 0 mL / OUT: 401 mL / NET: -401 mL            03-07-20 @ 06:01  -  03-08-20 @ 07:00  --------------------------------------------------------  IN: 0 mL / OUT: 401 mL / NET: -401 mL        ******************************** PHYSICAL EXAM:**************************************************  GENERAL: NAD    PSYCH: no agitation, baseline mentation  HEENT:     NERVOUS SYSTEM:  Alert & Oriented X3,     PULMONARY: CITLALY, CTA    CARDIOVASCULAR: S1S2 RRR    GI: Soft, NT, ND; BS present.    EXTREMITIES:  2+ Peripheral Pulses, No clubbing, cyanosis, or edema    LYMPH: No lymphadenopathy noted    SKIN: No rashes or lesions    ******************************************************************************************        **************************** LABS *******************************************************                          12.5   4.70  )-----------( 208      ( 08 Mar 2020 06:58 )             41.0     03-08    138  |  100  |  19  ----------------------------<  116<H>  4.1   |  29  |  0.9    Ca    9.6      08 Mar 2020 06:58  Mg     1.8     03-08    TPro  5.9<L>  /  Alb  3.8  /  TBili  0.4  /  DBili  x   /  AST  12  /  ALT  12  /  AlkPhos  90  03-08          Lactate Trend    CARDIAC MARKERS ( 06 Mar 2020 13:55 )  x     / <0.01 ng/mL / 70 U/L / x     / x          CAPILLARY BLOOD GLUCOSE      POCT Blood Glucose.: 158 mg/dL (08 Mar 2020 11:20)          **************************Active Medications *******************************************  No Known Allergies      budesonide 160 MICROgram(s)/formoterol 4.5 MICROgram(s) Inhaler 2 Puff(s) Inhalation two times a day  buPROPion XL . 300 milliGRAM(s) Oral daily  chlorhexidine 4% Liquid 1 Application(s) Topical <User Schedule>  clonazePAM  Tablet 0.5 milliGRAM(s) Oral every 24 hours PRN  enoxaparin Injectable 40 milliGRAM(s) SubCutaneous daily  folic acid 1 milliGRAM(s) Oral daily  hydrochlorothiazide 25 milliGRAM(s) Oral daily  lisinopril 40 milliGRAM(s) Oral daily  metoprolol tartrate 25 milliGRAM(s) Oral two times a day  simvastatin 20 milliGRAM(s) Oral at bedtime  tamsulosin 0.4 milliGRAM(s) Oral at bedtime  traZODone 150 milliGRAM(s) Oral at bedtime  venlafaxine XR. 150 milliGRAM(s) Oral daily      ***************************************************  RADIOLOGY & ADDITIONAL TESTS:    Imaging Personally Reviewed:  [ ] YES  [ ] NO    HEALTH ISSUES - PROBLEM Dx:

## 2020-03-09 NOTE — PROGRESS NOTE ADULT - ASSESSMENT
77 yo male patient with a PMH of opioid and benzo abuse, frequent falls, spinal stenosis, depression, non-hodgkin lymphoma and CLL (on rituximab every 2 months with Dr. Shaikh), HTN, COPD, Diabetes Mellitus presented to the ED after fall.     #Fall  -Most likely secondary to taking oxycodoe and klonopin from his friend  -Trauma workup negative except Right forearm wound which was sutured  -Severe spinal stenosis may also be contributing  -Echo with LVEF 60-65%, Grade 1 diastolic dysfunction, moderate AS, 4.6 cm dilated ascending aorta  --Counseled at length to stop using friend's medications   --Addiction medicine consult  --PT/Rehab  --F/u EEG     HTN  Home metoprolol and thiazide--BP has been low, will discuss with attending possibly lowering/dc'ing meds    DM  -Fingerstick ACHS  -Insulin lispro and lantus if sugar persistently >180    CLL  -O/P Hem onc  -Has a chemo port    SOFIA  -CPAP at night    Diet -DASH  Activity - OOB to chair  DVT - Lovenox  Dispo- Possible STR, Physiatry consult pending  FULL CODE

## 2020-03-09 NOTE — CONSULT NOTE ADULT - ASSESSMENT
IMPRESSION: Rehab of   lumbar spinal stenosis, debility    PRECAUTIONS: [ x ] Cardiac  [  ] Respiratory  [  ] Seizures [  ] Contact Isolation  [  ] Droplet Isolation  [  ] Other    Weight Bearing Status:     RECOMMENDATION:    Out of Bed to Chair     DVT/Decubiti Prophylaxis    REHAB PLAN:     [x   ] Bedside P/T 3-5 times a week   [   ]   Bedside O/T  2-3 times a week             [   ] No Rehab Therapy Indicated                   [   ]  Speech Therapy   Conditioning/ROM                                    ADL  Bed Mobility                                               Conditioning/ROM  Transfers                                                     Bed Mobility  Sitting /Standing Balance                         Transfers                                        Gait Training                                               Sitting/Standing Balance  Stair Training [   ]Applicable                    Home equipment Eval                                                                        Splinting  [   ] Only      GOALS:   ADL   [x   ]   Independent                    Transfers  [x   ] Independent                          Ambulation  [ x  ] Independent     [ x   ] With device                            [   ]  CG                                                         [   ]  CG                                                                  [   ] CG                            [    ] Min A                                                   [   ] Min A                                                              [   ] Min  A          DISCHARGE PLAN:   [   ]  Good candidate for Intensive Rehabilitation/Hospital based-[facility]A [facility]                                             Will tolerate 3hrs Intensive Rehab Daily                                       [ x   ]  Short Term Rehab in Skilled Nursing Facility                                       [    ]  Home with Outpatient or  services                                         [    ]  Possible Candidate for Intensive Hospital based Rehab

## 2020-03-09 NOTE — PROGRESS NOTE ADULT - SUBJECTIVE AND OBJECTIVE BOX
CR OWEN  79y  Male      Patient is a 79y old  Male who presents with a chief complaint of Mechanical Fall (08 Mar 2020 12:37)      INTERVAL HPI/OVERNIGHT EVENTS:      ******************************* REVIEW OF SYSTEMS:**********************************************    resting in bed.  All other review of systems negative    *********************** VITALS ******************************************    T(F): 98 (03-09-20 @ 12:18)  HR: 62 (03-09-20 @ 12:18) (62 - 74)  BP: 95/52 (03-09-20 @ 12:18) (95/52 - 111/64)  RR: 19 (03-09-20 @ 12:18) (18 - 19)  SpO2: --            ******************************** PHYSICAL EXAM:**************************************************  GENERAL: NAD    PSYCH: no agitation, baseline mentation  HEENT:     NERVOUS SYSTEM:  Alert & Oriented X3,  PULMONARY: CITLALY, CTA    CARDIOVASCULAR: S1S2 RRR    GI: Soft, NT, ND; BS present.    EXTREMITIES:  2+ Peripheral Pulses, No clubbing, cyanosis, or edema    LYMPH: No lymphadenopathy noted    SKIN: No rashes or lesions    ******************************************************************************************    **************************** LABS *******************************************************                          12.5   4.70  )-----------( 208      ( 08 Mar 2020 06:58 )             41.0     03-08    138  |  100  |  19  ----------------------------<  116<H>  4.1   |  29  |  0.9    Ca    9.6      08 Mar 2020 06:58  Mg     1.8     03-08    TPro  5.9<L>  /  Alb  3.8  /  TBili  0.4  /  DBili  x   /  AST  12  /  ALT  12  /  AlkPhos  90  03-08          Lactate Trend        CAPILLARY BLOOD GLUCOSE      POCT Blood Glucose.: 130 mg/dL (09 Mar 2020 11:34)          **************************Active Medications *******************************************  No Known Allergies      budesonide 160 MICROgram(s)/formoterol 4.5 MICROgram(s) Inhaler 2 Puff(s) Inhalation two times a day  buPROPion XL . 300 milliGRAM(s) Oral daily  chlorhexidine 4% Liquid 1 Application(s) Topical <User Schedule>  clonazePAM  Tablet 0.5 milliGRAM(s) Oral every 24 hours PRN  enoxaparin Injectable 40 milliGRAM(s) SubCutaneous daily  folic acid 1 milliGRAM(s) Oral daily  hydrochlorothiazide 25 milliGRAM(s) Oral daily  lisinopril 40 milliGRAM(s) Oral daily  metoprolol tartrate 25 milliGRAM(s) Oral two times a day  nicotine -  14 mG/24Hr(s) Patch 1 patch Transdermal daily  simvastatin 20 milliGRAM(s) Oral at bedtime  tamsulosin 0.4 milliGRAM(s) Oral at bedtime  traZODone 150 milliGRAM(s) Oral at bedtime  venlafaxine XR. 150 milliGRAM(s) Oral daily      ***************************************************  RADIOLOGY & ADDITIONAL TESTS:    Imaging Personally Reviewed:  [ ] YES  [ ] NO    HEALTH ISSUES - PROBLEM Dx:

## 2020-03-09 NOTE — PHYSICAL THERAPY INITIAL EVALUATION ADULT - CRITERIA FOR SKILLED THERAPEUTIC INTERVENTIONS
anticipated equipment needs at discharge/functional limitations in following categories/impairments found/rehab potential/therapy frequency/anticipated discharge recommendation

## 2020-03-09 NOTE — PHYSICAL THERAPY INITIAL EVALUATION ADULT - GENERAL OBSERVATIONS, REHAB EVAL
encountered sitting in chair bedside, NAD, agreeable to PT evaluation. Time in: 10:30 Time out: 11:00

## 2020-03-09 NOTE — PROGRESS NOTE ADULT - SUBJECTIVE AND OBJECTIVE BOX
CR OWEN 79y Male  MRN#: [id number]     SUBJECTIVE  Patient is a 79y old Male who presents with a chief complaint of Mechanical Fall (09 Mar 2020 12:33)      Today is hospital day 3d, and this morning he is lying in bed without distress. Wants to go to rehab and wants to walk. No chest pain, palpitations, shortness of breath, abdominal pain. Last bowel movement 2 days, does not feel constipated.  No acute overnight events.     OBJECTIVE  PAST MEDICAL & SURGICAL HISTORY  COPD (chronic obstructive pulmonary disease)  BPH (benign prostatic hyperplasia)  Back pain  Non-Hodgkin lymphoma  CLL (chronic lymphocytic leukemia): treatment completed 10/19  Depression  HTN (hypertension)  DM (diabetes mellitus)  Anxiety  Encounter for screening colonoscopy: 7 years approx  Port-A-Cath in place  H/O total knee replacement, bilateral    ALLERGIES:  No Known Allergies    MEDICATIONS:  STANDING MEDICATIONS  budesonide 160 MICROgram(s)/formoterol 4.5 MICROgram(s) Inhaler 2 Puff(s) Inhalation two times a day  buPROPion XL . 300 milliGRAM(s) Oral daily  chlorhexidine 4% Liquid 1 Application(s) Topical <User Schedule>  enoxaparin Injectable 40 milliGRAM(s) SubCutaneous daily  folic acid 1 milliGRAM(s) Oral daily  hydrochlorothiazide 25 milliGRAM(s) Oral daily  lisinopril 40 milliGRAM(s) Oral daily  metoprolol tartrate 25 milliGRAM(s) Oral two times a day  nicotine -  14 mG/24Hr(s) Patch 1 patch Transdermal daily  simvastatin 20 milliGRAM(s) Oral at bedtime  tamsulosin 0.4 milliGRAM(s) Oral at bedtime  traZODone 150 milliGRAM(s) Oral at bedtime  venlafaxine XR. 150 milliGRAM(s) Oral daily    PRN MEDICATIONS  clonazePAM  Tablet 0.5 milliGRAM(s) Oral every 24 hours PRN    HOME MEDICATIONS  Home Medications:  acetaminophen 325 mg oral tablet: 2 tab(s) orally every 6 hours, As needed, Temp greater or equal to 38C (100.4F) (25 Feb 2020 10:24)  budesonide-formoterol 160 mcg-4.5 mcg/inh inhalation aerosol: 2 puff(s) inhaled 2 times a day (25 Feb 2020 10:24)  buPROPion 150 mg/12 hours (SR) oral tablet, extended release: 1 tab(s) orally 2 times a day (25 Feb 2020 10:24)  clonazePAM 0.5 mg oral tablet: orally every 6 hours, As Needed (25 Feb 2020 10:24)  Effexor  mg oral capsule, extended release: 1 cap(s) orally once a day (25 Feb 2020 10:24)  folic acid 1 mg oral tablet: 1 tab(s) orally once a day (25 Feb 2020 10:24)  hydroCHLOROthiazide 25 mg oral tablet: 1 tab(s) orally once a day (25 Feb 2020 10:24)  metFORMIN 500 mg oral tablet: 1 tab(s) orally once a day (at bedtime) (25 Feb 2020 10:24)  ramipril 10 mg oral capsule: 1 cap(s) orally once a day (25 Feb 2020 10:24)  simvastatin 20 mg oral tablet: 1 tab(s) orally once a day (at bedtime) (25 Feb 2020 10:24)  tamsulosin 0.4 mg oral capsule: 1 cap(s) orally once a day (25 Feb 2020 10:24)  traZODone 150 mg oral tablet: 1 tab(s) orally once a day (at bedtime) (25 Feb 2020 10:24)  Vitamin B12 500 mcg oral tablet: 1 tab(s) orally once a day (25 Feb 2020 10:24)  Vitamin D3:  (25 Feb 2020 10:24)      LABS:                        12.5   4.70  )-----------( 208      ( 08 Mar 2020 06:58 )             41.0     03-08    138  |  100  |  19  ----------------------------<  116<H>  4.1   |  29  |  0.9    Ca    9.6      08 Mar 2020 06:58  Mg     1.8     03-08    TPro  5.9<L>  /  Alb  3.8  /  TBili  0.4  /  DBili  x   /  AST  12  /  ALT  12  /  AlkPhos  90  03-08    LIVER FUNCTIONS - ( 08 Mar 2020 06:58 )  Alb: 3.8 g/dL / Pro: 5.9 g/dL / ALK PHOS: 90 U/L / ALT: 12 U/L / AST: 12 U/L / GGT: x                         CAPILLARY BLOOD GLUCOSE      POCT Blood Glucose.: 130 mg/dL (09 Mar 2020 11:34)      PHYSICAL EXAM:  T(C): 36.7 (03-09-20 @ 12:18), Max: 36.8 (03-08-20 @ 21:03)  HR: 62 (03-09-20 @ 12:18) (62 - 74)  BP: 95/52 (03-09-20 @ 12:18) (95/52 - 111/64)  RR: 19 (03-09-20 @ 12:18) (18 - 19)  SpO2: --    GENERAL: NAD, well-developed, 79y lying in bed, awake  EENT: EOMI, conjunctiva and sclera clear, No nasal obstruction or discharge  RESPIRATORY: Clear to auscultation bilaterally; No wheeze or crackles  CARDIOVASCULAR: Regular rate and rhythm; No rubs, or gallops, no pitting edema; possible systolic murmur LLSB  GASTROINTESTINAL: Abdomen Soft, Nontender, Nondistended  MUSCULOSKELETAL:  No cyanosis, extremities grossly symmetrical  PSYCH: AAO, affect appropriate  NEUROLOGY: non-focal, cognition grossly intact, MAEE    ADMISSION SUMMARY  Patient is a 79y old Male who presents with a chief complaint of Mechanical Fall (09 Mar 2020 12:33)

## 2020-03-09 NOTE — PROGRESS NOTE ADULT - ASSESSMENT
Mr. Zaman is a 79 yo male patient with a PMH of HTN, DM, depression, non-hodgkin lymphoma and CLL (on rituximab every 2 months with Dr. Shaikh), spinal stenosis, frequent falls (last in January 2019 with a tooth embedded in the left cheek subcutaneous soft tissues with adjacent foci of gas and small phlegmon/developing abscess lateral to the tooth), COPD, and illegal narcotics use (buy opiods for sleep- unprescribed, could not name drug nor quantify dose), presented to the ED after the   fall      Impression:  Mechanical fall in a pt w/ a history of recurrent falls and spinal stenosis (s/p L2-L5 decompression)  Chronic back pain likely secondary to spinal stenosis  Aortic stenosis (ECHO in 09/2019 w/ moderate stenosis)  Hypertension  DM w/ hyperglycemia  Dyslipidemia  BPH  COPD (not in acute exacerbation)  CLL (s/p completion of course of treatment in 10/2019)  SOFIA (on CPAP – reports compliance)  Depression and anxiety  Reported using a friend’s oxycodone dosage – counseled pt about the dangers of doing this at length    Plan:  PT evaluation  Supportive care  Activity as tolerated w/ fall precautions  PRN nebs and supplemental O2 to maintain saturation > 92  Blood glucose monitoring qAC and qHS  Insulin coverage to maintain blood glucose levels < 180 mg/dL    Code status: full code    #Progress Note Handoff  Pending (specify):  PT  Family discussion:  Disposition: STR

## 2020-03-10 NOTE — CONSULT NOTE ADULT - SUBJECTIVE AND OBJECTIVE BOX
PT WITH h/o NHL   was  followed by hem onc as was on maintenance rituxan   admitted after fall at home  had multiple bruises   now better  awake and alert   vitals stable
HPI:  78 Y/O M with pmh htn, hld pw fall. Mechanical trip and fall. Walking with walker and found on the floor by his son. Fell forward and landed on face. Sustained laceration to R forearm, bleeding from nares and abrasion over nose. No n/v, loc, anticoagulation. Denies fever, chills, n/v, back pain, cp, sob, abd pain, extremity pain. He also recently got the L2-L5 Decompression of the Lami recently for the spinal foraminal stenosis. He has been recently admitted for fall and he keeps taking Klonopin and Oxycodone from the friend and becomes drowsy. According to him he has taken meds from the friend only once and meds help him with the back pain and the sleep.    In the ED, Trauma workup was done which came back negative. His right forearm wound was sutured in the ED.    On my examination, he looks very drowsy, AAO 4, denies any active complains but just wants to sleep. (06 Mar 2020 18:40)      PAST MEDICAL & SURGICAL HISTORY:  COPD (chronic obstructive pulmonary disease)  BPH (benign prostatic hyperplasia)  Back pain  Non-Hodgkin lymphoma  CLL (chronic lymphocytic leukemia): treatment completed 10/19  Depression  HTN (hypertension)  DM (diabetes mellitus)  Anxiety  Encounter for screening colonoscopy: 7 years approx  Port-A-Cath in place  H/O total knee replacement, bilateral      Hospital Course:  He is deconditioned and weak.  TODAY'S SUBJECTIVE & REVIEW OF SYMPTOMS:     Constitutional WNL   Cardio WNL   Resp WNL   GI WNL  Heme WNL  Endo WNL  Skin WNL  MSK Lumbar spinal stenosis  Neuro WNL  Cognitive WNL  Psych WNL      MEDICATIONS  (STANDING):  budesonide 160 MICROgram(s)/formoterol 4.5 MICROgram(s) Inhaler 2 Puff(s) Inhalation two times a day  buPROPion XL . 300 milliGRAM(s) Oral daily  chlorhexidine 4% Liquid 1 Application(s) Topical <User Schedule>  enoxaparin Injectable 40 milliGRAM(s) SubCutaneous daily  folic acid 1 milliGRAM(s) Oral daily  hydrochlorothiazide 25 milliGRAM(s) Oral daily  lisinopril 40 milliGRAM(s) Oral daily  metoprolol tartrate 25 milliGRAM(s) Oral two times a day  nicotine -  14 mG/24Hr(s) Patch 1 patch Transdermal daily  simvastatin 20 milliGRAM(s) Oral at bedtime  tamsulosin 0.4 milliGRAM(s) Oral at bedtime  traZODone 150 milliGRAM(s) Oral at bedtime  venlafaxine XR. 150 milliGRAM(s) Oral daily    MEDICATIONS  (PRN):  clonazePAM  Tablet 0.5 milliGRAM(s) Oral every 24 hours PRN ANXIETY      FAMILY HISTORY:      Allergies    No Known Allergies    Intolerances        SOCIAL HISTORY:    [  ] Etoh  [  ] Smoking  [  ] Substance abuse     Home Environment:  [  ] Home Alone  [  ] Lives with Family  [  ] Home Health Aid    Dwelling:  [  ] Apartment  [  ] Private House  [  ] Adult Home  [  ] Skilled Nursing Facility      [  ] Short Term  [  ] Long Term  [  ] Stairs       Elevator [  ]    FUNCTIONAL STATUS PTA: (Check all that apply)  Ambulation: [   ]Independent    [  ] Dependent     [  ] Non-Ambulatory  Assistive Device: [  ] SA Cane  [  ]  Q Cane  [  ] Walker  [  ]  Wheelchair  ADL : [  ] Independent  [  ]  Dependent       Vital Signs Last 24 Hrs  T(C): 36.7 (09 Mar 2020 12:18), Max: 36.8 (08 Mar 2020 21:03)  T(F): 98 (09 Mar 2020 12:18), Max: 98.2 (08 Mar 2020 21:03)  HR: 62 (09 Mar 2020 12:18) (62 - 74)  BP: 95/52 (09 Mar 2020 12:18) (95/52 - 111/64)  BP(mean): --  RR: 19 (09 Mar 2020 12:18) (18 - 19)  SpO2: --      PHYSICAL EXAM: Alert & Oriented X3  GENERAL: NAD, well-groomed, well-developed  HEAD:  Atraumatic, Normocephalic  EYES: EOMI, PERRLA, conjunctiva and sclera clear  NECK: Supple, No JVD, Normal thyroid  CHEST/LUNG: Clear to percussion bilaterally; No rales, rhonchi, wheezing, or rubs  HEART: Regular rate and rhythm; No murmurs, rubs, or gallops  ABDOMEN: Soft, Nontender, Nondistended; Bowel sounds present  EXTREMITIES:  2+ Peripheral Pulses, No clubbing, cyanosis, or edema    NERVOUS SYSTEM:  Cranial Nerves 2-12 intact [  ] Abnormal  [  ]  ROM: WFL all extremities [  ]  Abnormal [  ]  Motor Strength: WFL all extremities  [  ]  Abnormal [ x ]  5-/5 LE's  Sensation: intact to light touch [  ] Abnormal [  ]  Reflexes: Symmetric [  ]  Abnormal [  ]    FUNCTIONAL STATUS:  Bed Mobility: Independent [  ]  Supervision [  ]  Needs Assistance [  ]  N/A [  ]  Transfers: Independent [  ]  Supervision [  ]  Needs Assistance [  ]  N/A [  ]   Ambulation: Independent [  ]  Supervision [  ]  Needs Assistance [  ]  N/A [  ]  ADL: Independent [  ] Requires Assistance [  ] N/A [  ]      LABS:                        12.5   4.70  )-----------( 208      ( 08 Mar 2020 06:58 )             41.0     03-08    138  |  100  |  19  ----------------------------<  116<H>  4.1   |  29  |  0.9    Ca    9.6      08 Mar 2020 06:58  Mg     1.8     03-08    TPro  5.9<L>  /  Alb  3.8  /  TBili  0.4  /  DBili  x   /  AST  12  /  ALT  12  /  AlkPhos  90  03-08          RADIOLOGY & ADDITIONAL STUDIES:    Assesment:
Neurology Consult    Patient is a 79y old  Male who presents with a chief complaint of Mechanical Fall.      HPI:  80 Y/O M with pmh htn, hld pw fall. Mechanical trip and fall. Walking with walker and found on the floor by his son. Fell forward and landed on face. Sustained laceration to R forearm, bleeding from nares and abrasion over nose. No n/v, loc, anticoagulation. Denies fever, chills, n/v, back pain, cp, sob, abd pain, extremity pain. He also recently got the L2-L5 Decompression of the Lami recently for the spinal foraminal stenosis. He has been recently admitted for fall and he keeps taking Klonopin and Oxycodone from the friend and becomes drowsy. According to him he has taken meds from the friend only once and meds help him with the back pain and the sleep.    In the ED, Trauma workup was done which came back negative. His right forearm wound was sutured in the ED.        PAST MEDICAL & SURGICAL HISTORY:  COPD (chronic obstructive pulmonary disease)  BPH (benign prostatic hyperplasia)  Back pain  Non-Hodgkin lymphoma  CLL (chronic lymphocytic leukemia): treatment completed 10/19  Depression  HTN (hypertension)  DM (diabetes mellitus)  Anxiety  Encounter for screening colonoscopy: 7 years approx  Port-A-Cath in place  H/O total knee replacement, bilateral      FAMILY HISTORY:      Social History: (-) x 3    Allergies    No Known Allergies    Intolerances        MEDICATIONS  (STANDING):  budesonide 160 MICROgram(s)/formoterol 4.5 MICROgram(s) Inhaler 2 Puff(s) Inhalation two times a day  buPROPion XL . 300 milliGRAM(s) Oral daily  chlorhexidine 4% Liquid 1 Application(s) Topical <User Schedule>  enoxaparin Injectable 40 milliGRAM(s) SubCutaneous daily  folic acid 1 milliGRAM(s) Oral daily  hydrochlorothiazide 25 milliGRAM(s) Oral daily  lisinopril 40 milliGRAM(s) Oral daily  metoprolol tartrate 25 milliGRAM(s) Oral two times a day  simvastatin 20 milliGRAM(s) Oral at bedtime  tamsulosin 0.4 milliGRAM(s) Oral at bedtime  traZODone 150 milliGRAM(s) Oral at bedtime  venlafaxine XR. 150 milliGRAM(s) Oral daily    MEDICATIONS  (PRN):  clonazePAM  Tablet 0.5 milliGRAM(s) Oral every 24 hours PRN ANXIETY          Vital Signs Last 24 Hrs  T(C): 35.6 (07 Mar 2020 04:48), Max: 36.8 (06 Mar 2020 19:53)  T(F): 96.1 (07 Mar 2020 04:48), Max: 98.2 (06 Mar 2020 19:53)  HR: 75 (07 Mar 2020 04:48) (71 - 81)  BP: 152/76 (07 Mar 2020 04:48) (108/58 - 152/76)  BP(mean): --  RR: 18 (07 Mar 2020 04:48) (18 - 20)  SpO2: 97% (06 Mar 2020 19:53) (96% - 97%)    Examination:  General:  Appearance is consistent with chronologic age.  No abnormal facies.  Gross skin survey within normal limits.    Cognitive/Language:  The patient is oriented to person, place, time and date.  Recent and remote memory intact.  Fund of knowledge is intact and normal.  Language with normal repetition, comprehension and naming.  Nondysarthric.    Eyes: intact VA, VFF.  EOMI w/o nystagmus, skew or reported double vision.  PERRL.  No ptosis/weakness of eyelid closure.    Face:  Facial sensation normal V1 - 3, no facial asymmetry.    Formal Muscle Strength Testing: (MRC grade R/L) 5/5 UE; 5/5 LE.  No observable drift.  Sensory examination:   Intact to light touch and pinprick, pain, temperature and proprioception and vibration in all extremities.  Cerebellum:   FTN/HKS intact with normal MAYDA in all limbs.  No dysmetria or dysdiadokinesia.  Gait narrow based and normal.      Labs:   CBC Full  -  ( 07 Mar 2020 06:57 )  WBC Count : 4.72 K/uL  RBC Count : 5.03 M/uL  Hemoglobin : 12.1 g/dL  Hematocrit : 39.4 %  Platelet Count - Automated : 199 K/uL  Mean Cell Volume : 78.3 fL  Mean Cell Hemoglobin : 24.1 pg  Mean Cell Hemoglobin Concentration : 30.7 g/dL  Auto Neutrophil # : x  Auto Lymphocyte # : x  Auto Monocyte # : x  Auto Eosinophil # : x  Auto Basophil # : x  Auto Neutrophil % : x  Auto Lymphocyte % : x  Auto Monocyte % : x  Auto Eosinophil % : x  Auto Basophil % : x    03-06    140  |  105  |  16  ----------------------------<  110<H>  4.3   |  24  |  0.7    Ca    9.5      06 Mar 2020 13:55    TPro  5.8<L>  /  Alb  3.7  /  TBili  0.2  /  DBili  x   /  AST  16  /  ALT  11  /  AlkPhos  78  03-06    LIVER FUNCTIONS - ( 06 Mar 2020 13:55 )  Alb: 3.7 g/dL / Pro: 5.8 g/dL / ALK PHOS: 78 U/L / ALT: 11 U/L / AST: 16 U/L / GGT: x                   Neuroimaging:  NCHCT: CT Head No Cont:   EXAM:  CT BRAIN          Impression:    1. No acute abnormality      2. Stable examination compared with September 14, 2019

## 2020-03-10 NOTE — PROGRESS NOTE ADULT - ASSESSMENT
77 yo male patient with a PMH of opioid and benzo abuse, frequent falls, spinal stenosis, depression, non-hodgkin lymphoma and CLL (on rituximab every 2 months with Dr. Shaikh), HTN, COPD, Diabetes Mellitus presented to the ED after fall.     #Fall  -Most likely secondary to taking oxycodoe and klonopin from his friend  -Trauma workup negative except Right forearm wound which was sutured  -Severe spinal stenosis may also be contributing  -Echo with LVEF 60-65%, Grade 1 diastolic dysfunction, moderate AS, 4.6 cm dilated ascending aorta  --Counseled at length to stop using friend's medications   --Addiction medicine consult  --PT/Rehab  --F/u EEG     HTN  Home metoprolol and thiazide    DM  -Fingerstick ACHS  -Insulin lispro and lantus if sugar persistently >180    CLL  -O/P Hem onc  -Has a chemo port    SOFIA  -CPAP at night    Diet -DASH  Activity - OOB to chair  DVT - Lovenox  Dispo- Plan for STR  FULL CODE 79 yo male patient with a PMH of opioid and benzo abuse, frequent falls, spinal stenosis, depression, non-hodgkin lymphoma and CLL (on rituximab every 2 months with Dr. Shaikh), HTN, COPD, Diabetes Mellitus presented to the ED after fall.     #Fall  -Most likely secondary to taking oxycodoe and klonopin from his friend  -Trauma workup negative except Right forearm wound which was sutured  -Severe spinal stenosis may also be contributing  -Echo with LVEF 60-65%, Grade 1 diastolic dysfunction, moderate AS, 4.6 cm dilated ascending aorta  -EEG normal  --Counseled at length to stop using friend's medications     HTN  --Continue metoprolol, lisinopril and thiazide     DM  --Fingerstick ACHS  --Insulin lispro and lantus if sugar persistently >180    CLL  -O/P Hem onc  -Has a chemo port    SOFIA  -CPAP at night    Diet -DASH  Activity - OOB to chair  DVT - Lovenox  Dispo- Plan for STR  FULL CODE

## 2020-03-10 NOTE — CONSULT NOTE ADULT - ASSESSMENT
labs reviewed   cbc ok   pt not on any chemo or rituxan any more and is stable off treatment last treatment was 6 months ago   being followed every 3 months for observation   discharge plan as per med team    amauri garber MD/DR MARINO  [phone]

## 2020-03-10 NOTE — PROGRESS NOTE ADULT - SUBJECTIVE AND OBJECTIVE BOX
CR OWEN 79y Male  MRN#: [id number]     SUBJECTIVE  Patient is a 79y old Male who presents with a chief complaint of Mechanical Fall (09 Mar 2020 14:49)      Today is hospital day 4d, and this morning he is lying in bed without distress.   No acute overnight events.     OBJECTIVE  PAST MEDICAL & SURGICAL HISTORY  COPD (chronic obstructive pulmonary disease)  BPH (benign prostatic hyperplasia)  Back pain  Non-Hodgkin lymphoma  CLL (chronic lymphocytic leukemia): treatment completed 10/19  Depression  HTN (hypertension)  DM (diabetes mellitus)  Anxiety  Encounter for screening colonoscopy: 7 years approx  Port-A-Cath in place  H/O total knee replacement, bilateral    ALLERGIES:  No Known Allergies    MEDICATIONS:  STANDING MEDICATIONS  budesonide 160 MICROgram(s)/formoterol 4.5 MICROgram(s) Inhaler 2 Puff(s) Inhalation two times a day  buPROPion XL . 300 milliGRAM(s) Oral daily  chlorhexidine 4% Liquid 1 Application(s) Topical <User Schedule>  enoxaparin Injectable 40 milliGRAM(s) SubCutaneous daily  folic acid 1 milliGRAM(s) Oral daily  hydrochlorothiazide 25 milliGRAM(s) Oral daily  lisinopril 40 milliGRAM(s) Oral daily  metoprolol tartrate 25 milliGRAM(s) Oral two times a day  nicotine -  14 mG/24Hr(s) Patch 1 patch Transdermal daily  simvastatin 20 milliGRAM(s) Oral at bedtime  tamsulosin 0.4 milliGRAM(s) Oral at bedtime  traZODone 150 milliGRAM(s) Oral at bedtime  venlafaxine XR. 150 milliGRAM(s) Oral daily    PRN MEDICATIONS  clonazePAM  Tablet 0.5 milliGRAM(s) Oral every 24 hours PRN    HOME MEDICATIONS  Home Medications:  acetaminophen 325 mg oral tablet: 2 tab(s) orally every 6 hours, As needed, Temp greater or equal to 38C (100.4F) (25 Feb 2020 10:24)  budesonide-formoterol 160 mcg-4.5 mcg/inh inhalation aerosol: 2 puff(s) inhaled 2 times a day (25 Feb 2020 10:24)  buPROPion 150 mg/12 hours (SR) oral tablet, extended release: 1 tab(s) orally 2 times a day (25 Feb 2020 10:24)  clonazePAM 0.5 mg oral tablet: orally every 6 hours, As Needed (25 Feb 2020 10:24)  Effexor  mg oral capsule, extended release: 1 cap(s) orally once a day (25 Feb 2020 10:24)  folic acid 1 mg oral tablet: 1 tab(s) orally once a day (25 Feb 2020 10:24)  hydroCHLOROthiazide 25 mg oral tablet: 1 tab(s) orally once a day (25 Feb 2020 10:24)  metFORMIN 500 mg oral tablet: 1 tab(s) orally once a day (at bedtime) (25 Feb 2020 10:24)  ramipril 10 mg oral capsule: 1 cap(s) orally once a day (25 Feb 2020 10:24)  simvastatin 20 mg oral tablet: 1 tab(s) orally once a day (at bedtime) (25 Feb 2020 10:24)  tamsulosin 0.4 mg oral capsule: 1 cap(s) orally once a day (25 Feb 2020 10:24)  traZODone 150 mg oral tablet: 1 tab(s) orally once a day (at bedtime) (25 Feb 2020 10:24)  Vitamin B12 500 mcg oral tablet: 1 tab(s) orally once a day (25 Feb 2020 10:24)  Vitamin D3:  (25 Feb 2020 10:24)      LABS:      CAPILLARY BLOOD GLUCOSE      POCT Blood Glucose.: 120 mg/dL (10 Mar 2020 11:39)      PHYSICAL EXAM:  T(C): 35.6 (03-10-20 @ 04:48), Max: 36.7 (03-09-20 @ 12:18)  HR: 60 (03-10-20 @ 04:48) (60 - 63)  BP: 107/67 (03-10-20 @ 04:48) (95/52 - 107/67)  RR: 18 (03-10-20 @ 04:48) (18 - 19)  SpO2: 99% (03-10-20 @ 08:58) (99% - 99%)    GENERAL: NAD, well-developed, 79y lying in bed, asleep through exam  EENT: No nasal obstruction, discharge  RESPIRATORY: Clear to auscultation bilaterally; No wheeze or crackles  CARDIOVASCULAR: Regular, no pitting edema  GASTROINTESTINAL: Deferred  MUSCULOSKELETAL:  No cyanosis, extremities grossly symmetrical  PSYCH: Asleep  NEUROLOGY: Asleep      ADMISSION SUMMARY  Patient is a 79y old Male who presents with a chief complaint of Mechanical Fall (09 Mar 2020 14:49)

## 2020-03-10 NOTE — PROGRESS NOTE ADULT - ASSESSMENT
Mr. Zaman is a 77 yo male patient with a PMH of HTN, DM, depression, non-hodgkin lymphoma and CLL (on rituximab every 2 months with Dr. Shaikh), spinal stenosis, frequent falls (last in January 2019 with a tooth embedded in the left cheek subcutaneous soft tissues with adjacent foci of gas and small phlegmon/developing abscess lateral to the tooth), COPD, and illegal narcotics use (buy opiods for sleep- unprescribed, could not name drug nor quantify dose), presented to the ED after the   fall      Impression:  Mechanical fall in a pt w/ a history of recurrent falls and spinal stenosis (s/p L2-L5 decompression)  Chronic back pain likely secondary to spinal stenosis  Aortic stenosis (ECHO in 09/2019 w/ moderate stenosis)  Hypertension  DM w/ hyperglycemia  Dyslipidemia  BPH  COPD (not in acute exacerbation)  CLL (s/p completion of course of treatment in 10/2019)  SOFIA   Depression and anxiety    Plan:  PT evaluation  Supportive care  Activity as tolerated w/ fall precautions  Blood glucose monitoring qAC and qHS  Insulin coverage to maintain blood glucose levels < 180 mg/dL    Code status: full code    #Progress Note Handoff  Pending (specify):    Family discussion:  Disposition: STR

## 2020-03-10 NOTE — PROGRESS NOTE ADULT - SUBJECTIVE AND OBJECTIVE BOX
CR OWEN  79y  Male      Patient is a 79y old  Male who presents with a chief complaint of Mechanical Fall (10 Mar 2020 12:07)      INTERVAL HPI/OVERNIGHT EVENTS:      ******************************* REVIEW OF SYSTEMS:**********************************************    resting in bed.   All other review of systems negative    *********************** VITALS ******************************************    T(F): 96.1 (03-10-20 @ 04:48)  HR: 60 (03-10-20 @ 04:48) (60 - 63)  BP: 107/67 (03-10-20 @ 04:48) (105/61 - 107/67)  RR: 18 (03-10-20 @ 04:48) (18 - 18)  SpO2: 99% (03-10-20 @ 08:58) (99% - 99%)            ******************************** PHYSICAL EXAM:**************************************************  GENERAL: NAD    PSYCH: no agitation, baseline mentation  HEENT:     NERVOUS SYSTEM:  Alert & Oriented X3,   PULMONARY: CITLALY, CTA    CARDIOVASCULAR: S1S2 RRR    GI: Soft, NT, ND; BS present.    EXTREMITIES:  2+ Peripheral Pulses, No clubbing, cyanosis, or edema    LYMPH: No lymphadenopathy noted    SKIN: No rashes or lesions    ******************************************************************************************      **************************** LABS *******************************************************                  Lactate Trend        CAPILLARY BLOOD GLUCOSE      POCT Blood Glucose.: 120 mg/dL (10 Mar 2020 11:39)          **************************Active Medications *******************************************  No Known Allergies      budesonide 160 MICROgram(s)/formoterol 4.5 MICROgram(s) Inhaler 2 Puff(s) Inhalation two times a day  buPROPion XL . 300 milliGRAM(s) Oral daily  chlorhexidine 4% Liquid 1 Application(s) Topical <User Schedule>  clonazePAM  Tablet 0.5 milliGRAM(s) Oral every 24 hours PRN  enoxaparin Injectable 40 milliGRAM(s) SubCutaneous daily  folic acid 1 milliGRAM(s) Oral daily  hydrochlorothiazide 25 milliGRAM(s) Oral daily  lisinopril 40 milliGRAM(s) Oral daily  metoprolol tartrate 25 milliGRAM(s) Oral two times a day  nicotine -  14 mG/24Hr(s) Patch 1 patch Transdermal daily  simvastatin 20 milliGRAM(s) Oral at bedtime  tamsulosin 0.4 milliGRAM(s) Oral at bedtime  traZODone 150 milliGRAM(s) Oral at bedtime  venlafaxine XR. 150 milliGRAM(s) Oral daily      ***************************************************  RADIOLOGY & ADDITIONAL TESTS:    Imaging Personally Reviewed:  [ ] YES  [ ] NO    HEALTH ISSUES - PROBLEM Dx:

## 2020-03-11 NOTE — PROGRESS NOTE ADULT - ASSESSMENT
79 yo male patient with a PMH of opioid and benzo abuse, frequent falls, spinal stenosis, depression, non-hodgkin lymphoma and CLL (on rituximab every 2 months with Dr. Shaikh), HTN, COPD, Diabetes Mellitus presented to the ED after fall.     #Fall  -Most likely secondary to taking oxycodone and klonopin from his friend  -Trauma workup negative except Right forearm wound which was sutured  -Severe spinal stenosis may also be contributing  -Echo with LVEF 60-65%, Grade 1 diastolic dysfunction, moderate AS, 4.6 cm dilated ascending aorta  -EEG normal  --Counseled at length to stop using friend's medications   --Will need arm sutures removed as outpatient   --Social work consult for drug use  --Will check orthostatics    HTN--BP low here  --Continue metoprolol, lisinopril    --Thiazide discontinued; decrease lisinopril too if needed    DM  --Fingerstick ACHS  --Insulin lispro and lantus if sugar persistently >180    CLL  -Has been stable off treatment for 6 months  --O/P Hem onc   -Has a chemo port    SOFIA  -CPAP at night    Diet -DASH  Activity - With assistance  DVT - Lovenox  Dispo- Plan for STR, likely tomorrow  FULL CODE

## 2020-03-11 NOTE — DISCHARGE NOTE PROVIDER - NSDCFUSCHEDAPPT_GEN_ALL_CORE_FT
CR OWEN ; 05/14/2020 ; [facility] [address] CR OWEN ; 05/14/2020 ; [facility] [address] CR OWEN ; 05/14/2020 ; [facility] [address] CR OWEN ; 05/14/2020 ; [facility] [address] CR OWEN ; 05/14/2020 ; [facility] [address]

## 2020-03-11 NOTE — PROGRESS NOTE ADULT - ASSESSMENT
77 yo male patient with a PMH of HTN, DM, depression, non-hodgkin lymphoma, spinal stenosis, frequent falls (last in January 2019 with a tooth embedded in the left cheek subcutaneous soft tissues with adjacent foci of gas and small phlegmon/developing abscess lateral to the tooth), COPD, and polysubstance abuse    Impression:  Mechanical fall in a pt w/ a history of recurrent falls and spinal stenosis (s/p L2-L5 decompression)  Chronic back pain likely secondary to spinal stenosis  Aortic stenosis (ECHO in 09/2019 w/ moderate stenosis)  Hypertension  DM w/ hyperglycemia  Dyslipidemia  BPH  COPD (not in acute exacerbation)  NHL  SOFIA   Depression and anxiety    Plan:  PT evaluation  Supportive care  Activity as tolerated w/ fall precautions  Blood glucose monitoring qAC and qHS  Insulin coverage to maintain blood glucose levels < 180 mg/dL    Code status: full code    #Progress Note Handoff  SW counselling for drug cessation support  medically stable for discharge pending authorization at SNF

## 2020-03-11 NOTE — PROGRESS NOTE ADULT - SUBJECTIVE AND OBJECTIVE BOX
CR OWEN 79y Male  MRN#: [id number]     SUBJECTIVE  Patient is a 79y old Male who presents with a chief complaint of Mechanical Fall (11 Mar 2020 14:01)      Today is hospital day 5d, and this morning he is lying in bed without distress. He is tired of being in the hospital and wants to go to STR. Has been walking with assistance and feels stable when walking. No chest pain, palpitations, dizziness.   No acute overnight events.     OBJECTIVE  PAST MEDICAL & SURGICAL HISTORY  COPD (chronic obstructive pulmonary disease)  BPH (benign prostatic hyperplasia)  Back pain  Non-Hodgkin lymphoma  CLL (chronic lymphocytic leukemia): treatment completed 10/19  Depression  HTN (hypertension)  DM (diabetes mellitus)  Anxiety  Encounter for screening colonoscopy: 7 years approx  Port-A-Cath in place  H/O total knee replacement, bilateral    ALLERGIES:  No Known Allergies    MEDICATIONS:  STANDING MEDICATIONS  budesonide 160 MICROgram(s)/formoterol 4.5 MICROgram(s) Inhaler 2 Puff(s) Inhalation two times a day  buPROPion XL . 300 milliGRAM(s) Oral daily  chlorhexidine 4% Liquid 1 Application(s) Topical <User Schedule>  enoxaparin Injectable 40 milliGRAM(s) SubCutaneous daily  folic acid 1 milliGRAM(s) Oral daily  lisinopril 40 milliGRAM(s) Oral daily  metoprolol tartrate 25 milliGRAM(s) Oral two times a day  nicotine -  14 mG/24Hr(s) Patch 1 patch Transdermal daily  simvastatin 20 milliGRAM(s) Oral at bedtime  tamsulosin 0.4 milliGRAM(s) Oral at bedtime  traZODone 150 milliGRAM(s) Oral at bedtime  venlafaxine XR. 150 milliGRAM(s) Oral daily    PRN MEDICATIONS  clonazePAM  Tablet 0.5 milliGRAM(s) Oral every 24 hours PRN    HOME MEDICATIONS  Home Medications:  budesonide-formoterol 160 mcg-4.5 mcg/inh inhalation aerosol: 2 puff(s) inhaled 2 times a day (25 Feb 2020 10:24)  buPROPion 150 mg/12 hours (SR) oral tablet, extended release: 1 tab(s) orally 2 times a day (25 Feb 2020 10:24)  clonazePAM 0.5 mg oral tablet: orally every 6 hours, As Needed (25 Feb 2020 10:24)  Effexor  mg oral capsule, extended release: 1 cap(s) orally once a day (25 Feb 2020 10:24)  folic acid 1 mg oral tablet: 1 tab(s) orally once a day (25 Feb 2020 10:24)  metFORMIN 500 mg oral tablet: 1 tab(s) orally once a day (at bedtime) (25 Feb 2020 10:24)  nicotine 14 mg/24 hr transdermal film, extended release: 1 patch transdermal once a day. DO NOT smoke while using patches. (11 Mar 2020 14:05)  ramipril 10 mg oral capsule: 1 cap(s) orally once a day (25 Feb 2020 10:24)  simvastatin 20 mg oral tablet: 1 tab(s) orally once a day (at bedtime) (25 Feb 2020 10:24)  tamsulosin 0.4 mg oral capsule: 1 cap(s) orally once a day (25 Feb 2020 10:24)  traZODone 150 mg oral tablet: 1 tab(s) orally once a day (at bedtime) (25 Feb 2020 10:24)  Vitamin B12 500 mcg oral tablet: 1 tab(s) orally once a day (25 Feb 2020 10:24)  Vitamin D3:  (25 Feb 2020 10:24)      LABS:                          CAPILLARY BLOOD GLUCOSE      POCT Blood Glucose.: 137 mg/dL (11 Mar 2020 11:22)      PHYSICAL EXAM:  T(C): 35.3 (03-11-20 @ 12:15), Max: 36.1 (03-10-20 @ 20:24)  HR: 61 (03-11-20 @ 12:15) (61 - 67)  BP: 108/65 (03-11-20 @ 12:15) (93/67 - 108/65)  RR: 17 (03-11-20 @ 12:15) (17 - 18)  SpO2: 93% (03-11-20 @ 07:56) (93% - 98%)    GENERAL: NAD, well-developed, 79y sitting in bed, awake  EENT: EOMI, conjunctiva and sclera clear, No nasal obstruction or discharge  RESPIRATORY: Clear to auscultation bilaterally; No wheeze or crackles  CARDIOVASCULAR: Regular rate and rhythm; No rubs, or gallops, no pitting edema; no murmur appreciated  GASTROINTESTINAL: Abdomen Soft, Nontender, Nondistended  MUSCULOSKELETAL:  No cyanosis, extremities grossly symmetrical  PSYCH: AAO, affect appropriate  NEUROLOGY: non-focal, cognition grossly intact, MAEE    ADMISSION SUMMARY  Patient is a 79y old Male who presents with a chief complaint of Mechanical Fall (11 Mar 2020 14:01)

## 2020-03-11 NOTE — DISCHARGE NOTE PROVIDER - HOSPITAL COURSE
79 yo male patient with a PMH of opioid and benzo abuse, frequent falls, spinal stenosis, depression, non-hodgkin lymphoma and CLL (on rituximab every 2 months with Dr. Shaikh), HTN, COPD, Diabetes Mellitus presented to the ED after fall. Admitted to medicine. Fall likely secondary to taking oxycodone and klonopin from his friend, counseled at length to stop using friend's medications. Trauma workup negative except Right forearm wound which was sutured. EEG negative. Patient's blood pressure was soft, Hypertension meds decreased. He is hemodynamically stable and ready for discharge.

## 2020-03-11 NOTE — DISCHARGE NOTE PROVIDER - NSDCMRMEDTOKEN_GEN_ALL_CORE_FT
budesonide-formoterol 160 mcg-4.5 mcg/inh inhalation aerosol: 2 puff(s) inhaled 2 times a day  buPROPion 150 mg/12 hours (SR) oral tablet, extended release: 1 tab(s) orally 2 times a day  clonazePAM 0.5 mg oral tablet: orally every 6 hours, As Needed  Effexor  mg oral capsule, extended release: 1 cap(s) orally once a day  folic acid 1 mg oral tablet: 1 tab(s) orally once a day  metFORMIN 500 mg oral tablet: 1 tab(s) orally once a day (at bedtime)  metoprolol tartrate 25 mg oral tablet: 1 tab(s) orally 2 times a day  nicotine 14 mg/24 hr transdermal film, extended release: 1 patch transdermal once a day. DO NOT smoke while using patches.  ramipril 10 mg oral capsule: 1 cap(s) orally once a day  simvastatin 20 mg oral tablet: 1 tab(s) orally once a day (at bedtime)  tamsulosin 0.4 mg oral capsule: 1 cap(s) orally once a day  traZODone 150 mg oral tablet: 1 tab(s) orally once a day (at bedtime)  Vitamin B12 500 mcg oral tablet: 1 tab(s) orally once a day  Vitamin D3: budesonide-formoterol 160 mcg-4.5 mcg/inh inhalation aerosol: 2 puff(s) inhaled 2 times a day  buPROPion 150 mg/12 hours (SR) oral tablet, extended release: 1 tab(s) orally 2 times a day  clonazePAM 0.5 mg oral tablet: orally every 6 hours, As Needed  Effexor  mg oral capsule, extended release: 1 cap(s) orally once a day  folic acid 1 mg oral tablet: 1 tab(s) orally once a day  metFORMIN 500 mg oral tablet: 1 tab(s) orally once a day (at bedtime)  metoprolol tartrate 25 mg oral tablet: 1 tab(s) orally 2 times a day  nicotine 14 mg/24 hr transdermal film, extended release: 1 patch transdermal once a day. DO NOT smoke while using patches.  simvastatin 20 mg oral tablet: 1 tab(s) orally once a day (at bedtime)  tamsulosin 0.4 mg oral capsule: 1 cap(s) orally once a day  traZODone 150 mg oral tablet: 1 tab(s) orally once a day (at bedtime)  Vitamin B12 500 mcg oral tablet: 1 tab(s) orally once a day  Vitamin D3: budesonide-formoterol 160 mcg-4.5 mcg/inh inhalation aerosol: 2 puff(s) inhaled 2 times a day  buPROPion 150 mg/12 hours (SR) oral tablet, extended release: 1 tab(s) orally 2 times a day  Effexor  mg oral capsule, extended release: 1 cap(s) orally once a day  folic acid 1 mg oral tablet: 1 tab(s) orally once a day  metFORMIN 500 mg oral tablet: 1 tab(s) orally once a day (at bedtime)  metoprolol tartrate 25 mg oral tablet: 0.5 tab(s) orally 2 times a day   nicotine 14 mg/24 hr transdermal film, extended release: 1 patch transdermal once a day. DO NOT smoke while using patches.  simvastatin 20 mg oral tablet: 1 tab(s) orally once a day (at bedtime)  tamsulosin 0.4 mg oral capsule: 1 cap(s) orally once a day (at bedtime)  traZODone 150 mg oral tablet: 1 tab(s) orally once a day (at bedtime)  Vitamin B12 500 mcg oral tablet: 1 tab(s) orally once a day  Vitamin D3: budesonide-formoterol 160 mcg-4.5 mcg/inh inhalation aerosol: 2 puff(s) inhaled 2 times a day  buPROPion 150 mg/12 hours (SR) oral tablet, extended release: 1 tab(s) orally 2 times a day  Effexor  mg oral capsule, extended release: 1 cap(s) orally once a day  folic acid 1 mg oral tablet: 1 tab(s) orally once a day  metFORMIN 500 mg oral tablet: 1 tab(s) orally once a day (at bedtime)  metoprolol tartrate 25 mg oral tablet: 0.5 tab(s) orally 2 times a day   nicotine 14 mg/24 hr transdermal film, extended release: 1 patch transdermal once a day. DO NOT smoke while using patches.  simvastatin 20 mg oral tablet: 1 tab(s) orally once a day (at bedtime)  tamsulosin 0.4 mg oral capsule: 1 cap(s) orally once a day (at bedtime)  traZODone 150 mg oral tablet: 1 tab(s) orally once a day (at bedtime)  Vitamin B12 500 mcg oral tablet: 1 tab(s) orally once a day  Vitamin D3 1000 intl units (25 mcg) oral tablet: 1 tab(s) orally once a day

## 2020-03-11 NOTE — PROGRESS NOTE ADULT - SUBJECTIVE AND OBJECTIVE BOX
CR OWEN  79y  Male      Patient is a 79y old  Male who presents with a chief complaint of Mechanical Fall (11 Mar 2020 15:46)      INTERVAL HPI/OVERNIGHT EVENTS: denies syncopal episode. feels well. walked with PT. denies any drug withdrawal symptoms      REVIEW OF SYSTEMS:  as above  All other review of systems negative    T(C): 35.3 (03-11-20 @ 12:15), Max: 36.1 (03-10-20 @ 20:24)  HR: 61 (03-11-20 @ 12:15) (61 - 67)  BP: 108/65 (03-11-20 @ 12:15) (93/67 - 108/65)  RR: 17 (03-11-20 @ 12:15) (17 - 18)  SpO2: 93% (03-11-20 @ 07:56) (93% - 98%)  Wt(kg): --Vital Signs Last 24 Hrs  T(C): 35.3 (11 Mar 2020 12:15), Max: 36.1 (10 Mar 2020 20:24)  T(F): 95.5 (11 Mar 2020 12:15), Max: 97 (10 Mar 2020 20:24)  HR: 61 (11 Mar 2020 12:15) (61 - 67)  BP: 108/65 (11 Mar 2020 12:15) (93/67 - 108/65)  BP(mean): --  RR: 17 (11 Mar 2020 12:15) (17 - 18)  SpO2: 93% (11 Mar 2020 07:56) (93% - 98%)      03-10-20 @ 07:01  -  03-11-20 @ 07:00  --------------------------------------------------------  IN: 0 mL / OUT: 1250 mL / NET: -1250 mL        PHYSICAL EXAM:  GENERAL: a bit deconditioned  PSYCH: no agitation, baseline mentation  NERVOUS SYSTEM:  Alert & Oriented X3, no new focal deficits  PULMONARY: Clear to percussion bilaterally; No rales, rhonchi, wheezing, or rubs  CARDIOVASCULAR: Regular rate and rhythm; +2/6 systolic murmur, no rubs, or gallops  GI: Soft, Nontender, Nondistended; Bowel sounds present rotund  EXTREMITIES:  2+ Peripheral Pulses, No clubbing, cyanosis, or edema  SKIN xanthelasma, euvolemic appearing    Consultant(s) Notes Reviewed:  [x ] YES  [ ] NO    Discussed with Consultants/Other Providers [ x] YES     LABS                  Lactate Trend        CAPILLARY BLOOD GLUCOSE      POCT Blood Glucose.: 137 mg/dL (11 Mar 2020 11:22)        RADIOLOGY & ADDITIONAL TESTS:    Imaging Personally Reviewed:  [ ] YES  [ ] NO    HEALTH ISSUES - PROBLEM Dx:

## 2020-03-11 NOTE — DISCHARGE NOTE PROVIDER - CARE PROVIDERS DIRECT ADDRESSES
[email or web address]

## 2020-03-11 NOTE — DISCHARGE NOTE PROVIDER - CARE PROVIDER_API CALL
Duncan Li)  Family Medicine  [address] [place] [address]  Phone: [phone]  Fax: [phone]  Follow Up Time: 1 week    Paulino Xiong)  EEGEpilepsy; Neurology  [address] [place] [address], [address]  [place] [address]  Phone: [phone]  Fax: [phone]  Follow Up Time: 1 week    Agustin Pryor)  Hematology; Internal Medicine; Medical Oncology  [facility] [place] [address]  Phone: [phone]  Fax: [phone]  Follow Up Time: Routine

## 2020-03-11 NOTE — DISCHARGE NOTE PROVIDER - NSDCCPCAREPLAN_GEN_ALL_CORE_FT
PRINCIPAL DISCHARGE DIAGNOSIS  Diagnosis: Fall  Assessment and Plan of Treatment: You came in after a fall, which was likely due to using medication that was not prescribed to you.  -Please do not take any prescription medication that was not prescribed to you. Please take your medications only as prescribed.   -Please also Follow up with neurology and neurosurgery for your chronic spinal stenosis  -Please Follow up with your primary care provider      SECONDARY DISCHARGE DIAGNOSES  Diagnosis: Hypertension  Assessment and Plan of Treatment: You have a history of hypertension but your blood pressure was on the low side here. We decreased your Blood Pressure meds.  -Please Follow up with your primary care provider PRINCIPAL DISCHARGE DIAGNOSIS  Diagnosis: Fall  Assessment and Plan of Treatment: You came in after a fall, which was likely due to using medication that was not prescribed to you.  -Please do not take any prescription medication that was not prescribed to you. Please take your medications only as prescribed.   -Please also Follow up with neurology and neurosurgery for your chronic spinal stenosis  -Please Follow up with your primary care provider  -You should have your right arm sutures removed in 4 days by a provider.      SECONDARY DISCHARGE DIAGNOSES  Diagnosis: Hypertension  Assessment and Plan of Treatment: You have a history of hypertension but your blood pressure was on the low side here. We decreased your Blood Pressure meds.  -Please Follow up with your primary care provider    Diagnosis: Age-related physical debility  Assessment and Plan of Treatment: Outpatient physical therapy recommended. Use walker as recommended by physical therapist and follow fall precautions. Consider changing outpatient benzodiazopenes, as they may increase risk of falls; discuss with primary care provider. PRINCIPAL DISCHARGE DIAGNOSIS  Diagnosis: Fall  Assessment and Plan of Treatment: You came in after a fall, which was likely due to using medication that was not prescribed to you.  -Please do not take any prescription medication that was not prescribed to you. Please take your medications only as prescribed.   -Please also Follow up with neurology and neurosurgery for your chronic spinal stenosis  -Please Follow up with your primary care provider  -Please have your right arm sutures removed in 4 days at Dr Li's office      SECONDARY DISCHARGE DIAGNOSES  Diagnosis: Hypertension  Assessment and Plan of Treatment: You have a history of hypertension but your blood pressure was on the low side here. We decreased your Blood Pressure meds.  -Please Follow up with your primary care provider    Diagnosis: Age-related physical debility  Assessment and Plan of Treatment: Outpatient physical therapy recommended. Use walker as recommended by physical therapist and follow fall precautions. Consider changing outpatient benzodiazopenes, as they may increase risk of falls; discuss with primary care provider.

## 2020-03-12 NOTE — PROGRESS NOTE ADULT - SUBJECTIVE AND OBJECTIVE BOX
CR OWEN  79y  Male      Patient is a 79y old  Male who presents with a chief complaint of Mechanical Fall (12 Mar 2020 11:43)      INTERVAL HPI/OVERNIGHT EVENTS: feels well. walking well with PT. no elbow pain. no new complaints      REVIEW OF SYSTEMS:  limited as above  All other review of systems negative    T(C): 36 (03-12-20 @ 13:14), Max: 36.2 (03-11-20 @ 20:21)  HR: 75 (03-12-20 @ 13:14) (62 - 75)  BP: 109/67 (03-12-20 @ 13:14) (91/58 - 115/68)  RR: 17 (03-12-20 @ 13:14) (17 - 18)  SpO2: 96% (03-12-20 @ 07:58) (95% - 96%)  Wt(kg): --Vital Signs Last 24 Hrs  T(C): 36 (12 Mar 2020 13:14), Max: 36.2 (11 Mar 2020 20:21)  T(F): 96.8 (12 Mar 2020 13:14), Max: 97.2 (11 Mar 2020 20:21)  HR: 75 (12 Mar 2020 13:14) (62 - 75)  BP: 109/67 (12 Mar 2020 13:14) (91/58 - 115/68)  BP(mean): --  RR: 17 (12 Mar 2020 13:14) (17 - 18)  SpO2: 96% (12 Mar 2020 07:58) (95% - 96%)        PHYSICAL EXAM:  GENERAL: NAD  PSYCH: no agitation, baseline mentation  NERVOUS SYSTEM:  Alert & Oriented X3, no new focal deficits  PULMONARY: Clear to percussion bilaterally; No rales, rhonchi, wheezing, or rubs  CARDIOVASCULAR: Regular rate and rhythm; No murmurs, rubs, or gallops  GI: Soft, Nontender, Nondistended; Bowel sounds present  EXTREMITIES:  2+ Peripheral Pulses, No clubbing, cyanosis, or edema, R elbow stitches CDI    Consultant(s) Notes Reviewed:  [x ] YES  [ ] NO    Discussed with Consultants/Other Providers [ x] YES     LABS                  Lactate Trend        CAPILLARY BLOOD GLUCOSE      POCT Blood Glucose.: 118 mg/dL (12 Mar 2020 11:40)        RADIOLOGY & ADDITIONAL TESTS:    Imaging Personally Reviewed:  [ ] YES  [ ] NO    HEALTH ISSUES - PROBLEM Dx:

## 2020-03-12 NOTE — PROGRESS NOTE ADULT - ASSESSMENT
77 yo male patient with a PMH of opioid and benzo abuse, frequent falls, spinal stenosis, depression, non-hodgkin lymphoma and CLL (on rituximab every 2 months with Dr. Shaikh), HTN, COPD, Diabetes Mellitus presented to the ED after fall.     #Fall  -Most likely secondary to taking oxycodone and klonopin from his friend  -Trauma workup negative except Right forearm wound which was sutured  -Severe spinal stenosis may also be contributing  -Echo with LVEF 60-65%, Grade 1 diastolic dysfunction, moderate AS, 4.6 cm dilated ascending aorta  -EEG normal  -Orthostatics negative  --Counseled at length to stop using friend's medications   --Will need arm sutures removed as outpatient   --Social work consult for drug use    HTN--BP low here  --Continue metoprolol  --Thiazide and lisinopril discontinued    DM  --Fingerstick ACHS  --Insulin lispro and lantus if sugar persistently >180    CLL  -Has been stable off treatment for 6 months  --O/P Hem onc   -Has a chemo port    SOFIA  -CPAP at night, patient refuses    Diet -DASH  Activity - With assistance  DVT - Lovenox  Dispo- Plan for STR  FULL CODE

## 2020-03-12 NOTE — PROGRESS NOTE ADULT - ASSESSMENT
77 yo male patient with a PMH of HTN, DM, depression, non-hodgkin lymphoma, spinal stenosis, frequent falls (last in January 2019 with a tooth embedded in the left cheek subcutaneous soft tissues with adjacent foci of gas and small phlegmon/developing abscess lateral to the tooth), COPD, and polysubstance abuse    Impression:  Mechanical fall in a pt w/ a history of recurrent falls and spinal stenosis (s/p L2-L5 decompression)  Chronic back pain likely secondary to spinal stenosis  Aortic stenosis (ECHO in 09/2019 w/ moderate stenosis)  Hypertension  DM w/ hyperglycemia  Dyslipidemia  BPH  COPD (not in acute exacerbation)  NHL  SOFIA   Depression and anxiety    Plan:  PT evaluation  Supportive care  Activity as tolerated w/ fall precautions  Blood glucose monitoring qAC and qHS  Insulin coverage to maintain blood glucose levels < 180 mg/dL    SW counselling for drug cessation support provided    Code status: full code        medically stable for discharge pending authorization at SNF

## 2020-03-12 NOTE — PROGRESS NOTE ADULT - SUBJECTIVE AND OBJECTIVE BOX
CR OWEN 79y Male  MRN#: [id number]     SUBJECTIVE  Patient is a 79y old Male who presents with a chief complaint of Mechanical Fall (11 Mar 2020 16:13)      Today is hospital day 6d, and this morning he is lying in bed without distress. Wants to go to STR. No chest pain, palpitations, shortness of breath, abdominal pain, diarrhea, constipation.   No acute overnight events.     OBJECTIVE  PAST MEDICAL & SURGICAL HISTORY  COPD (chronic obstructive pulmonary disease)  BPH (benign prostatic hyperplasia)  Back pain  Non-Hodgkin lymphoma  CLL (chronic lymphocytic leukemia): treatment completed 10/19  Depression  HTN (hypertension)  DM (diabetes mellitus)  Anxiety  Encounter for screening colonoscopy: 7 years approx  Port-A-Cath in place  H/O total knee replacement, bilateral    ALLERGIES:  No Known Allergies    MEDICATIONS:  STANDING MEDICATIONS  budesonide 160 MICROgram(s)/formoterol 4.5 MICROgram(s) Inhaler 2 Puff(s) Inhalation two times a day  buPROPion XL . 300 milliGRAM(s) Oral daily  chlorhexidine 4% Liquid 1 Application(s) Topical <User Schedule>  enoxaparin Injectable 40 milliGRAM(s) SubCutaneous daily  folic acid 1 milliGRAM(s) Oral daily  metoprolol tartrate 25 milliGRAM(s) Oral two times a day  nicotine -  14 mG/24Hr(s) Patch 1 patch Transdermal daily  simvastatin 20 milliGRAM(s) Oral at bedtime  tamsulosin 0.4 milliGRAM(s) Oral at bedtime  traZODone 150 milliGRAM(s) Oral at bedtime  venlafaxine XR. 150 milliGRAM(s) Oral daily    PRN MEDICATIONS  clonazePAM  Tablet 0.5 milliGRAM(s) Oral every 24 hours PRN    HOME MEDICATIONS  Home Medications:  budesonide-formoterol 160 mcg-4.5 mcg/inh inhalation aerosol: 2 puff(s) inhaled 2 times a day (25 Feb 2020 10:24)  buPROPion 150 mg/12 hours (SR) oral tablet, extended release: 1 tab(s) orally 2 times a day (25 Feb 2020 10:24)  clonazePAM 0.5 mg oral tablet: orally every 6 hours, As Needed (25 Feb 2020 10:24)  Effexor  mg oral capsule, extended release: 1 cap(s) orally once a day (25 Feb 2020 10:24)  folic acid 1 mg oral tablet: 1 tab(s) orally once a day (25 Feb 2020 10:24)  metFORMIN 500 mg oral tablet: 1 tab(s) orally once a day (at bedtime) (25 Feb 2020 10:24)  nicotine 14 mg/24 hr transdermal film, extended release: 1 patch transdermal once a day. DO NOT smoke while using patches. (11 Mar 2020 14:05)  ramipril 10 mg oral capsule: 1 cap(s) orally once a day (25 Feb 2020 10:24)  simvastatin 20 mg oral tablet: 1 tab(s) orally once a day (at bedtime) (25 Feb 2020 10:24)  tamsulosin 0.4 mg oral capsule: 1 cap(s) orally once a day (25 Feb 2020 10:24)  traZODone 150 mg oral tablet: 1 tab(s) orally once a day (at bedtime) (25 Feb 2020 10:24)  Vitamin B12 500 mcg oral tablet: 1 tab(s) orally once a day (25 Feb 2020 10:24)  Vitamin D3:  (25 Feb 2020 10:24)      LABS:                          CAPILLARY BLOOD GLUCOSE      POCT Blood Glucose.: 124 mg/dL (12 Mar 2020 07:47)      PHYSICAL EXAM:  T(C): 35.9 (03-12-20 @ 05:30), Max: 36.2 (03-11-20 @ 20:21)  HR: 70 (03-12-20 @ 07:58) (61 - 70)  BP: 91/58 (03-12-20 @ 07:58) (91/58 - 115/68)  RR: 18 (03-12-20 @ 07:58) (17 - 18)  SpO2: 96% (03-12-20 @ 07:58) (95% - 96%)    GENERAL: NAD, well-developed, 79y asleep in bed, awakens to voice  EENT: EOMI, conjunctiva and sclera clear, No nasal obstruction or discharge  RESPIRATORY: Clear to auscultation bilaterally; No wheeze or crackles  CARDIOVASCULAR: Regular rate and rhythm; No rubs, or gallops, no pitting edema; no murmur appreciated  GASTROINTESTINAL: Abdomen Soft, Nontender, Nondistended  MUSCULOSKELETAL:  No cyanosis, extremities grossly symmetrical  PSYCH: AAO, affect appropriate  NEUROLOGY: non-focal, cognition grossly intact, MAEE    ADMISSION SUMMARY  Patient is a 79y old Male who presents with a chief complaint of Mechanical Fall (11 Mar 2020 16:13)

## 2020-03-13 NOTE — PROGRESS NOTE ADULT - ASSESSMENT
77 yo male patient with a PMH of HTN, DM, depression, non-hodgkin lymphoma, spinal stenosis, frequent falls (last in January 2019 with a tooth embedded in the left cheek subcutaneous soft tissues with adjacent foci of gas and small phlegmon/developing abscess lateral to the tooth), COPD chronic and stable, and polysubstance abuse    Impression:  Mechanical fall in a pt w/ a history of recurrent falls and spinal stenosis (s/p L2-L5 decompression)  Chronic back pain likely secondary to spinal stenosis  Aortic stenosis (ECHO in 09/2019 w/ moderate stenosis)  Hypertension  DM w/ hyperglycemia  Dyslipidemia  BPH  COPD (not in acute exacerbation)  NHL  SOFIA   Depression and anxiety    Plan:  PT evaluation  Supportive care  Activity as tolerated w/ fall precautions  Blood glucose monitoring qAC and qHS  Insulin coverage to maintain blood glucose levels < 180 mg/dL    SW counselling for drug cessation support provided    Code status: full code        medically stable for discharge  insurance did not authorize inpatient PT at [facility]  patient will be going home with home services and home PT referral  should visit surgical clinic in about 4 days for stitches removal  ambulate with assistive device as instructed by physical therapist with strict supervision and falls precautions

## 2020-03-13 NOTE — DISCHARGE NOTE NURSING/CASE MANAGEMENT/SOCIAL WORK - PATIENT PORTAL LINK FT
You can access the FollowMyHealth Patient Portal offered by  by registering at the following website: [email or web address]. By joining [employer]’s FollowMyHealth portal, you will also be able to view your health information using other applications (apps) compatible with our system.

## 2020-03-13 NOTE — CHART NOTE - NSCHARTNOTEFT_GEN_A_CORE
<<<RESIDENT DISCHARGE NOTE>>>     CR OWEN  MRN-[id number]    VITAL SIGNS:  T(F): 96.4 (03-13-20 @ 05:45), Max: 97 (03-12-20 @ 22:00)  HR: 65 (03-13-20 @ 05:45)  BP: 104/51 (03-13-20 @ 05:45)  SpO2: 95% (03-12-20 @ 20:07)      T(C): 35.8 (03-13-20 @ 05:45), Max: 36.1 (03-12-20 @ 22:00)  HR: 65 (03-13-20 @ 05:45) (65 - 75)  BP: 104/51 (03-13-20 @ 05:45) (104/51 - 109/67)  RR: 18 (03-13-20 @ 05:45) (17 - 18)  SpO2: 95% (03-12-20 @ 20:07) (94% - 95%)    GENERAL: NAD, well-developed, 79y sitting at the edge of his bed  EENT: EOMI, conjunctiva and sclera clear, No nasal obstruction or discharge  RESPIRATORY: Clear to auscultation bilaterally; No wheeze or crackles  CARDIOVASCULAR: Regular rate and rhythm; No rubs, or gallops, no pitting edema; no murmur appreciated  GASTROINTESTINAL: Abdomen Soft, Nontender, Nondistended  MUSCULOSKELETAL:  No cyanosis, extremities grossly symmetrical  PSYCH: AAO, affect appropriate  NEUROLOGY: non-focal, cognition grossly intact, MAEE    TEST RESULTS:              FINAL DISCHARGE INTERVIEW:  Resident(s) Present: (Name:_______Dimmyles_____), RN Present: (Name:  ___________)    DISCHARGE MEDICATION RECONCILIATION  reviewed with Attending (Name:____Weston______)    DISPOSITION:   [  ] Home,    [ x ] Home with Visiting Nursing Services,   [    ]  SNF/ NH,    [   ] Acute Rehab (4A),   [   ] Other (Specify:_________)

## 2020-03-13 NOTE — PROGRESS NOTE ADULT - SUBJECTIVE AND OBJECTIVE BOX
CR OWEN  79y  Male      Patient is a 79y old  Male who presents with a chief complaint of Mechanical Fall (12 Mar 2020 13:51)      INTERVAL HPI/OVERNIGHT EVENTS: no recurrent falls here. elbow no pain. walking well with supervision and assistive device. no acute complaints      REVIEW OF SYSTEMS:  as above  All other review of systems negative    T(C): 36.9 (03-13-20 @ 12:25), Max: 36.9 (03-13-20 @ 12:25)  HR: 78 (03-13-20 @ 12:25) (65 - 78)  BP: 120/74 (03-13-20 @ 12:25) (104/51 - 120/74)  RR: 18 (03-13-20 @ 12:25) (18 - 18)  SpO2: 95% (03-12-20 @ 20:07) (94% - 95%)  Wt(kg): --Vital Signs Last 24 Hrs  T(C): 36.9 (13 Mar 2020 12:25), Max: 36.9 (13 Mar 2020 12:25)  T(F): 98.4 (13 Mar 2020 12:25), Max: 98.4 (13 Mar 2020 12:25)  HR: 78 (13 Mar 2020 12:25) (65 - 78)  BP: 120/74 (13 Mar 2020 12:25) (104/51 - 120/74)  BP(mean): --  RR: 18 (13 Mar 2020 12:25) (18 - 18)  SpO2: 95% (12 Mar 2020 20:07) (94% - 95%)      03-12-20 @ 07:01  -  03-13-20 @ 07:00  --------------------------------------------------------  IN: 210 mL / OUT: 200 mL / NET: 10 mL        PHYSICAL EXAM:  GENERAL: NAD  PSYCH: no agitation, baseline mentation, no tremor or hallucinations  NERVOUS SYSTEM:  Alert & Oriented X3, no new focal deficits  PULMONARY: Clear to percussion bilaterally; No rales, rhonchi, wheezing, or rubs  CARDIOVASCULAR: Regular rate and rhythm; No murmurs, rubs, or gallops  GI: Soft, Nontender, Nondistended; Bowel sounds present, rotund  EXTREMITIES:  2+ Peripheral Pulses, No clubbing, cyanosis, or edema    Consultant(s) Notes Reviewed:  [x ] YES  [ ] NO    Discussed with Consultants/Other Providers [ x] YES     LABS                  Lactate Trend        CAPILLARY BLOOD GLUCOSE      POCT Blood Glucose.: 105 mg/dL (13 Mar 2020 11:45)        RADIOLOGY & ADDITIONAL TESTS:    Imaging Personally Reviewed:  [ ] YES  [ ] NO    HEALTH ISSUES - PROBLEM Dx:

## 2020-03-13 NOTE — PROGRESS NOTE ADULT - REASON FOR ADMISSION
Mechanical Fall

## 2020-05-21 NOTE — PATIENT PROFILE ADULT - PRIMARY SOURCE OF SUPPORT/COMFORT
[de-identified] : He has been well since the initial visit and I&D of the [facility]. He has had no pain, no drainage. He is not limited in activities. the are of the skin is normal without irritation or elevation. He does not feel there is anything abnormal present. He is completely asymptomatic. 
no one

## 2020-06-05 PROBLEM — R30.0 STRANGURIA: Status: ACTIVE | Noted: 2020-01-01

## 2020-06-05 PROBLEM — N32.81 URGENCY-FREQUENCY SYNDROME: Status: ACTIVE | Noted: 2020-01-01

## 2020-06-05 PROBLEM — N20.0 RIGHT NEPHROLITHIASIS: Status: ACTIVE | Noted: 2020-01-01

## 2020-06-05 PROBLEM — R39.198 SLOW URINARY STREAM: Status: ACTIVE | Noted: 2020-01-01

## 2020-06-05 PROBLEM — R35.0 URINARY FREQUENCY: Status: ACTIVE | Noted: 2020-01-01

## 2020-06-05 PROBLEM — R35.0 INCREASED FREQUENCY OF URINATION: Status: ACTIVE | Noted: 2018-06-12

## 2020-06-05 PROBLEM — R33.9 INCOMPLETE BLADDER EMPTYING: Status: ACTIVE | Noted: 2018-06-12

## 2020-06-05 PROBLEM — R39.13 INTERMITTENT URINARY STREAM: Status: ACTIVE | Noted: 2018-06-12

## 2020-06-05 PROBLEM — E66.8 MODERATE OBESITY: Status: ACTIVE | Noted: 2020-01-01

## 2020-06-05 PROBLEM — R35.1 NOCTURIA MORE THAN TWICE PER NIGHT: Status: ACTIVE | Noted: 2020-01-01

## 2020-06-05 NOTE — REVIEW OF SYSTEMS
[Feeling Poorly] : feeling poorly [Feeling Tired] : feeling tired [Shortness Of Breath] : shortness of breath [see HPI] : see HPI [Negative] : Psychiatric

## 2020-06-05 NOTE — PHYSICAL EXAM
[General Appearance - Well Nourished] : well nourished [General Appearance - Well Developed] : well developed [Well Groomed] : well groomed [Normal Appearance] : normal appearance [General Appearance - In No Acute Distress] : no acute distress [Abdomen Tenderness] : non-tender [Abdomen Soft] : soft [Costovertebral Angle Tenderness] : no ~M costovertebral angle tenderness [Penis Abnormality] : normal circumcised penis [Epididymis] : the epididymides were normal [Testes Tenderness] : no tenderness of the testes [Heart Rate And Rhythm] : Heart rate and rhythm were normal [] : no respiratory distress [Auscultation Breath Sounds / Voice Sounds] : lungs were clear to auscultation bilaterally [Exaggerated Use Of Accessory Muscles For Inspiration] : no accessory muscle use [Respiration, Rhythm And Depth] : normal respiratory rhythm and effort [Mood] : the mood was normal [Oriented To Time, Place, And Person] : oriented to person, place, and time [Affect] : the affect was normal [Not Anxious] : not anxious [FreeTextEntry1] : too weak to stand without assistance

## 2020-06-05 NOTE — PHYSICAL EXAM
[General Appearance - Well Developed] : well developed [General Appearance - Well Nourished] : well nourished [Normal Appearance] : normal appearance [Well Groomed] : well groomed [General Appearance - In No Acute Distress] : no acute distress [Abdomen Soft] : soft [Abdomen Tenderness] : non-tender [Costovertebral Angle Tenderness] : no ~M costovertebral angle tenderness [Epididymis] : the epididymides were normal [Testes Tenderness] : no tenderness of the testes [Penis Abnormality] : normal circumcised penis [Heart Rate And Rhythm] : Heart rate and rhythm were normal [] : no respiratory distress [Respiration, Rhythm And Depth] : normal respiratory rhythm and effort [Auscultation Breath Sounds / Voice Sounds] : lungs were clear to auscultation bilaterally [Exaggerated Use Of Accessory Muscles For Inspiration] : no accessory muscle use [Affect] : the affect was normal [Oriented To Time, Place, And Person] : oriented to person, place, and time [Mood] : the mood was normal [Not Anxious] : not anxious [FreeTextEntry1] : right dtr=1, left i cant get

## 2020-06-05 NOTE — HISTORY OF PRESENT ILLNESS
[FreeTextEntry1] : Zach is a 72-year-old male we had seen back in 2018 with significant lower urinary tract symptoms. We had recommended urodynamics he had scheduled it and then elected not to follow-up. At that time he was also noted to have microscopic immaturity with the right-sided kidney stone with the stone about 6 x 6 x 4 mm Hounsfield units of 800. He had a hypertensive right exophoric upper pull cyst and slight thickening of the adrenal glands his prostate was about 50 g and he had umbilical hernia and degenerative disc disease. A uroflow at that time showed poor flow with intermittency. Since last seen he's had neurosurgical issues, including a decompress of laminectomy and foramen autonomy with inter-laminar stabilization on November 18, 2019. This was done at L2 – 3, 3 – 4, and 4 – 5. He tells me that since surgery is not really better but that was seven months ago. Over the last year his symptoms have slowly gotten worse with increasing urgency frequency, slow intermittent stream, increasing nocturia and his trips to the bathroom at night usually ended up with urine on the floor. The last three weeks have been amazingly worse than going some days five times in three hours often on the floor in the no have a day where he doesn’t urinate that much at all. However, realize not that much at all means he can make it to the bathroom most of the time only wetting himself once or twice a day. His decide if you’d like to see what could be done. [Urinary Incontinence] : urinary incontinence [Urinary Urgency] : urinary urgency [Urinary Frequency] : urinary frequency [Nocturia] : nocturia [Straining] : straining [Weak Stream] : weak stream [Dysuria] : no dysuria [Intermittency] : intermittency [Hematuria - Gross] : no gross hematuria [Abdominal Pain] : no abdominal pain

## 2020-06-05 NOTE — LETTER HEADER
[FreeTextEntry3] : Henrry Byrd M.D.\par Director of Urology\par [facility]\par [address] [place] [address], [address]\[place] [address]

## 2020-06-05 NOTE — LETTER BODY
[Dear  ___] : Dear  [unfilled], [Courtesy Letter:] : I had the pleasure of seeing your patient, [unfilled], in my office today. [Please see my note below.] : Please see my note below. [Sincerely,] : Sincerely, [FreeTextEntry2] : Duncan Li MD\par [address]\[place] [address]\par

## 2020-06-05 NOTE — ASSESSMENT
[FreeTextEntry1] : Physical exam shows morbid obesity with a large panniculus. He sitting in a wheel chair due to poor lower extremity strength and when he tries to walk he needs someone to hold unrolls all. He has no CVA tenderness. The lung sounds were acceptable he has allowed us systolic murmur, and umbilical hernia. The penis is atrophied the testicles are small and soft and I did not do a CLARITA at this time.\par \par He had avoided about two hours his bladder volumes 236. We had him void to get a sample with urinalysis, culture and cytology in the bladder volume postvoid was 51 ML.\par \par This is progression of his underlying I believe bladder outlet obstruction there would could be at this point bladder dysfunction with or without bladder outlet obstruction. We susan lsend send urine to make sure is noninfected, send him for a KUB to see if the stone is amenable to lithotripsy, he will get a renal bladder ultrasound to see if there are jets and if there is anything else I need to worry about and while do a telemedicine visit. If there is no signs of infection the studies are otherwise acceptable we will arrange for urodynamics and then decide what procedure if any is indicated.\par

## 2020-06-05 NOTE — REVIEW OF SYSTEMS
[Feeling Tired] : feeling tired [Feeling Poorly] : feeling poorly [see HPI] : see HPI [Shortness Of Breath] : shortness of breath [Negative] : Psychiatric

## 2020-06-05 NOTE — HISTORY OF PRESENT ILLNESS
[FreeTextEntry1] : Zach is a 72-year-old male we had seen back in 2018 with significant lower urinary tract symptoms. We had recommended urodynamics he had scheduled it and then elected not to follow-up. At that time he was also noted to have microscopic immaturity with the right-sided kidney stone with the stone about 6 x 6 x 4 mm Hounsfield units of 800. He had a hypertensive right exophoric upper pull cyst and slight thickening of the adrenal glands his prostate was about 50 g and he had umbilical hernia and degenerative disc disease. A uroflow at that time showed poor flow with intermittency. Since last seen he's had neurosurgical issues, including a decompress of laminectomy and foramen autonomy with inter-laminar stabilization on November 18, 2019. This was done at L2 – 3, 3 – 4, and 4 – 5. He tells me that since surgery is not really better but that was seven months ago. Over the last year his symptoms have slowly gotten worse with increasing urgency frequency, slow intermittent stream, increasing nocturia and his trips to the bathroom at night usually ended up with urine on the floor. The last three weeks have been amazingly worse than going some days five times in three hours often on the floor in the no have a day where he doesn’t urinate that much at all. However, realize not that much at all means he can make it to the bathroom most of the time only wetting himself once or twice a day. His decide if you’d like to see what could be done. [Urinary Urgency] : urinary urgency [Urinary Incontinence] : urinary incontinence [Nocturia] : nocturia [Urinary Frequency] : urinary frequency [Straining] : straining [Weak Stream] : weak stream [Dysuria] : no dysuria [Intermittency] : intermittency [Hematuria - Gross] : no gross hematuria [Abdominal Pain] : no abdominal pain

## 2020-06-05 NOTE — LETTER HEADER
[FreeTextEntry3] : Henrry Byrd M.D.\par Director of Urology\par [facility]\par [address] [place] [address], [address]\[place] [address]

## 2020-06-05 NOTE — LETTER BODY
[Dear  ___] : Dear  [unfilled], [Courtesy Letter:] : I had the pleasure of seeing your patient, [unfilled], in my office today. [Please see my note below.] : Please see my note below. [Sincerely,] : Sincerely, [FreeTextEntry2] : Duncan Li MD\par [address]\[place] [address]\par

## 2020-06-10 PROBLEM — N39.41 URGE INCONTINENCE OF URINE: Status: ACTIVE | Noted: 2020-01-01

## 2020-07-29 NOTE — ED ADULT NURSE NOTE - READING LANGUAGE PREFERRED
.Pt notified of normal results and voiced understanding. he had no further questions at this time.    English

## 2020-12-19 NOTE — ED PROCEDURE NOTE - CPROC ED POST PROC CARE GUIDE1
Verbal/written post procedure instructions were given to patient/caregiver./Instructed patient/caregiver to follow-up with primary care physician./Instructed patient/caregiver regarding signs and symptoms of infection./Keep the cast/splint/dressing clean and dry.
Verbal/written post procedure instructions were given to patient/caregiver.

## 2020-12-19 NOTE — ED PROVIDER NOTE - WET READ LAUNCH FT
There are no Wet Read(s) to document. There is 1 Wet Read(s) to document. There are 2 Wet Read(s) to document.

## 2020-12-19 NOTE — ED PROVIDER NOTE - CRITICAL CARE PROVIDED
direct patient care (not related to procedure)/additional history taking/interpretation of diagnostic studies/documentation/consultation with other physicians/conducted a detailed discussion of DNR status/telephone consultation with the patient's family

## 2020-12-19 NOTE — ED PROVIDER NOTE - PHYSICAL EXAMINATION
CONSTITUTIONAL: Obese male, laying on bed, closing eyes, not responding to questions  SKIN: warm, dry  HEAD: Normocephalic; atraumatic.  EYES: no conjunctival injection. PERRL.   ENT: No nasal discharge; airway clear.  NECK: Supple; non tender.  CARD: S1, S2 normal; no murmurs, gallops, or rubs. Tachycardic and regular rhythm.   RESP: No wheezes, rales or rhonchi.  ABD: soft ntnd  EXT: Normal ROM.  No clubbing, cyanosis or edema.   LYMPH: No acute cervical adenopathy.  NEURO: closing eyes intermittently, groans to sternal rub, opens eyes to sternal rub CONSTITUTIONAL: Obese male, laying on bed, closing eyes, not responding to questions  SKIN: warm, dry  HEAD: Normocephalic; atraumatic.  EYES: no conjunctival injection. PERRL.   ENT: No nasal discharge; airway clear.  NECK: Supple; non tender.  CARD: S1, S2 normal; no murmurs, gallops, or rubs. Tachycardic and regular rhythm.   RESP: No wheezes, rales or rhonchi.  ABD: soft ntnd  EXT: Normal ROM.  No clubbing, cyanosis or edema.   LYMPH: No acute cervical adenopathy.  NEURO: closing eyes intermittently, groans to sternal rub, opens eyes to sternal rub, not moving left side

## 2020-12-19 NOTE — ED PROVIDER NOTE - PROGRESS NOTE DETAILS
MQ: Stroke code called, patient in CT scanner MQ: Calling family, left message ([phone]), son ([phone]) MQ: Neurosurgery consulted and aware of patient, saw patient, neurology is on board, patient is intubated for AMS and protection of airway, arterial line placed, will admit to ICU CP: attempted to call family again but unable to reach MQ: Pending ICU fellow evaluation for disposition

## 2020-12-19 NOTE — CONSULT NOTE ADULT - SUBJECTIVE AND OBJECTIVE BOX
Patient is a 80y old  Male who presents with a chief complaint of small SAH, edema (19 Dec 2020 09:50)    HPI: 81 yo male with PMHx of DM, COPD, HTN c/o AMS since this morning. Patient lives by himself, son lives next door. Last known well at 10 PM at baseline per son, at 6 AM today, patient was not himself, confused, not talking. Stroke code called en route, NIHSS 11 for aphasia but grossly no focal weakness. CTH reported as having a focus of vasogenic edema in the right temporoparietal white matter with internal hyperdensity and effacement of the adjacent cortical sulci. Trace right temporoparietal subarachnoid hemorrhage is also seen. Not a candidate for IV thrombolytics for being out of the window and for ICH. Not a candidate for IA intervention because of no LVO on CTA.      PAST MEDICAL & SURGICAL HISTORY:  COPD (chronic obstructive pulmonary disease)  BPH (benign prostatic hyperplasia)  Back pain  Non-Hodgkin lymphoma  CLL (chronic lymphocytic leukemia)  treatment completed 10/19  Depression  HTN (hypertension)  DM (diabetes mellitus)  Anxiety  Encounter for screening colonoscopy 7 years approx  Port-A-Cath in place  H/O total knee replacement, bilateral      SOCIAL HX:   Unable to obtain    FAMILY HISTORY:  :  No known cardiovascular family history     Review Of Systems:   All ROS are negative except per HPI     Allergies  No Known Allergies  Intolerances      PHYSICAL EXAM  ICU Vital Signs Last 24 Hrs  T(C): 37.1 (19 Dec 2020 08:20), Max: 37.1 (19 Dec 2020 08:20)  T(F): 98.7 (19 Dec 2020 08:20), Max: 98.7 (19 Dec 2020 08:20)  HR: 98 (19 Dec 2020 10:30) (91 - 116)  BP: 110/65  ABP: 150/72 (19 Dec 2020 10:30) (150/72 - 206/96)  ABP(mean): 132 (19 Dec 2020 10:10) (112 - 132)  RR: 20 (19 Dec 2020 10:30) (16 - 20)  SpO2: 100% (19 Dec 2020 10:30) (98% - 100%)      CONSTITUTIONAL:  Well nourished.  NAD  Intubated sedated    ENT:   Airway patent,   Mouth with normal mucosa.   No thrush  ETT  OGT    EYES:   pupils equal,   round and reactive to light.    CARDIAC:   Normal rate,   Regular rhythm.    Heart sounds S1, S2.   No edema    Vascular:   normal systolic impulse  no bruits    RESPIRATORY:   BL breath sounds  No wheezing   Normal chest expansion  No use of accessory muscles    GASTROINTESTINAL:  Abdomen soft   Non-tender,   No guarding,   + BS    GENITOURINARY  normal genitalia for sex  no edema    MUSCULOSKELETAL:   Range of motion is not limited,  No clubbing, cyanosis  Right radial A line    NEUROLOGICAL:   Sedated  Left sided weakness on presentation  Moves right extremity  Unable to further assess currently sedated    SKIN:   Skin normal color for race,   Warm and dry  No evidence of rash.    PSYCHIATRIC:   Sedated  No apparent risk to self or others.    HEME LYMPH:   No cervical  lymphadenopathy.  No inguinal lymphadenopathy      12-19-20 @ 07:01  -  12-19-20 @ 10:50  --------------------------------------------------------  IN:  Total IN: 0 mL    OUT:    Indwelling Catheter - Urethral (mL): 400 mL  Total OUT: 400 mL    Total NET: -400 mL      LABS:                          15.9   12.68 )-----------( 217      ( 19 Dec 2020 08:12 )             51.2       138  |  101  |  16  ----------------------------<  179<H>  4.2   |  24  |  0.8    Ca    8.9      19 Dec 2020 08:12    TPro  6.4  /  Alb  4.1  /  TBili  0.4  /  DBili  x   /  AST  16  /  ALT  19  /  AlkPhos  87  12-19    PT/INR - ( 19 Dec 2020 08:12 )   PT: 14.40 sec;   INR: 1.25 ratio    PTT - ( 19 Dec 2020 08:12 )  PTT:32.0 sec                                           CARDIAC MARKERS ( 19 Dec 2020 08:12 )  x     / <0.01 ng/mL / x     / x     / x        LIVER FUNCTIONS - ( 19 Dec 2020 08:12 )  Alb: 4.1 g/dL / Pro: 6.4 g/dL / ALK PHOS: 87 U/L / ALT: 19 U/L / AST: 16 U/L / GGT: x                                              Mode: AC/ CMV (Assist Control/ Continuous Mandatory Ventilation)  RR (machine): 16  TV (machine): 470  FiO2: 70  PEEP: 8  ITime: 1  MAP: 19  PIP: 24    ABG - ( 19 Dec 2020 09:49 )  pH, Arterial: 7.29  pH, Blood: x     /  pCO2: 55    /  pO2: 139   / HCO3: 26    / Base Excess: -1.3  /  SaO2: 95          X-Rays reviewed  CXR interpreted by me:  ETT OG OK, no acute cardiopulmonary pathology      MEDICATIONS  (STANDING):  clevidipine Infusion 1 mG/Hr (2 mL/Hr) IV Continuous <Continuous>  HYDROmorphone  Injectable 1 milliGRAM(s) IV Push Once  propofol Infusion 20 MICROgram(s)/kG/Min (14.4 mL/Hr) IV Continuous <Continuous>    MEDICATIONS  (PRN):

## 2020-12-19 NOTE — H&P ADULT - NSHPLABSRESULTS_GEN_ALL_CORE
15.9   12.68 )-----------( 217      ( 19 Dec 2020 08:12 )             51.2             12-19    138  |  101  |  16  ----------------------------<  179<H>  4.2   |  24  |  0.8    Ca    8.9      19 Dec 2020 08:12    TPro  6.4  /  Alb  4.1  /  TBili  0.4  /  DBili  x   /  AST  16  /  ALT  19  /  AlkPhos  87  12-19    LIVER FUNCTIONS - ( 19 Dec 2020 08:12 )  Alb: 4.1 g/dL / Pro: 6.4 g/dL / ALK PHOS: 87 U/L / ALT: 19 U/L / AST: 16 U/L / GGT: x         PT/INR - ( 19 Dec 2020 08:12 )   PT: 14.40 sec;   INR: 1.25 ratio         PTT - ( 19 Dec 2020 08:12 )  PTT:32.0 sec  CARDIAC MARKERS ( 19 Dec 2020 08:12 )  x     / <0.01 ng/mL / x     / x     / x          ABG - ( 19 Dec 2020 09:49 )  pH, Arterial: 7.29  pH, Blood: x     /  pCO2: 55    /  pO2: 139   / HCO3: 26    / Base Excess: -1.3  /  SaO2: 95

## 2020-12-19 NOTE — H&P ADULT - ASSESSMENT
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  EEG.  Keppra 500 bid ppx.   MRI brain.  No intervention per Neurosurgery.  Neurology following.    HEENT: Oral care    PULMONARY:  HOB @ 45 degrees.  Aspiration precautions.  Keep POx > 92%.  Vent changes as follows: Increase RR to 20, wean off FiO2 to 60%.  Repeat ABG.    CARDIOVASCULAR:  Keep BP < 140/90.  Nicardipine drip if elevated BP.   ECHO.   CE x 2.  BNP.    GI: GI prophylaxis.  NPO for now.  Bowel regimen.    RENAL:  Follow up lytes.  Correct as needed    INFECTIOUS DISEASE:  Panculture.  Follow up cultures.  COVID negative.    HEMATOLOGICAL:  SCDs for DVT prophylaxis.  D/c AC.    ENDOCRINE:  Follow up FS.  Insulin protocol if needed.  TSH, Lipid profile, HbA1c.    MUSCULOSKELETAL: bed rest    MICU

## 2020-12-19 NOTE — ED PROVIDER NOTE - ATTENDING CONTRIBUTION TO CARE
80 M to ED with AMS from home, son found him altered this AM  last seen normal at 10pm, and h/o dementia.  HPI/ROS limited, NIHSS=35? Stroke code activated on arrival  exam as noted, CTAB, RRR, abdomen soft NTND

## 2020-12-19 NOTE — STROKE CODE NOTE - ABSOLUTE EXCLUSION OTHER CRITERIA
ICH and out of the window for IV TPA. Patients m-RS >2 not a candidate for IA intervention. ICH and out of the window for IV TPA. No LVO on CTA, not a candidate for IA intervention.

## 2020-12-19 NOTE — PROCEDURAL SAFETY CHECKLIST WITH OR WITHOUT SEDATION - NSPOSTCOMMENTFT_GEN_ALL_CORE
Pt tolerated procedure well. No concerns identified by team. good back flow monitored.
Pt tolerated procedure well. No concerns identified by team. Appropriate A-Line waveform on monitor.
unable to place new MLC OTW, new line to be placed

## 2020-12-19 NOTE — CONSULT NOTE ADULT - SUBJECTIVE AND OBJECTIVE BOX
HPI: 81 y/o male last seen last night awake alert, comes in this am with AMS and lethargy.  No anticoagulants or trauma.        PAST MEDICAL & SURGICAL HISTORY:  COPD (chronic obstructive pulmonary disease)    BPH (benign prostatic hyperplasia)    Back pain    Non-Hodgkin lymphoma    CLL (chronic lymphocytic leukemia)  treatment completed 10/19    Depression    HTN (hypertension)    DM (diabetes mellitus)    Anxiety    Encounter for screening colonoscopy  7 years approx    Port-A-Cath in place    H/O total knee replacement, bilateral        Home Medications:  budesonide-formoterol 160 mcg-4.5 mcg/inh inhalation aerosol: 2 puff(s) inhaled 2 times a day (25 Feb 2020 10:24)  buPROPion 150 mg/12 hours (SR) oral tablet, extended release: 1 tab(s) orally 2 times a day (25 Feb 2020 10:24)  Effexor  mg oral capsule, extended release: 1 cap(s) orally once a day (25 Feb 2020 10:24)  folic acid 1 mg oral tablet: 1 tab(s) orally once a day (25 Feb 2020 10:24)  metFORMIN 500 mg oral tablet: 1 tab(s) orally once a day (at bedtime) (25 Feb 2020 10:24)  nicotine 14 mg/24 hr transdermal film, extended release: 1 patch transdermal once a day. DO NOT smoke while using patches. (11 Mar 2020 14:05)  simvastatin 20 mg oral tablet: 1 tab(s) orally once a day (at bedtime) (25 Feb 2020 10:24)  tamsulosin 0.4 mg oral capsule: 1 cap(s) orally once a day (at bedtime) (13 Mar 2020 11:21)  traZODone 150 mg oral tablet: 1 tab(s) orally once a day (at bedtime) (25 Feb 2020 10:24)  Vitamin B12 500 mcg oral tablet: 1 tab(s) orally once a day (25 Feb 2020 10:24)  Vitamin D3 1000 intl units (25 mcg) oral tablet: 1 tab(s) orally once a day (13 Mar 2020 11:40)      Allergies    No Known Allergies    Intolerances        MEDICATIONS  (STANDING):  clevidipine Infusion 1 mG/Hr (2 mL/Hr) IV Continuous <Continuous>  propofol Infusion 20 MICROgram(s)/kG/Min (14.4 mL/Hr) IV Continuous <Continuous>    MEDICATIONS  (PRN):      ICU Vital Signs Last 24 Hrs  T(C): 37.1 (19 Dec 2020 08:20), Max: 37.1 (19 Dec 2020 08:20)  T(F): 98.7 (19 Dec 2020 08:20), Max: 98.7 (19 Dec 2020 08:20)  HR: 97 (19 Dec 2020 09:45) (91 - 116)  BP: 188/106 (19 Dec 2020 08:50) (168/119 - 212/126)  BP(mean): --  ABP: 196/91 (19 Dec 2020 09:45) (167/87 - 196/91)  ABP(mean): 126 (19 Dec 2020 09:45) (112 - 126)  RR: 16 (19 Dec 2020 09:45) (16 - 20)  SpO2: 99% (19 Dec 2020 09:45) (98% - 100%)      I&O's Detail    19 Dec 2020 07:01  -  19 Dec 2020 09:57  --------------------------------------------------------  IN:  Total IN: 0 mL    OUT:    Indwelling Catheter - Urethral (mL): 400 mL  Total OUT: 400 mL    Total NET: -400 mL          CBC Full  -  ( 19 Dec 2020 08:12 )  WBC Count : 12.68 K/uL  RBC Count : 6.20 M/uL  Hemoglobin : 15.9 g/dL  Hematocrit : 51.2 %  Platelet Count - Automated : 217 K/uL  Mean Cell Volume : 82.6 fL  Mean Cell Hemoglobin : 25.6 pg  Mean Cell Hemoglobin Concentration : 31.1 g/dL  Auto Neutrophil # : 10.36 K/uL  Auto Lymphocyte # : 1.60 K/uL  Auto Monocyte # : 0.49 K/uL  Auto Eosinophil # : 0.07 K/uL  Auto Basophil # : 0.05 K/uL  Auto Neutrophil % : 81.6 %  Auto Lymphocyte % : 12.6 %  Auto Monocyte % : 3.9 %  Auto Eosinophil % : 0.6 %  Auto Basophil % : 0.4 %    12-19    138  |  101  |  16  ----------------------------<  179<H>  4.2   |  24  |  0.8    Ca    8.9      19 Dec 2020 08:12    TPro  6.4  /  Alb  4.1  /  TBili  0.4  /  DBili  x   /  AST  16  /  ALT  19  /  AlkPhos  87  12-19    CARDIAC MARKERS ( 19 Dec 2020 08:12 )  x     / <0.01 ng/mL / x     / x     / x              Awake, opens eyes, non verbal not following commands, appears tired, left pupil minimally larger than right, reactive to light.   withdraws all extremities to pain, purposeful at times    Imaging: < from: CT Brain Stroke Protocol (12.19.20 @ 08:22) >  Since the prior head CT dated 3/6/2020:    Interval development of a 3.5 cm right frontoparietal hypodensity with areas of cortical extension and small areas of internal hyperdensity suspicious for intralesional hemorrhage. Diagnostic considerations include infarct versus neoplasm.    < end of copied text >  < from: CT Perfusion w/ Maps w/ IV Cont (12.19.20 @ 08:37) >  IMPRESSION:    1.  No evidence of major vascular stenosis or occlusion. Normal perfusion images.    2.  4.7 cm right frontal/parietal lesion; CTA appearance favors neoplasm. Prominent surrounding the traversing vasculature noted. Recommend further evaluation with a contrast-enhanced brain MRI.        < end of copied text >          Assessment/Plan right frontal/parietal lesion, rec neurology, MRI brain +/-, keppra HPI: 81 y/o male last seen last night awake alert, comes in this am with AMS and lethargy.  No anticoagulants or trauma.        PAST MEDICAL & SURGICAL HISTORY:  COPD (chronic obstructive pulmonary disease)    BPH (benign prostatic hyperplasia)    Back pain    Non-Hodgkin lymphoma    CLL (chronic lymphocytic leukemia)  treatment completed 10/19    Depression    HTN (hypertension)    DM (diabetes mellitus)    Anxiety    Encounter for screening colonoscopy  7 years approx    Port-A-Cath in place    H/O total knee replacement, bilateral        Home Medications:  budesonide-formoterol 160 mcg-4.5 mcg/inh inhalation aerosol: 2 puff(s) inhaled 2 times a day (25 Feb 2020 10:24)  buPROPion 150 mg/12 hours (SR) oral tablet, extended release: 1 tab(s) orally 2 times a day (25 Feb 2020 10:24)  Effexor  mg oral capsule, extended release: 1 cap(s) orally once a day (25 Feb 2020 10:24)  folic acid 1 mg oral tablet: 1 tab(s) orally once a day (25 Feb 2020 10:24)  metFORMIN 500 mg oral tablet: 1 tab(s) orally once a day (at bedtime) (25 Feb 2020 10:24)  nicotine 14 mg/24 hr transdermal film, extended release: 1 patch transdermal once a day. DO NOT smoke while using patches. (11 Mar 2020 14:05)  simvastatin 20 mg oral tablet: 1 tab(s) orally once a day (at bedtime) (25 Feb 2020 10:24)  tamsulosin 0.4 mg oral capsule: 1 cap(s) orally once a day (at bedtime) (13 Mar 2020 11:21)  traZODone 150 mg oral tablet: 1 tab(s) orally once a day (at bedtime) (25 Feb 2020 10:24)  Vitamin B12 500 mcg oral tablet: 1 tab(s) orally once a day (25 Feb 2020 10:24)  Vitamin D3 1000 intl units (25 mcg) oral tablet: 1 tab(s) orally once a day (13 Mar 2020 11:40)      Allergies    No Known Allergies    Intolerances        MEDICATIONS  (STANDING):  clevidipine Infusion 1 mG/Hr (2 mL/Hr) IV Continuous <Continuous>  propofol Infusion 20 MICROgram(s)/kG/Min (14.4 mL/Hr) IV Continuous <Continuous>    MEDICATIONS  (PRN):      ICU Vital Signs Last 24 Hrs  T(C): 37.1 (19 Dec 2020 08:20), Max: 37.1 (19 Dec 2020 08:20)  T(F): 98.7 (19 Dec 2020 08:20), Max: 98.7 (19 Dec 2020 08:20)  HR: 97 (19 Dec 2020 09:45) (91 - 116)  BP: 188/106 (19 Dec 2020 08:50) (168/119 - 212/126)  BP(mean): --  ABP: 196/91 (19 Dec 2020 09:45) (167/87 - 196/91)  ABP(mean): 126 (19 Dec 2020 09:45) (112 - 126)  RR: 16 (19 Dec 2020 09:45) (16 - 20)  SpO2: 99% (19 Dec 2020 09:45) (98% - 100%)      I&O's Detail    19 Dec 2020 07:01  -  19 Dec 2020 09:57  --------------------------------------------------------  IN:  Total IN: 0 mL    OUT:    Indwelling Catheter - Urethral (mL): 400 mL  Total OUT: 400 mL    Total NET: -400 mL          CBC Full  -  ( 19 Dec 2020 08:12 )  WBC Count : 12.68 K/uL  RBC Count : 6.20 M/uL  Hemoglobin : 15.9 g/dL  Hematocrit : 51.2 %  Platelet Count - Automated : 217 K/uL  Mean Cell Volume : 82.6 fL  Mean Cell Hemoglobin : 25.6 pg  Mean Cell Hemoglobin Concentration : 31.1 g/dL  Auto Neutrophil # : 10.36 K/uL  Auto Lymphocyte # : 1.60 K/uL  Auto Monocyte # : 0.49 K/uL  Auto Eosinophil # : 0.07 K/uL  Auto Basophil # : 0.05 K/uL  Auto Neutrophil % : 81.6 %  Auto Lymphocyte % : 12.6 %  Auto Monocyte % : 3.9 %  Auto Eosinophil % : 0.6 %  Auto Basophil % : 0.4 %    12-19    138  |  101  |  16  ----------------------------<  179<H>  4.2   |  24  |  0.8    Ca    8.9      19 Dec 2020 08:12    TPro  6.4  /  Alb  4.1  /  TBili  0.4  /  DBili  x   /  AST  16  /  ALT  19  /  AlkPhos  87  12-19    CARDIAC MARKERS ( 19 Dec 2020 08:12 )  x     / <0.01 ng/mL / x     / x     / x              Awake, opens eyes, non verbal not following commands, appears tired, left pupil minimally larger than right, reactive to light.   withdraws all extremities to pain, purposeful at times    Imaging: < from: CT Brain Stroke Protocol (12.19.20 @ 08:22) >  Since the prior head CT dated 3/6/2020:    Interval development of a 3.5 cm right frontoparietal hypodensity with areas of cortical extension and small areas of internal hyperdensity suspicious for intralesional hemorrhage. Diagnostic considerations include infarct versus neoplasm.    < end of copied text >  < from: CT Perfusion w/ Maps w/ IV Cont (12.19.20 @ 08:37) >  IMPRESSION:    1.  No evidence of major vascular stenosis or occlusion. Normal perfusion images.    2.  4.7 cm right frontal/parietal lesion; CTA appearance favors neoplasm. Prominent surrounding the traversing vasculature noted. Recommend further evaluation with a contrast-enhanced brain MRI.        < end of copied text >          Assessment/Plan right frontal/parietal lesion, rec neurology, MRI brain +/-, keppra, Head CT 6hrs after first.

## 2020-12-19 NOTE — ED PROCEDURE NOTE - CPROC ED TIME OUT STATEMENT1
“Patient's name, , procedure and correct site were confirmed during the [place] Timeout.”
“Patient's name, , procedure and correct site were confirmed during the [place] Timeout.”
“Patient's name, , procedure and correct site were confirmed during the [place] Timeout.”
“Patient's name, , procedure and correct site were confirmed during the [place] Timeout.”

## 2020-12-19 NOTE — ED PROVIDER NOTE - CLINICAL SUMMARY MEDICAL DECISION MAKING FREE TEXT BOX
Pt with ICH, AMS intubated for airway protection  A-line for bp management  CVC for bp support  will admit to ICU,neuro sx at bedside.

## 2020-12-19 NOTE — ED PROCEDURE NOTE - CPROC ED INDICATIONS1
airway protection/critical patient
emergency venous access/hemodynamic monitoring
critical patient/monitoring purposes
intubated

## 2020-12-19 NOTE — CONSULT NOTE ADULT - ASSESSMENT
Impression:  Patient is a 81 yo male with PMHx of DM, COPD, HTN c/o AMS since this morning. Patient lives by himself, son lives next door. Last known well at 10 PM at baseline per son, at 6 AM today, patient was not himself, confused, not talking. Stroke code called en route, NIHSS 11 for aphasia but grossly no focal weakness. CTH reported as having a focus of vasogenic edema in the right temporoparietal white matter with internal hyperdensity and effacement of the adjacent cortical sulci. Trace right temporoparietal subarachnoid hemorrhage is also seen. Not a candidate for IV thrombolytics for being out of the window and for ICH. Not a candidate for IA intervention because of no LVO on CTA.    Suggestion:  Neurosurgery stat.(Ed staff aware)  MRI brain with and without madisyn.   No antiplatelets or anticoagulation.   Blood pressure management.   Telemetry monitoring.  TTE  LDL A1C  PT OT Rehab speech    Routine EEG.       Dick Nuñez NP  [phone]   Impression:  Patient is a 81 yo male with PMHx of DM, COPD, HTN c/o AMS since this morning. Patient lives by himself, son lives next door. Last known well at 10 PM at baseline per son, at 6 AM today, patient was not himself, confused, not talking. Stroke code called en route, NIHSS 11 for aphasia but grossly no focal weakness. CTH reported as having a focus of vasogenic edema in the right temporoparietal white matter with internal hyperdensity and effacement of the adjacent cortical sulci. Trace right temporoparietal subarachnoid hemorrhage is also seen. Not a candidate for IV thrombolytics for being out of the window and for ICH. Not a candidate for IA intervention because of no LVO on CTA.    Suggestion:  Neurosurgery stat.(Ed staff aware)  MRI brain with and without madisyn.   No antiplatelets or anticoagulation.   Blood pressure management.   Telemetry monitoring.  TTE  LDL A1C  PT OT Rehab speech    Routine EEG.   Can start Keppra 500 mg q12 hours      Dick Nuñez NP  [phone]   Impression:  Patient is a 81 yo male with PMHx of DM, COPD, HTN c/o AMS since this morning. Patient lives by himself, son lives next door. Last known well at 10 PM at baseline per son, at 6 AM today, patient was not himself, confused, not talking. Stroke code called en route, NIHSS 11 for aphasia but grossly no focal weakness. CTH reported as having a focus of vasogenic edema in the right temporoparietal white matter with internal hyperdensity and effacement of the adjacent cortical sulci. Trace right temporoparietal subarachnoid hemorrhage is also seen. Not a candidate for IV thrombolytics for being out of the window and for ICH. Not a candidate for IA intervention because of no LVO on CTA.    Suggestion:  Neurosurgery stat.(Ed staff aware)  MRI brain with and without madisyn.   No antiplatelets or anticoagulation.   Blood pressure management.   Telemetry monitoring.  TTE  LDL A1C  Keppra 1 gm load and 500 BID   PT OT Rehab speech    Routine EEG.   Can start Keppra 500 mg q12 hours      Dick Nuñez NP  [phone]

## 2020-12-19 NOTE — CONSULT NOTE ADULT - ASSESSMENT
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  EEG.  Keppra 500 bid ppx.   MRI brain.  No intervention per Neurosurgery.  Neurology following.    HEENT: Oral care    PULMONARY:  HOB @ 45 degrees.  Aspiration precautions.  Keep POx > 92%.  Increase RR to 20.  Repeat ABG.    CARDIOVASCULAR:  Keep BP < 140/90.  Nicardipine drip if elevated BP.      GI: GI prophylaxis.  NPO for now.  Bowel regimen.    RENAL:  Follow up lytes.  Correct as needed    INFECTIOUS DISEASE:  Panculture.  Follow up cultures.  COVID negative.    HEMATOLOGICAL:  SCDs for DVT prophylaxis.  D/c AC    ENDOCRINE:  Follow up FS.  Insulin protocol if needed.  TSH, Lipid profile, HbA1c.    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  EEG.  Keppra 500 bid ppx.   MRI brain.  No intervention per Neurosurgery.  Neurology following.    HEENT: Oral care    PULMONARY:  HOB @ 45 degrees.  Aspiration precautions.  Keep POx > 92%.  Vent changes as follows: Increase RR to 20, wean off FiO2 to 60%.  Repeat ABG.    CARDIOVASCULAR:  Keep BP < 140/90.  Nicardipine drip if elevated BP.   ECHO.   CE x 2.  BNP.    GI: GI prophylaxis.  NPO for now.  Bowel regimen.    RENAL:  Follow up lytes.  Correct as needed    INFECTIOUS DISEASE:  Panculture.  Follow up cultures.  COVID negative.    HEMATOLOGICAL:  SCDs for DVT prophylaxis.  D/c AC.    ENDOCRINE:  Follow up FS.  Insulin protocol if needed.  TSH, Lipid profile, HbA1c.    MUSCULOSKELETAL: bed rest    MICU

## 2020-12-19 NOTE — H&P ADULT - HISTORY OF PRESENT ILLNESS
81 yo male with PMHx of DM, COPD, HTN c/o AMS since this morning. Patient lives by himself, son lives next door. Last known well at 10 PM at baseline per son, at 6 AM today, patient was not himself, confused, not talking. Stroke code called en route, NIHSS 11 for aphasia but grossly no focal weakness. CTH reported as having a focus of vasogenic edema in the right temporoparietal white matter with internal hyperdensity and effacement of the adjacent cortical sulci. Trace right temporoparietal subarachnoid hemorrhage is also seen. Not a candidate for IV thrombolytics for being out of the window and for ICH. Not a candidate for IA intervention because of no LVO on CTA.

## 2020-12-19 NOTE — CONSULT NOTE ADULT - SUBJECTIVE AND OBJECTIVE BOX
Neurology Consult    Patient is a 80y old  Male who presents with a chief complaint of ams    HPI:  Patient is a 79 yo male with PMHx of DM, COPD, HTN c/o AMS since this morning. Patient lives by himself, son lives next door. Last known well at 10 PM at baseline per son, at 6 AM today, patient was not himself, confused, not talking. Stroke code called en route, NIHSS 11 for aphasia but grossly no focal weakness.     PAST MEDICAL & SURGICAL HISTORY:  COPD (chronic obstructive pulmonary disease)    BPH (benign prostatic hyperplasia)    Back pain    Non-Hodgkin lymphoma    CLL (chronic lymphocytic leukemia)  treatment completed 10/19    Depression    HTN (hypertension)    DM (diabetes mellitus)    Anxiety    Encounter for screening colonoscopy  7 years approx    Port-A-Cath in place    H/O total knee replacement, bilateral        FAMILY HISTORY:      Social History: (-) x 3    Allergies    No Known Allergies    Intolerances        MEDICATIONS  (STANDING):    MEDICATIONS  (PRN):    Vital Signs Last 24 Hrs  T(C): 37.1 (19 Dec 2020 08:20), Max: 37.1 (19 Dec 2020 08:20)  T(F): 98.7 (19 Dec 2020 08:20), Max: 98.7 (19 Dec 2020 08:20)  HR: 91 (19 Dec 2020 08:35) (91 - 116)  BP: 212/126 (19 Dec 2020 08:35) (168/119 - 212/126)  BP(mean): --  RR: 20 (19 Dec 2020 08:35) (20 - 20)  SpO2: 100% (19 Dec 2020 08:35) (99% - 100%)    Examination:    [facility] STROKE SCALE:     NIH Stroke Scale:   · NIH Stroke Scale: LOC	(1) Not alert; but arousable by minor stimulation to obey, answer, or respond  · NIH Stroke Scale: LOC Question	(2) Answers neither question correctly  · NIH Stroke Scale: LOC Command	(2) Performs neither task correctly  · NIH Stroke Scale: Gaze	(0) Normal  · NIH Stroke Scale: Visual	(0) No visual loss  · NIH Stroke Scale: Facial	(0) Normal symmetrical movements  · NIH Stroke Scale: Arm Left	(0) No drift; limb holds 90 (or 45) degrees for full 10 secs  · NIH Stroke Scale: Arm Right	(0) No drift; limb holds 90 (or 45) degrees for full 10 secs  · NIH Stroke Scale: Leg Left	(1) Drift; leg falls by the end of the 5-sec period but does not hit bed  · NIH Stroke Scale: Leg Right	(1) Drift; leg falls by the end of the 5-sec period but does not hit bed  · NIH Stroke Scale: Ataxia	(0) Absent  · NIH Stroke Scale: Sensory	(0) Normal; no sensory loss  · NIH Stroke Scale: Language	(2) Severe aphasia; all communication is through fragmentary expression; great need for inference, questioning, and guessing by the listener. Range of information that can be exchanged is limited; listener carries burden of communication. Examiner cannot identify materials provided from patient response.  · NIH Stroke Scale: Dysarthria	(2) Severe dysarthria; patients speech is so slurred as to be unintelligible in the absence of or out of proportion to any dysphasia, or is mute/anarthric  · NIH Stroke Scale: Extinct Inattention	(0) No abnormality  · NIH Stroke Scale: Total	11    Labs:                     [facility]  [facility]   < from: CT Brain Stroke Protocol (12.19.20 @ 08:22) >    IMPRESSION:    Focus of vasogenic edema in the right temporoparietal white matter with internal hyperdensity and effacement of the adjacent cortical sulci. Findings concerning for hemorrhagic infarct, or less likely, hemorrhagic metastatic lesion in the setting of malignancy. Trace right temporoparietal subarachnoid hemorrhage is also seen.

## 2020-12-19 NOTE — ED PROVIDER NOTE - OBJECTIVE STATEMENT
Patient is a 79 yo male with PMHx of DM, COPD, HTN c/o AMS since this morning. Patient lives by himself, son lives next door. Last known well at 10 PM at baseline per son, at 6 AM today, patient was not himself, confused, not talking. Son called EMS. EMS said he is leaning to left side.

## 2020-12-19 NOTE — ED PROCEDURE NOTE - CPROC ED ARTER LINE DETAIL1
Using sterile technique, the correct location was identified, and a needle was inserted into the artery (specify in FT)./Positive blood return was obtained via the catheter./Connected to a pressurized flush line./Line was sutured in place./Hemostasis was achieved with direct pressure, and a dry sterile dressing applied.

## 2020-12-19 NOTE — ED ADULT NURSE NOTE - OBJECTIVE STATEMENT
Pt presents to ED with altered mental status as stated by EMS. As per EMS, pt with history of dementia but able to make needs known. Pt was last seen normal at 10pm yesterday and today woke up with aphasia and minimal responsiveness.

## 2020-12-20 NOTE — PROGRESS NOTE ADULT - ASSESSMENT
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  EEG.  Keppra 500 bid ppx.   MRI brain.  No intervention per Neurosurgery.  Neurology following.    HEENT: Oral care    PULMONARY:  HOB @ 45 degrees.  Aspiration precautions.  Keep POx > 92%.  Advance ETT 2cm, advance OGT repeat CXR    CARDIOVASCULAR:  Keep BP < 140/90.  Nicardipine drip if elevated BP.   ECHO.   CE x 2.  BNP.    GI: GI prophylaxis.  NPO for now.  Bowel regimen.    RENAL:  Follow up lytes.  Correct as needed    INFECTIOUS DISEASE:  Panculture.  Follow up cultures.  COVID negative.    HEMATOLOGICAL:  SCDs for DVT prophylaxis.    ENDOCRINE:  Follow up FS.  Insulin protocol if needed.    MUSCULOSKELETAL: bed rest    MICU

## 2020-12-20 NOTE — PROGRESS NOTE ADULT - SUBJECTIVE AND OBJECTIVE BOX
HPI:  HPI:  79 yo male with PMHx of DM, COPD, HTN c/o AMS since this morning. Patient lives by himself, son lives next door. Last known well at 10 PM at baseline per son, at 6 AM today, patient was not himself, confused, not talking. Stroke code called en route, NIHSS 11 for aphasia but grossly no focal weakness. CTH reported as having a focus of vasogenic edema in the right temporoparietal white matter with internal hyperdensity and effacement of the adjacent cortical sulci. Trace right temporoparietal subarachnoid hemorrhage is also seen. Not a candidate for IV thrombolytics for being out of the window and for ICH. Not a candidate for IA intervention because of no LVO on CTA. (19 Dec 2020 11:02)      INTERVAL HPI/OVERNIGHT EVENTS:  No acute events overnight. Patient remains intubated and sedated, on propofol/fentanyl.  As per RN, with lightened sedation patient flails extremities, does not follow commands.      Vital Signs Last 24 Hrs  T(C): 36.7 (20 Dec 2020 08:00), Max: 37.3 (19 Dec 2020 13:30)  T(F): 98.1 (20 Dec 2020 08:00), Max: 99.1 (19 Dec 2020 13:30)  HR: 74 (20 Dec 2020 10:00) (62 - 78)  BP: 96/46 (19 Dec 2020 19:30) (40/22 - 96/46)  BP(mean): 62 (19 Dec 2020 19:30) (27 - 62)  RR: 20 (20 Dec 2020 10:00) (14 - 35)  SpO2: 96% (20 Dec 2020 10:00) (91% - 100%)    PHYSICAL EXAM:  HEAD:  Atraumatic, normocephalic  intubated, sedated  face symmetric,   Pupils 2mm, sluggish, minimally reactive  +corneals b/l  +gag  no doll's eyes  Negative clonus  withdraws to noxious stimuli      LABS:                        13.2   11.15 )-----------( 168      ( 20 Dec 2020 04:30 )             42.2     12-20    135  |  101  |  18  ----------------------------<  137<H>  3.5   |  26  |  0.8    Ca    8.3<L>      20 Dec 2020 04:30  Mg     1.5     12-20    TPro  4.6<L>  /  Alb  3.2<L>  /  TBili  0.6  /  DBili  x   /  AST  11  /  ALT  12  /  AlkPhos  70  12-20    PT/INR - ( 20 Dec 2020 04:30 )   PT: 15.10 sec;   INR: 1.31 ratio         PTT - ( 19 Dec 2020 08:12 )  PTT:32.0 sec      12-19 @ 07:01  -  12-20 @ 07:00  --------------------------------------------------------  IN: 781.2 mL / OUT: 1280 mL / NET: -498.8 mL    12-20 @ 07:01  -  12-20 @ 10:33  --------------------------------------------------------  IN: 50.4 mL / OUT: 65 mL / NET: -14.6 mL        RADIOLOGY & ADDITIONAL TESTS:  Imaging: < from: CT Brain Stroke Protocol (12.19.20 @ 08:22) >  Since the prior head CT dated 3/6/2020:    Interval development of a 3.5 cm right frontoparietal hypodensity with areas of cortical extension and small areas of internal hyperdensity suspicious for intralesional hemorrhage. Diagnostic considerations include infarct versus neoplasm.    < end of copied text >  < from: CT Perfusion w/ Maps w/ IV Cont (12.19.20 @ 08:37) >  IMPRESSION:    1.  No evidence of major vascular stenosis or occlusion. Normal perfusion images.    2.  4.7 cm right frontal/parietal lesion; CTA appearance favors neoplasm. Prominent surrounding the traversing vasculature noted. Recommend further evaluation with a contrast-enhanced brain MRI.        < end of copied text >    < from: CT Head No Cont (12.19.20 @ 18:39) >      IMPRESSION:    No significant change from prior exam.    Unchanged 3.5 cm right frontoparietal hypodensitywith areas of cortical extension and internal hyperdensity. Recommend MRI with contrast for further evaluation.          < end of copied text >          Assessment/Plan:   right frontal/parietal lesion of unclear etiology  - rec EEG r/o seizure  - r/o infection  - MRI brain +/-  - cont keppra  - will follow    d/w attending

## 2020-12-20 NOTE — PROGRESS NOTE ADULT - SUBJECTIVE AND OBJECTIVE BOX
Over Night Events: Remains on MV.        ROS:  See HPI    PHYSICAL EXAM    ICU Vital Signs Last 24 Hrs  T(C): 36.7 (20 Dec 2020 08:00), Max: 37.3 (19 Dec 2020 13:30)  T(F): 98.1 (20 Dec 2020 08:00), Max: 99.1 (19 Dec 2020 13:30)  HR: 68 (20 Dec 2020 09:00) (62 - 98)  BP: 96/46 (19 Dec 2020 19:30) (40/22 - 96/46)  BP(mean): 62 (19 Dec 2020 19:30) (27 - 62)  ABP: 80/70 (20 Dec 2020 09:00) (76/46 - 206/96)  ABP(mean): 76 (20 Dec 2020 09:00) (56 - 132)  RR: 19 (20 Dec 2020 09:00) (14 - 35)  SpO2: 98% (20 Dec 2020 09:00) (91% - 100%)        12-19-20 @ 07:01  -  12-20-20 @ 07:00  --------------------------------------------------------  IN: 781.2 mL / OUT: 1280 mL / NET: -498.8 mL    12-20-20 @ 07:01  -  12-20-20 @ 09:27  --------------------------------------------------------  IN: 50.4 mL / OUT: 65 mL / NET: -14.6 mL        General: NAD  HEENT:  + ETT       Lymph Nodes: NO cervical LN   Lungs: Bilateral BS  Cardiovascular: Regular   Abdomen: Soft, Positive BS  Extremities: No clubbing   Skin: intact  Neurological: Sedated       LABS:                          13.2   11.15 )-----------( 168      ( 20 Dec 2020 04:30 )             42.2                                               12-20    135  |  101  |  18  ----------------------------<  137<H>  3.5   |  26  |  0.8    Ca    8.3<L>      20 Dec 2020 04:30  Mg     1.5     12-20    TPro  4.6<L>  /  Alb  3.2<L>  /  TBili  0.6  /  DBili  x   /  AST  11  /  ALT  12  /  AlkPhos  70  12-20      PT/INR - ( 20 Dec 2020 04:30 )   PT: 15.10 sec;   INR: 1.31 ratio         PTT - ( 19 Dec 2020 08:12 )  PTT:32.0 sec                                           CARDIAC MARKERS ( 20 Dec 2020 04:30 )  x     / 0.01 ng/mL / x     / x     / x      CARDIAC MARKERS ( 19 Dec 2020 18:38 )  x     / <0.01 ng/mL / x     / x     / x      CARDIAC MARKERS ( 19 Dec 2020 08:12 )  x     / <0.01 ng/mL / x     / x     / x                                                LIVER FUNCTIONS - ( 20 Dec 2020 04:30 )  Alb: 3.2 g/dL / Pro: 4.6 g/dL / ALK PHOS: 70 U/L / ALT: 12 U/L / AST: 11 U/L / GGT: x                                                                                               Mode: AC/ CMV (Assist Control/ Continuous Mandatory Ventilation)  RR (machine): 20  TV (machine): 470  FiO2: 50  PEEP: 8  ITime: 1  MAP: 14  PIP: 28  ABG - ( 20 Dec 2020 03:56 )  pH, Arterial: 7.43  pH, Blood: x     /  pCO2: 43    /  pO2: 98    / HCO3: 28    / Base Excess: 3.4   /  SaO2: 98                  MEDICATIONS  (STANDING):  chlorhexidine 0.12% Liquid 15 milliLiter(s) Oral Mucosa two times a day  chlorhexidine 4% Liquid 1 Application(s) Topical daily  cyanocobalamin 1000 MICROGram(s) Oral daily  fentaNYL   Infusion. 0.5 MICROgram(s)/kG/Hr (5.2 mL/Hr) IV Continuous <Continuous>  folic acid 1 milliGRAM(s) Oral daily  levETIRAcetam  IVPB 500 milliGRAM(s) IV Intermittent every 12 hours  norepinephrine Infusion 0.05 MICROgram(s)/kG/Min (9.74 mL/Hr) IV Continuous <Continuous>  propofol Infusion 20 MICROgram(s)/kG/Min (14.4 mL/Hr) IV Continuous <Continuous>  simvastatin 20 milliGRAM(s) Oral at bedtime  tamsulosin Oral Tab/Cap - Peds 0.4 milliGRAM(s) Oral at bedtime  traZODone 150 milliGRAM(s) Oral daily    MEDICATIONS  (PRN):      Xrays:      No acute cardiopulmonary disease, ETT high, OGT high                                                                               ECHO

## 2020-12-20 NOTE — PROGRESS NOTE ADULT - ATTENDING COMMENTS
Pt seen and examined on rounds chart reviewed. Still pending EEG, Still pending MRI  +/- brain. Per nursing off sedation pt moving all 4 ext purposefully with strength, too agitated to follow commands. Further plan of care pending EEG and MRI +/-.

## 2020-12-21 NOTE — PROGRESS NOTE ADULT - SUBJECTIVE AND OBJECTIVE BOX
Interval Hx: Pt remains intubated and sedated    ROS:  Patient unable to participate in ROS due to neurologic status      Medications:  MEDICATIONS  (STANDING):  chlorhexidine 0.12% Liquid 15 milliLiter(s) Oral Mucosa two times a day  chlorhexidine 4% Liquid 1 Application(s) Topical daily  cyanocobalamin 1000 MICROGram(s) Oral daily  dexMEDEtomidine Infusion 0.2 MICROgram(s)/kG/Hr (5.2 mL/Hr) IV Continuous <Continuous>  fentaNYL   Infusion. 0.5 MICROgram(s)/kG/Hr (5.2 mL/Hr) IV Continuous <Continuous>  folic acid 1 milliGRAM(s) Oral daily  levETIRAcetam  IVPB 500 milliGRAM(s) IV Intermittent every 12 hours  norepinephrine Infusion 0.05 MICROgram(s)/kG/Min (9.74 mL/Hr) IV Continuous <Continuous>  propofol Infusion 20 MICROgram(s)/kG/Min (14.4 mL/Hr) IV Continuous <Continuous>  simvastatin 20 milliGRAM(s) Oral at bedtime  tamsulosin Oral Tab/Cap - Peds 0.4 milliGRAM(s) Oral at bedtime  traZODone 150 milliGRAM(s) Oral daily    MEDICATIONS  (PRN):  polyethylene glycol 3350 17 Gram(s) Oral daily PRN Constipation      Intake & output:  I&O's Summary    20 Dec 2020 07:01  -  21 Dec 2020 07:00  --------------------------------------------------------  IN: 1015.5 mL / OUT: 1085 mL / NET: -69.5 mL    21 Dec 2020 07:01  -  21 Dec 2020 13:22  --------------------------------------------------------  IN: 1100.2 mL / OUT: 255 mL / NET: 845.2 mL      Vital signs:  ICU Vital Signs Last 24 Hrs  T(C): 38.2 (21 Dec 2020 12:00), Max: 38.2 (21 Dec 2020 08:00)  T(F): 100.7 (21 Dec 2020 12:00), Max: 100.8 (21 Dec 2020 08:00)  HR: 68 (21 Dec 2020 13:00) (68 - 96)  BP: 123/75 (21 Dec 2020 08:00) (123/75 - 132/65)  BP(mean): 84 (21 Dec 2020 08:00) (84 - 88)  ABP: 102/60 (21 Dec 2020 13:00) (88/52 - 136/66)  ABP(mean): 76 (21 Dec 2020 13:00) (64 - 92)  RR: 19 (21 Dec 2020 13:00) (19 - 20)  SpO2: 97% (21 Dec 2020 13:00) (95% - 100%)    Vent settings:  Mode: AC/ CMV (Assist Control/ Continuous Mandatory Ventilation)  RR (machine): 20  TV (machine): 470  FiO2: 40  PEEP: 5  ITime: 1  MAP: 10  PIP: 25      Physical exam:  Gen: Intubated and sedated  Mental status: Opens eyes briefly to voice. Does not follow commands  Cranial nerves: Pupils equal, reactive. No gross facial asymmetry. Cough reflex intact. Corneal reflex intact.  Motor:    Some spontaneous movement of b/l UE observed. Muscle contraction of b/l LE without movement against gravity.  Sensation: Localizes to painful stimuli      Labs:                  12.8   11.17 )-----------( 155      ( 21 Dec 2020 04:10 )             42.3   12-21    141  |  106  |  16  ----------------------------<  130<H>  3.6   |  25  |  0.8    Ca    8.4<L>      21 Dec 2020 04:10  Mg     1.8     12-21    TPro  4.6<L>  /  Alb  3.2<L>  /  TBili  0.6  /  DBili  x   /  AST  11  /  ALT  12  /  AlkPhos  70  12-20  PT/INR - ( 20 Dec 2020 04:30 )   PT: 15.10 sec;   INR: 1.31 ratio      ABG - ( 21 Dec 2020 03:34 )  pH, Arterial: 7.39  pH, Blood: x     /  pCO2: 44    /  pO2: 82    / HCO3: 26    / Base Excess: 1.2   /  SaO2: 95      Lipid Profile in AM (12.20.20 @ 04:30)   Cholesterol, Serum: 92 mg/dL   Triglycerides, Serum: 98 mg/dL   HDL Cholesterol, Serum: 29 mg/dL   Non HDL Cholesterol: 63    Thyroid Stimulating Hormone, Serum in AM (12.20.20 @ 04:30)   Thyroid Stimulating Hormone, Serum: 1.95      Imaging:  EXAM:  CT BRAIN          PROCEDURE DATE:  12/19/2020      IMPRESSION:    No significant change from prior exam.    Unchanged 3.5 cm right frontoparietal hypodensitywith areas of cortical extension and internal hyperdensity. Recommend MRI with contrast for further evaluation.      EXAM:  CT BRAIN STROKE PROTOCOL          PROCEDURE DATE:  12/19/2020      IMPRESSION:  Since the prior head CT dated 3/6/2020:    Interval development of a 3.5 cm right frontoparietal hypodensity with areas of cortical extension and small areas of internal hyperdensity suspicious for intralesional hemorrhage. Diagnostic considerations include infarct versus neoplasm.      EXAM:  CT PERFUSION W MAPS IC          PROCEDURE DATE:  12/19/2020      IMPRESSION:    1.  No evidence of major vascular stenosis or occlusion. Normal perfusion images.    2.  4.7 cm right frontal/parietal lesion; CTA appearance favors neoplasm. Prominent surrounding the traversing vasculature noted. Recommend further evaluation with a contrast-enhanced brain MRI.      Assessment: Patient is a 81 yo male with PMHx of DM, COPD, HTN c/o AMS since this morning. Patient lives by himself, son lives next door. Last known well at 10 PM at baseline per son, at 6 AM today, patient was not himself, confused, not talking. Stroke code called en route, NIHSS 11 for aphasia but grossly no focal weakness. CTH reported as having a focus of vasogenic edema in the right temporoparietal white matter with internal hyperdensity and effacement of the adjacent cortical sulci. Trace right temporoparietal subarachnoid hemorrhage is also seen. Not a candidate for IV thrombolytics for being out of the window and for ICH. Not a candidate for IA intervention because of no LVO on CTA. Evaluated by neurosurgery, no surgical intervention. Exam remains nonfocal, AMS could be related to CT findings but need to ensure there are no contributory metabolic factors.      Recommendations:  Minimize sedation- consider switching fentanyl to PRN  MRI brain with and without madisyn.   No antiplatelets or anticoagulation.   Blood pressure management.   Telemetry monitoring  Routine EEG  TTE  Keppra 500 BID   PT OT Rehab speech

## 2020-12-21 NOTE — CONSULT NOTE ADULT - SUBJECTIVE AND OBJECTIVE BOX
Patient is a 80y old  Male who presents with a chief complaint of Intracranial bleed (20 Dec 2020 10:33)      HPI:  79 yo male with PMHx of DM, COPD, HTN c/o AMS since this morning. Patient lives by himself, son lives next door. Last known well at 10 PM at baseline per son, at 6 AM today, patient was not himself, confused, not talking. Stroke code called en route, NIHSS 11 for aphasia but grossly no focal weakness. CTH reported as having a focus of vasogenic edema in the right temporoparietal white matter with internal hyperdensity and effacement of the adjacent cortical sulci. Trace right temporoparietal subarachnoid hemorrhage is also seen. Not a candidate for IV thrombolytics for being out of the window and for ICH. Not a candidate for IA intervention because of no LVO on CTA. (19 Dec 2020 11:02)  had svt this morning still intubated     PAST MEDICAL & SURGICAL HISTORY:  COPD (chronic obstructive pulmonary disease)    BPH (benign prostatic hyperplasia)    Back pain    Non-Hodgkin lymphoma    CLL (chronic lymphocytic leukemia)  treatment completed 10/19    Depression    HTN (hypertension)    DM (diabetes mellitus)    Anxiety    Encounter for screening colonoscopy  7 years approx    Port-A-Cath in place    H/O total knee replacement, bilateral      Allergies    No Known Allergies    Intolerances      Family history : no cardiovscular family history   Home Medications:  budesonide-formoterol 160 mcg-4.5 mcg/inh inhalation aerosol: 2 puff(s) inhaled 2 times a day (25 Feb 2020 10:24)  buPROPion 150 mg/12 hours (SR) oral tablet, extended release: 1 tab(s) orally 2 times a day (25 Feb 2020 10:24)  Effexor  mg oral capsule, extended release: 1 cap(s) orally once a day (25 Feb 2020 10:24)  folic acid 1 mg oral tablet: 1 tab(s) orally once a day (25 Feb 2020 10:24)  metFORMIN 500 mg oral tablet: 1 tab(s) orally once a day (at bedtime) (25 Feb 2020 10:24)  nicotine 14 mg/24 hr transdermal film, extended release: 1 patch transdermal once a day. DO NOT smoke while using patches. (11 Mar 2020 14:05)  simvastatin 20 mg oral tablet: 1 tab(s) orally once a day (at bedtime) (25 Feb 2020 10:24)  tamsulosin 0.4 mg oral capsule: 1 cap(s) orally once a day (at bedtime) (13 Mar 2020 11:21)  traZODone 150 mg oral tablet: 1 tab(s) orally once a day (at bedtime) (25 Feb 2020 10:24)  Vitamin B12 500 mcg oral tablet: 1 tab(s) orally once a day (25 Feb 2020 10:24)  Vitamin D3 1000 intl units (25 mcg) oral tablet: 1 tab(s) orally once a day (13 Mar 2020 11:40)    Occupation:  Alochol: Denied  Smoking: Denied  Drug Use: Denied  Marital Status:         ROS: as in HPI; All other systems reviewed are negative    ICU Vital Signs Last 24 Hrs  T(C): 38.2 (21 Dec 2020 08:00), Max: 38.2 (21 Dec 2020 08:00)  T(F): 100.8 (21 Dec 2020 08:00), Max: 100.8 (21 Dec 2020 08:00)  HR: 84 (21 Dec 2020 08:00) (64 - 96)  BP: 123/75 (21 Dec 2020 08:00) (123/75 - 132/65)  BP(mean): 84 (21 Dec 2020 08:00) (84 - 88)  ABP: 118/68 (21 Dec 2020 08:00) (80/70 - 136/66)  ABP(mean): 86 (21 Dec 2020 08:00) (64 - 92)  RR: 20 (21 Dec 2020 08:00) (19 - 20)  SpO2: 96% (21 Dec 2020 08:00) (95% - 100%)        Physical Examination:    General: awake     HEENT: Pupils equal, reactive to light.  Symmetric.    PULM: Clear to auscultation bilaterally, no significant sputum production    CVS: Regular rate and rhythm, no murmurs, rubs, or gallops    ABD: Soft, nondistended, nontender, normoactive bowel sounds, no masses    EXT: No edema, nontender, no clubbing     SKIN: Warm and well perfused, no rashes noted.    Neurology : no motor or sensory deficit     Musculoskeletal : move all extremity     Lymphatic system: no Palpable node       Mode: AC/ CMV (Assist Control/ Continuous Mandatory Ventilation)  RR (machine): 20  TV (machine): 470  FiO2: 40  PEEP: 8  ITime: 1  MAP: 13  PIP: 23      ABG - ( 21 Dec 2020 03:34 )  pH, Arterial: 7.39  pH, Blood: x     /  pCO2: 44    /  pO2: 82    / HCO3: 26    / Base Excess: 1.2   /  SaO2: 95                  I&O's Detail    20 Dec 2020 07:01  -  21 Dec 2020 07:00  --------------------------------------------------------  IN:    FentaNYL: 327.6 mL    IV PiggyBack: 250 mL    Oral Fluid: 200 mL    Propofol: 237.9 mL  Total IN: 1015.5 mL    OUT:    Indwelling Catheter - Urethral (mL): 1085 mL  Total OUT: 1085 mL    Total NET: -69.5 mL      21 Dec 2020 07:01  -  21 Dec 2020 08:48  --------------------------------------------------------  IN:    FentaNYL: 10.4 mL    Propofol: 9.4 mL  Total IN: 19.8 mL    OUT:    Indwelling Catheter - Urethral (mL): 45 mL  Total OUT: 45 mL    Total NET: -25.2 mL            LABS:                        12.8   11.17 )-----------( 155      ( 21 Dec 2020 04:10 )             42.3     21 Dec 2020 04:10    141    |  106    |  16     ----------------------------<  130    3.6     |  25     |  0.8      Ca    8.4        21 Dec 2020 04:10  Mg     1.8       21 Dec 2020 04:10        CARDIAC MARKERS ( 20 Dec 2020 04:30 )  x     / 0.01 ng/mL / x     / x     / x      CARDIAC MARKERS ( 19 Dec 2020 18:38 )  x     / <0.01 ng/mL / x     / x     / x          CAPILLARY BLOOD GLUCOSE      POCT Blood Glucose.: 89 mg/dL (20 Dec 2020 15:04)    PT/INR - ( 20 Dec 2020 04:30 )   PT: 15.10 sec;   INR: 1.31 ratio             Culture        MEDICATIONS  (STANDING):  chlorhexidine 0.12% Liquid 15 milliLiter(s) Oral Mucosa two times a day  chlorhexidine 4% Liquid 1 Application(s) Topical daily  cyanocobalamin 1000 MICROGram(s) Oral daily  fentaNYL   Infusion. 0.5 MICROgram(s)/kG/Hr (5.2 mL/Hr) IV Continuous <Continuous>  folic acid 1 milliGRAM(s) Oral daily  levETIRAcetam  IVPB 500 milliGRAM(s) IV Intermittent every 12 hours  norepinephrine Infusion 0.05 MICROgram(s)/kG/Min (9.74 mL/Hr) IV Continuous <Continuous>  propofol Infusion 20 MICROgram(s)/kG/Min (14.4 mL/Hr) IV Continuous <Continuous>  simvastatin 20 milliGRAM(s) Oral at bedtime  tamsulosin Oral Tab/Cap - Peds 0.4 milliGRAM(s) Oral at bedtime  traZODone 150 milliGRAM(s) Oral daily    MEDICATIONS  (PRN):  polyethylene glycol 3350 17 Gram(s) Oral daily PRN Constipation        RADIOLOGY: ***     CXR:  no infiltarte   TLC:  OG:  ET tube:        CAM ICU:  ECHO:

## 2020-12-21 NOTE — PROGRESS NOTE ADULT - SUBJECTIVE AND OBJECTIVE BOX
CR OWEN 80y Male  MRN#: [id number]       SUBJECTIVE  Patient is a 80y old Male who presents with a chief complaint of Intracranial bleed (21 Dec 2020 09:37)    Today is hospital day 2d, and this morning he remains intubated.  Sedated with Propofol and Fentanyl.  Pt did have brief episode of NSVT this AM.  No acute overnight events.     OBJECTIVE  PAST MEDICAL & SURGICAL HISTORY  COPD (chronic obstructive pulmonary disease)    BPH (benign prostatic hyperplasia)    Back pain    Non-Hodgkin lymphoma    CLL (chronic lymphocytic leukemia)  treatment completed 10/19    Depression    HTN (hypertension)    DM (diabetes mellitus)    Anxiety    Encounter for screening colonoscopy  7 years approx    Port-A-Cath in place    H/O total knee replacement, bilateral      ALLERGIES:  No Known Allergies    MEDICATIONS:  STANDING MEDICATIONS  chlorhexidine 0.12% Liquid 15 milliLiter(s) Oral Mucosa two times a day  chlorhexidine 4% Liquid 1 Application(s) Topical daily  cyanocobalamin 1000 MICROGram(s) Oral daily  dexMEDEtomidine Infusion 0.2 MICROgram(s)/kG/Hr IV Continuous <Continuous>  fentaNYL   Infusion. 0.5 MICROgram(s)/kG/Hr IV Continuous <Continuous>  folic acid 1 milliGRAM(s) Oral daily  levETIRAcetam  IVPB 500 milliGRAM(s) IV Intermittent every 12 hours  norepinephrine Infusion 0.05 MICROgram(s)/kG/Min IV Continuous <Continuous>  potassium chloride  20 mEq/100 mL IVPB 20 milliEquivalent(s) IV Intermittent once  propofol Infusion 20 MICROgram(s)/kG/Min IV Continuous <Continuous>  simvastatin 20 milliGRAM(s) Oral at bedtime  tamsulosin Oral Tab/Cap - Peds 0.4 milliGRAM(s) Oral at bedtime  traZODone 150 milliGRAM(s) Oral daily    PRN MEDICATIONS  polyethylene glycol 3350 17 Gram(s) Oral daily PRN      VITAL SIGNS: Last 24 Hours  T(C): 38.2 (21 Dec 2020 08:00), Max: 38.2 (21 Dec 2020 08:00)  T(F): 100.8 (21 Dec 2020 08:00), Max: 100.8 (21 Dec 2020 08:00)  HR: 72 (21 Dec 2020 11:00) (64 - 96)  BP: 123/75 (21 Dec 2020 08:00) (123/75 - 132/65)  BP(mean): 84 (21 Dec 2020 08:00) (84 - 88)  RR: 19 (21 Dec 2020 11:00) (19 - 20)  SpO2: 96% (21 Dec 2020 11:00) (95% - 100%)    LABS:                        12.8   11.17 )-----------( 155      ( 21 Dec 2020 04:10 )             42.3     12-21    141  |  106  |  16  ----------------------------<  130<H>  3.6   |  25  |  0.8    Ca    8.4<L>      21 Dec 2020 04:10  Mg     1.8     12-21    TPro  4.6<L>  /  Alb  3.2<L>  /  TBili  0.6  /  DBili  x   /  AST  11  /  ALT  12  /  AlkPhos  70  12-20    PT/INR - ( 20 Dec 2020 04:30 )   PT: 15.10 sec;   INR: 1.31 ratio             ABG - ( 21 Dec 2020 03:34 )  pH, Arterial: 7.39  pH, Blood: x     /  pCO2: 44    /  pO2: 82    / HCO3: 26    / Base Excess: 1.2   /  SaO2: 95                    CARDIAC MARKERS ( 20 Dec 2020 04:30 )  x     / 0.01 ng/mL / x     / x     / x      CARDIAC MARKERS ( 19 Dec 2020 18:38 )  x     / <0.01 ng/mL / x     / x     / x          RADIOLOGY:      PHYSICAL EXAM:    GENERAL: intubated  HEENT:  Atraumatic. EOMI. No JVD  PULMONARY: CTAB  CARDIOVASCULAR: RRR  GASTROINTESTINAL: Soft, Nontender, Nondistended  MUSCULOSKELETAL:  2+ Peripheral Pulses, No clubbing, cyanosis, or edema  NEUROLOGY: non-focal; moves all extremities  SKIN: in tact      ADMISSION SUMMARY  Patient is a 80y old Male who presents with a chief complaint of Intracranial bleed (21 Dec 2020 09:37)

## 2020-12-21 NOTE — CONSULT NOTE ADULT - ASSESSMENT
IMPRESSION:  acute resp failure secondary to ICH       PLAN:    CNS: start precedex taper propofol off and then fentanyl     HEENT: oral care     PULMONARY: lower peep to 5     CARDIOVASCULAR:  echo , CE had episodes of SVT   cardiology   replace K and MG   give 500cc LR   GI: GI prophylaxis. start NG  Feeding ,     RENAL:follow is and os   KCL 20 jeff   mg 1 gram     INFECTIOUS DISEASE: no abx follow cx     HEMATOLOGICAL:  DVT prophylaxis.    ENDOCRINE:  Follow up FS.  Insulin protocol if needed.    MUSCULOSKELETAL:        CRITICAL CARE TIME SPENT: ***

## 2020-12-21 NOTE — PROGRESS NOTE ADULT - ATTENDING COMMENTS
History, events, data and scans reviewed, patient examined at bedside on 12/21/2020. I agree and approved plan of care as outlined above.

## 2020-12-21 NOTE — DIETITIAN INITIAL EVALUATION ADULT. - OTHER CALCULATIONS
dosing 103.9kg, ideal weight 75.2kg  calorie ~1415-1693kcal   1448-1654kcal (70-80% PSE 2010) vs. 1654-1880kcal (22-25kcal/kg IBW) vs. 1143-1545kcal (11-14kcal/kg CBW)  protein 135-150g (1.8-2.0g/kg IBW)  fluid per CCU team

## 2020-12-21 NOTE — PROGRESS NOTE ADULT - ASSESSMENT
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  Start Precedex.  C/w Fentanyl.  f/u EEG.  Keppra 500 bid ppx.   MRI brain.  No intervention per Neurosurgery.  Neurology following.    HEENT: Oral care    PULMONARY:  HOB @ 45 degrees.  Aspiration precautions.  Keep POx > 92%.  Decrease PEEP to 5; Possible extubation keysha    CARDIOVASCULAR:  Keep BP < 140/90.  f/u ECHO, CE - neg, Cardiology consult    GI: GI prophylaxis.  Start NG feeds;  Bowel regimen.    RENAL:  Follow up lytes.  Correct as needed; replace K and Mg, give 500cc LR     INFECTIOUS DISEASE:  Panculture.  Follow up cultures.  COVID negative.    HEMATOLOGICAL:  SCDs for DVT prophylaxis.    ENDOCRINE:  Follow up FS.  Insulin protocol if needed.    MUSCULOSKELETAL: bed rest    DISPO: CCU Monitoring

## 2020-12-21 NOTE — DIETITIAN INITIAL EVALUATION ADULT. - OTHER INFO
P/w: intracranial bleed. Acute resp failure secondary to ICH.  Patient remains intubated and sedated, on propofol/fentanyl.  right frontal/parietal lesion of unclear etiology. No intervention per Neurosurgery.  Neurology following. Covid NEG, ID following.

## 2020-12-21 NOTE — DIETITIAN INITIAL EVALUATION ADULT. - ENTERAL
When medically feasible to initiate EN provide Vital HP @55ml/h for 1320kcal, 114g protein, 1069ml free H2O. TF + Propofol will provide 1568kcal (100% est calorie needs, 84% est protein needs). Initiate @15ml/h and increase by 10ml q4h or as tolerated. Additional free H2O flush per LIP. Feed only if MAP consistently >65. If Propofol d/chirag provide goal rate of 65ml/h.

## 2020-12-22 NOTE — CONSULT NOTE ADULT - ASSESSMENT
80 year old male has medical history of DM, COPD, HTN admitted for AMS. Imaging consisted with ICH, requiring intubation for airway protection Cardiology team consulted for Aortic stenosis. Patient is still intubated, febrile last night, in sepsis (Tachy, Febrile)    # Aortic stenosis  Aortic Valve:  AoV VMax:   3.76 m/s  AoV Area, Vmax: 1.13 cm² Vmax Indx: 0.51 cm²/m²  AoV VTI:     0.52 m    AoV Area, VTI:  1.42 cm² VTI Indx:  0.64cm²/m²  AoV Pk Grad: 56.6 mmHg  AoV Mn Grad: 19.0 mmHg  - Functional status:   - Outpatient follow up,           This is incomplete note, attending note will follow 80 year old male has medical history of DM, COPD, HTN admitted for AMS. Imaging consisted with ICH, requiring intubation for airway protection Cardiology team consulted for Aortic stenosis. Patient is still intubated, febrile last night, in sepsis (Tachy, Febrile)  - Spoke with Son Zach: [phone]  - patient was diagnosed with dementia, where he is baseline orientation is 2-3, however for last 1-2 year it is progressively worsening. He is ambulatory status is declining where is mostly on wheelchair, he is becoming more dependent. Overall Performance status is low.    # Aortic stenosis  Aortic Valve:  AoV VMax:   3.76 m/s  AoV Area, Vmax: 1.13 cm² Vmax Indx: 0.51 cm²/m²  AoV VTI:     0.52 m    AoV Area, VTI:  1.42 cm² VTI Indx:  0.64cm²/m²  AoV Pk Grad: 56.6 mmHg  AoV Mn Grad: 19.0 mmHg  - Low performance status, uses wheelchair and cane, becoming more dependent, has baseline dementia  - Outpatient follow up,       This is incomplete note, attending note will follow 80 year old male has medical history of DM, COPD, HTN admitted for AMS. Imaging consisted with ICH, requiring intubation for airway protection Cardiology team consulted for Aortic stenosis. Patient is still intubated, febrile last night, in sepsis (Tachy, Febrile)  - Spoke with Son Zach: [phone]  - patient was diagnosed with dementia, where he is baseline orientation is 2-3, however for last 1-2 year it is progressively worsening. He is ambulatory status is declining where is mostly on wheelchair, he is becoming more dependent. Overall Performance status is low.    # Aortic stenosis  Aortic Valve:  AoV VMax: 3.76 m/s  AoV Area, Vmax: 1.13 cm² Vmax Indx: 0.51 cm²/m²  AoV VTI: 0.52 m    AoV Area, VTI:  1.42 cm² VTI Indx:  0.64cm²/m²  AoV Pk Grad: 56.6 mmHg  AoV Mn Grad: 19.0 mmHg  - Low performance status, uses wheelchair and cane, becoming more dependent, has baseline dementia  - Outpatient follow up,       This is incomplete note, attending note will follow 80 year old male has medical history of DM, COPD, HTN admitted for AMS. Imaging consisted with ICH, requiring intubation for airway protection Cardiology team consulted for Aortic stenosis. Patient is still intubated, febrile last night, in sepsis (Tachy, Febrile)  - Spoke with Son Zach: [phone]  - patient was diagnosed with dementia, where he is baseline orientation is 2-3, however for last 1-2 year it is progressively worsening. He is ambulatory status is declining where is mostly on wheelchair, he is becoming more dependent. Overall Performance status is low.    # Aortic stenosis  Aortic Valve:  AoV VMax: 3.76 m/s  AoV Area, Vmax: 1.13 cm² Vmax Indx: 0.51 cm²/m²  AoV VTI: 0.52 m    AoV Area, VTI:  1.42 cm² VTI Indx:  0.64cm²/m²  AoV Pk Grad: 56.6 mmHg  AoV Mn Grad: 19.0 mmHg  - Low performance status, uses wheelchair and cane, becoming more dependent, has baseline dementia  - Outpatient follow up,  moderate at best for nwo.      NSVT  on pressors no metoprolol 25 at this time  Nl LVEf   Lytes nl   Recc EPS eval.     No invasive management for CAD at this time.

## 2020-12-22 NOTE — PROGRESS NOTE ADULT - SUBJECTIVE AND OBJECTIVE BOX
HPI: 81 yo male with PMHx of DM, COPD, HTN c/o AMS since this morning. Patient lives by himself, son lives next door. Last known well at 10 PM at baseline per son, at 6 AM on 12/19, patient was not himself, confused, not talking. Stroke code activated en route, NIHSS 11 for aphasia but grossly no focal weakness. [facility] stroke protocol reported interval development of right frontoparietal hypodensity with cortical extension and small areas of internal hyperdensity suspicious for intralesional hemorrhage. Patient was not a candidate for IV thrombolytics for being out of the window and for ICH. Not a candidate for IA intervention because of no LVO on CTA.     Interval events: no reported events overnight. Patient remains intubated and sedated.     ROS: Patient unable to participate in ROS due to neurologic status and intubation.    MEDICATIONS  (STANDING):  ampicillin/sulbactam  IVPB      ampicillin/sulbactam  IVPB 1.5 Gram(s) IV Intermittent every 6 hours  chlorhexidine 0.12% Liquid 15 milliLiter(s) Oral Mucosa two times a day  chlorhexidine 4% Liquid 1 Application(s) Topical daily  cyanocobalamin 1000 MICROGram(s) Oral daily  dexMEDEtomidine Infusion 0.2 MICROgram(s)/kG/Hr (5.2 mL/Hr) IV Continuous <Continuous>  fentaNYL   Infusion. 0.5 MICROgram(s)/kG/Hr (5.2 mL/Hr) IV Continuous <Continuous>  folic acid 1 milliGRAM(s) Oral daily  lactated ringers. 1000 milliLiter(s) (70 mL/Hr) IV Continuous <Continuous>  levETIRAcetam  IVPB 500 milliGRAM(s) IV Intermittent every 12 hours  norepinephrine Infusion 0.05 MICROgram(s)/kG/Min (9.74 mL/Hr) IV Continuous <Continuous>  propofol Infusion 20 MICROgram(s)/kG/Min (14.4 mL/Hr) IV Continuous <Continuous>  simvastatin 20 milliGRAM(s) Oral at bedtime  tamsulosin Oral Tab/Cap - Peds 0.4 milliGRAM(s) Oral at bedtime  traZODone 150 milliGRAM(s) Oral daily    MEDICATIONS  (PRN):  acetaminophen   Tablet .. 650 milliGRAM(s) Oral every 6 hours PRN Temp greater or equal to 38C (100.4F)  fentaNYL    Injectable 25 MICROGram(s) IV Push every 6 hours PRN sedation  polyethylene glycol 3350 17 Gram(s) Oral daily PRN Constipation    I&O's Summary    21 Dec 2020 07:01  -  22 Dec 2020 07:00  --------------------------------------------------------  IN: 2547.6 mL / OUT: 1100 mL / NET: 1447.6 mL    22 Dec 2020 07:01  -  22 Dec 2020 15:18  --------------------------------------------------------  IN: 1023 mL / OUT: 450 mL / NET: 573 mL    Objective:  ICU Vital Signs Last 24 Hrs  T(C): 39.3 (22 Dec 2020 12:00), Max: 39.3 (22 Dec 2020 12:00)  T(F): 102.8 (22 Dec 2020 12:00), Max: 102.8 (22 Dec 2020 12:00)  HR: 66 (22 Dec 2020 14:00) (66 - 102)  BP: 146/76 (22 Dec 2020 07:45) (146/76 - 146/76)  BP(mean): 103 (22 Dec 2020 07:45) (103 - 103)  ABP: 104/62 (22 Dec 2020 14:00) (102/60 - 166/88)  ABP(mean): 76 (22 Dec 2020 14:00) (74 - 114)  RR: 22 (22 Dec 2020 14:00) (19 - 31)  SpO2: 98% (22 Dec 2020 14:00) (95% - 98%)    Mode: AC/ CMV (Assist Control/ Continuous Mandatory Ventilation)  RR (machine): 20  TV (machine): 470  FiO2: 40  PEEP: 8  ITime: 1  MAP: 14  PIP: 22    Physical exam:  General: laying in bed with restraints in right and left arm, sedated and intubated  Mental status: opens eyes to voice at times. Does not follow commands.  CN II: pupils are 3mm, equal and reactive to light bilaterally.  CN III, IV, VI: will not track with eyes.   (+) cough reflex, (+) gag reflex, (+) corneal reflexes bilaterally  Motor: spontaneous movement of the b/l upper extremities. Spontaneous movement of the left lower extremity and some movement of the right lower extremity. Right upper extremity and right lower extremity motor is more prominent than the left.  Sensation: localizes to noxious stimuli     Labs:  POCT Blood Glucose (12.22.20 @ 11:33)   POCT Blood Glucose.: 149 mg/dL   Magnesium, Serum in AM (12.22.20 @ 04:50)   Magnesium, Serum: 1.9 mg/dL   Basic Metabolic Panel in AM (12.22.20 @ 04:50)   Sodium, Serum: 139 mmol/L   Potassium, Serum: 4.0 mmol/L   Chloride, Serum: 106 mmol/L   Carbon Dioxide, Serum: 24 mmol/L   Anion Gap, Serum: 9 mmol/L   Blood Urea Nitrogen, Serum: 19 mg/dL   Creatinine, Serum: 0.8 mg/dL   Glucose, Serum: 184 mg/dL   Calcium, Total Serum: 8.7 mg/dL   eGFR if Non : 86  Complete Blood Count + Automated Diff in AM (12.22.20 @ 04:50)   WBC Count: 10.80 K/uL   RBC Count: 5.15 M/uL   Hemoglobin: 13.3 g/dL   Hematocrit: 43.3 %   Mean Cell Volume: 84.1 fL   Mean Cell Hemoglobin: 25.8 pg   Mean Cell Hemoglobin Conc: 30.7 g/dL   Red Cell Distrib Width: 17.0 %   Platelet Count - Automated: 171 K/uL   Auto Neutrophil #: 9.18 K/uL   Auto Lymphocyte #: 0.58 K/uL   Auto Monocyte #: 0.83 K/uL   Auto Eosinophil #: 0.02 K/uL   Auto Basophil #: 0.03 K/uL   Auto Neutrophil %: 84.9: Differential percentages must be correlated with absolute numbers for   clinical significance. %   Auto Lymphocyte %: 5.4 %   Auto Monocyte %: 7.7 %   Auto Eosinophil %: 0.2 %   Auto Basophil %: 0.3 %   Auto Immature Granulocyte %: 1.5 %   Nucleated RBC: 0 /100 WBCs   Blood Gas Arterial, Lactate (12.22.20 @ 03:29)   Blood Gas Arterial, Lactate: 0.7 mmoL/L   Blood Gas Profile - Arterial (12.22.20 @ 03:29)   pH, Arterial: 7.42   pCO2, Arterial: 40 mmHg   pO2, Arterial: 85 mmHg   HCO3, Arterial: 26 mmoL/L   Base Excess, Arterial: 1.5 mmoL/L   Oxygen Saturation, Arterial: 95 %   FIO2, Arterial: 40   Radiology:  Xray Chest 1 View 12/22 Impression:  Bibasilar opacities similar to prior.    LAST CT Head No Cont IMPRESSION 12/19:  No significant change from prior exam.  Unchanged 3.5 cm right frontoparietal hypodensity with areas of cortical extension and internal hyperdensity. Recommend MRI with contrast for further evaluation.  Assessment: Patient is a 81 yo male with PMHx of DM, COPD, HTN who presented with AMS. Latest CTH reported unchanged right frontoparietal hypodensity with areas of cortical extension and internal hyperdensity. Patient was not a candidate for IV thrombolytics for being out of the window and for ICH. Not a candidate for IA intervention because of no LVO on CTA. Evaluated by neurosurgery, no surgical intervention. Exam remains nonfocal, AMS could be related to CT findings but need to ensure there are no contributory metabolic factors. Will follow up the patient with video EEG and MRI of brain.  Plan:  - Minimize sedation- consider switching fentanyl to PRN  - MRI brain with and without madisyn.   - No antiplatelets or anticoagulation.   - Blood pressure management of 120 - 200 SBP   - Telemetry monitoring  - Video EEG  - TTE  - Keppra 500 BID

## 2020-12-22 NOTE — PROGRESS NOTE ADULT - ASSESSMENT
IMPRESSION:  acute resp failure secondary to ICH       PLAN:    CNS: precedex     HEENT: oral care     PULMONARY: send DTA and then weaning trial     CARDIOVASCULAR: moderate AS   cardiology follow up   replace K and MG   LR 70cc/ hr   GI: GI prophylaxis.  NG  Feeding ,     RENAL:follow is and os        INFECTIOUS DISEASE:  send dta , d/c femoral line   start Unasyn IV   repeat bld cx   HEMATOLOGICAL:  DVT prophylaxis.    ENDOCRINE:  Follow up FS.  Insulin protocol if needed.    MUSCULOSKELETAL:  d/c Andie   d/c aayush

## 2020-12-22 NOTE — CONSULT NOTE ADULT - SUBJECTIVE AND OBJECTIVE BOX
Date of Admission:    CHIEF COMPLAINT:    HISTORY OF PRESENT ILLNESS: 79 yo male with PMHx of DM, COPD, HTN c/o AMS since this morning. Patient lives by himself, son lives next door. Last known well at 10 PM at baseline per son, at 6 AM today, patient was not himself, confused, not talking. Stroke code called en route, NIHSS 11 for aphasia but grossly no focal weakness. CTH reported as having a focus of vasogenic edema in the right temporoparietal white matter with internal hyperdensity and effacement of the adjacent cortical sulci. Trace right temporoparietal subarachnoid hemorrhage is also seen. Not a candidate for IV thrombolytics for being out of the window and for ICH. Not a candidate for IA intervention because of no LVO on CTA. (19 Dec 2020 11:02)      PAST MEDICAL & SURGICAL HISTORY:  COPD (chronic obstructive pulmonary disease)    BPH (benign prostatic hyperplasia)    Back pain    Non-Hodgkin lymphoma    CLL (chronic lymphocytic leukemia)  treatment completed 10/19    Depression    HTN (hypertension)    DM (diabetes mellitus)    Anxiety    Encounter for screening colonoscopy  7 years approx    Port-A-Cath in place    H/O total knee replacement, bilateral        FAMILY HISTORY:  [ ] no pertinent family history of premature cardiovascular disease in first degree relatives.  Mother:   Father:   Siblings:     SOCIAL HISTORY:    [ ] Non-smoker  [ ] Smoker  [ ] Alcohol    Allergies    No Known Allergies    Intolerances    	    REVIEW OF SYSTEMS:  unable to obtain patient is intubated    PHYSICAL EXAM:  T(C): 38.7 (12-22-20 @ 10:00), Max: 38.7 (12-22-20 @ 10:00)  HR: 96 (12-22-20 @ 10:00) (68 - 98)  BP: 146/76 (12-22-20 @ 07:45) (146/76 - 146/76)  RR: 27 (12-22-20 @ 10:00) (19 - 31)  SpO2: 95% (12-22-20 @ 10:00) (95% - 98%)  Wt(kg): --  I&O's Summary    21 Dec 2020 07:01  -  22 Dec 2020 07:00  --------------------------------------------------------  IN: 2547.6 mL / OUT: 1100 mL / NET: 1447.6 mL    22 Dec 2020 07:01  -  22 Dec 2020 11:04  --------------------------------------------------------  IN: 21 mL / OUT: 225 mL / NET: -204 mL        General Appearance: intubated  Neck: normal JVP, no bruit.   Cardiovascular: regular rate and rhythm S1 S2, No JVD, systolic murmurs,  Respiratory: Lungs clear to auscultation	  Gastrointestinal:  Soft, Non-tender  Musculoskeletal/extremities: No clubbing, cyanosis or edema  Vascular: Peripheral pulses palpable 2+ bilaterally    LABS:	 	                          13.3   10.80 )-----------( 171      ( 22 Dec 2020 04:50 )             43.3     12-22    139  |  106  |  19  ----------------------------<  184<H>  4.0   |  24  |  0.8    Ca    8.7      22 Dec 2020 04:50  Mg     1.9     12-22      CARDIAC MARKERS ( 21 Dec 2020 10:45 )  x     / 0.01 ng/mL / x     / x     / x              CARDIAC MARKERS:            TELEMETRY EVENTS: 	    ECG:  	  RADIOLOGY:  OTHER: 	    PREVIOUS DIAGNOSTIC TESTING:    [x ] Echocardiogram:  [ ] Catheterization:  [ ] Stress Test:  	  	    Home Medications:  budesonide-formoterol 160 mcg-4.5 mcg/inh inhalation aerosol: 2 puff(s) inhaled 2 times a day (25 Feb 2020 10:24)  buPROPion 150 mg/12 hours (SR) oral tablet, extended release: 1 tab(s) orally 2 times a day (25 Feb 2020 10:24)  Effexor  mg oral capsule, extended release: 1 cap(s) orally once a day (25 Feb 2020 10:24)  folic acid 1 mg oral tablet: 1 tab(s) orally once a day (25 Feb 2020 10:24)  metFORMIN 500 mg oral tablet: 1 tab(s) orally once a day (at bedtime) (25 Feb 2020 10:24)  nicotine 14 mg/24 hr transdermal film, extended release: 1 patch transdermal once a day. DO NOT smoke while using patches. (11 Mar 2020 14:05)  simvastatin 20 mg oral tablet: 1 tab(s) orally once a day (at bedtime) (25 Feb 2020 10:24)  tamsulosin 0.4 mg oral capsule: 1 cap(s) orally once a day (at bedtime) (13 Mar 2020 11:21)  traZODone 150 mg oral tablet: 1 tab(s) orally once a day (at bedtime) (25 Feb 2020 10:24)  Vitamin B12 500 mcg oral tablet: 1 tab(s) orally once a day (25 Feb 2020 10:24)  Vitamin D3 1000 intl units (25 mcg) oral tablet: 1 tab(s) orally once a day (13 Mar 2020 11:40)    MEDICATIONS  (STANDING):  ampicillin/sulbactam  IVPB      ampicillin/sulbactam  IVPB 1.5 Gram(s) IV Intermittent once  ampicillin/sulbactam  IVPB 1.5 Gram(s) IV Intermittent every 6 hours  chlorhexidine 0.12% Liquid 15 milliLiter(s) Oral Mucosa two times a day  chlorhexidine 4% Liquid 1 Application(s) Topical daily  cyanocobalamin 1000 MICROGram(s) Oral daily  dexMEDEtomidine Infusion 0.2 MICROgram(s)/kG/Hr (5.2 mL/Hr) IV Continuous <Continuous>  fentaNYL   Infusion. 0.5 MICROgram(s)/kG/Hr (5.2 mL/Hr) IV Continuous <Continuous>  folic acid 1 milliGRAM(s) Oral daily  lactated ringers. 1000 milliLiter(s) (70 mL/Hr) IV Continuous <Continuous>  levETIRAcetam  IVPB 500 milliGRAM(s) IV Intermittent every 12 hours  norepinephrine Infusion 0.05 MICROgram(s)/kG/Min (9.74 mL/Hr) IV Continuous <Continuous>  propofol Infusion 20 MICROgram(s)/kG/Min (14.4 mL/Hr) IV Continuous <Continuous>  simvastatin 20 milliGRAM(s) Oral at bedtime  tamsulosin Oral Tab/Cap - Peds 0.4 milliGRAM(s) Oral at bedtime  traZODone 150 milliGRAM(s) Oral daily    MEDICATIONS  (PRN):  acetaminophen   Tablet .. 650 milliGRAM(s) Oral every 6 hours PRN Temp greater or equal to 38C (100.4F)  fentaNYL    Injectable 25 MICROGram(s) IV Push every 6 hours PRN sedation  polyethylene glycol 3350 17 Gram(s) Oral daily PRN Constipation      < from: TTE Echo Complete w/o Contrast w/ Doppler (12.21.20 @ 10:16) >  Summary:   1. Left ventricular ejection fraction, by visual estimation, is 60 to 65%.   2. Normal global left ventricular systolic function.   3. Peak transaortic gradient equals 56.6 mmHg, mean transaortic gradient equals 19.0 mmHg, the calculated aortic valve area equals 1.42 cm² by the continuity equation consistent with moderate aortic stenosis.   4. Mild aortic regurgitation.   5. Thickening and calcification of the anterior and posterior mitral leaflets.   6. Moderate mitralannular calcification.    PHYSICIAN INTERPRETATION:  Left Ventricle: The left ventricular internal cavity size is normal. Left ventricular wall thickness is normal. Global LV systolic function was normal. Left ventricular ejection fraction, by visualestimation, is 60 to 65%. The left ventricular diastolic function could not be assessed in this study.  Right Ventricle: RV systolic function is normal.  Left Atrium: Normal left atrial size.  Right Atrium: Normal right atrial size.  Mitral Valve: Thickening and calcification of the anterior and posterior mitral valve leaflets. There is moderate mitral annular calcification. No mitral regurgitation.  Tricuspid Valve: The tricuspid valve is normal in structure. No tricuspid regurgitation is visualized.  Aortic Valve: Sclerotic aortic valve with decreased opening. Peak transaortic gradient equals 56.6 mmHg, mean transaortic gradient equals 19.0 mmHg, the calculated aortic valve area equals 1.42 cm² by the continuity equation consistent with moderate aortic stenosis. Mild aortic regurgitation.  Pulmonic Valve: The pulmonic valve was not well visualized.  Aorta: The ascending aorta was not well visualized.  Pulmonary Artery: The pulmonary artery is not well visualized.  Venous: The inferior vena cava was dilated, with respiratory size variation less than 50%.      2D AND M-MODE MEASUREMENTS (normal ranges within parentheses):  Left                  Normal   Aorta/Left             Normal  Ventricle:                     Atrium:  IVSd (2D):0.82 cm  (0.7-1.1) AoV Cusp       1.31  (1.5-2.6)  LVPWd       0.90 cm  (0.7-1.1) Separation:     cm  (2D):                          Left Atrium    4.33  (1.9-4.0)  LVIDd       5.60 cm  (3.4-5.7) (Mmode):        cm  (2D):                          Right  LVIDs       4.05 cm            Ventricle:  (2D):                          RVd (2D):      4.32 cm  LV FS       27.8 %    (>25%)  (2D):  IVSd        0.86 cm  (0.7-1.1)  (Mmode):  LVPWd       1.02 cm  (0.7-1.1)  (Mmode):  LVIDd       5.64 cm  (3.4-5.7)  (Mmode):  LVIDs       4.39 cm  (Mmode):  LV FS       22.2 %    (>25%)  (Mmode):  Relative     0.32     (<0.42)  Wall  Thickness  Rel. Wall    0.36     (<0.42)  Thickness  Mm  LV Mass    92.9 g/m²  Index:  Mmode    SPECTRAL DOPPLER ANALYSIS:  LV DIASTOLIC FUNCTION:  MV Peak E: 0.55 m/s Decel Time: 236 msec  MV Peak A: 1.03 m/s  E/A Ratio: 0.53    Aortic Valve:  AoV VMax:    3.76 m/s  AoV Area, Vmax: 1.13 cm² Vmax Indx: 0.51 cm²/m²  AoV VTI:     0.52 m    AoV Area, VTI:  1.42 cm² VTI Indx:  0.64cm²/m²  AoV Pk Grad: 56.6 mmHg  AoV Mn Grad: 19.0 mmHg    LVOT Vmax: 1.00 m/s  LVOT VTI:  0.17 m  LVOT Diam: 2.33 cm    Aortic Insufficiency:  AI Half-time:  787 msec  AI Decel Rate: 0.81 m/s²    Mitral Valve:  MV P1/2 Time: 68.32 msec  MV Area, PHT:3.22 cm²    Tricuspid Valve and PA/RV Systolic Pressure: TR Max Velocity: 2.34 m/s RA Pressure: 8 mmHg RVSP/PASP: 29.9 mmHg      [id number] Imelda Brown M.D., Electronically signed on 12/21/2020 at 11:49:14 AM           Date of Admission:    CHIEF COMPLAINT:    HISTORY OF PRESENT ILLNESS: 79 yo male with PMHx of DM, COPD, HTN c/o AMS since this morning. Patient lives by himself, son lives next door. Last known well at 10 PM at baseline per son, at 6 AM today, patient was not himself, confused, not talking. Stroke code called en route, NIHSS 11 for aphasia but grossly no focal weakness. CTH reported as having a focus of vasogenic edema in the right temporoparietal white matter with internal hyperdensity and effacement of the adjacent cortical sulci. Trace right temporoparietal subarachnoid hemorrhage is also seen. Not a candidate for IV thrombolytics for being out of the window and for ICH. Not a candidate for IA intervention because of no LVO on CTA. (19 Dec 2020 11:02)      PAST MEDICAL & SURGICAL HISTORY:  COPD (chronic obstructive pulmonary disease)    BPH (benign prostatic hyperplasia)    Back pain    Non-Hodgkin lymphoma    CLL (chronic lymphocytic leukemia)  treatment completed 10/19    Depression    HTN (hypertension)    DM (diabetes mellitus)    Anxiety    Encounter for screening colonoscopy  7 years approx    Port-A-Cath in place    H/O total knee replacement, bilateral        FAMILY HISTORY:  [ ] no pertinent family history of premature cardiovascular disease in first degree relatives.  Mother:   Father:   Siblings:     SOCIAL HISTORY:    [ ] Non-smoker  [ ] Smoker  [ ] Alcohol    Allergies    No Known Allergies    Intolerances    	    REVIEW OF SYSTEMS:  unable to obtain patient is intubated    PHYSICAL EXAM:  T(C): 38.7 (12-22-20 @ 10:00), Max: 38.7 (12-22-20 @ 10:00)  HR: 96 (12-22-20 @ 10:00) (68 - 98)  BP: 146/76 (12-22-20 @ 07:45) (146/76 - 146/76)  RR: 27 (12-22-20 @ 10:00) (19 - 31)  SpO2: 95% (12-22-20 @ 10:00) (95% - 98%)  Wt(kg): --  I&O's Summary    21 Dec 2020 07:01  -  22 Dec 2020 07:00  --------------------------------------------------------  IN: 2547.6 mL / OUT: 1100 mL / NET: 1447.6 mL    22 Dec 2020 07:01  -  22 Dec 2020 11:04  --------------------------------------------------------  IN: 21 mL / OUT: 225 mL / NET: -204 mL        General Appearance: intubated, opens eyes, on sedation  Neck: normal JVP, no bruit.   Cardiovascular: regular rate and rhythm S1 S2, No JVD, systolic murmurs,  Respiratory: Lungs clear to auscultation	  Gastrointestinal:  Soft, Non-tender  Musculoskeletal/extremities: No clubbing, cyanosis   Vascular: Peripheral pulses palpable 2+ bilaterally    LABS:	 	                          13.3   10.80 )-----------( 171      ( 22 Dec 2020 04:50 )             43.3     12-22    139  |  106  |  19  ----------------------------<  184<H>  4.0   |  24  |  0.8    Ca    8.7      22 Dec 2020 04:50  Mg     1.9     12-22      CARDIAC MARKERS ( 21 Dec 2020 10:45 )  x     / 0.01 ng/mL / x     / x     / x              CARDIAC MARKERS:            TELEMETRY EVENTS: 	    ECG:  	  RADIOLOGY:  OTHER: 	    PREVIOUS DIAGNOSTIC TESTING:    [x ] Echocardiogram:  [ ] Catheterization:  [ ] Stress Test:  	  	    Home Medications:  budesonide-formoterol 160 mcg-4.5 mcg/inh inhalation aerosol: 2 puff(s) inhaled 2 times a day (25 Feb 2020 10:24)  buPROPion 150 mg/12 hours (SR) oral tablet, extended release: 1 tab(s) orally 2 times a day (25 Feb 2020 10:24)  Effexor  mg oral capsule, extended release: 1 cap(s) orally once a day (25 Feb 2020 10:24)  folic acid 1 mg oral tablet: 1 tab(s) orally once a day (25 Feb 2020 10:24)  metFORMIN 500 mg oral tablet: 1 tab(s) orally once a day (at bedtime) (25 Feb 2020 10:24)  nicotine 14 mg/24 hr transdermal film, extended release: 1 patch transdermal once a day. DO NOT smoke while using patches. (11 Mar 2020 14:05)  simvastatin 20 mg oral tablet: 1 tab(s) orally once a day (at bedtime) (25 Feb 2020 10:24)  tamsulosin 0.4 mg oral capsule: 1 cap(s) orally once a day (at bedtime) (13 Mar 2020 11:21)  traZODone 150 mg oral tablet: 1 tab(s) orally once a day (at bedtime) (25 Feb 2020 10:24)  Vitamin B12 500 mcg oral tablet: 1 tab(s) orally once a day (25 Feb 2020 10:24)  Vitamin D3 1000 intl units (25 mcg) oral tablet: 1 tab(s) orally once a day (13 Mar 2020 11:40)    MEDICATIONS  (STANDING):  ampicillin/sulbactam  IVPB      ampicillin/sulbactam  IVPB 1.5 Gram(s) IV Intermittent once  ampicillin/sulbactam  IVPB 1.5 Gram(s) IV Intermittent every 6 hours  chlorhexidine 0.12% Liquid 15 milliLiter(s) Oral Mucosa two times a day  chlorhexidine 4% Liquid 1 Application(s) Topical daily  cyanocobalamin 1000 MICROGram(s) Oral daily  dexMEDEtomidine Infusion 0.2 MICROgram(s)/kG/Hr (5.2 mL/Hr) IV Continuous <Continuous>  fentaNYL   Infusion. 0.5 MICROgram(s)/kG/Hr (5.2 mL/Hr) IV Continuous <Continuous>  folic acid 1 milliGRAM(s) Oral daily  lactated ringers. 1000 milliLiter(s) (70 mL/Hr) IV Continuous <Continuous>  levETIRAcetam  IVPB 500 milliGRAM(s) IV Intermittent every 12 hours  norepinephrine Infusion 0.05 MICROgram(s)/kG/Min (9.74 mL/Hr) IV Continuous <Continuous>  propofol Infusion 20 MICROgram(s)/kG/Min (14.4 mL/Hr) IV Continuous <Continuous>  simvastatin 20 milliGRAM(s) Oral at bedtime  tamsulosin Oral Tab/Cap - Peds 0.4 milliGRAM(s) Oral at bedtime  traZODone 150 milliGRAM(s) Oral daily    MEDICATIONS  (PRN):  acetaminophen   Tablet .. 650 milliGRAM(s) Oral every 6 hours PRN Temp greater or equal to 38C (100.4F)  fentaNYL    Injectable 25 MICROGram(s) IV Push every 6 hours PRN sedation  polyethylene glycol 3350 17 Gram(s) Oral daily PRN Constipation      < from: TTE Echo Complete w/o Contrast w/ Doppler (12.21.20 @ 10:16) >  Summary:   1. Left ventricular ejection fraction, by visual estimation, is 60 to 65%.   2. Normal global left ventricular systolic function.   3. Peak transaortic gradient equals 56.6 mmHg, mean transaortic gradient equals 19.0 mmHg, the calculated aortic valve area equals 1.42 cm² by the continuity equation consistent with moderate aortic stenosis.   4. Mild aortic regurgitation.   5. Thickening and calcification of the anterior and posterior mitral leaflets.   6. Moderate mitralannular calcification.    PHYSICIAN INTERPRETATION:  Left Ventricle: The left ventricular internal cavity size is normal. Left ventricular wall thickness is normal. Global LV systolic function was normal. Left ventricular ejection fraction, by visualestimation, is 60 to 65%. The left ventricular diastolic function could not be assessed in this study.  Right Ventricle: RV systolic function is normal.  Left Atrium: Normal left atrial size.  Right Atrium: Normal right atrial size.  Mitral Valve: Thickening and calcification of the anterior and posterior mitral valve leaflets. There is moderate mitral annular calcification. No mitral regurgitation.  Tricuspid Valve: The tricuspid valve is normal in structure. No tricuspid regurgitation is visualized.  Aortic Valve: Sclerotic aortic valve with decreased opening. Peak transaortic gradient equals 56.6 mmHg, mean transaortic gradient equals 19.0 mmHg, the calculated aortic valve area equals 1.42 cm² by the continuity equation consistent with moderate aortic stenosis. Mild aortic regurgitation.  Pulmonic Valve: The pulmonic valve was not well visualized.  Aorta: The ascending aorta was not well visualized.  Pulmonary Artery: The pulmonary artery is not well visualized.  Venous: The inferior vena cava was dilated, with respiratory size variation less than 50%.      2D AND M-MODE MEASUREMENTS (normal ranges within parentheses):  Left                  Normal   Aorta/Left             Normal  Ventricle:                     Atrium:  IVSd (2D):0.82 cm  (0.7-1.1) AoV Cusp       1.31  (1.5-2.6)  LVPWd       0.90 cm  (0.7-1.1) Separation:     cm  (2D):                          Left Atrium    4.33  (1.9-4.0)  LVIDd       5.60 cm  (3.4-5.7) (Mmode):        cm  (2D):                          Right  LVIDs       4.05 cm            Ventricle:  (2D):                          RVd (2D):      4.32 cm  LV FS       27.8 %    (>25%)  (2D):  IVSd        0.86 cm  (0.7-1.1)  (Mmode):  LVPWd       1.02 cm  (0.7-1.1)  (Mmode):  LVIDd       5.64 cm  (3.4-5.7)  (Mmode):  LVIDs       4.39 cm  (Mmode):  LV FS       22.2 %    (>25%)  (Mmode):  Relative     0.32     (<0.42)  Wall  Thickness  Rel. Wall    0.36     (<0.42)  Thickness  Mm  LV Mass    92.9 g/m²  Index:  Mmode    SPECTRAL DOPPLER ANALYSIS:  LV DIASTOLIC FUNCTION:  MV Peak E: 0.55 m/s Decel Time: 236 msec  MV Peak A: 1.03 m/s  E/A Ratio: 0.53    Aortic Valve:  AoV VMax:    3.76 m/s  AoV Area, Vmax: 1.13 cm² Vmax Indx: 0.51 cm²/m²  AoV VTI:     0.52 m    AoV Area, VTI:  1.42 cm² VTI Indx:  0.64cm²/m²  AoV Pk Grad: 56.6 mmHg  AoV Mn Grad: 19.0 mmHg    LVOT Vmax: 1.00 m/s  LVOT VTI:  0.17 m  LVOT Diam: 2.33 cm    Aortic Insufficiency:  AI Half-time:  787 msec  AI Decel Rate: 0.81 m/s²    Mitral Valve:  MV P1/2 Time: 68.32 msec  MV Area, PHT:3.22 cm²    Tricuspid Valve and PA/RV Systolic Pressure: TR Max Velocity: 2.34 m/s RA Pressure: 8 mmHg RVSP/PASP: 29.9 mmHg      [id number] Imelda Brown M.D., Electronically signed on 12/21/2020 at 11:49:14 AM

## 2020-12-22 NOTE — PROGRESS NOTE ADULT - SUBJECTIVE AND OBJECTIVE BOX
Patient is a 80y old  Male who presents with a chief complaint of Intracranial bleed (21 Dec 2020 13:20)      Over Night Events:  Patient seen and examined.   still vented on precedex   levo    ROS:  See HPI    PHYSICAL EXAM    ICU Vital Signs Last 24 Hrs  T(C): 37.9 (22 Dec 2020 06:00), Max: 38.5 (22 Dec 2020 04:00)  T(F): 100.2 (22 Dec 2020 06:00), Max: 101.3 (22 Dec 2020 04:00)  HR: 84 (22 Dec 2020 07:45) (68 - 98)  BP: 146/76 (22 Dec 2020 07:45) (146/76 - 146/76)  BP(mean): 103 (22 Dec 2020 07:45) (103 - 103)  ABP: 152/80 (22 Dec 2020 07:45) (94/60 - 152/80)  ABP(mean): 106 (22 Dec 2020 07:45) (70 - 106)  RR: 31 (22 Dec 2020 07:45) (19 - 31)  SpO2: 98% (22 Dec 2020 07:45) (96% - 98%)      General: open eyes   HEENT:         et tube        Lymph Nodes: NO cervical LN   Lungs: Bilateral BS  Cardiovascular: Regular   Abdomen: Soft, Positive BS  Extremities: No clubbing   Skin: warm   Neurological: positive gag  Musculoskeletal: move all ext     I&O's Detail    21 Dec 2020 07:01  -  22 Dec 2020 07:00  --------------------------------------------------------  IN:    Dexmedetomidine: 276.6 mL    Enteral Tube Flush: 380 mL    FentaNYL: 81 mL    IV PiggyBack: 250 mL    Lactated Ringers Bolus: 500 mL    Norepinephrine: 90.6 mL    Propofol: 9.4 mL    Vital High Protein: 960 mL  Total IN: 2547.6 mL    OUT:    Indwelling Catheter - Urethral (mL): 1100 mL  Total OUT: 1100 mL    Total NET: 1447.6 mL      22 Dec 2020 07:01  -  22 Dec 2020 08:42  --------------------------------------------------------  IN:    Dexmedetomidine: 19 mL    Norepinephrine: 2 mL  Total IN: 21 mL    OUT:    Indwelling Catheter - Urethral (mL): 225 mL  Total OUT: 225 mL    Total NET: -204 mL          LABS:                          13.3   10.80 )-----------( 171      ( 22 Dec 2020 04:50 )             43.3         22 Dec 2020 04:50    139    |  106    |  19     ----------------------------<  184    4.0     |  24     |  0.8      Ca    8.7        22 Dec 2020 04:50  Mg     1.9       22 Dec 2020 04:50                                                                                            CARDIAC MARKERS ( 21 Dec 2020 10:45 )  x     / 0.01 ng/mL / x     / x     / x                                                                                                         Mode: AC/ CMV (Assist Control/ Continuous Mandatory Ventilation)  RR (machine): 20  TV (machine): 470  FiO2: 40  PEEP: 5  ITime: 1  MAP: 14  PIP: 23                                      ABG - ( 22 Dec 2020 03:29 )  pH, Arterial: 7.42  pH, Blood: x     /  pCO2: 40    /  pO2: 85    / HCO3: 26    / Base Excess: 1.5   /  SaO2: 95                  MEDICATIONS  (STANDING):  chlorhexidine 0.12% Liquid 15 milliLiter(s) Oral Mucosa two times a day  chlorhexidine 4% Liquid 1 Application(s) Topical daily  cyanocobalamin 1000 MICROGram(s) Oral daily  dexMEDEtomidine Infusion 0.2 MICROgram(s)/kG/Hr (5.2 mL/Hr) IV Continuous <Continuous>  fentaNYL   Infusion. 0.5 MICROgram(s)/kG/Hr (5.2 mL/Hr) IV Continuous <Continuous>  folic acid 1 milliGRAM(s) Oral daily  levETIRAcetam  IVPB 500 milliGRAM(s) IV Intermittent every 12 hours  norepinephrine Infusion 0.05 MICROgram(s)/kG/Min (9.74 mL/Hr) IV Continuous <Continuous>  propofol Infusion 20 MICROgram(s)/kG/Min (14.4 mL/Hr) IV Continuous <Continuous>  simvastatin 20 milliGRAM(s) Oral at bedtime  tamsulosin Oral Tab/Cap - Peds 0.4 milliGRAM(s) Oral at bedtime  traZODone 150 milliGRAM(s) Oral daily    MEDICATIONS  (PRN):  acetaminophen   Tablet .. 650 milliGRAM(s) Oral every 6 hours PRN Temp greater or equal to 38C (100.4F)  fentaNYL    Injectable 25 MICROGram(s) IV Push every 6 hours PRN sedation  polyethylene glycol 3350 17 Gram(s) Oral daily PRN Constipation          Xrays:  TLC:  OG:  ET tube:                                                                                    mild b/l opacity    ECHO:  [facility] ICU:

## 2020-12-22 NOTE — CONSULT NOTE ADULT - ATTENDING COMMENTS
Pt intubated, sedated, pupils pinpoint no movement to noxious. Recc stat repeat CTh after intubation. uncleare etiology of mass recc MRI +/- brain. Recc EEG r/o seizure. Recc r/o infection. Neurosurgery following.
I have personally seen and examined this patient on 12/19 I have fully participated in the care of this patient during the stroke code.  I have reviewed all pertinent clinical information, including history, physical exam, plan and note.  Patient presented with acute altered mental status. CT head showed a lesion with hemorrhagic conversion. CTA showed no LVO.  Recommend Brain MRI w/wo contrast. Keppra. Neurosurgery eval. I have reviewed all pertinent clinical information and reviewed all relevant imaging and diagnostic studies personally.  Recommendations as above.  Agree with above assessment except as noted.
agree with above.

## 2020-12-22 NOTE — PROGRESS NOTE ADULT - SUBJECTIVE AND OBJECTIVE BOX
CR OWEN 80y Male  MRN#: [id number]       SUBJECTIVE  Patient is a 80y old Male who presents with a chief complaint of Intracranial bleed (22 Dec 2020 08:40)    Today is hospital day 3d, and this morning he remains intubated.  On Precedex and Levo. Tmax 101.3.  No acute overnight events.     OBJECTIVE  PAST MEDICAL & SURGICAL HISTORY  COPD (chronic obstructive pulmonary disease)    BPH (benign prostatic hyperplasia)    Back pain    Non-Hodgkin lymphoma    CLL (chronic lymphocytic leukemia)  treatment completed 10/19    Depression    HTN (hypertension)    DM (diabetes mellitus)    Anxiety    Encounter for screening colonoscopy  7 years approx    Port-A-Cath in place    H/O total knee replacement, bilateral      ALLERGIES:  No Known Allergies    MEDICATIONS:  STANDING MEDICATIONS  ampicillin/sulbactam  IVPB      ampicillin/sulbactam  IVPB 1.5 Gram(s) IV Intermittent once  ampicillin/sulbactam  IVPB 1.5 Gram(s) IV Intermittent every 6 hours  chlorhexidine 0.12% Liquid 15 milliLiter(s) Oral Mucosa two times a day  chlorhexidine 4% Liquid 1 Application(s) Topical daily  cyanocobalamin 1000 MICROGram(s) Oral daily  dexMEDEtomidine Infusion 0.2 MICROgram(s)/kG/Hr IV Continuous <Continuous>  fentaNYL   Infusion. 0.5 MICROgram(s)/kG/Hr IV Continuous <Continuous>  folic acid 1 milliGRAM(s) Oral daily  lactated ringers. 1000 milliLiter(s) IV Continuous <Continuous>  levETIRAcetam  IVPB 500 milliGRAM(s) IV Intermittent every 12 hours  norepinephrine Infusion 0.05 MICROgram(s)/kG/Min IV Continuous <Continuous>  propofol Infusion 20 MICROgram(s)/kG/Min IV Continuous <Continuous>  simvastatin 20 milliGRAM(s) Oral at bedtime  tamsulosin Oral Tab/Cap - Peds 0.4 milliGRAM(s) Oral at bedtime  traZODone 150 milliGRAM(s) Oral daily    PRN MEDICATIONS  acetaminophen   Tablet .. 650 milliGRAM(s) Oral every 6 hours PRN  fentaNYL    Injectable 25 MICROGram(s) IV Push every 6 hours PRN  polyethylene glycol 3350 17 Gram(s) Oral daily PRN      VITAL SIGNS: Last 24 Hours  T(C): 38.7 (22 Dec 2020 10:00), Max: 38.7 (22 Dec 2020 10:00)  T(F): 101.6 (22 Dec 2020 10:00), Max: 101.6 (22 Dec 2020 10:00)  HR: 96 (22 Dec 2020 10:00) (68 - 98)  BP: 146/76 (22 Dec 2020 07:45) (146/76 - 146/76)  BP(mean): 103 (22 Dec 2020 07:45) (103 - 103)  RR: 27 (22 Dec 2020 10:00) (19 - 31)  SpO2: 95% (22 Dec 2020 10:00) (95% - 98%)    LABS:                        13.3   10.80 )-----------( 171      ( 22 Dec 2020 04:50 )             43.3     12-22    139  |  106  |  19  ----------------------------<  184<H>  4.0   |  24  |  0.8    Ca    8.7      22 Dec 2020 04:50  Mg     1.9     12-22          ABG - ( 22 Dec 2020 03:29 )  pH, Arterial: 7.42  pH, Blood: x     /  pCO2: 40    /  pO2: 85    / HCO3: 26    / Base Excess: 1.5   /  SaO2: 95                    CARDIAC MARKERS ( 21 Dec 2020 10:45 )  x     / 0.01 ng/mL / x     / x     / x          RADIOLOGY:      PHYSICAL EXAM:    GENERAL: intubated  HEENT:  Atraumatic. EOMI. No JVD  PULMONARY: CTAB  CARDIOVASCULAR: RRR  GASTROINTESTINAL: Soft, Nontender, Nondistended  MUSCULOSKELETAL:  2+ Peripheral Pulses, No clubbing, cyanosis, or edema  NEUROLOGY: non-focal; moves all extremities  SKIN: in tact        ADMISSION SUMMARY  Patient is a 80y old Male who presents with a chief complaint of Intracranial bleed (22 Dec 2020 08:40)

## 2020-12-22 NOTE — PROGRESS NOTE ADULT - ATTENDING COMMENTS
History, events, data and scans reviewed, patient examined on 12/22/2020. I agree and approved plan of care as outlined above.

## 2020-12-22 NOTE — PROGRESS NOTE ADULT - ASSESSMENT
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  EEG.  Keppra 500 bid ppx.   MRI brain.  No intervention per Neurosurgery.  Neurology following.    HEENT: Oral care    PULMONARY:  HOB @ 45 degrees.  Aspiration precautions.  Keep POx > 92%.  SBT today; f/u DTA    CARDIOVASCULAR:  Keep BP < 140/90.  ECHO - 60-65% EF, moderate AS; CE - neg, Cardiology consult; LR at 70 cc/hr    GI: GI prophylaxis.  NG feeds;  Bowel regimen.    RENAL:  Follow up lytes.  Correct as needed; Cheetah    INFECTIOUS DISEASE:  Panculture.  Follow up cultures. Start Unasyn. COVID negative. D/c Femoral TLC, Andie Koenig    HEMATOLOGICAL:  SCDs for DVT prophylaxis.    ENDOCRINE:  Follow up FS.  Insulin protocol if needed.    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  EEG.  Keppra 500 bid ppx. MRI brain. f/u vEEG.  No intervention per Neurosurgery.  Neurology following.    HEENT: Oral care    PULMONARY:  HOB @ 45 degrees.  Aspiration precautions.  Keep POx > 92%.  SBT today; f/u DTA    CARDIOVASCULAR:  Keep BP < 140/90.  ECHO - 60-65% EF, moderate AS; CE - neg, Cardiology consult; LR at 70 cc/hr    GI: GI prophylaxis.  NG feeds;  Bowel regimen.    RENAL:  Follow up lytes.  Correct as needed; Cheetah    INFECTIOUS DISEASE:  Panculture.  Follow up cultures. Start Unasyn. COVID negative. D/c Femoral TLC, KoenigAndie    HEMATOLOGICAL:  SCDs for DVT prophylaxis.    ENDOCRINE:  Follow up FS.  Insulin protocol if needed.    MUSCULOSKELETAL: bed rest    DISPO: CCU Monitoring

## 2020-12-23 NOTE — PROCEDURAL SAFETY CHECKLIST WITH OR WITHOUT SEDATION - NSPREPROCFT_GEN_ALL_CORE
FEM-TRIPLE LUMEN CENTRAL LINE
MLC change OTW
Central venous catheter placement
Arterial Line Insertion

## 2020-12-23 NOTE — PROGRESS NOTE ADULT - ASSESSMENT
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  EEG.  Keppra 500 bid ppx. MRI brain unremarkable. f/u vEEG.  No intervention per Neurosurgery.  Neurology following.    HEENT: Oral care    PULMONARY:  HOB @ 45 degrees.  Aspiration precautions.  Keep POx > 92%.  SBT keysha; f/u DTA    CARDIOVASCULAR:  Keep BP < 140/90.  ECHO - 60-65% EF, moderate AS; CE - neg, Cardiology consult; LR at 70 cc/hr    GI: GI prophylaxis.  NG feeds;  Bowel regimen.    RENAL:  Follow up lytes.  Correct as needed; replete Mg, K    INFECTIOUS DISEASE:  Panculture.  Follow up cultures. Unasyn. COVID negative. D/c Adona    HEMATOLOGICAL:  SCDs for DVT prophylaxis.    ENDOCRINE:  Follow up FS.  Insulin protocol if needed.    MUSCULOSKELETAL: bed rest    DISPO: CCU Monitoring

## 2020-12-23 NOTE — CONSULT NOTE ADULT - SUBJECTIVE AND OBJECTIVE BOX
Patient is a 80y old  Male who presents with a chief complaint of Intracranial bleed (23 Dec 2020 08:42)        HPI: 81 yo male with PMHx of DM, COPD, HTN c/o AMS since this morning. Patient lives by himself, son lives next door. Last known well at 10 PM at baseline per son, at 6 AM today, patient was not himself, confused, not talking. Stroke code called en route, NIHSS 11 for aphasia but grossly no focal weakness. CTH reported as having a focus of vasogenic edema in the right temporoparietal white matter with internal hyperdensity and effacement of the adjacent cortical sulci. Trace right temporoparietal subarachnoid hemorrhage is also seen. Not a candidate for IV thrombolytics for being out of the window and for ICH. Not a candidate for IA intervention because of no LVO on CTA.    Currently: Patient was examined at the bedside today. he is still intubated and sedated however patient seems agitated. no acute events overnight      PAST MEDICAL & SURGICAL HISTORY:  COPD (chronic obstructive pulmonary disease)    BPH (benign prostatic hyperplasia)    Back pain    Non-Hodgkin lymphoma    CLL (chronic lymphocytic leukemia)  treatment completed 10/19    Depression    HTN (hypertension)    DM (diabetes mellitus)    Anxiety    Encounter for screening colonoscopy  7 years approx    Port-A-Cath in place    H/O total knee replacement, bilateral      PREVIOUS DIAGNOSTIC TESTING:      ECHO  FINDINGS:  TTE Echo Complete w/o Contrast w/ Doppler (20 @ 10:16)   Summary:   1. Left ventricular ejection fraction, by visual estimation, is 60 to 65%.   2. Normal global left ventricular systolic function.   3. Peak transaortic gradient equals 56.6 mmHg, mean transaortic gradient equals 19.0 mmHg, the calculated aortic valve area equals 1.42 cm² by the continuity equation consistent with moderate aortic stenosis.   4. Mild aortic regurgitation.   5. Thickening and calcification of the anterior and posterior mitral leaflets.   6. Moderate mitralannular calcification.    Transthoracic Echocardiogram (20 @ 07:51)   Summary:   1. Left ventricular ejection fraction, by visual estimation, is 60 to 65%.   2. Normal global left ventricular systolic function.   3. Spectral Doppler shows impaired relaxation pattern of left ventricular myocardial filling (Grade I diastolic dysfunction).   4. Peak transaortic gradient equals 32.2 mmHg, mean transaortic gradient equals 19.0 mmHg, the calculated aortic valve area equals 1.29 cm² by the continuity equation consistent with moderate aortic stenosis.   5. Mild-to-moderate aortic regurgitation.   6. Moderate mitral annular calcification.   7. Thickening and calcification of the anterior and posterior mitral valve leaflets.   8. Moderate dilatation of the ascending aorta (4.6 cm).    Transthoracic Echocardiogram (19 @ 14:50)   Summary:   1. Normal global left ventricular systolic function.   2. Normal left ventricular internal cavity size.   3. Spectral Doppler shows impaired relaxation pattern of left   ventricular myocardial filling (Grade I diastolic dysfunction).   4. Gossly normal LV function on limited views to evaluate LV.   5. Mild mitral annular calcification.   6. Mild thickening and calcification of the anterior and posterior   mitral valve leaflets.   7. Sclerotic aortic valve with decreased opening.   8. Limited study to evaluate severity of AS.   9. Peak transaortic gradient equals 36.7 mmHg, mean transaortic gradient   equals 14.1 mmHg, the calculated aortic valve area equals 1.10 cm² by the   continuity equation consistent with moderate aortic stenosis.    CT Head No Cont (20 @ 18:39)   IMPRESSION:  No significant change from prior exam.  Unchanged 3.5 cm right frontoparietal hypodensitywith areas of cortical extension and internal hyperdensity. Recommend MRI with contrast for further evaluation.    CT Perfusion w/ Maps w/ IV Cont (20 @ 08:37)   IMPRESSION:  1.  No evidence of major vascular stenosis or occlusion. Normal perfusion images.  2.  4.7 cm right frontal/parietal lesion; CTA appearance favors neoplasm. Prominent surrounding the traversing vasculature noted. Recommend further evaluation with a contrast-enhanced brain MRI.    MEDICATIONS  (STANDING):  ampicillin/sulbactam  IVPB      ampicillin/sulbactam  IVPB 1.5 Gram(s) IV Intermittent every 6 hours  chlorhexidine 0.12% Liquid 15 milliLiter(s) Oral Mucosa two times a day  chlorhexidine 4% Liquid 1 Application(s) Topical daily  cyanocobalamin 1000 MICROGram(s) Oral daily  dexMEDEtomidine Infusion 0.2 MICROgram(s)/kG/Hr (5.2 mL/Hr) IV Continuous <Continuous>  fentaNYL   Infusion. 0.5 MICROgram(s)/kG/Hr (5.2 mL/Hr) IV Continuous <Continuous>  folic acid 1 milliGRAM(s) Oral daily  levETIRAcetam  IVPB 500 milliGRAM(s) IV Intermittent every 12 hours  norepinephrine Infusion 0.05 MICROgram(s)/kG/Min (9.74 mL/Hr) IV Continuous <Continuous>  propofol Infusion 20 MICROgram(s)/kG/Min (14.4 mL/Hr) IV Continuous <Continuous>  simvastatin 20 milliGRAM(s) Oral at bedtime  tamsulosin Oral Tab/Cap - Peds 0.4 milliGRAM(s) Oral at bedtime  traZODone 150 milliGRAM(s) Oral daily    MEDICATIONS  (PRN):  acetaminophen   Tablet .. 650 milliGRAM(s) Oral every 6 hours PRN Temp greater or equal to 38C (100.4F)  fentaNYL    Injectable 25 MICROGram(s) IV Push every 6 hours PRN sedation  polyethylene glycol 3350 17 Gram(s) Oral daily PRN Constipation      SOCIAL HISTORY:  unable to obtain on current admission but according to chart from 3/2020:  Active smoker, 1 pack a day  Nonalcoholic,  Never used IV Drugs    Past Surgical History:   Encounter for screening colonoscopy 7 years approx.    H/O total knee replacement, bilateral     Port-A-Cath in place.    Allergies:  No Known Allergies      REVIEW OF SYSTEMS: unable to obtain - pt intubated    Vital Signs Last 24 Hrs  T(C): 37.4 (23 Dec 2020 08:00), Max: 38.3 (23 Dec 2020 04:00)  T(F): 99.4 (23 Dec 2020 08:00), Max: 100.9 (23 Dec 2020 04:00)  HR: 54 (23 Dec 2020 11:00) (50 - 80)  BP: --  BP(mean): --  RR: 20 (23 Dec 2020 11:00) (19 - 31)  SpO2: 98% (23 Dec 2020 11:00) (95% - 99%)    PHYSICAL EXAM:    General Appearance: intubated, opens eyes, on sedation  Neck: normal JVP, no bruit.   Cardiovascular: regular rate and rhythm S1 S2, No JVD, systolic murmur  Respiratory: Lungs CTA B/L	  Gastrointestinal:  Soft, Non-tender  Musculoskeletal/extremities: No clubbing, cyanosis   Vascular: Peripheral pulses palpable 2+ bilaterally      INTERPRETATION OF TELEMETRY: NSR    EC Lead ECG (20 @ 06:26)   Diagnosis Line Sinus rhythm with frequent Premature ventricular complexes  Left anterior fascicular block  Abnormal ECG      12 Lead ECG (20 @ 08:45)   Diagnosis Line Sinus rhythm with Premature supraventricular complexes and with occasional   Premature ventricular complexes  Left axis deviation  Left anterior fascicular block  Abnormal ECG      I&O's Detail    22 Dec 2020 07:  -  23 Dec 2020 07:00  --------------------------------------------------------  IN:    Dexmedetomidine: 421 mL    Enteral Tube Flush: 300 mL    FentaNYL: 98 mL    IV PiggyBack: 350 mL    Lactated Ringers: 1610 mL    Norepinephrine: 118 mL    Vital High Protein: 960 mL  Total IN: 3857 mL    OUT:    Indwelling Catheter - Urethral (mL): 450 mL    Propofol: 0 mL    Voided (mL): 700 mL  Total OUT: 1150 mL    Total NET: 2707 mL      23 Dec 2020 07:  -  23 Dec 2020 12:28  --------------------------------------------------------  IN:    Dexmedetomidine: 137.7 mL    FentaNYL: 113.3 mL    IV PiggyBack: 50 mL    Lactated Ringers: 70 mL    Norepinephrine: 7.8 mL  Total IN: 378.8 mL    OUT:  Total OUT: 0 mL    Total NET: 378.8 mL        LABS:                        13.0   9.47  )-----------( 165      ( 23 Dec 2020 04:30 )             42.4         142  |  110  |  21<H>  ----------------------------<  208<H>  3.9   |  26  |  0.6<L>    Ca    8.2<L>      23 Dec 2020 04:30  Mg     1.8           BNP  I&O's Detail    22 Dec 2020 07:  -  23 Dec 2020 07:00  --------------------------------------------------------  IN:    Dexmedetomidine: 421 mL    Enteral Tube Flush: 300 mL    FentaNYL: 98 mL    IV PiggyBack: 350 mL    Lactated Ringers: 1610 mL    Norepinephrine: 118 mL    Vital High Protein: 960 mL  Total IN: 3857 mL    OUT:    Indwelling Catheter - Urethral (mL): 450 mL    Propofol: 0 mL    Voided (mL): 700 mL  Total OUT: 1150 mL    Total NET: 2707 mL      23 Dec 2020 07:01  -  23 Dec 2020 12:28  --------------------------------------------------------  IN:    Dexmedetomidine: 137.7 mL    FentaNYL: 113.3 mL    IV PiggyBack: 50 mL    Lactated Ringers: 70 mL    Norepinephrine: 7.8 mL  Total IN: 378.8 mL    OUT:  Total OUT: 0 mL    Total NET: 378.8 mL        Daily     Daily Weight in k (23 Dec 2020 00:00)    RADIOLOGY & ADDITIONAL STUDIES: Patient is a 80y old  Male who presents with a chief complaint of Intracranial bleed (23 Dec 2020 08:42)        HPI: 81 yo male with PMHx of DM, COPD, HTN c/o AMS since this morning. Patient lives by himself, son lives next door. Last known well at 10 PM at baseline per son, at 6 AM today, patient was not himself, confused, not talking. Stroke code called en route, NIHSS 11 for aphasia but grossly no focal weakness. CTH reported as having a focus of vasogenic edema in the right temporoparietal white matter with internal hyperdensity and effacement of the adjacent cortical sulci. Trace right temporoparietal subarachnoid hemorrhage is also seen. Not a candidate for IV thrombolytics for being out of the window and for ICH. Not a candidate for IA intervention because of no LVO on CTA.    Currently: Patient was examined at the bedside today. he is still intubated and sedated however patient seems agitated. no acute events overnight      PAST MEDICAL & SURGICAL HISTORY:  COPD (chronic obstructive pulmonary disease)    BPH (benign prostatic hyperplasia)    Back pain    Non-Hodgkin lymphoma    CLL (chronic lymphocytic leukemia)  treatment completed 10/19    Depression    HTN (hypertension)    DM (diabetes mellitus)    Anxiety    Encounter for screening colonoscopy  7 years approx    Port-A-Cath in place    H/O total knee replacement, bilateral      PREVIOUS DIAGNOSTIC TESTING:      ECHO  FINDINGS:  TTE Echo Complete w/o Contrast w/ Doppler (20 @ 10:16)   Summary:   1. Left ventricular ejection fraction, by visual estimation, is 60 to 65%.   2. Normal global left ventricular systolic function.   3. Peak transaortic gradient equals 56.6 mmHg, mean transaortic gradient equals 19.0 mmHg, the calculated aortic valve area equals 1.42 cm² by the continuity equation consistent with moderate aortic stenosis.   4. Mild aortic regurgitation.   5. Thickening and calcification of the anterior and posterior mitral leaflets.   6. Moderate mitralannular calcification.    Transthoracic Echocardiogram (20 @ 07:51)   Summary:   1. Left ventricular ejection fraction, by visual estimation, is 60 to 65%.   2. Normal global left ventricular systolic function.   3. Spectral Doppler shows impaired relaxation pattern of left ventricular myocardial filling (Grade I diastolic dysfunction).   4. Peak transaortic gradient equals 32.2 mmHg, mean transaortic gradient equals 19.0 mmHg, the calculated aortic valve area equals 1.29 cm² by the continuity equation consistent with moderate aortic stenosis.   5. Mild-to-moderate aortic regurgitation.   6. Moderate mitral annular calcification.   7. Thickening and calcification of the anterior and posterior mitral valve leaflets.   8. Moderate dilatation of the ascending aorta (4.6 cm).    Transthoracic Echocardiogram (19 @ 14:50)   Summary:   1. Normal global left ventricular systolic function.   2. Normal left ventricular internal cavity size.   3. Spectral Doppler shows impaired relaxation pattern of left   ventricular myocardial filling (Grade I diastolic dysfunction).   4. Gossly normal LV function on limited views to evaluate LV.   5. Mild mitral annular calcification.   6. Mild thickening and calcification of the anterior and posterior   mitral valve leaflets.   7. Sclerotic aortic valve with decreased opening.   8. Limited study to evaluate severity of AS.   9. Peak transaortic gradient equals 36.7 mmHg, mean transaortic gradient   equals 14.1 mmHg, the calculated aortic valve area equals 1.10 cm² by the   continuity equation consistent with moderate aortic stenosis.    CT Head No Cont (20 @ 18:39)   IMPRESSION:  No significant change from prior exam.  Unchanged 3.5 cm right frontoparietal hypodensitywith areas of cortical extension and internal hyperdensity. Recommend MRI with contrast for further evaluation.    CT Perfusion w/ Maps w/ IV Cont (20 @ 08:37)   IMPRESSION:  1.  No evidence of major vascular stenosis or occlusion. Normal perfusion images.  2.  4.7 cm right frontal/parietal lesion; CTA appearance favors neoplasm. Prominent surrounding the traversing vasculature noted. Recommend further evaluation with a contrast-enhanced brain MRI.    MEDICATIONS  (STANDING):  ampicillin/sulbactam  IVPB      ampicillin/sulbactam  IVPB 1.5 Gram(s) IV Intermittent every 6 hours  chlorhexidine 0.12% Liquid 15 milliLiter(s) Oral Mucosa two times a day  chlorhexidine 4% Liquid 1 Application(s) Topical daily  cyanocobalamin 1000 MICROGram(s) Oral daily  dexMEDEtomidine Infusion 0.2 MICROgram(s)/kG/Hr (5.2 mL/Hr) IV Continuous <Continuous>  fentaNYL   Infusion. 0.5 MICROgram(s)/kG/Hr (5.2 mL/Hr) IV Continuous <Continuous>  folic acid 1 milliGRAM(s) Oral daily  levETIRAcetam  IVPB 500 milliGRAM(s) IV Intermittent every 12 hours  norepinephrine Infusion 0.05 MICROgram(s)/kG/Min (9.74 mL/Hr) IV Continuous <Continuous>  propofol Infusion 20 MICROgram(s)/kG/Min (14.4 mL/Hr) IV Continuous <Continuous>  simvastatin 20 milliGRAM(s) Oral at bedtime  tamsulosin Oral Tab/Cap - Peds 0.4 milliGRAM(s) Oral at bedtime  traZODone 150 milliGRAM(s) Oral daily    MEDICATIONS  (PRN):  acetaminophen   Tablet .. 650 milliGRAM(s) Oral every 6 hours PRN Temp greater or equal to 38C (100.4F)  fentaNYL    Injectable 25 MICROGram(s) IV Push every 6 hours PRN sedation  polyethylene glycol 3350 17 Gram(s) Oral daily PRN Constipation      SOCIAL HISTORY:  unable to obtain on current admission but according to chart from 3/2020:  Active smoker, 1 pack a day  Nonalcoholic,  Never used IV Drugs    Past Surgical History:   Encounter for screening colonoscopy 7 years approx.    H/O total knee replacement, bilateral     Port-A-Cath in place.    Allergies:  No Known Allergies      REVIEW OF SYSTEMS: unable to obtain - pt intubated    Vital Signs Last 24 Hrs  T(C): 37.4 (23 Dec 2020 08:00), Max: 38.3 (23 Dec 2020 04:00)  T(F): 99.4 (23 Dec 2020 08:00), Max: 100.9 (23 Dec 2020 04:00)  HR: 54 (23 Dec 2020 11:00) (50 - 80)  BP: --  BP(mean): --  RR: 20 (23 Dec 2020 11:00) (19 - 31)  SpO2: 98% (23 Dec 2020 11:00) (95% - 99%)    PHYSICAL EXAM:    General Appearance: intubated, opens eyes, on sedation but arousal  Neck: normal JVP, no bruit.   Cardiovascular: regular rate and rhythm S1 S2, No JVD, systolic murmur  Respiratory: Lungs CTA B/L	  Gastrointestinal:  Soft, Non-tender  Musculoskeletal/extremities: No clubbing, cyanosis   Vascular: Peripheral pulses palpable 2+ bilaterally      INTERPRETATION OF TELEMETRY: NSR    EC Lead ECG (20 @ 06:26)   Diagnosis Line Sinus rhythm with frequent Premature ventricular complexes  Left anterior fascicular block  Abnormal ECG      12 Lead ECG (.20 @ 08:45)   Diagnosis Line Sinus rhythm with Premature supraventricular complexes and with occasional   Premature ventricular complexes  Left axis deviation  Left anterior fascicular block  Abnormal ECG      I&O's Detail    22 Dec 2020 07:  -  23 Dec 2020 07:00  --------------------------------------------------------  IN:    Dexmedetomidine: 421 mL    Enteral Tube Flush: 300 mL    FentaNYL: 98 mL    IV PiggyBack: 350 mL    Lactated Ringers: 1610 mL    Norepinephrine: 118 mL    Vital High Protein: 960 mL  Total IN: 3857 mL    OUT:    Indwelling Catheter - Urethral (mL): 450 mL    Propofol: 0 mL    Voided (mL): 700 mL  Total OUT: 1150 mL    Total NET: 2707 mL      23 Dec 2020 07:  -  23 Dec 2020 12:28  --------------------------------------------------------  IN:    Dexmedetomidine: 137.7 mL    FentaNYL: 113.3 mL    IV PiggyBack: 50 mL    Lactated Ringers: 70 mL    Norepinephrine: 7.8 mL  Total IN: 378.8 mL    OUT:  Total OUT: 0 mL    Total NET: 378.8 mL        LABS:                        13.0   9.47  )-----------( 165      ( 23 Dec 2020 04:30 )             42.4         142  |  110  |  21<H>  ----------------------------<  208<H>  3.9   |  26  |  0.6<L>    Ca    8.2<L>      23 Dec 2020 04:30  Mg     1.8           BNP  I&O's Detail    22 Dec 2020 07:  -  23 Dec 2020 07:00  --------------------------------------------------------  IN:    Dexmedetomidine: 421 mL    Enteral Tube Flush: 300 mL    FentaNYL: 98 mL    IV PiggyBack: 350 mL    Lactated Ringers: 1610 mL    Norepinephrine: 118 mL    Vital High Protein: 960 mL  Total IN: 3857 mL    OUT:    Indwelling Catheter - Urethral (mL): 450 mL    Propofol: 0 mL    Voided (mL): 700 mL  Total OUT: 1150 mL    Total NET: 2707 mL      23 Dec 2020 07:01  -  23 Dec 2020 12:28  --------------------------------------------------------  IN:    Dexmedetomidine: 137.7 mL    FentaNYL: 113.3 mL    IV PiggyBack: 50 mL    Lactated Ringers: 70 mL    Norepinephrine: 7.8 mL  Total IN: 378.8 mL    OUT:  Total OUT: 0 mL    Total NET: 378.8 mL        Daily     Daily Weight in k (23 Dec 2020 00:00)

## 2020-12-23 NOTE — PROGRESS NOTE ADULT - SUBJECTIVE AND OBJECTIVE BOX
CR OWEN 80y Male  MRN#: [id number]       SUBJECTIVE  Patient is a 80y old Male who presents with a chief complaint of Intracranial bleed (23 Dec 2020 12:26)      Today is hospital day 4d, and this morning he remains intubated.  On Precedex and Levo. Tmax 100.9.  s/p failed SBT yesterday. No acute overnight events.     OBJECTIVE  PAST MEDICAL & SURGICAL HISTORY  COPD (chronic obstructive pulmonary disease)    BPH (benign prostatic hyperplasia)    Back pain    Non-Hodgkin lymphoma    CLL (chronic lymphocytic leukemia)  treatment completed 10/19    Depression    HTN (hypertension)    DM (diabetes mellitus)    Anxiety    Encounter for screening colonoscopy  7 years approx    Port-A-Cath in place    H/O total knee replacement, bilateral      ALLERGIES:  No Known Allergies    MEDICATIONS:  STANDING MEDICATIONS  ampicillin/sulbactam  IVPB      ampicillin/sulbactam  IVPB 1.5 Gram(s) IV Intermittent every 6 hours  chlorhexidine 0.12% Liquid 15 milliLiter(s) Oral Mucosa two times a day  chlorhexidine 4% Liquid 1 Application(s) Topical daily  cyanocobalamin 1000 MICROGram(s) Oral daily  dexMEDEtomidine Infusion 0.2 MICROgram(s)/kG/Hr IV Continuous <Continuous>  fentaNYL   Infusion. 0.5 MICROgram(s)/kG/Hr IV Continuous <Continuous>  folic acid 1 milliGRAM(s) Oral daily  levETIRAcetam  IVPB 500 milliGRAM(s) IV Intermittent every 12 hours  norepinephrine Infusion 0.05 MICROgram(s)/kG/Min IV Continuous <Continuous>  propofol Infusion 20 MICROgram(s)/kG/Min IV Continuous <Continuous>  simvastatin 20 milliGRAM(s) Oral at bedtime  tamsulosin Oral Tab/Cap - Peds 0.4 milliGRAM(s) Oral at bedtime  traZODone 150 milliGRAM(s) Oral daily    PRN MEDICATIONS  acetaminophen   Tablet .. 650 milliGRAM(s) Oral every 6 hours PRN  fentaNYL    Injectable 25 MICROGram(s) IV Push every 6 hours PRN  polyethylene glycol 3350 17 Gram(s) Oral daily PRN      VITAL SIGNS: Last 24 Hours  T(C): 37.4 (23 Dec 2020 08:00), Max: 38.3 (23 Dec 2020 04:00)  T(F): 99.4 (23 Dec 2020 08:00), Max: 100.9 (23 Dec 2020 04:00)  HR: 54 (23 Dec 2020 14:00) (50 - 80)  BP: 158/83 (23 Dec 2020 14:00) (156/80 - 158/83)  BP(mean): 118 (23 Dec 2020 14:00) (118 - 120)  RR: 20 (23 Dec 2020 14:00) (19 - 31)  SpO2: 99% (23 Dec 2020 14:00) (95% - 99%)    LABS:                        13.0   9.47  )-----------( 165      ( 23 Dec 2020 04:30 )             42.4     12-23    142  |  110  |  21<H>  ----------------------------<  208<H>  3.9   |  26  |  0.6<L>    Ca    8.2<L>      23 Dec 2020 04:30  Mg     1.8     12-23          ABG - ( 23 Dec 2020 03:47 )  pH, Arterial: 7.45  pH, Blood: x     /  pCO2: 42    /  pO2: 100   / HCO3: 29    / Base Excess: 4.5   /  SaO2: 95                    Culture - Sputum (collected 22 Dec 2020 08:46)  Source: .Sputum deep tracheal aspirate  Gram Stain (23 Dec 2020 06:11):    Moderate polymorphonuclear leukocytes per low power field    Rare Squamous epithelial cells per low power field    Few Gram positive cocci in pairs seen per oil power field          RADIOLOGY:      PHYSICAL EXAM:    GENERAL: intubated  HEENT:  Atraumatic. EOMI. No JVD  PULMONARY: CTAB  CARDIOVASCULAR: RRR  GASTROINTESTINAL: Soft, Nontender, Nondistended  MUSCULOSKELETAL:  2+ Peripheral Pulses, No clubbing, cyanosis, or edema  NEUROLOGY: non-focal; moves all extremities  SKIN: in tact      ADMISSION SUMMARY  Patient is a 80y old Male who presents with a chief complaint of Intracranial bleed (23 Dec 2020 12:26)

## 2020-12-23 NOTE — PROGRESS NOTE ADULT - ASSESSMENT
IMPRESSION:  acute resp failure secondary to ICH       PLAN:    CNS: precedex  fentanyl   MRI today     HEENT: oral care     PULMONARY: lower peep to 5     CARDIOVASCULAR: moderate AS   cardiology follow up   replace K and MG     GI: GI prophylaxis.  NG  Feeding ,     RENAL:follow is and os        INFECTIOUS DISEASE:  send dta ,   start Unasyn IV   follow    bld cx   HEMATOLOGICAL:  DVT prophylaxis.    ENDOCRINE:  Follow up FS.  Insulin protocol if needed.    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  EEG.  Keppra 500 bid ppx. MRI brain unremarkable. f/u vEEG.  No intervention per Neurosurgery.  Neurology following.    HEENT: Oral care    PULMONARY:  HOB @ 45 degrees.  Aspiration precautions.  Keep POx > 92%.  SBT keysha; f/u DTA    CARDIOVASCULAR:  Keep BP < 140/90.  ECHO - 60-65% EF, moderate AS; CE - neg, Cardiology consult; LR at 70 cc/hr    GI: GI prophylaxis.  NG feeds;  Bowel regimen.    RENAL:  Follow up lytes.  Correct as needed; replete Mg, K    INFECTIOUS DISEASE:  Panculture.  Follow up cultures. Unasyn. COVID negative. D/c Stephenson    HEMATOLOGICAL:  SCDs for DVT prophylaxis.    ENDOCRINE:  Follow up FS.  Insulin protocol if needed.    MUSCULOSKELETAL: bed rest    DISPO: CCU Monitoring  IMPRESSION:  acute resp failure secondary to ICH       PLAN:    CNS: precedex  fentanyl   MRI today     HEENT: oral care     PULMONARY: lower peep to 5     CARDIOVASCULAR: moderate AS   cardiology follow up   replace K and MG     GI: GI prophylaxis.  NG  Feeding ,     RENAL:follow is and os        INFECTIOUS DISEASE:  send dta ,   start Unasyn IV   follow    bld cx   HEMATOLOGICAL:  DVT prophylaxis.    ENDOCRINE:  Follow up FS.  Insulin protocol if needed.    MUSCULOSKELETAL:  d/c Andie

## 2020-12-23 NOTE — PROGRESS NOTE ADULT - SUBJECTIVE AND OBJECTIVE BOX
Patient is a 80y old  Male who presents with a chief complaint of Intracranial bleed (22 Dec 2020 15:16)      Over Night Events:  Patient seen and examined.    become agitated     ROS:  See HPI    PHYSICAL EXAM    ICU Vital Signs Last 24 Hrs  T(C): 37.2 (23 Dec 2020 06:00), Max: 39.3 (22 Dec 2020 12:00)  T(F): 99 (23 Dec 2020 06:00), Max: 102.8 (22 Dec 2020 12:00)  HR: 60 (23 Dec 2020 07:00) (50 - 102)  BP: --  BP(mean): --  ABP: 146/77 (23 Dec 2020 07:00) (100/54 - 166/88)  ABP(mean): 102 (23 Dec 2020 07:00) (70 - 114)  RR: 20 (23 Dec 2020 07:00) (19 - 31)  SpO2: 99% (23 Dec 2020 07:00) (95% - 99%)      General: awake but confused agitated when lower sedation   HEENT:        et tube      Lymph Nodes: NO cervical LN   Lungs: Bilateral BS  Cardiovascular: Regular   Abdomen: Soft, Positive BS  Extremities: No clubbing   Skin: warm   Neurological: no focal   Musculoskeletal: move all ext     I&O's Detail    22 Dec 2020 07:01  -  23 Dec 2020 07:00  --------------------------------------------------------  IN:    Dexmedetomidine: 421 mL    Enteral Tube Flush: 300 mL    FentaNYL: 98 mL    IV PiggyBack: 350 mL    Lactated Ringers: 1610 mL    Norepinephrine: 118 mL    Vital High Protein: 960 mL  Total IN: 3857 mL    OUT:    Indwelling Catheter - Urethral (mL): 450 mL    Propofol: 0 mL    Voided (mL): 700 mL  Total OUT: 1150 mL    Total NET: 2707 mL          LABS:                          13.0   9.47  )-----------( 165      ( 23 Dec 2020 04:30 )             42.4         23 Dec 2020 04:30    142    |  110    |  21     ----------------------------<  208    3.9     |  26     |  0.6      Ca    8.2        23 Dec 2020 04:30  Mg     1.8       23 Dec 2020 04:30                                                                                            CARDIAC MARKERS ( 21 Dec 2020 10:45 )  x     / 0.01 ng/mL / x     / x     / x                                                            Culture - Sputum (collected 22 Dec 2020 08:46)  Source: .Sputum deep tracheal aspirate  Gram Stain (23 Dec 2020 06:11):    Moderate polymorphonuclear leukocytes per low power field    Rare Squamous epithelial cells per low power field    Few Gram positive cocci in pairs seen per oil power field                                                   Mode: AC/ CMV (Assist Control/ Continuous Mandatory Ventilation)  RR (machine): 20  TV (machine): 470  FiO2: 40  PEEP: 8  ITime: 1  MAP: 13  PIP: 25                                      ABG - ( 23 Dec 2020 03:47 )  pH, Arterial: 7.45  pH, Blood: x     /  pCO2: 42    /  pO2: 100   / HCO3: 29    / Base Excess: 4.5   /  SaO2: 95                  MEDICATIONS  (STANDING):  ampicillin/sulbactam  IVPB      ampicillin/sulbactam  IVPB 1.5 Gram(s) IV Intermittent every 6 hours  chlorhexidine 0.12% Liquid 15 milliLiter(s) Oral Mucosa two times a day  chlorhexidine 4% Liquid 1 Application(s) Topical daily  cyanocobalamin 1000 MICROGram(s) Oral daily  dexMEDEtomidine Infusion 0.2 MICROgram(s)/kG/Hr (5.2 mL/Hr) IV Continuous <Continuous>  fentaNYL   Infusion. 0.5 MICROgram(s)/kG/Hr (5.2 mL/Hr) IV Continuous <Continuous>  folic acid 1 milliGRAM(s) Oral daily  lactated ringers. 1000 milliLiter(s) (70 mL/Hr) IV Continuous <Continuous>  levETIRAcetam  IVPB 500 milliGRAM(s) IV Intermittent every 12 hours  norepinephrine Infusion 0.05 MICROgram(s)/kG/Min (9.74 mL/Hr) IV Continuous <Continuous>  propofol Infusion 20 MICROgram(s)/kG/Min (14.4 mL/Hr) IV Continuous <Continuous>  simvastatin 20 milliGRAM(s) Oral at bedtime  tamsulosin Oral Tab/Cap - Peds 0.4 milliGRAM(s) Oral at bedtime  traZODone 150 milliGRAM(s) Oral daily    MEDICATIONS  (PRN):  acetaminophen   Tablet .. 650 milliGRAM(s) Oral every 6 hours PRN Temp greater or equal to 38C (100.4F)  fentaNYL    Injectable 25 MICROGram(s) IV Push every 6 hours PRN sedation  polyethylene glycol 3350 17 Gram(s) Oral daily PRN Constipation          Xrays:  TLC:  OG:  ET tube:                                                                                    mild congestion    ECHO:  [facility] ICU:

## 2020-12-23 NOTE — PROCEDURE NOTE - NSICDXPROCEDURE_GEN_ALL_CORE_FT
PROCEDURES:  Ultrasound guidance for placement of central venous catheter (CVC) 23-Dec-2020 11:29:25  Musa Pina

## 2020-12-23 NOTE — PROCEDURE NOTE - NSPROCDETAILS_GEN_ALL_CORE
guidewire recovered/lumen(s) aspirated and flushed/sterile dressing applied/sterile technique, catheter placed/ultrasound guidance with use of sterile gel and probe cove
guidewire recovered/lumen(s) aspirated and flushed/sterile dressing applied/sterile technique, catheter placed/ultrasound guidance with use of sterile gel and probe cove

## 2020-12-23 NOTE — PROCEDURAL SAFETY CHECKLIST WITH OR WITHOUT SEDATION - NSPREPROCEDSEDAT_GEN_ALL_CORE
Pt intubated on Propofol/with sedation
with sedation
pt on sedation/with sedation
Pt intubated on Propofol/with sedation

## 2020-12-23 NOTE — PROCEDURAL SAFETY CHECKLIST WITH OR WITHOUT SEDATION - NSINSTRUMENTCOUNTSD_GEN_ALL_CORE
Received signed medical records request/authorization form for Joao Gillis to disclose patient health information to the Facility identified below:     Facility / Provider Name: To patient  Facility Address: picked up in office    Specific PHI to
not applicable
done

## 2020-12-23 NOTE — PROGRESS NOTE ADULT - SUBJECTIVE AND OBJECTIVE BOX
HPI: 79 yo male with PMHx of DM, COPD, HTN c/o AMS since this morning. Patient lives by himself, son lives next door. Last known well at 10 PM at baseline per son, at 6 AM on 12/19, patient was not himself, confused, not talking. Stroke code activated en route, NIHSS 11 for aphasia but grossly no focal weakness. [facility] Brain stroke protocol reported interval development of right frontoparietal hypodensity with cortical extension and small areas of internal hyperdensity suspicious for intralesional hemorrhage. Patient was not a candidate for IV thrombolytics for being out of the window and for ICH. Not a candidate for IA intervention because of no LVO on CTA.     ROS unable to be obtained due to patient's neurologic status    Interval events: Patient became agitated and almost pulled out endotracheal tube so the RN needed to give sedation.    MEDICATIONS  (STANDING):  ampicillin/sulbactam  IVPB      ampicillin/sulbactam  IVPB 1.5 Gram(s) IV Intermittent every 6 hours  chlorhexidine 0.12% Liquid 15 milliLiter(s) Oral Mucosa two times a day  chlorhexidine 4% Liquid 1 Application(s) Topical daily  cyanocobalamin 1000 MICROGram(s) Oral daily  dexMEDEtomidine Infusion 0.2 MICROgram(s)/kG/Hr (5.2 mL/Hr) IV Continuous <Continuous>  fentaNYL   Infusion. 0.5 MICROgram(s)/kG/Hr (5.2 mL/Hr) IV Continuous <Continuous>  folic acid 1 milliGRAM(s) Oral daily  levETIRAcetam  IVPB 500 milliGRAM(s) IV Intermittent every 12 hours  norepinephrine Infusion 0.05 MICROgram(s)/kG/Min (9.74 mL/Hr) IV Continuous <Continuous>  propofol Infusion 20 MICROgram(s)/kG/Min (14.4 mL/Hr) IV Continuous <Continuous>  simvastatin 20 milliGRAM(s) Oral at bedtime  tamsulosin Oral Tab/Cap - Peds 0.4 milliGRAM(s) Oral at bedtime  traZODone 150 milliGRAM(s) Oral daily    MEDICATIONS  (PRN):  acetaminophen   Tablet .. 650 milliGRAM(s) Oral every 6 hours PRN Temp greater or equal to 38C (100.4F)  fentaNYL    Injectable 25 MICROGram(s) IV Push every 6 hours PRN sedation  polyethylene glycol 3350 17 Gram(s) Oral daily PRN Constipation    I&O's Summary    22 Dec 2020 07:01  -  23 Dec 2020 07:00  --------------------------------------------------------  IN: 3857 mL / OUT: 1150 mL / NET: 2707 mL    23 Dec 2020 07:01  -  23 Dec 2020 16:32  --------------------------------------------------------  IN: 866 mL / OUT: 0 mL / NET: 866 mL    Objective:  ICU Vital Signs Last 24 Hrs  T(C): 37.4 (23 Dec 2020 08:00), Max: 38.3 (23 Dec 2020 04:00)  T(F): 99.4 (23 Dec 2020 08:00), Max: 100.9 (23 Dec 2020 04:00)  HR: 52 (23 Dec 2020 15:00) (50 - 80)  BP: 160/81 (23 Dec 2020 15:00) (156/80 - 160/81)  BP(mean): 113 (23 Dec 2020 15:00) (113 - 120)  ABP: 170/92 (23 Dec 2020 12:00) (100/54 - 188/92)  ABP(mean): 122 (23 Dec 2020 12:00) (70 - 124)  RR: 20 (23 Dec 2020 15:00) (19 - 31)  SpO2: 99% (23 Dec 2020 15:00) (95% - 99%)    Physical exam:   General: laying in bed, intubated and sedated on fentanyl and precedex  Mental status: patient will open eyes to voice, can follow commands by giving thumbs up using the right hand and by moving lower extremities when asked  CN II: pupils are 3mm, equal, round and reactive to light bilaterally.  CN III, IV, VI: will not track with eyes  (+) cough reflex, (+) gag reflex, (+) corneal reflexes bilaterally  Motor: spontaneous movement of the b/l upper extremities. Spontaneous movement of the left lower extremity and some movement of the right lower extremity. Right upper extremity and right lower extremity motor is more prominent than the left.  Sensation: localizes to noxious stimuli     Labs:  POCT Blood Glucose.: 179 mg/dL   Magnesium, Serum: 1.8 mg/dL   Basic Metabolic Panel in AM (12.23.20 @ 04:30)   Sodium, Serum: 142 mmol/L   Potassium, Serum: 3.9 mmol/L   Chloride, Serum: 110 mmol/L   Carbon Dioxide, Serum: 26 mmol/L   Anion Gap, Serum: 6 mmol/L   Blood Urea Nitrogen, Serum: 21 mg/dL   Creatinine, Serum: 0.6 mg/dL   Glucose, Serum: 208 mg/dL   Calcium, Total Serum: 8.2 mg/dL   eGFR if Non : 96  Complete Blood Count + Automated Diff in AM (12.23.20 @ 04:30)   WBC Count: 9.47 K/uL   RBC Count: 5.12 M/uL   Hemoglobin: 13.0 g/dL   Hematocrit: 42.4 %   Mean Cell Volume: 82.8 fL   Mean Cell Hemoglobin: 25.4 pg   Mean Cell Hemoglobin Conc: 30.7 g/dL   Red Cell Distrib Width: 16.7 %   Platelet Count - Automated: 165 K/uL   Auto Neutrophil #: 7.36 K/uL   Auto Lymphocyte #: 0.59 K/uL   Auto Monocyte #: 0.72 K/uL   Auto Eosinophil #: 0.71 K/uL   Auto Basophil #: 0.03 K/uL   Auto Neutrophil %: 77.8: Differential percentages must be correlated with absolute numbers for   clinical significance. %   Auto Lymphocyte %: 6.2 %   Auto Monocyte %: 7.6 %   Auto Eosinophil %: 7.5 %   Auto Basophil %: 0.3 %   Auto Immature Granulocyte %: 0.6 %   Nucleated RBC: 0 /100 WBCs   Blood Gas Arterial, Lactate: 0.6 mmoL/L   Blood Gas Profile - Arterial (12.23.20 @ 03:47)   pH, Arterial: 7.45   pCO2, Arterial: 42 mmHg   pO2, Arterial: 100 mmHg   HCO3, Arterial: 29 mmoL/L   Base Excess, Arterial: 4.5 mmoL/L   Oxygen Saturation, Arterial: 95 %   FIO2, Arterial: 40   Radiology:  Xray Chest 1 view Impression:    Unchanged retrocardiac opacity.  Stable support devices as above.    MR Head No cont IMPRESSION:  Limited examination demonstrates abnormal signal in the right frontoparietal region responding to abnormality seen on recent CT of 12/19/2020. There is no significant associated mass effect or midline shift. Ventricles and sulci are otherwise unremarkable.  Recommend repeat pre and postcontrast MRI with sedation.  Assessment: Patient is a 79 yo male with PMHx of DM, COPD, HTN who presented with AMS. Latest CTH reported unchanged right frontoparietal hypodensity with areas of cortical extension and internal hyperdensity. Patient was not a candidate for IV thrombolytics for being out of the window and for ICH. Not a candidate for IA intervention because of no LVO on CTA. Evaluated by neurosurgery, no surgical intervention. Exam remains nonfocal. Repeat MRI revealed no mass effect or midline shift and demonstrated abnormal signal in the right frontoparietal region responding to abnormality seen on the recent CT on 12/19. We recommend following up with video EEG.   Plan:  - Minimize sedation - consider switching fentanyl to PRN  - Blood pressure management of 120 - 200 SBP   - Telemetry monitoring  - Video EEG  - TTE  - Keppra 500 BID               HPI: 79 yo male with PMHx of DM, COPD, HTN c/o AMS since this morning. Patient lives by himself, son lives next door. Last known well at 10 PM at baseline per son, at 6 AM on 12/19, patient was not himself, confused, not talking. Stroke code activated en route, NIHSS 11 for aphasia but grossly no focal weakness. [facility] Brain stroke protocol reported interval development of right frontoparietal hypodensity with cortical extension and small areas of internal hyperdensity suspicious for intralesional hemorrhage. Patient was not a candidate for IV thrombolytics for being out of the window and for ICH. Not a candidate for IA intervention because of no LVO on CTA.     ROS unable to be obtained due to patient's neurologic status    Interval events: Patient became agitated and almost pulled out endotracheal tube so the RN needed to give sedation.    MEDICATIONS  (STANDING):  ampicillin/sulbactam  IVPB      ampicillin/sulbactam  IVPB 1.5 Gram(s) IV Intermittent every 6 hours  chlorhexidine 0.12% Liquid 15 milliLiter(s) Oral Mucosa two times a day  chlorhexidine 4% Liquid 1 Application(s) Topical daily  cyanocobalamin 1000 MICROGram(s) Oral daily  dexMEDEtomidine Infusion 0.2 MICROgram(s)/kG/Hr (5.2 mL/Hr) IV Continuous <Continuous>  fentaNYL   Infusion. 0.5 MICROgram(s)/kG/Hr (5.2 mL/Hr) IV Continuous <Continuous>  folic acid 1 milliGRAM(s) Oral daily  levETIRAcetam  IVPB 500 milliGRAM(s) IV Intermittent every 12 hours  norepinephrine Infusion 0.05 MICROgram(s)/kG/Min (9.74 mL/Hr) IV Continuous <Continuous>  propofol Infusion 20 MICROgram(s)/kG/Min (14.4 mL/Hr) IV Continuous <Continuous>  simvastatin 20 milliGRAM(s) Oral at bedtime  tamsulosin Oral Tab/Cap - Peds 0.4 milliGRAM(s) Oral at bedtime  traZODone 150 milliGRAM(s) Oral daily    MEDICATIONS  (PRN):  acetaminophen   Tablet .. 650 milliGRAM(s) Oral every 6 hours PRN Temp greater or equal to 38C (100.4F)  fentaNYL    Injectable 25 MICROGram(s) IV Push every 6 hours PRN sedation  polyethylene glycol 3350 17 Gram(s) Oral daily PRN Constipation    I&O's Summary    22 Dec 2020 07:01  -  23 Dec 2020 07:00  --------------------------------------------------------  IN: 3857 mL / OUT: 1150 mL / NET: 2707 mL    23 Dec 2020 07:01  -  23 Dec 2020 16:32  --------------------------------------------------------  IN: 866 mL / OUT: 0 mL / NET: 866 mL    Objective:  ICU Vital Signs Last 24 Hrs  T(C): 37.4 (23 Dec 2020 08:00), Max: 38.3 (23 Dec 2020 04:00)  T(F): 99.4 (23 Dec 2020 08:00), Max: 100.9 (23 Dec 2020 04:00)  HR: 52 (23 Dec 2020 15:00) (50 - 80)  BP: 160/81 (23 Dec 2020 15:00) (156/80 - 160/81)  BP(mean): 113 (23 Dec 2020 15:00) (113 - 120)  ABP: 170/92 (23 Dec 2020 12:00) (100/54 - 188/92)  ABP(mean): 122 (23 Dec 2020 12:00) (70 - 124)  RR: 20 (23 Dec 2020 15:00) (19 - 31)  SpO2: 99% (23 Dec 2020 15:00) (95% - 99%)    Physical exam:   General: laying in bed, intubated and sedated on fentanyl and precedex  Mental status: patient will open eyes to voice, can follow commands by giving thumbs up using the right hand and by moving lower extremities when asked  CN II: pupils are 3mm, equal, round and reactive to light bilaterally.  CN III, IV, VI: will not track with eyes  (+) cough reflex, (+) gag reflex, (+) corneal reflexes bilaterally  Motor: spontaneous movement of the b/l upper extremities. Spontaneous movement of the left lower extremity and some movement of the right lower extremity. Right upper extremity and right lower extremity motor is more prominent than the left.  Sensation: localizes to noxious stimuli     Labs:  POCT Blood Glucose.: 179 mg/dL   Magnesium, Serum: 1.8 mg/dL   Basic Metabolic Panel in AM (12.23.20 @ 04:30)   Sodium, Serum: 142 mmol/L   Potassium, Serum: 3.9 mmol/L   Chloride, Serum: 110 mmol/L   Carbon Dioxide, Serum: 26 mmol/L   Anion Gap, Serum: 6 mmol/L   Blood Urea Nitrogen, Serum: 21 mg/dL   Creatinine, Serum: 0.6 mg/dL   Glucose, Serum: 208 mg/dL   Calcium, Total Serum: 8.2 mg/dL   eGFR if Non : 96  Complete Blood Count + Automated Diff in AM (12.23.20 @ 04:30)   WBC Count: 9.47 K/uL   RBC Count: 5.12 M/uL   Hemoglobin: 13.0 g/dL   Hematocrit: 42.4 %   Mean Cell Volume: 82.8 fL   Mean Cell Hemoglobin: 25.4 pg   Mean Cell Hemoglobin Conc: 30.7 g/dL   Red Cell Distrib Width: 16.7 %   Platelet Count - Automated: 165 K/uL   Auto Neutrophil #: 7.36 K/uL   Auto Lymphocyte #: 0.59 K/uL   Auto Monocyte #: 0.72 K/uL   Auto Eosinophil #: 0.71 K/uL   Auto Basophil #: 0.03 K/uL   Auto Neutrophil %: 77.8: Differential percentages must be correlated with absolute numbers for   clinical significance. %   Auto Lymphocyte %: 6.2 %   Auto Monocyte %: 7.6 %   Auto Eosinophil %: 7.5 %   Auto Basophil %: 0.3 %   Auto Immature Granulocyte %: 0.6 %   Nucleated RBC: 0 /100 WBCs   Blood Gas Arterial, Lactate: 0.6 mmoL/L   Blood Gas Profile - Arterial (12.23.20 @ 03:47)   pH, Arterial: 7.45   pCO2, Arterial: 42 mmHg   pO2, Arterial: 100 mmHg   HCO3, Arterial: 29 mmoL/L   Base Excess, Arterial: 4.5 mmoL/L   Oxygen Saturation, Arterial: 95 %   FIO2, Arterial: 40   Radiology:  Xray Chest 1 view Impression:  Unchanged retrocardiac opacity.  Stable support devices as above.    MR Head No cont IMPRESSION:  Limited examination demonstrates abnormal signal in the right frontoparietal region responding to abnormality seen on recent CT of 12/19/2020. There is no significant associated mass effect or midline shift. Ventricles and sulci are otherwise unremarkable.  Recommend repeat pre and postcontrast MRI with sedation.  Assessment: Patient is a 79 yo male with PMHx of DM, COPD, HTN who presented with AMS. Latest CTH reported unchanged right frontoparietal hypodensity with areas of cortical extension and internal hyperdensity. Patient was not a candidate for IV thrombolytics for being out of the window and for ICH. Not a candidate for IA intervention because of no LVO on CTA. Evaluated by neurosurgery, no surgical intervention. Exam remains nonfocal. Repeat MRI revealed no mass effect or midline shift and demonstrated abnormal signal in the right frontoparietal region responding to abnormality seen on the recent CT on 12/19. We recommend video EEG.   Plan:  - Minimize sedation - consider switching fentanyl to PRN  - Blood pressure management of 120 - 200 SBP   - Telemetry monitoring  - Video EEG to be done   - TTE  - Keppra 500 BID

## 2020-12-23 NOTE — PROCEDURE NOTE - NSPOSTCAREGUIDE_GEN_A_CORE
Verbal/written post procedure instructions were given to patient/caregiver/Instructed patient/caregiver to follow-up with primary care physician/Instructed patient/caregiver regarding signs and symptoms of infection/Keep the cast/splint/dressing clean and dry
Verbal/written post procedure instructions were given to patient/caregiver/Instructed patient/caregiver to follow-up with primary care physician/Instructed patient/caregiver regarding signs and symptoms of infection/Keep the cast/splint/dressing clean and dry/Care for catheter as per unit/ICU protocols

## 2020-12-23 NOTE — CONSULT NOTE ADULT - ASSESSMENT
81 yo male with PMHx of DM, COPD, HTN admitted to CCU with primary diagnosis of intercranial bleed with AMS.      #Non-sustained VT  - 2 episodes of NSVT on 10/21 (10-20 beats)  - no known hx of MI  - TTE showed EF 60-65% and moderate AS and MS  - recommend start low dose metoprolol 12.5mg BID with holding parameters for HR. titrate up as tolerated  - f/u with cardiology regarding ischemic workup once patient is stable

## 2020-12-24 NOTE — CHART NOTE - NSCHARTNOTEFT_GEN_A_CORE
Registered Dietitian Follow-Up     Patient Profile Reviewed                           Yes [x]   No []     Nutrition History Previously Obtained        Yes []  No [x]       Pertinent Subjective Information: Pt. remains intubated, sedated. Off Propofol. Receiving TF at goal rate. Previously recommended TF regimen has not been implemented. Ve: 10.0, Tmax 37.3, MAP 89.     Pertinent Medical Interventions: Acute resp failure secondary to ICH. Plans for bronchoscopy, weaning trials. ID following.      Diet order: Vital HP @240ml q6h      Anthropometrics:  - Ht. 177.8cm  - Wt. 105.6kg on 12/24 vs. 103.9kg on 12/21 pt. with edema, will continue to monitor wt trends.   - %wt change  - BMI 32.9  - IBW 166lbs      Pertinent Lab Data: (12/24) Hg 13.0, glu 181, BUN 23, Mg 1.6, POCT glu 152       Pertinent Meds: Miralax, Fentanyl, Cyanocobalamin, Simvastatin      Physical Findings:  - Appearance: intubated, 1+ generalized, 3+ L, R arm edema   - GI function: no GI distress noted, abd noted soft/nontender, nondistended per flow sheets, no BM's doc  - Tubes: OGT  - Oral/Mouth cavity: NPO  - Skin: ecchymosis      Nutrition Requirements (from RD note on 12/21)   Weight Used: dosing 103.9kg, ideal weight 75.2kg     Estimated Energy Needs    Continue [x]  Adjust [] ~1415-1693kcal   1448-1654kcal (70-80% PSE 2010) vs. 1654-1880kcal (22-25kcal/kg IBW) vs. 1143-1545kcal (11-14kcal/kg CBW)  Adjusted Energy Recommendations:   kcal/day        Estimated Protein Needs    Continue [x]  Adjust [] 135-150g (1.8-2.0g/kg IBW)  Adjusted Protein Recommendations:   gm/day        Estimated Fluid Needs        Continue [x]  Adjust []  per CCU team  Adjusted Fluid Recommendations:   mL/day     Nutrient Intake: current TF regimen provides 960kcal, 83g protein 777ml free H2O (68% est calorie needs, 61% est protein needs)         [] Previous Nutrition Diagnosis: Inadequate protein energy intake.            [x] Ongoing          [] Resolved         Nutrition Intervention:  enteral and parenteral nutrition, nutrition related medication management     Rec: Provide Peptamen AF @55ml/h with No Carb Prosource TF BID for 1664kcal,  126g protein, 1069ml free H2O (100% est calorie needs, 92% est protein needs). Feed only IF MAP consistently >65. Additional free H2O flush per LIP. Consider bowel regimen.      Goal/Expected Outcome: In 4 days TF to provide >85% est energy needs, but not exceed 105%, at least 1BM in 3 days     Indicator/Monitoring: energy intake, body composition, NFPF, electrolyte profile, glucose profil

## 2020-12-24 NOTE — PROGRESS NOTE ADULT - ATTENDING COMMENTS
History, events, data and scans reviewed, patient examined at bedside. I agree and approved  plan of care as outlined above and summarized below.   #Neuro  Spontaneous ICH in left BG with mass effect s/p crani/evacuation: subsequent development of hematoma expansion, mass effect and obstructive hydro.   - Q1H neuro exams  - Continue csf drainage at 10, monitor ICP with goals of <20 mmHg  - Sodium acetate 200 meq in 1L NS (total of 354 mEq of Na/liter) @ 70 cc/hr  - Na goal 140 - 150  - Q4H BMP and serum osmo  - vEEG  - Continue Keppra 1 g BID  - Titrate propofol based on vEEG, if patient has sz activity increase propofol and consider a second AED      #CV  - Pressors and volume to maintain CPP > 70  - Keep SBP < 150 as long as CPP is close to 70 mmHg  -Monitor QTc to monitor arrythmogenic effect of CNS autonomic dysfunction  - TTE    #Pulm  - HOB 45 degrees  - PCO2 35 - 40, pH WNL  - Plateau < 30      #GI/  - EN per nutrition  - Bowel regimen to reduce straining and increased ICP  - Strict IsOs, maintain euvolemia  - Avoid all hypotonic solutions    #Skin  - Turn and position as per nursing    #Heme  - Hold AC for now  -SCDs    #ID  - Continuous temperature management   - Maintain Normothermia, utilize antipyretics and arctic sun as needed.  - Blood and CSF studies if temperature spike History, events, data and scans reviewed, patient examined at bedside. I agree and approved  plan of care as outlined above and summarized below.

## 2020-12-24 NOTE — PROGRESS NOTE ADULT - SUBJECTIVE AND OBJECTIVE BOX
CR OWEN 80y Male  MRN#: [id number]       SUBJECTIVE  Patient is a 80y old Male who presents with a chief complaint of Intracranial bleed (24 Dec 2020 08:43)    Today is hospital day 5d, and this morning he remains intubated.  On Precedex and Fentanyl. Afebrile. No episodes of NSVT. No acute overnight events.     OBJECTIVE  PAST MEDICAL & SURGICAL HISTORY  COPD (chronic obstructive pulmonary disease)    BPH (benign prostatic hyperplasia)    Back pain    Non-Hodgkin lymphoma    CLL (chronic lymphocytic leukemia)  treatment completed 10/19    Depression    HTN (hypertension)    DM (diabetes mellitus)    Anxiety    Encounter for screening colonoscopy  7 years approx    Port-A-Cath in place    H/O total knee replacement, bilateral      ALLERGIES:  No Known Allergies    MEDICATIONS:  STANDING MEDICATIONS  ampicillin/sulbactam  IVPB      ampicillin/sulbactam  IVPB 1.5 Gram(s) IV Intermittent every 6 hours  chlorhexidine 0.12% Liquid 15 milliLiter(s) Oral Mucosa two times a day  chlorhexidine 4% Liquid 1 Application(s) Topical daily  cyanocobalamin 1000 MICROGram(s) Oral daily  dexMEDEtomidine Infusion 0.2 MICROgram(s)/kG/Hr IV Continuous <Continuous>  fentaNYL   Infusion. 0.5 MICROgram(s)/kG/Hr IV Continuous <Continuous>  folic acid 1 milliGRAM(s) Oral daily  levETIRAcetam  IVPB 500 milliGRAM(s) IV Intermittent every 12 hours  magnesium sulfate  IVPB 2 Gram(s) IV Intermittent once  metoprolol tartrate 12.5 milliGRAM(s) Oral two times a day  midazolam Injectable 2 milliGRAM(s) IV Push once  norepinephrine Infusion 0.05 MICROgram(s)/kG/Min IV Continuous <Continuous>  propofol Infusion 20 MICROgram(s)/kG/Min IV Continuous <Continuous>  simvastatin 20 milliGRAM(s) Oral at bedtime  tamsulosin Oral Tab/Cap - Peds 0.4 milliGRAM(s) Oral at bedtime  traZODone 150 milliGRAM(s) Oral daily    PRN MEDICATIONS  acetaminophen   Tablet .. 650 milliGRAM(s) Oral every 6 hours PRN  fentaNYL    Injectable 25 MICROGram(s) IV Push every 6 hours PRN  polyethylene glycol 3350 17 Gram(s) Oral daily PRN      VITAL SIGNS: Last 24 Hours  T(C): 36.6 (24 Dec 2020 07:00), Max: 37.3 (23 Dec 2020 16:00)  T(F): 97.9 (24 Dec 2020 07:00), Max: 99.1 (23 Dec 2020 16:00)  HR: 58 (24 Dec 2020 09:00) (52 - 80)  BP: 96/54 (24 Dec 2020 09:00) (93/59 - 160/81)  BP(mean): 70 (24 Dec 2020 09:00) (64 - 120)  RR: 20 (24 Dec 2020 09:00) (20 - 26)  SpO2: 97% (24 Dec 2020 09:00) (95% - 99%)    LABS:                        13.0   8.09  )-----------( 153      ( 24 Dec 2020 05:10 )             43.4     12-24    146  |  110  |  23<H>  ----------------------------<  181<H>  4.0   |  28  |  0.8    Ca    8.3<L>      24 Dec 2020 05:10  Mg     1.6     12-24          ABG - ( 24 Dec 2020 06:03 )  pH, Arterial: 7.44  pH, Blood: x     /  pCO2: 43    /  pO2: 72    / HCO3: 29    / Base Excess: 4.3   /  SaO2: 92                    Culture - Blood (collected 22 Dec 2020 12:50)  Source: .Blood None  Preliminary Report (23 Dec 2020 23:01):    No growth to date.    Culture - Sputum (collected 22 Dec 2020 08:46)  Source: .Sputum deep tracheal aspirate  Gram Stain (23 Dec 2020 06:11):    Moderate polymorphonuclear leukocytes per low power field    Rare Squamous epithelial cells per low power field    Few Gram positive cocci in pairs seen per oil power field  Preliminary Report (23 Dec 2020 22:47):    Moderate Gram Positive Cocci in Pairs and Chains          RADIOLOGY:      PHYSICAL EXAM:    GENERAL: intubated  HEENT:  Atraumatic. EOMI. No JVD  PULMONARY: CTAB  CARDIOVASCULAR: RRR  GASTROINTESTINAL: Soft, Nontender, Nondistended  MUSCULOSKELETAL:  2+ Peripheral Pulses, No clubbing, cyanosis, or edema  NEUROLOGY: non-focal; moves all extremities  SKIN: in tact      ADMISSION SUMMARY  Patient is a 80y old Male who presents with a chief complaint of Intracranial bleed (24 Dec 2020 08:43)

## 2020-12-24 NOTE — PROGRESS NOTE ADULT - ASSESSMENT
IMPRESSION:  acute resp failure secondary to ICH       PLAN:    CNS: precedex  fentanyl for now after bronch will stop fentnayl for weaning trial   VEEG     HEENT: oral care     PULMONARY: same vent setting will do bronch to evaluate the left lower lobe and then will try weaning trail     CARDIOVASCULAR: moderate AS   cardiology follow up   replace K and MG     GI: GI prophylaxis.  NG  Feeding ,     RENAL:follow is and os        INFECTIOUS DISEASE:  send dta ,   start Unasyn IV   follow    bld cx   HEMATOLOGICAL:  DVT prophylaxis.    ENDOCRINE:  Follow up FS.  Insulin protocol if needed.    MUSCULOSKELETAL:

## 2020-12-24 NOTE — CHART NOTE - NSCHARTNOTEFT_GEN_A_CORE
DATE OF PROCEDURE: 12/24/2020 11:30am    PREOPERATIVE DIAGNOSIS: left lower lobe pneumonia    POSTOPERATIVE DIAGNOSES: left lower lobe mucous plugging    PROCEDURE PERFORMED:  Bronchoscopy and bronchial wash/BAL    Attending: Dr. Jerome  Fellow: Dr. Aguilar    CONSENT: After explaining the risk and benefit the consent was obtained from Melody (sister) via phone call      The patient had been previously intubated and was on mechanical ventilatory support. He was on sedation, which was continued and adjusted during the procedure. His FiO2 was increased to 100% during the procedure. The  fiberoptic bronchoscope was introduced through an endotracheal tube adaptor and the tip of the endotracheal tube was noted to be slightly below nancy in Right mainstem bronchus. Et tube retracted 2cm above nancy and bronchoscopy resumed.  The Right tracheobronchial tree was inspected closely to the level of the subsegmental bronchi. All bronchi are patent with no endobronchial lesions and no mucosal lesions noted.   The Left tracheobronchial tree was also patent and intact with the mucosa normal   The bronchoscope was then introduced to the left lower lobe and washings/BAL were taken from that area.  The procedure was completed and all samples were submitted for appropriate studies  After adequate clearing of secretions was accomplished, the bronchoscope was removed from the patient and the procedure was ended.   The patient tolerated the procedure well and there were no complications. DATE OF PROCEDURE: 12/24/2020 11:30am    PREOPERATIVE DIAGNOSIS: left lower lobe pneumonia    POSTOPERATIVE DIAGNOSES: left lower lobe mucous plugging    PROCEDURE PERFORMED:  Bronchoscopy and bronchial wash/BAL    Attending: Dr. Jerome  Fellow: Dr. Aguilar    CONSENT: After explaining the risk and benefit the consent was obtained from Melody (sister) via phone call      The patient had been previously intubated and was on mechanical ventilatory support. He was on sedation, which was continued and adjusted during the procedure. His FiO2 was increased to 100% during the procedure. The  fiberoptic bronchoscope was introduced through an endotracheal tube adaptor and the tip of the endotracheal tube was noted on the nancy Et tube retracted 2cm above nancy and bronchoscopy resumed.  The Right tracheobronchial tree was inspected closely to the level of the subsegmental bronchi. All bronchi are patent with no endobronchial lesions and no mucosal lesions noted.   The Left tracheobronchial tree was also patent and intact with the mucosa normal   The bronchoscope was then introduced to the left lower lobe and washings/BAL were taken from that area.  The procedure was completed and all samples were submitted for appropriate studies  After adequate clearing of secretions was accomplished, the bronchoscope was removed from the patient and the procedure was ended.   The patient tolerated the procedure well and there were no complications.

## 2020-12-24 NOTE — PROGRESS NOTE ADULT - SUBJECTIVE AND OBJECTIVE BOX
Patient is a 80y old  Male who presents with a chief complaint of Intracranial bleed (23 Dec 2020 15:53)      INTERVAL HISTORY/overnight events  on sedation fentnayl and precedex       Vital Signs Last 24 Hrs  T(C): 37.1 (24 Dec 2020 04:00), Max: 37.3 (23 Dec 2020 16:00)  T(F): 98.8 (24 Dec 2020 04:00), Max: 99.1 (23 Dec 2020 16:00)  HR: 58 (24 Dec 2020 07:00) (52 - 80)  BP: 126/66 (24 Dec 2020 07:00) (93/59 - 160/81)  BP(mean): 89 (24 Dec 2020 07:00) (64 - 120)  RR: 20 (24 Dec 2020 07:00) (20 - 26)  SpO2: 97% (24 Dec 2020 07:00) (95% - 99%)  Daily     Daily Weight in k.6 (24 Dec 2020 00:00)  I&O's Summary    23 Dec 2020 07:01  -  24 Dec 2020 07:00same vent setting will do br  --------------------------------------------------------  IN: 2184.7 mL / OUT: 700 mL / NET: 1484.7 mL        Physical Examination:    General: No acute distress.  Alert, oriented, interactive, nonfocal    HEENT: Pupils equal, reactive to light.  Symmetric.    PULM: Clear to auscultation bilaterally, no significant sputum production    CVS: Regular rate and rhythm, no murmurs, rubs, or gallops    ABD: Soft, nondistended, nontender, normoactive bowel sounds, no masses    EXT: No edema, nontender    SKIN: Warm and well perfused, no rashes noted.    Neurology: no focal     Musculoskeletal : Move all extremety     ABG - ( 24 Dec 2020 06:03 )  pH, Arterial: 7.44  pH, Blood: x     /  pCO2: 43    /  pO2: 72    / HCO3: 29    / Base Excess: 4.3   /  SaO2: 92                  Lab Results:                        13.0   8.09  )-----------( 153      ( 24 Dec 2020 05:10 )             43.4     24 Dec 2020 05:10    146    |  110    |  23     ----------------------------<  181    4.0     |  28     |  0.8      Ca    8.3        24 Dec 2020 05:10  Mg     1.6       24 Dec 2020 05:10                Microbiology    Culture - Blood (collected 22 Dec 2020 12:50)  Source: .Blood None  Preliminary Report (23 Dec 2020 23:01):    No growth to date.    Culture - Sputum (collected 22 Dec 2020 08:46)  Source: .Sputum deep tracheal aspirate  Gram Stain (23 Dec 2020 06:11):    Moderate polymorphonuclear leukocytes per low power field    Rare Squamous epithelial cells per low power field    Few Gram positive cocci in pairs seen per oil power field  Preliminary Report (23 Dec 2020 22:47):    Moderate Gram Positive Cocci in Pairs and Chains        Medication:  MEDICATIONS  (STANDING):  ampicillin/sulbactam  IVPB      ampicillin/sulbactam  IVPB 1.5 Gram(s) IV Intermittent every 6 hours  chlorhexidine 0.12% Liquid 15 milliLiter(s) Oral Mucosa two times a day  chlorhexidine 4% Liquid 1 Application(s) Topical daily  cyanocobalamin 1000 MICROGram(s) Oral daily  dexMEDEtomidine Infusion 0.2 MICROgram(s)/kG/Hr (5.2 mL/Hr) IV Continuous <Continuous>  fentaNYL   Infusion. 0.5 MICROgram(s)/kG/Hr (5.2 mL/Hr) IV Continuous <Continuous>  folic acid 1 milliGRAM(s) Oral daily  levETIRAcetam  IVPB 500 milliGRAM(s) IV Intermittent every 12 hours  norepinephrine Infusion 0.05 MICROgram(s)/kG/Min (9.74 mL/Hr) IV Continuous <Continuous>  propofol Infusion 20 MICROgram(s)/kG/Min (14.4 mL/Hr) IV Continuous <Continuous>  simvastatin 20 milliGRAM(s) Oral at bedtime  tamsulosin Oral Tab/Cap - Peds 0.4 milliGRAM(s) Oral at bedtime  traZODone 150 milliGRAM(s) Oral daily    MEDICATIONS  (PRN):  acetaminophen   Tablet .. 650 milliGRAM(s) Oral every 6 hours PRN Temp greater or equal to 38C (100.4F)  fentaNYL    Injectable 25 MICROGram(s) IV Push every 6 hours PRN sedation  polyethylene glycol 3350 17 Gram(s) Oral daily PRN Constipation        IMAGING STUDIES:   et tube good       PH :7.44/43/ 72/29

## 2020-12-24 NOTE — PROGRESS NOTE ADULT - SUBJECTIVE AND OBJECTIVE BOX
Interval Hx: No acute events overnight. Pt remains intubated and sedated, sedation increased for bronchoscopy      ROS:  Patient unable to participate in ROS due to neurologic status    Medications:  MEDICATIONS  (STANDING):  ampicillin/sulbactam  IVPB      ampicillin/sulbactam  IVPB 1.5 Gram(s) IV Intermittent every 6 hours  chlorhexidine 0.12% Liquid 15 milliLiter(s) Oral Mucosa two times a day  chlorhexidine 4% Liquid 1 Application(s) Topical daily  cyanocobalamin 1000 MICROGram(s) Oral daily  dexMEDEtomidine Infusion 0.2 MICROgram(s)/kG/Hr (5.2 mL/Hr) IV Continuous <Continuous>  fentaNYL   Infusion. 0.5 MICROgram(s)/kG/Hr (5.2 mL/Hr) IV Continuous <Continuous>  folic acid 1 milliGRAM(s) Oral daily  levETIRAcetam  IVPB 500 milliGRAM(s) IV Intermittent every 12 hours  metoprolol tartrate 12.5 milliGRAM(s) Oral two times a day  norepinephrine Infusion 0.05 MICROgram(s)/kG/Min (9.74 mL/Hr) IV Continuous <Continuous>  propofol Infusion 20 MICROgram(s)/kG/Min (14.4 mL/Hr) IV Continuous <Continuous>  simvastatin 20 milliGRAM(s) Oral at bedtime  tamsulosin Oral Tab/Cap - Peds 0.4 milliGRAM(s) Oral at bedtime  traZODone 150 milliGRAM(s) Oral daily    MEDICATIONS  (PRN):  acetaminophen   Tablet .. 650 milliGRAM(s) Oral every 6 hours PRN Temp greater or equal to 38C (100.4F)  fentaNYL    Injectable 25 MICROGram(s) IV Push every 6 hours PRN sedation  polyethylene glycol 3350 17 Gram(s) Oral daily PRN Constipation      Intake & output:  I&O's Summary    23 Dec 2020 07:01  -  24 Dec 2020 07:00  --------------------------------------------------------  IN: 2184.7 mL / OUT: 800 mL / NET: 1384.7 mL    24 Dec 2020 07:01  -  24 Dec 2020 11:30  --------------------------------------------------------  IN: 65 mL / OUT: 0 mL / NET: 65 mL      Vital signs:  ICU Vital Signs Last 24 Hrs  T(C): 36.6 (24 Dec 2020 07:00), Max: 37.3 (23 Dec 2020 16:00)  T(F): 97.9 (24 Dec 2020 07:00), Max: 99.1 (23 Dec 2020 16:00)  HR: 54 (24 Dec 2020 11:00) (52 - 80)  BP: 113/60 (24 Dec 2020 11:00) (93/59 - 160/81)  BP(mean): 85 (24 Dec 2020 11:00) (64 - 120)  ABP: 170/92 (23 Dec 2020 12:00) (170/92 - 170/92)  ABP(mean): 122 (23 Dec 2020 12:00) (122 - 122)  RR: 19 (24 Dec 2020 11:00) (19 - 26)  SpO2: 100% (24 Dec 2020 11:00) (95% - 100%)    Vent settings:  Mode: AC/ CMV (Assist Control/ Continuous Mandatory Ventilation)  RR (machine): 20  TV (machine): 470  FiO2: 40  PEEP: 5  ITime: 1  MAP: 11  PIP: 20      Physical exam:  Gen: Ill-appearing, intubated and sedated  Mental status: Sedated. Not following commands. Opens eyes to persistent noxious stimuli  Cranial nerves: PERRL, does not track. Cough/gag reflex intact. Corneal reflex intact.   Motor: No spontaneous movement of extremities. Localizing to pain  Sensation: Localizing to pain      Labs:                   13.0   8.09  )-----------( 153      ( 24 Dec 2020 05:10 )             43.4   12-24    146  |  110  |  23<H>  ----------------------------<  181<H>  4.0   |  28  |  0.8    Ca    8.3<L>      24 Dec 2020 05:10  Mg     1.6     12-24    ABG - ( 24 Dec 2020 06:03 )  pH, Arterial: 7.44  pH, Blood: x     /  pCO2: 43    /  pO2: 72    / HCO3: 29    / Base Excess: 4.3   /  SaO2: 92      Blood Gas Arterial, Lactate: 0.7 mmoL/L  (12.24.20 @ 06:03)     Imaging:   EXAM:  CT BRAIN        PROCEDURE DATE:  12/19/2020    IMPRESSION:    No significant change from prior exam.    Unchanged 3.5 cm right frontoparietal hypodensity with areas of cortical extension and internal hyperdensity. Recommend MRI with contrast for further evaluation.        EXAM:  MR BRAIN        PROCEDURE DATE:  12/22/2020    IMPRESSION:    Limited examination demonstrates abnormal signal in the right frontoparietal region responding to abnormality seen on recent CT of 12/19/2020. There is no significant associated mass effect or midline shift. Ventricles and sulci are otherwise unremarkable.    Recommend repeat pre and postcontrast MRI with sedation.        EEG REPORT:    EEG Report:  24-Dec-2020 10:32    VEEG in the last 24 hours: intubated and sedated    Background------------ symmetrical, reactive reaching frequencies in the range of 6-7     Focal and generalized slowing----------- right hemispheric focal slowing. mainly over the FT region                                                   Interictal activity--------------------- right FT or FTC low amplitude spikes at times in runs of periodic discharge    Events---------------------- none    Seizures------------- none    Impression:  abnormal as above    Plan - discussed with the [facility] team      EXAM:  EEG AWAKE AND ASLEEP    PROCEDURE DATE:  12/21/2020    State  Intubated, Sedated  -  Symmetry  -  Organization  Less than optimally organized  -  PDR  Continuous  Background: 4-5 hz  -  Generalized Slowing  Yes  moderate - severe  -  Focal Slowing  Yes  Bilateral synchronous  moderate-severe  -  anterior quadrant  -  Breach Artifact: No  -  -  Activation Procedure  Hyper Ventilation  No  -  Photic Stimulation  No  -  Epileptiform Activity  No  -  Frequency:  -  Side:  -  Type:  -  Area of Activity:  -  Events:  No  -  Impression  Abnormal due to the presence of: focal slowing as above, generalized slowing as above  Clinical Correlation & Recommendations  Consistent with focal electrophysiological dysfunction.  Secondary to structural/metabolic cause.          Assessment:  Patient is a 79 yo male with PMHx of DM, COPD, HTN who presented with AMS. Latest CTH reported unchanged right frontoparietal hypodensity with areas of cortical extension and internal hyperdensity. Patient was not a candidate for IV thrombolytics for being out of the window and for ICH. Not a candidate for IA intervention because of no LVO on CTA. Evaluated by neurosurgery, no surgical intervention. Exam remains nonfocal. Repeat MRI revealed no mass effect or midline shift and demonstrated abnormal signal in the right frontoparietal region responding to abnormality seen on the recent CT on 12/19, however imaging quality poor as patient was moving through study. EEG did not reveal any epileptiform activity.    Recommendations:  Repeat MRI brain w/wo- would recommend obtaining while pt is on increased sedation Interval Hx: No acute events overnight. Pt remains intubated and sedated, sedation increased for bronchoscopy      ROS:  Patient unable to participate in ROS due to neurologic status    Medications:  MEDICATIONS  (STANDING):  ampicillin/sulbactam  IVPB      ampicillin/sulbactam  IVPB 1.5 Gram(s) IV Intermittent every 6 hours  chlorhexidine 0.12% Liquid 15 milliLiter(s) Oral Mucosa two times a day  chlorhexidine 4% Liquid 1 Application(s) Topical daily  cyanocobalamin 1000 MICROGram(s) Oral daily  dexMEDEtomidine Infusion 0.2 MICROgram(s)/kG/Hr (5.2 mL/Hr) IV Continuous <Continuous>  fentaNYL   Infusion. 0.5 MICROgram(s)/kG/Hr (5.2 mL/Hr) IV Continuous <Continuous>  folic acid 1 milliGRAM(s) Oral daily  levETIRAcetam  IVPB 500 milliGRAM(s) IV Intermittent every 12 hours  metoprolol tartrate 12.5 milliGRAM(s) Oral two times a day  norepinephrine Infusion 0.05 MICROgram(s)/kG/Min (9.74 mL/Hr) IV Continuous <Continuous>  propofol Infusion 20 MICROgram(s)/kG/Min (14.4 mL/Hr) IV Continuous <Continuous>  simvastatin 20 milliGRAM(s) Oral at bedtime  tamsulosin Oral Tab/Cap - Peds 0.4 milliGRAM(s) Oral at bedtime  traZODone 150 milliGRAM(s) Oral daily    MEDICATIONS  (PRN):  acetaminophen   Tablet .. 650 milliGRAM(s) Oral every 6 hours PRN Temp greater or equal to 38C (100.4F)  fentaNYL    Injectable 25 MICROGram(s) IV Push every 6 hours PRN sedation  polyethylene glycol 3350 17 Gram(s) Oral daily PRN Constipation      Intake & output:  I&O's Summary    23 Dec 2020 07:01  -  24 Dec 2020 07:00  --------------------------------------------------------  IN: 2184.7 mL / OUT: 800 mL / NET: 1384.7 mL    24 Dec 2020 07:01  -  24 Dec 2020 11:30  --------------------------------------------------------  IN: 65 mL / OUT: 0 mL / NET: 65 mL      Vital signs:  ICU Vital Signs Last 24 Hrs  T(C): 36.6 (24 Dec 2020 07:00), Max: 37.3 (23 Dec 2020 16:00)  T(F): 97.9 (24 Dec 2020 07:00), Max: 99.1 (23 Dec 2020 16:00)  HR: 54 (24 Dec 2020 11:00) (52 - 80)  BP: 113/60 (24 Dec 2020 11:00) (93/59 - 160/81)  BP(mean): 85 (24 Dec 2020 11:00) (64 - 120)  ABP: 170/92 (23 Dec 2020 12:00) (170/92 - 170/92)  ABP(mean): 122 (23 Dec 2020 12:00) (122 - 122)  RR: 19 (24 Dec 2020 11:00) (19 - 26)  SpO2: 100% (24 Dec 2020 11:00) (95% - 100%)    Vent settings:  Mode: AC/ CMV (Assist Control/ Continuous Mandatory Ventilation)  RR (machine): 20  TV (machine): 470  FiO2: 40  PEEP: 5  ITime: 1  MAP: 11  PIP: 20      Physical exam:  Gen: Ill-appearing, intubated and sedated  Mental status: Sedated. Not following commands. Opens eyes to persistent noxious stimuli  Cranial nerves: PERRL, does not track. Cough/gag reflex intact. Corneal reflex intact.   Motor: No spontaneous movement of extremities. Localizing to pain  Sensation: Localizing to pain      Labs:                   13.0   8.09  )-----------( 153      ( 24 Dec 2020 05:10 )             43.4   12-24    146  |  110  |  23<H>  ----------------------------<  181<H>  4.0   |  28  |  0.8    Ca    8.3<L>      24 Dec 2020 05:10  Mg     1.6     12-24    ABG - ( 24 Dec 2020 06:03 )  pH, Arterial: 7.44  pH, Blood: x     /  pCO2: 43    /  pO2: 72    / HCO3: 29    / Base Excess: 4.3   /  SaO2: 92      Blood Gas Arterial, Lactate: 0.7 mmoL/L  (12.24.20 @ 06:03)     Imaging:   EXAM:  CT BRAIN        PROCEDURE DATE:  12/19/2020    IMPRESSION:    No significant change from prior exam.    Unchanged 3.5 cm right frontoparietal hypodensity with areas of cortical extension and internal hyperdensity. Recommend MRI with contrast for further evaluation.        EXAM:  MR BRAIN        PROCEDURE DATE:  12/22/2020    IMPRESSION:    Limited examination demonstrates abnormal signal in the right frontoparietal region responding to abnormality seen on recent CT of 12/19/2020. There is no significant associated mass effect or midline shift. Ventricles and sulci are otherwise unremarkable.    Recommend repeat pre and postcontrast MRI with sedation.        EEG REPORT:    EEG Report:  24-Dec-2020 10:32    VEEG in the last 24 hours: intubated and sedated    Background------------ symmetrical, reactive reaching frequencies in the range of 6-7     Focal and generalized slowing----------- right hemispheric focal slowing. mainly over the FT region                                                   Interictal activity--------------------- right FT or FTC low amplitude spikes at times in runs of periodic discharge    Events---------------------- none    Seizures------------- none    Impression:  abnormal as above    Plan - discussed with the [facility] team      EXAM:  EEG AWAKE AND ASLEEP    PROCEDURE DATE:  12/21/2020    State  Intubated, Sedated  -  Symmetry  -  Organization  Less than optimally organized  -  PDR  Continuous  Background: 4-5 hz  -  Generalized Slowing  Yes  moderate - severe  -  Focal Slowing  Yes  Bilateral synchronous  moderate-severe  -  anterior quadrant  -  Breach Artifact: No  -  -  Activation Procedure  Hyper Ventilation  No  -  Photic Stimulation  No  -  Epileptiform Activity  No  -  Frequency:  -  Side:  -  Type:  -  Area of Activity:  -  Events:  No  -  Impression  Abnormal due to the presence of: focal slowing as above, generalized slowing as above  Clinical Correlation & Recommendations  Consistent with focal electrophysiological dysfunction.  Secondary to structural/metabolic cause.          Assessment:  Patient is a 79 yo male with PMHx of DM, COPD, HTN who presented with AMS. Latest CTH reported unchanged right frontoparietal hypodensity with areas of cortical extension and internal hyperdensity. Patient was not a candidate for IV thrombolytics for being out of the window and for ICH. Not a candidate for IA intervention because of no LVO on CTA. Evaluated by neurosurgery, no surgical intervention. Exam remains nonfocal. Repeat MRI revealed no mass effect or midline shift and demonstrated abnormal signal in the right frontoparietal region responding to abnormality seen on the recent CT on 12/19, however imaging quality poor as patient was moving through study. EEG did not reveal any epileptiform activity.    Recommendations:  Repeat MRI brain w/wo- would recommend obtaining while pt is on increased sedation  Discontinue vEEG  Blood pressure management of 120 - 200 SBP   Telemetry monitoring  Keppra 500 BID

## 2020-12-24 NOTE — PROGRESS NOTE ADULT - ASSESSMENT
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  EEG.  Keppra 500 bid ppx. MRI brain unremarkable. f/u vEEG results.  No intervention per Neurosurgery.  Neurology following.    HEENT: Oral care    PULMONARY:  HOB @ 45 degrees.  Aspiration precautions.  Keep POx > 92%. DTA - gram pos cocci, Blood cx NGTD;  Bronch this AM to evaluate LLL then SBT    CARDIOVASCULAR:  Keep BP < 140/90.  ECHO - 60-65% EF, moderate AS; CE - neg, Cardiology consult - start Metoprolol 12.5 bid    GI: GI prophylaxis.  NG feeds;  Bowel regimen.    RENAL:  Follow up lytes.  Correct as needed; replete Mg, K; f/u BMP    INFECTIOUS DISEASE:  Panculture.  Follow up cultures. Unasyn. COVID negative    HEMATOLOGICAL:  SCDs for DVT prophylaxis.    ENDOCRINE:  Follow up FS.  Insulin protocol if needed.    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  EEG.  Keppra 500 bid ppx. MRI brain unremarkable. vEEG neg; f/u MRI.  No intervention per Neurosurgery.  Neurology following.    HEENT: Oral care    PULMONARY:  HOB @ 45 degrees.  Aspiration precautions.  Keep POx > 92%. DTA - gram pos cocci, Blood cx NGTD;  Bronch this AM to evaluate LLL then SBT    CARDIOVASCULAR:  Keep BP < 140/90.  ECHO - 60-65% EF, moderate AS; CE - neg, Cardiology consult - start Metoprolol 12.5 bid    GI: GI prophylaxis.  NG feeds;  Bowel regimen.    RENAL:  Follow up lytes.  Correct as needed; replete Mg, K; f/u BMP    INFECTIOUS DISEASE:  Panculture.  Follow up cultures. Unasyn. COVID negative    HEMATOLOGICAL:  SCDs for DVT prophylaxis.    ENDOCRINE:  Follow up FS.  Insulin protocol if needed.    MUSCULOSKELETAL: bed rest    DISPO: CCU Monitoring

## 2020-12-24 NOTE — EEG REPORT - NS EEG TEXT BOX
Epilepsy Attending Note:     CR OWEN    80y Male  MRN [id number]    Vital Signs Last 24 Hrs  T(C): 36.6 (24 Dec 2020 07:00), Max: 37.3 (23 Dec 2020 16:00)  T(F): 97.9 (24 Dec 2020 07:00), Max: 99.1 (23 Dec 2020 16:00)  HR: 58 (24 Dec 2020 09:00) (52 - 80)  BP: 96/54 (24 Dec 2020 09:00) (93/59 - 160/81)  BP(mean): 70 (24 Dec 2020 09:00) (64 - 120)  RR: 20 (24 Dec 2020 09:00) (20 - 26)  SpO2: 97% (24 Dec 2020 09:00) (95% - 99%)                          13.0   8.09  )-----------( 153      ( 24 Dec 2020 05:10 )             43.4       12-24    146  |  110  |  23<H>  ----------------------------<  181<H>  4.0   |  28  |  0.8    Ca    8.3<L>      24 Dec 2020 05:10  Mg     1.6     12-24        MEDICATIONS  (STANDING):  ampicillin/sulbactam  IVPB      ampicillin/sulbactam  IVPB 1.5 Gram(s) IV Intermittent every 6 hours  chlorhexidine 0.12% Liquid 15 milliLiter(s) Oral Mucosa two times a day  chlorhexidine 4% Liquid 1 Application(s) Topical daily  cyanocobalamin 1000 MICROGram(s) Oral daily  dexMEDEtomidine Infusion 0.2 MICROgram(s)/kG/Hr (5.2 mL/Hr) IV Continuous <Continuous>  fentaNYL   Infusion. 0.5 MICROgram(s)/kG/Hr (5.2 mL/Hr) IV Continuous <Continuous>  folic acid 1 milliGRAM(s) Oral daily  levETIRAcetam  IVPB 500 milliGRAM(s) IV Intermittent every 12 hours  metoprolol tartrate 12.5 milliGRAM(s) Oral two times a day  norepinephrine Infusion 0.05 MICROgram(s)/kG/Min (9.74 mL/Hr) IV Continuous <Continuous>  propofol Infusion 20 MICROgram(s)/kG/Min (14.4 mL/Hr) IV Continuous <Continuous>  simvastatin 20 milliGRAM(s) Oral at bedtime  tamsulosin Oral Tab/Cap - Peds 0.4 milliGRAM(s) Oral at bedtime  traZODone 150 milliGRAM(s) Oral daily    MEDICATIONS  (PRN):  acetaminophen   Tablet .. 650 milliGRAM(s) Oral every 6 hours PRN Temp greater or equal to 38C (100.4F)  fentaNYL    Injectable 25 MICROGram(s) IV Push every 6 hours PRN sedation  polyethylene glycol 3350 17 Gram(s) Oral daily PRN Constipation            VEEG in the last 24 hours: intubated and sedated    Background------------ symmetrical, reactive reaching frequencies in the range of 6-7     Focal and generalized slowing----------- right hemispheric focal slowing. mainly over the FT region                                                   Interictal activity--------------------- right FT or FTC low amplitude spikes at times in runs of periodic discharge    Events---------------------- none    Seizures------------- none    Impression:  abnormal as above    Plan - discussed with the [facility] team

## 2020-12-25 NOTE — PROGRESS NOTE ADULT - SUBJECTIVE AND OBJECTIVE BOX
CR OWEN 80y Male  MRN#: [id number]       SUBJECTIVE  Patient is a 80y old Male who presents with a chief complaint of Intracranial bleed (25 Dec 2020 08:03)    Today is hospital day 6d, and this morning he remains intubated.  On Fentanyl, Propofol, Levophed. Afebrile. s/p Bronch yesterday of LLL. No acute overnight events.     OBJECTIVE  PAST MEDICAL & SURGICAL HISTORY  COPD (chronic obstructive pulmonary disease)    BPH (benign prostatic hyperplasia)    Back pain    Non-Hodgkin lymphoma    CLL (chronic lymphocytic leukemia)  treatment completed 10/19    Depression    HTN (hypertension)    DM (diabetes mellitus)    Anxiety    Encounter for screening colonoscopy  7 years approx    Port-A-Cath in place    H/O total knee replacement, bilateral      ALLERGIES:  No Known Allergies    MEDICATIONS:  STANDING MEDICATIONS  ampicillin/sulbactam  IVPB      ampicillin/sulbactam  IVPB 1.5 Gram(s) IV Intermittent every 6 hours  chlorhexidine 0.12% Liquid 15 milliLiter(s) Oral Mucosa two times a day  chlorhexidine 4% Liquid 1 Application(s) Topical daily  cyanocobalamin 1000 MICROGram(s) Oral daily  dexMEDEtomidine Infusion 0.2 MICROgram(s)/kG/Hr IV Continuous <Continuous>  fentaNYL   Infusion. 0.5 MICROgram(s)/kG/Hr IV Continuous <Continuous>  folic acid 1 milliGRAM(s) Oral daily  levETIRAcetam  IVPB 500 milliGRAM(s) IV Intermittent every 12 hours  magnesium sulfate  IVPB 2 Gram(s) IV Intermittent once  metoprolol tartrate 12.5 milliGRAM(s) Oral two times a day  norepinephrine Infusion 0.05 MICROgram(s)/kG/Min IV Continuous <Continuous>  potassium chloride  20 mEq/100 mL IVPB 20 milliEquivalent(s) IV Intermittent every 2 hours  propofol Infusion 20 MICROgram(s)/kG/Min IV Continuous <Continuous>  simvastatin 20 milliGRAM(s) Oral at bedtime  tamsulosin Oral Tab/Cap - Peds 0.4 milliGRAM(s) Oral at bedtime  traZODone 150 milliGRAM(s) Oral daily    PRN MEDICATIONS  acetaminophen   Tablet .. 650 milliGRAM(s) Oral every 6 hours PRN  fentaNYL    Injectable 25 MICROGram(s) IV Push every 6 hours PRN  polyethylene glycol 3350 17 Gram(s) Oral daily PRN      VITAL SIGNS: Last 24 Hours  T(C): 37.2 (25 Dec 2020 06:00), Max: 37.4 (24 Dec 2020 12:00)  T(F): 98.9 (25 Dec 2020 06:00), Max: 99.4 (24 Dec 2020 12:00)  HR: 50 (25 Dec 2020 07:00) (50 - 70)  BP: 118/58 (25 Dec 2020 07:00) (60/39 - 174/71)  BP(mean): 79 (25 Dec 2020 07:00) (44 - 108)  RR: 20 (25 Dec 2020 07:00) (18 - 21)  SpO2: 98% (25 Dec 2020 07:00) (93% - 100%)    LABS:                        13.1   11.30 )-----------( 194      ( 25 Dec 2020 04:30 )             43.9     12-25    146  |  111<H>  |  22<H>  ----------------------------<  174<H>  3.6   |  27  |  0.9    Ca    8.2<L>      25 Dec 2020 04:30  Mg     1.7     12-25          ABG - ( 25 Dec 2020 02:33 )  pH, Arterial: 7.40  pH, Blood: x     /  pCO2: 44    /  pO2: 143   / HCO3: 28    / Base Excess: 2.4   /  SaO2: 96                    Culture - Bronchial (collected 24 Dec 2020 11:31)  Source: Lavage None  Gram Stain (25 Dec 2020 02:22):    Numerous polymorphonuclear leukocytes seen per low power field    No Squamous epithelial cells seen per low power field    No organisms seen per oil power field    Culture - Bronchial (collected 24 Dec 2020 11:30)  Source: Bronch Wash None  Gram Stain (25 Dec 2020 02:22):    Moderate polymorphonuclear leukocytes seen per low power field    No Squamous epithelial cells seen per low power field    No organisms seen per oil power field    Culture - Blood (collected 22 Dec 2020 12:50)  Source: .Blood None  Preliminary Report (23 Dec 2020 23:01):    No growth to date.          RADIOLOGY:      PHYSICAL EXAM:    GENERAL: intubated, sedated  HEENT:  Atraumatic. EOMI. No JVD  PULMONARY: CTAB  CARDIOVASCULAR: RRR  GASTROINTESTINAL: Soft, Nontender, Nondistended  MUSCULOSKELETAL:  2+ Peripheral Pulses, No clubbing, cyanosis, or edema  NEUROLOGY: non-focal; moves all extremities  SKIN: in tact      ADMISSION SUMMARY  Patient is a 80y old Male who presents with a chief complaint of Intracranial bleed (25 Dec 2020 08:03)

## 2020-12-25 NOTE — PROGRESS NOTE ADULT - SUBJECTIVE AND OBJECTIVE BOX
Patient is a 80y old  Male who presents with a chief complaint of Intracranial bleed (24 Dec 2020 11:25)      Over Night Events:  Patient seen and examined.   s/p bronch yesterday mucopurulent secretion left side   fentanyl drip propofol   ROS:  See HPI    PHYSICAL EXAM    ICU Vital Signs Last 24 Hrs  T(C): 37.2 (25 Dec 2020 06:00), Max: 37.4 (24 Dec 2020 12:00)  T(F): 98.9 (25 Dec 2020 06:00), Max: 99.4 (24 Dec 2020 12:00)  HR: 52 (25 Dec 2020 06:00) (50 - 70)  BP: 114/62 (25 Dec 2020 06:00) (60/39 - 174/71)  BP(mean): 85 (25 Dec 2020 06:00) (44 - 108)  ABP: --  ABP(mean): --  RR: 20 (25 Dec 2020 06:00) (18 - 21)  SpO2: 98% (25 Dec 2020 06:00) (93% - 100%)      General: on sedation   HEENT:    et tube             Lymph Nodes: NO cervical LN   Lungs: Bilateral BS  Cardiovascular: Regular   Abdomen: Soft, Positive BS  Extremities: No clubbing   Skin: warm   Neurological: no focal deficit   Musculoskeletal: move all ext     I&O's Detail    24 Dec 2020 07:01  -  25 Dec 2020 07:00  --------------------------------------------------------  IN:    Dexmedetomidine: 152 mL    Enteral Tube Flush: 400 mL    FentaNYL: 312 mL    IV PiggyBack: 50 mL    Norepinephrine: 187.8 mL    Propofol: 420 mL    Vital High Protein: 1075 mL  Total IN: 2596.8 mL    OUT:    Voided (mL): 1150 mL  Total OUT: 1150 mL    Total NET: 1446.8 mL          LABS:                          13.1   11.30 )-----------( 194      ( 25 Dec 2020 04:30 )             43.9         25 Dec 2020 04:30    146    |  111    |  22     ----------------------------<  174    3.6     |  27     |  0.9      Ca    8.2        25 Dec 2020 04:30  Mg     1.7       25 Dec 2020 04:30                                                                                                                                                  Culture - Bronchial (collected 24 Dec 2020 11:31)  Source: Lavage None  Gram Stain (25 Dec 2020 02:22):    Numerous polymorphonuclear leukocytes seen per low power field    No Squamous epithelial cells seen per low power field    No organisms seen per oil power field    Culture - Bronchial (collected 24 Dec 2020 11:30)  Source: Bronch Wash None  Gram Stain (25 Dec 2020 02:22):    Moderate polymorphonuclear leukocytes seen per low power field    No Squamous epithelial cells seen per low power field    No organisms seen per oil power field    Culture - Blood (collected 22 Dec 2020 12:50)  Source: .Blood None  Preliminary Report (23 Dec 2020 23:01):    No growth to date.    Culture - Sputum (collected 22 Dec 2020 08:46)  Source: .Sputum deep tracheal aspirate  Gram Stain (23 Dec 2020 06:11):    Moderate polymorphonuclear leukocytes per low power field    Rare Squamous epithelial cells per low power field    Few Gram positive cocci in pairs seen per oil power field  Final Report (24 Dec 2020 17:41):    Moderate Normal Respiratory Ruth present                                                   Mode: AC/ CMV (Assist Control/ Continuous Mandatory Ventilation)  RR (machine): 20  TV (machine): 470  FiO2: 50  PEEP: 7  ITime: 1  MAP: 12  PIP: 25                                      ABG - ( 25 Dec 2020 02:33 )  pH, Arterial: 7.40  pH, Blood: x     /  pCO2: 44    /  pO2: 143   / HCO3: 28    / Base Excess: 2.4   /  SaO2: 96                  MEDICATIONS  (STANDING):  ampicillin/sulbactam  IVPB      ampicillin/sulbactam  IVPB 1.5 Gram(s) IV Intermittent every 6 hours  chlorhexidine 0.12% Liquid 15 milliLiter(s) Oral Mucosa two times a day  chlorhexidine 4% Liquid 1 Application(s) Topical daily  cyanocobalamin 1000 MICROGram(s) Oral daily  dexMEDEtomidine Infusion 0.2 MICROgram(s)/kG/Hr (5.2 mL/Hr) IV Continuous <Continuous>  fentaNYL   Infusion. 0.5 MICROgram(s)/kG/Hr (5.2 mL/Hr) IV Continuous <Continuous>  folic acid 1 milliGRAM(s) Oral daily  levETIRAcetam  IVPB 500 milliGRAM(s) IV Intermittent every 12 hours  metoprolol tartrate 12.5 milliGRAM(s) Oral two times a day  norepinephrine Infusion 0.05 MICROgram(s)/kG/Min (9.74 mL/Hr) IV Continuous <Continuous>  propofol Infusion 20 MICROgram(s)/kG/Min (14.4 mL/Hr) IV Continuous <Continuous>  simvastatin 20 milliGRAM(s) Oral at bedtime  tamsulosin Oral Tab/Cap - Peds 0.4 milliGRAM(s) Oral at bedtime  traZODone 150 milliGRAM(s) Oral daily    MEDICATIONS  (PRN):  acetaminophen   Tablet .. 650 milliGRAM(s) Oral every 6 hours PRN Temp greater or equal to 38C (100.4F)  fentaNYL    Injectable 25 MICROGram(s) IV Push every 6 hours PRN sedation  polyethylene glycol 3350 17 Gram(s) Oral daily PRN Constipation          Xrays:  TLC:  OG:  ET tube:                                                                                    decrease LL opacity    ECHO:  [facility] ICU:

## 2020-12-25 NOTE — PROGRESS NOTE ADULT - ASSESSMENT
IMPRESSION:  acute resp failure secondary to ICH       PLAN:    CNS: resume precedex   fentanyl and propofol try to taper propofol     order MRI as per neurology     HEENT: oral care     PULMONARY: lower fio2 to 40%    CARDIOVASCULAR: moderate AS   cardiology follow up   replace K and MG again     GI: GI prophylaxis.  NG  Feeding ,     RENAL:follow is and os        INFECTIOUS DISEASE:  send dta ,    Unasyn IV if any spike temp switch to MErop   bld cx   HEMATOLOGICAL:  DVT prophylaxis.follow with neurology if ok to resume heparin SQ    ENDOCRINE:  Follow up FS.  Insulin protocol if needed.    MUSCULOSKELETAL:

## 2020-12-25 NOTE — PROGRESS NOTE ADULT - ASSESSMENT
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  Wean Fentanyl then Propofol.  EEG.  Keppra 500 bid ppx. MRI brain unremarkable. vEEG neg; f/u MRI.  No intervention per Neurosurgery.  Neurology following.    HEENT: Oral care    PULMONARY:  HOB @ 45 degrees.  Aspiration precautions.  Keep POx > 92%. DTA - gram pos cocci, Blood cx NGTD;  Bronch sputum cx - no organisms seen; Decrease FiO2 to 40    CARDIOVASCULAR:  Keep -200.  ECHO - 60-65% EF, moderate AS; CE - neg, Cardiology - Metoprolol 12.5 bid    GI: GI prophylaxis.  NG feeds;  Bowel regimen.    RENAL:  Follow up lytes.  Correct as needed; replete Mg, K; f/u BMP    INFECTIOUS DISEASE:  Panculture.  Follow up cultures. C/w Unasyn; if spike fever, may start Meropenem. COVID negative    HEMATOLOGICAL:  SCDs for DVT prophylaxis. f/u with Neuro to start subQ Heparin    ENDOCRINE:  Follow up FS.  Insulin protocol if needed.    MUSCULOSKELETAL: bed rest    DISPO: CCU Monitoring

## 2020-12-26 NOTE — PROGRESS NOTE ADULT - SUBJECTIVE AND OBJECTIVE BOX
Patient is a 80y old  Male who presents with a chief complaint of Intracranial bleed (25 Dec 2020 09:16)      Over Night Events:  Patient seen and examined.   on vent  on levo 0.05 \  on precedex , fentanyl and propofol   agitated when lower     ROS:  See HPI    PHYSICAL EXAM    ICU Vital Signs Last 24 Hrs  T(C): 38.2 (26 Dec 2020 00:00), Max: 38.3 (25 Dec 2020 14:00)  T(F): 100.7 (26 Dec 2020 00:00), Max: 101 (25 Dec 2020 14:00)  HR: 54 (26 Dec 2020 06:00) (50 - 62)  BP: 151/73 (26 Dec 2020 06:00) (94/50 - 161/82)  BP(mean): 104 (26 Dec 2020 06:00) (63 - 124)  ABP: --  ABP(mean): --  RR: 19 (26 Dec 2020 06:00) (18 - 20)  SpO2: 99% (26 Dec 2020 06:00) (93% - 99%)      General: on sedation   HEENT:     et tube            Lymph Nodes: NO cervical LN   Lungs: Bilateral BS  Cardiovascular: Regular   Abdomen: Soft, Positive BS  Extremities: No clubbing   Skin: warm   Neurological: no focal   Musculoskeletal: move all ext     I&O's Detail    25 Dec 2020 07:01  -  26 Dec 2020 07:00  --------------------------------------------------------  IN:    Dexmedetomidine: 616 mL    Enteral Tube Flush: 400 mL    FentaNYL: 510 mL    Norepinephrine: 303 mL    Propofol: 340 mL    Vital High Protein: 929 mL  Total IN: 3098 mL    OUT:    Voided (mL): 1725 mL  Total OUT: 1725 mL    Total NET: 1373 mL      26 Dec 2020 07:01  -  26 Dec 2020 08:25  --------------------------------------------------------  IN:    IV PiggyBack: 100 mL  Total IN: 100 mL    OUT:  Total OUT: 0 mL    Total NET: 100 mL          LABS:                          13.4   11.43 )-----------( 168      ( 26 Dec 2020 04:30 )             44.2         26 Dec 2020 04:30    142    |  109    |  16     ----------------------------<  214    4.2     |  26     |  0.8      Ca    8.0        26 Dec 2020 04:30  Mg     1.6       26 Dec 2020 04:30                                                                                                                                                  Culture - Acid Fast - Bronchial w/Smear (collected 24 Dec 2020 11:31)  Source: BAL None    Culture - Bronchial (collected 24 Dec 2020 11:31)  Source: Lavage None  Gram Stain (25 Dec 2020 02:22):    Numerous polymorphonuclear leukocytes seen per low power field    No Squamous epithelial cells seen per low power field    No organisms seen per oil power field  Preliminary Report (25 Dec 2020 19:48):    No growth to date.    Culture - Acid Fast - Bronchial w/Smear (collected 24 Dec 2020 11:30)  Source: Bronch Wash None    Culture - Bronchial (collected 24 Dec 2020 11:30)  Source: Bronch Wash None  Gram Stain (25 Dec 2020 02:22):    Moderate polymorphonuclear leukocytes seen per low power field    No Squamous epithelial cells seen per low power field    No organisms seen per oil power field  Preliminary Report (25 Dec 2020 19:43):    No growth to date.                                                   Mode: AC/ CMV (Assist Control/ Continuous Mandatory Ventilation)  RR (machine): 20  TV (machine): 470  FiO2: 40  PEEP: 7  ITime: 1  MAP: 14  PIP: 32                                      ABG - ( 26 Dec 2020 02:32 )  pH, Arterial: 7.37  pH, Blood: x     /  pCO2: 49    /  pO2: 59    / HCO3: 28    / Base Excess: 2.3   /  SaO2: 88                  MEDICATIONS  (STANDING):  chlorhexidine 0.12% Liquid 15 milliLiter(s) Oral Mucosa two times a day  chlorhexidine 4% Liquid 1 Application(s) Topical daily  cyanocobalamin 1000 MICROGram(s) Oral daily  dexMEDEtomidine Infusion 0.2 MICROgram(s)/kG/Hr (5.2 mL/Hr) IV Continuous <Continuous>  fentaNYL   Infusion. 0.5 MICROgram(s)/kG/Hr (5.2 mL/Hr) IV Continuous <Continuous>  folic acid 1 milliGRAM(s) Oral daily  heparin   Injectable 5000 Unit(s) SubCutaneous every 12 hours  levETIRAcetam  IVPB 500 milliGRAM(s) IV Intermittent every 12 hours  meropenem  IVPB 500 milliGRAM(s) IV Intermittent every 8 hours  meropenem  IVPB      metoprolol tartrate 12.5 milliGRAM(s) Oral two times a day  norepinephrine Infusion 0.05 MICROgram(s)/kG/Min (9.74 mL/Hr) IV Continuous <Continuous>  propofol Infusion 20 MICROgram(s)/kG/Min (14.4 mL/Hr) IV Continuous <Continuous>  simvastatin 20 milliGRAM(s) Oral at bedtime  tamsulosin Oral Tab/Cap - Peds 0.4 milliGRAM(s) Oral at bedtime  traZODone 150 milliGRAM(s) Oral daily    MEDICATIONS  (PRN):  acetaminophen   Tablet .. 650 milliGRAM(s) Oral every 6 hours PRN Temp greater or equal to 38C (100.4F)  fentaNYL    Injectable 25 MICROGram(s) IV Push every 6 hours PRN sedation  polyethylene glycol 3350 17 Gram(s) Oral daily PRN Constipation          Xrays:  TLC:  OG:  ET tube:                                                                                    mild bibasilar opacity    ECHO:  [facility] ICU:

## 2020-12-26 NOTE — PROGRESS NOTE ADULT - SUBJECTIVE AND OBJECTIVE BOX
Neurocritical Care Progress Note    ZACH OWEN    80y     [id number]   Daily Weight in k (26 Dec 2020 05:00)  SARS-CoV-2: NotDetec (19 Dec 2020 08:30)    Patient is a 80y old  Male who presents with a chief complaint of Intracranial bleed (26 Dec 2020 08:24)    HPI:  81 yo male with PMHx of DM, COPD, HTN c/o AMS since this morning. Patient lives by himself, son lives next door. Last known well at 10 PM at baseline per son, at 6 AM today, patient was not himself, confused, not talking. Stroke code called en route, NIHSS 11 for aphasia but grossly no focal weakness. CTH reported as having a focus of vasogenic edema in the right temporoparietal white matter with internal hyperdensity and effacement of the adjacent cortical sulci. Trace right temporoparietal subarachnoid hemorrhage is also seen. Not a candidate for IV thrombolytics for being out of the window and for ICH. Not a candidate for IA intervention because of no LVO on CTA. (19 Dec 2020 11:02)    Allergies:  No Known Allergies    PAST MEDICAL & SURGICAL HISTORY:  COPD (chronic obstructive pulmonary disease)    BPH (benign prostatic hyperplasia)    Back pain    Non-Hodgkin lymphoma    CLL (chronic lymphocytic leukemia)  treatment completed 10/19    Depression    HTN (hypertension)    DM (diabetes mellitus)    Anxiety    Encounter for screening colonoscopy  7 years approx    Port-A-Cath in place    H/O total knee replacement, bilateral    Home Medications:  budesonide-formoterol 160 mcg-4.5 mcg/inh inhalation aerosol: 2 puff(s) inhaled 2 times a day (2020 10:24)  buPROPion 150 mg/12 hours (SR) oral tablet, extended release: 1 tab(s) orally 2 times a day (2020 10:24)  Effexor  mg oral capsule, extended release: 1 cap(s) orally once a day (2020 10:24)  folic acid 1 mg oral tablet: 1 tab(s) orally once a day (2020 10:24)  metFORMIN 500 mg oral tablet: 1 tab(s) orally once a day (at bedtime) (2020 10:24)  nicotine 14 mg/24 hr transdermal film, extended release: 1 patch transdermal once a day. DO NOT smoke while using patches. (11 Mar 2020 14:05)  simvastatin 20 mg oral tablet: 1 tab(s) orally once a day (at bedtime) (2020 10:24)  tamsulosin 0.4 mg oral capsule: 1 cap(s) orally once a day (at bedtime) (13 Mar 2020 11:21)  traZODone 150 mg oral tablet: 1 tab(s) orally once a day (at bedtime) (2020 10:24)  Vitamin B12 500 mcg oral tablet: 1 tab(s) orally once a day (2020 10:24)  Vitamin D3 1000 intl units (25 mcg) oral tablet: 1 tab(s) orally once a day (13 Mar 2020 11:40)    24 Hour Events:  --> Requested f/u my CCU team in regards to starting DVT ppx: OK for heparin SQ. MRI completed: Right frontoparietal hemorrhagic enhancing lesion with central necrosis which may represent a primary brain neoplasm versus metastatic lesion.    Exam:  Mentation: On precedex 1.2mcg/kg and fentanyl gtt 2.5mcg/kg. No spontaneous eye opening, not following commands. Language NADIA due to presence of ET tube  Cranial Nerves:  	II – Blink to threat minimal, but present  	III/IV/VI – Pinpoint pupils, but reactive. No ptosis, skew, dysconjugate or gaze preference noted  	V – Corneals present b/l  	VII – Difficult to assess facial palsy due to ET securement device  	VIII – No nystagmus. Absent oculocephalic  	IX/X – Cough to ET suction present (NADIA if palate rises symetrically or if uvula rises midline)  	XI – Deferred (NADIA SCM strength/symmetry)  	XII – Deferred (NADIA if tongue protrusion is at midline)  Motor: No posturing witnessed. No spontaneous movement  Sensory: Withdrawal to pain right LE only w/ ambiguous purposeful movement of left LE to pain. Grimaces to painful stimuli    Reflexes: Babinski downgoing b/l  Cerebellum: NADIA dysmetria. Gait deferred    Current Medications:  acetaminophen   Tablet .. 650 milliGRAM(s) Oral every 6 hours PRN  chlorhexidine 0.12% Liquid 15 milliLiter(s) Oral Mucosa two times a day  chlorhexidine 4% Liquid 1 Application(s) Topical daily  cyanocobalamin 1000 MICROGram(s) Oral daily  dexMEDEtomidine Infusion 0.2 MICROgram(s)/kG/Hr IV Continuous <Continuous>  fentaNYL    Injectable 25 MICROGram(s) IV Push every 6 hours PRN  fentaNYL   Infusion. 0.5 MICROgram(s)/kG/Hr IV Continuous <Continuous>  folic acid 1 milliGRAM(s) Oral daily  heparin   Injectable 5000 Unit(s) SubCutaneous every 8 hours  levETIRAcetam  IVPB 500 milliGRAM(s) IV Intermittent every 12 hours  meropenem  IVPB 500 milliGRAM(s) IV Intermittent every 8 hours  meropenem  IVPB      metoprolol tartrate 12.5 milliGRAM(s) Oral two times a day  norepinephrine Infusion 0.05 MICROgram(s)/kG/Min IV Continuous <Continuous>  polyethylene glycol 3350 17 Gram(s) Oral daily PRN  propofol Infusion 23.099 MICROgram(s)/kG/Min IV Continuous <Continuous>  simvastatin 20 milliGRAM(s) Oral at bedtime  tamsulosin Oral Tab/Cap - Peds 0.4 milliGRAM(s) Oral at bedtime  traZODone 150 milliGRAM(s) Oral daily    mRS:  0 No symptoms at all  1 No significant disability despite symptoms; able to carry out all usual duties and activities without assistance  2 Slight disability; unable to carry out all previous activities, but able to look after own affairs  3 Moderate disability; requiring some help, but able to walk without assistance  4 Moderately severe disability; unable to walk without assistance and unable to attend to own bodily needs without assistance  5 Severe disability; bedridden, incontinent and requiring constant nursing care and attention  6 Dead    Vital Signs Last 24 Hrs  T(C): 38.2 (26 Dec 2020 16:00), Max: 38.2 (26 Dec 2020 00:00)  T(F): 100.8 (26 Dec 2020 16:00), Max: 100.8 (26 Dec 2020 16:00)  HR: 54 (26 Dec 2020 16:43) (52 - 58)  BP: 109/54 (26 Dec 2020 16:00) (103/51 - 151/73)  BP(mean): 76 (26 Dec 2020 16:00) (63 - 107)  RR: 20 (26 Dec 2020 16:00) (18 - 20)  SpO2: 99% (26 Dec 2020 16:43) (95% - 100%)     I/Os:  I&O's Detail    25 Dec 2020 07:01  -  26 Dec 2020 07:00  --------------------------------------------------------  IN:    Dexmedetomidine: 616 mL    Enteral Tube Flush: 400 mL    FentaNYL: 510 mL    Norepinephrine: 303 mL    Propofol: 340 mL    Vital High Protein: 929 mL  Total IN: 3098 mL    OUT:    Voided (mL): 1725 mL  Total OUT: 1725 mL    Total NET: 1373 mL      26 Dec 2020 07:01  -  26 Dec 2020 18:15  --------------------------------------------------------  IN:    Dexmedetomidine: 104 mL    Dexmedetomidine: 194 mL    Enteral Tube Flush: 40 mL    FentaNYL: 80 mL    FentaNYL: 152 mL    IV PiggyBack: 250 mL    Norepinephrine: 87 mL    Propofol: 40 mL    Propofol: 13 mL    Propofol: 48 mL  Total IN: 1008 mL    OUT:    Voided (mL): 1075 mL  Total OUT: 1075 mL    Total NET: -67 mL    Labs:  ABG - ( 26 Dec 2020 02:32 )  pH, Arterial: 7.37  pH, Blood: x     /  pCO2: 49    /  pO2: 59    / HCO3: 28    / Base Excess: 2.3   /  SaO2: 88        Mode: AC/ CMV (Assist Control/ Continuous Mandatory Ventilation)  RR (machine): 20  TV (machine): 470  FiO2: 50  PEEP: 7  ITime: 1  MAP: 13  PIP: 26                        13.4   11.43 )-----------( 168      ( 26 Dec 2020 04:30 )             44.2      12    142  |  109  |  16  ----------------------------<  214<H>  4.2   |  26  |  0.8    Ca    8.0<L>      26 Dec 2020 04:30  Mg     1.6         Diagnostics/ Results:  Last CTH: < from: CT Head No Cont (.19.20 @ 18:39) >  IMPRESSION:    No significant change from prior exam.    Unchanged 3.5 cm right frontoparietal hypodensitywith areas of cortical extension and internal hyperdensity. Recommend MRI with contrast for further evaluation.    < end of copied text >    Last CTA/MRA: n/a    Last CTP: < from: CT Perfusion w/ Maps w/ IV Cont (20 @ 08:37) >  IMPRESSION:    1.  No evidence of major vascular stenosis or occlusion. Normal perfusion images.    2.  4.7 cm right frontal/parietal lesion; CTA appearance favors neoplasm. Prominent surrounding the traversing vasculature noted. Recommend further evaluation with a contrast-enhanced brain MRI.    < end of copied text >    Last MRI: < from: MR Head w/wo IV Cont (20 @ 15:04) >  IMPRESSION:    Right frontoparietal hemorrhagic enhancing lesion with central necrosis which may represent a primary brain neoplasm versus metastatic lesion.    Mild chronic microvascular-type changes.    < end of copied text >    Last TCD: n/a    Last EEG: VEEG in the last 24 hours: intubated and sedated    Background------------ symmetrical, reactive reaching frequencies in the range of 6-7     Focal and generalized slowing----------- right hemispheric focal slowing. mainly over the FT region                                                   Interictal activity--------------------- right FT or FTC low amplitude spikes at times in runs of periodic discharge    Events---------------------- none    Seizures------------- none    Impression:  abnormal as above    Last Echo: < from: TTE Echo Complete w/o Contrast w/ Doppler (20 @ 10:16) >  Summary:   1. Left ventricular ejection fraction, by visual estimation, is 60 to 65%.   2. Normal global left ventricular systolic function.   3. Peak transaortic gradient equals 56.6 mmHg, mean transaortic gradient equals 19.0 mmHg, the calculated aortic valve area equals 1.42 cm² by the continuity equation consistent with moderate aortic stenosis.   4. Mild aortic regurgitation.   5. Thickening and calcification of the anterior and posterior mitral leaflets.   6. Moderate mitralannular calcification.    PHYSICIAN INTERPRETATION:  Left Ventricle: The left ventricular internal cavity size is normal. Left ventricular wall thickness is normal. Global LV systolic function was normal. Left ventricular ejection fraction, by visualestimation, is 60 to 65%. The left ventricular diastolic function could not be assessed in this study.  Right Ventricle: RV systolic function is normal.  Left Atrium: Normal left atrial size.  Right Atrium: Normal right atrial size.  Mitral Valve: Thickening and calcification of the anterior and posterior mitral valve leaflets. There is moderate mitral annular calcification. No mitral regurgitation.  Tricuspid Valve: The tricuspid valve is normal in structure. No tricuspid regurgitation is visualized.  Aortic Valve: Sclerotic aortic valve with decreased opening. Peak transaortic gradient equals 56.6 mmHg, mean transaortic gradient equals 19.0 mmHg, the calculated aortic valve area equals 1.42 cm² by the continuity equation consistent with moderate aortic stenosis. Mild aortic regurgitation.  Pulmonic Valve: The pulmonic valve was not well visualized.  Aorta: The ascending aorta was not well visualized.  Pulmonary Artery: The pulmonary artery is not well visualized.  Venous: The inferior vena cava was dilated, with respiratory size variation less than 50%.    < end of copied text >    Last EKG: < from: 12 Lead ECG (20 @ 06:26) >  Diagnosis Line Sinus rhythm with frequent Premature ventricular complexes  Left anterior fascicular block  Abnormal ECG    < end of copied text >    Chest Xray: < from: Xray Chest 1 View- PORTABLE-Routine (Xray Chest 1 View- PORTABLE-Routine in AM.) (20 @ 05:45) >  Impression:  1.  Stable support lines and tubes, as described.  2.  Stable left basilar pulmonary opacity/pleural effusion    < end of copied text >    ROS:  [X] A ten-point ROS was otherwise negative except as noted in HPI    Assessment: Patient is a 81 yo male with PMHx of DM, COPD, HTN who presented with AMS. Latest CTH reported unchanged right frontoparietal hypodensity with areas of cortical extension and internal hyperdensity. Patient was not a candidate for IV thrombolytics for being out of the window and for ICH. Not a candidate for IA intervention because of no LVO on CTA. Evaluated by neurosurgery, no surgical intervention. MRI completed: Right frontoparietal hemorrhagic enhancing lesion with central necrosis which may represent a primary brain neoplasm versus metastatic lesion. vEEG showed interictal activity right FT or FTC low amplitude spikes at times in runs of periodic discharge.     Plan:  Neurology:  -Precedex for sedation as needed for agitation or vent synchrony   -vEEG--> potential for seizures; Keppra 500mg BID  -MRI w/w/o --> Right frontoparietal hemorrhagic enhancing lesion with central necrosis which may represent a primary brain neoplasm versus metastatic lesion    Respiratory:  -Ventilator management as per respiratory  -Keep HOB > 35; asp precautions     Cardiology:  -Keep -200; titrate levophed PRN    GI/Nutrition:  -Diet, NPO with Tube Feed  -Bowel Regimen ->n/a  -PPx -> Consider starting Pantoprazole     / Renal/ Fluids/ Electrolytes:  -Strict IsOs, maintain euvolemia    Hematology/ Oncology:  -DVT PPx -> Heparin SQ    Lovenox SQ    Haparin gtt    Infectious Disease:  -ABX as per ID; PNA; meropenum   -Continuous temperature management   -Maintain Normothermia, utilize antipyretics and arctic sun as needed.  -Blood and CSF studies if temperature spike .     Musculoskeletol:  -Bedrest    Endocrine:  -Zocor    Tubes/ Lines/ Drains:  Central    Peripheral    A-line    Koenig    NGT/OGT    Chest    EVD    Disposition:  Continue medical management as per primary team in:   ICU   CCU   SICU   Stroke Unit   Stepdown    CODE STATUS w/ Next of Kin: Zach (son) [phone]  DNI   DNR   FULL    Patient seen and case discussed with NCC attending; see attending attestation  Please call w/ questions  Mattie Gilbert NP  [id number]             Neurocritical Care Progress Note    ZACH OWEN    80y     [id number]   Daily Weight in k (26 Dec 2020 05:00)  SARS-CoV-2: NotDetec (19 Dec 2020 08:30)    Patient is a 80y old  Male who presents with a chief complaint of Intracranial bleed (26 Dec 2020 08:24)    HPI:  79 yo male with PMHx of DM, COPD, HTN c/o AMS since this morning. Patient lives by himself, son lives next door. Last known well at 10 PM at baseline per son, at 6 AM today, patient was not himself, confused, not talking. Stroke code called en route, NIHSS 11 for aphasia but grossly no focal weakness. CTH reported as having a focus of vasogenic edema in the right temporoparietal white matter with internal hyperdensity and effacement of the adjacent cortical sulci. Trace right temporoparietal subarachnoid hemorrhage is also seen. Not a candidate for IV thrombolytics for being out of the window and for ICH. Not a candidate for IA intervention because of no LVO on CTA. (19 Dec 2020 11:02)    Allergies:  No Known Allergies    PAST MEDICAL & SURGICAL HISTORY:  COPD (chronic obstructive pulmonary disease)    BPH (benign prostatic hyperplasia)    Back pain    Non-Hodgkin lymphoma    CLL (chronic lymphocytic leukemia)  treatment completed 10/19    Depression    HTN (hypertension)    DM (diabetes mellitus)    Anxiety    Encounter for screening colonoscopy  7 years approx    Port-A-Cath in place    H/O total knee replacement, bilateral    Home Medications:  budesonide-formoterol 160 mcg-4.5 mcg/inh inhalation aerosol: 2 puff(s) inhaled 2 times a day (2020 10:24)  buPROPion 150 mg/12 hours (SR) oral tablet, extended release: 1 tab(s) orally 2 times a day (2020 10:24)  Effexor  mg oral capsule, extended release: 1 cap(s) orally once a day (2020 10:24)  folic acid 1 mg oral tablet: 1 tab(s) orally once a day (2020 10:24)  metFORMIN 500 mg oral tablet: 1 tab(s) orally once a day (at bedtime) (2020 10:24)  nicotine 14 mg/24 hr transdermal film, extended release: 1 patch transdermal once a day. DO NOT smoke while using patches. (11 Mar 2020 14:05)  simvastatin 20 mg oral tablet: 1 tab(s) orally once a day (at bedtime) (2020 10:24)  tamsulosin 0.4 mg oral capsule: 1 cap(s) orally once a day (at bedtime) (13 Mar 2020 11:21)  traZODone 150 mg oral tablet: 1 tab(s) orally once a day (at bedtime) (2020 10:24)  Vitamin B12 500 mcg oral tablet: 1 tab(s) orally once a day (2020 10:24)  Vitamin D3 1000 intl units (25 mcg) oral tablet: 1 tab(s) orally once a day (13 Mar 2020 11:40)    24 Hour Events:  --> Requested f/u my CCU team in regards to starting DVT ppx: OK for heparin SQ. MRI completed: Right frontoparietal hemorrhagic enhancing lesion with central necrosis which may represent a primary brain neoplasm versus metastatic lesion.    Exam:  Mentation: On precedex 1.2mcg/kg and fentanyl gtt 2.5mcg/kg. No spontaneous eye opening, not following commands. Language NADIA due to presence of ET tube  Cranial Nerves:  	II – Blink to threat minimal, but present  	III/IV/VI – Pinpoint pupils, but reactive. No ptosis, skew, dysconjugate or gaze preference noted  	V – Corneals present b/l  	VII – Difficult to assess facial palsy due to ET securement device  	VIII – No nystagmus. Absent oculocephalic  	IX/X – Cough to ET suction present (NADIA if palate rises symetrically or if uvula rises midline)  	XI – Deferred (NADIA SCM strength/symmetry)  	XII – Deferred (NADIA if tongue protrusion is at midline)  Motor: No posturing witnessed. No spontaneous movement  Sensory: Withdrawal to pain right LE only w/ ambiguous purposeful movement of left LE to pain. Grimaces to painful stimuli    Reflexes: Babinski downgoing b/l  Cerebellum: NADIA dysmetria. Gait deferred    Current Medications:  acetaminophen   Tablet .. 650 milliGRAM(s) Oral every 6 hours PRN  chlorhexidine 0.12% Liquid 15 milliLiter(s) Oral Mucosa two times a day  chlorhexidine 4% Liquid 1 Application(s) Topical daily  cyanocobalamin 1000 MICROGram(s) Oral daily  dexMEDEtomidine Infusion 0.2 MICROgram(s)/kG/Hr IV Continuous <Continuous>  fentaNYL    Injectable 25 MICROGram(s) IV Push every 6 hours PRN  fentaNYL   Infusion. 0.5 MICROgram(s)/kG/Hr IV Continuous <Continuous>  folic acid 1 milliGRAM(s) Oral daily  heparin   Injectable 5000 Unit(s) SubCutaneous every 8 hours  levETIRAcetam  IVPB 500 milliGRAM(s) IV Intermittent every 12 hours  meropenem  IVPB 500 milliGRAM(s) IV Intermittent every 8 hours  meropenem  IVPB      metoprolol tartrate 12.5 milliGRAM(s) Oral two times a day  norepinephrine Infusion 0.05 MICROgram(s)/kG/Min IV Continuous <Continuous>  polyethylene glycol 3350 17 Gram(s) Oral daily PRN  propofol Infusion 23.099 MICROgram(s)/kG/Min IV Continuous <Continuous>  simvastatin 20 milliGRAM(s) Oral at bedtime  tamsulosin Oral Tab/Cap - Peds 0.4 milliGRAM(s) Oral at bedtime  traZODone 150 milliGRAM(s) Oral daily    mRS:  0 No symptoms at all  1 No significant disability despite symptoms; able to carry out all usual duties and activities without assistance  2 Slight disability; unable to carry out all previous activities, but able to look after own affairs  3 Moderate disability; requiring some help, but able to walk without assistance  4 Moderately severe disability; unable to walk without assistance and unable to attend to own bodily needs without assistance  5 Severe disability; bedridden, incontinent and requiring constant nursing care and attention  6 Dead    Vital Signs Last 24 Hrs  T(C): 38.2 (26 Dec 2020 16:00), Max: 38.2 (26 Dec 2020 00:00)  T(F): 100.8 (26 Dec 2020 16:00), Max: 100.8 (26 Dec 2020 16:00)  HR: 54 (26 Dec 2020 16:43) (52 - 58)  BP: 109/54 (26 Dec 2020 16:00) (103/51 - 151/73)  BP(mean): 76 (26 Dec 2020 16:00) (63 - 107)  RR: 20 (26 Dec 2020 16:00) (18 - 20)  SpO2: 99% (26 Dec 2020 16:43) (95% - 100%)     I/Os:  I&O's Detail    25 Dec 2020 07:01  -  26 Dec 2020 07:00  --------------------------------------------------------  IN:    Dexmedetomidine: 616 mL    Enteral Tube Flush: 400 mL    FentaNYL: 510 mL    Norepinephrine: 303 mL    Propofol: 340 mL    Vital High Protein: 929 mL  Total IN: 3098 mL    OUT:    Voided (mL): 1725 mL  Total OUT: 1725 mL    Total NET: 1373 mL      26 Dec 2020 07:01  -  26 Dec 2020 18:15  --------------------------------------------------------  IN:    Dexmedetomidine: 104 mL    Dexmedetomidine: 194 mL    Enteral Tube Flush: 40 mL    FentaNYL: 80 mL    FentaNYL: 152 mL    IV PiggyBack: 250 mL    Norepinephrine: 87 mL    Propofol: 40 mL    Propofol: 13 mL    Propofol: 48 mL  Total IN: 1008 mL    OUT:    Voided (mL): 1075 mL  Total OUT: 1075 mL    Total NET: -67 mL    Labs:  ABG - ( 26 Dec 2020 02:32 )  pH, Arterial: 7.37  pH, Blood: x     /  pCO2: 49    /  pO2: 59    / HCO3: 28    / Base Excess: 2.3   /  SaO2: 88        Mode: AC/ CMV (Assist Control/ Continuous Mandatory Ventilation)  RR (machine): 20  TV (machine): 470  FiO2: 50  PEEP: 7  ITime: 1  MAP: 13  PIP: 26                        13.4   11.43 )-----------( 168      ( 26 Dec 2020 04:30 )             44.2      12    142  |  109  |  16  ----------------------------<  214<H>  4.2   |  26  |  0.8    Ca    8.0<L>      26 Dec 2020 04:30  Mg     1.6         Diagnostics/ Results:  Last CTH: < from: CT Head No Cont (.19.20 @ 18:39) >  IMPRESSION:    No significant change from prior exam.    Unchanged 3.5 cm right frontoparietal hypodensitywith areas of cortical extension and internal hyperdensity. Recommend MRI with contrast for further evaluation.    < end of copied text >    Last CTA/MRA: n/a    Last CTP: < from: CT Perfusion w/ Maps w/ IV Cont (20 @ 08:37) >  IMPRESSION:    1.  No evidence of major vascular stenosis or occlusion. Normal perfusion images.    2.  4.7 cm right frontal/parietal lesion; CTA appearance favors neoplasm. Prominent surrounding the traversing vasculature noted. Recommend further evaluation with a contrast-enhanced brain MRI.    < end of copied text >    Last MRI: < from: MR Head w/wo IV Cont (20 @ 15:04) >  IMPRESSION:    Right frontoparietal hemorrhagic enhancing lesion with central necrosis which may represent a primary brain neoplasm versus metastatic lesion.    Mild chronic microvascular-type changes.    < end of copied text >    Last TCD: n/a    Last EEG: VEEG in the last 24 hours: intubated and sedated    Background------------ symmetrical, reactive reaching frequencies in the range of 6-7     Focal and generalized slowing----------- right hemispheric focal slowing. mainly over the FT region                                                   Interictal activity--------------------- right FT or FTC low amplitude spikes at times in runs of periodic discharge    Events---------------------- none    Seizures------------- none    Impression:  abnormal as above    Last Echo: < from: TTE Echo Complete w/o Contrast w/ Doppler (20 @ 10:16) >  Summary:   1. Left ventricular ejection fraction, by visual estimation, is 60 to 65%.   2. Normal global left ventricular systolic function.   3. Peak transaortic gradient equals 56.6 mmHg, mean transaortic gradient equals 19.0 mmHg, the calculated aortic valve area equals 1.42 cm² by the continuity equation consistent with moderate aortic stenosis.   4. Mild aortic regurgitation.   5. Thickening and calcification of the anterior and posterior mitral leaflets.   6. Moderate mitralannular calcification.    PHYSICIAN INTERPRETATION:  Left Ventricle: The left ventricular internal cavity size is normal. Left ventricular wall thickness is normal. Global LV systolic function was normal. Left ventricular ejection fraction, by visualestimation, is 60 to 65%. The left ventricular diastolic function could not be assessed in this study.  Right Ventricle: RV systolic function is normal.  Left Atrium: Normal left atrial size.  Right Atrium: Normal right atrial size.  Mitral Valve: Thickening and calcification of the anterior and posterior mitral valve leaflets. There is moderate mitral annular calcification. No mitral regurgitation.  Tricuspid Valve: The tricuspid valve is normal in structure. No tricuspid regurgitation is visualized.  Aortic Valve: Sclerotic aortic valve with decreased opening. Peak transaortic gradient equals 56.6 mmHg, mean transaortic gradient equals 19.0 mmHg, the calculated aortic valve area equals 1.42 cm² by the continuity equation consistent with moderate aortic stenosis. Mild aortic regurgitation.  Pulmonic Valve: The pulmonic valve was not well visualized.  Aorta: The ascending aorta was not well visualized.  Pulmonary Artery: The pulmonary artery is not well visualized.  Venous: The inferior vena cava was dilated, with respiratory size variation less than 50%.    < end of copied text >    Last EKG: < from: 12 Lead ECG (20 @ 06:26) >  Diagnosis Line Sinus rhythm with frequent Premature ventricular complexes  Left anterior fascicular block  Abnormal ECG    < end of copied text >    Chest Xray: < from: Xray Chest 1 View- PORTABLE-Routine (Xray Chest 1 View- PORTABLE-Routine in AM.) (20 @ 05:45) >  Impression:  1.  Stable support lines and tubes, as described.  2.  Stable left basilar pulmonary opacity/pleural effusion    < end of copied text >    ROS:  [X] A ten-point ROS was otherwise negative except as noted in HPI    Assessment: Patient is a 79 yo male with PMHx of DM, COPD, HTN who presented with AMS. Latest CTH reported unchanged right frontoparietal hypodensity with areas of cortical extension and internal hyperdensity. Patient was not a candidate for IV thrombolytics for being out of the window and for ICH. Not a candidate for IA intervention because of no LVO on CTA. Evaluated by neurosurgery, no surgical intervention. MRI completed: Right frontoparietal hemorrhagic enhancing lesion with central necrosis which may represent a primary brain neoplasm versus metastatic lesion. vEEG showed interictal activity right FT or FTC low amplitude spikes at times in runs of periodic discharge.     Plan:  Neurology:  -Precedex for sedation as needed for agitation or vent synchrony   -vEEG--> potential for seizures; Keppra 500mg BID  -MRI w/w/o --> Right frontoparietal hemorrhagic enhancing lesion with central necrosis which may represent a primary brain neoplasm versus metastatic lesion    Respiratory:  -Ventilator management as per respiratory  -Keep HOB > 35; asp precautions     Cardiology:  -Keep -200; titrate levophed PRN    GI/Nutrition:  -Diet, NPO with Tube Feed  -Bowel Regimen ->n/a  -PPx -> Consider starting Pantoprazole     / Renal/ Fluids/ Electrolytes:  -Strict IsOs, maintain euvolemia    Hematology/ Oncology:  -DVT PPx -> Heparin SQ    Lovenox SQ    Haparin gtt    Infectious Disease:  -ABX as per ID; PNA; meropenum   -Continuous temperature management   -Maintain Normothermia, utilize antipyretics and arctic sun as needed.  -Blood and CSF studies if temperature spike .     Musculoskeletol:  -Bedrest    Endocrine:  -Zocor    Tubes/ Lines/ Drains:  Central    Peripheral    A-line    Koenig    NGT/OGT    Chest    EVD    Disposition:  Continue medical management as per primary team in:   ICU   CCU   SICU   Stroke Unit   Stepdown    CODE STATUS w/ Next of Kin: Zach (son) [phone]  DNI   DNR   FULL    Patient seen and case discussed with Neurology attending; see attending attestation  Please call w/ questions  Mattie Gilbert NP  [id number]

## 2020-12-26 NOTE — PROGRESS NOTE ADULT - ASSESSMENT
IMPRESSION:  acute resp failure secondary to ICH       PLAN:    CNS: resume precedex   fentanyl and propofol try to taper propofol     MRI     HEENT: oral care     PULMONARY: increase fio2 to 50 and peep 8     CARDIOVASCULAR: moderate AS   cardiology follow up   replace K and MG again     GI: GI prophylaxis.  NG  Feeding ,     RENAL:follow is and os        INFECTIOUS DISEASE:  ,    MErop   bld cx   HEMATOLOGICAL:  DVT prophylaxis heparin SQ  if ok by neurology document    doppler lower ext require more fio2   ENDOCRINE:  Follow up FS.  Insulin protocol if needed.    MUSCULOSKELETAL:

## 2020-12-26 NOTE — PROGRESS NOTE ADULT - ATTENDING COMMENTS
Patient examined this evening and MRI brain w/wo contrast reviewed.  Appearance is of mass lesion and enhancement makes malignancy likely.  The minimal blood present dose not prohibit pharmacologic DVT prophylaxis with heparin.  If there is sudden neurologic worsening then would hold further heparin until noncontrast head CT is completed and worsening of intracranial bleeding is excluded.  Recommend workup for unerlying primary malignancy.

## 2020-12-27 NOTE — PROGRESS NOTE ADULT - SUBJECTIVE AND OBJECTIVE BOX
Patient is a 80y old  Male who presents with a chief complaint of Intracranial bleed (27 Dec 2020 00:09)      Over Night Events:  Patient seen and examined.   still vented on precedex and fentnayl   levo 0.01     ROS:  See HPI    PHYSICAL EXAM    ICU Vital Signs Last 24 Hrs  T(C): 36.6 (27 Dec 2020 02:00), Max: 38.2 (26 Dec 2020 16:00)  T(F): 97.9 (27 Dec 2020 02:00), Max: 100.8 (26 Dec 2020 16:00)  HR: 58 (27 Dec 2020 06:00) (50 - 70)  BP: 79/51 (27 Dec 2020 06:00) (79/51 - 144/72)  BP(mean): 57 (27 Dec 2020 06:00) (57 - 106)  ABP: --  ABP(mean): --  RR: 19 (27 Dec 2020 06:00) (19 - 20)  SpO2: 99% (27 Dec 2020 06:00) (95% - 100%)      General: open eyes   HEENT:     t tube            Lymph Nodes: NO cervical LN   Lungs: Bilateral BS  Cardiovascular: Regular   Abdomen: Soft, Positive BS  Extremities: No clubbing   Skin: warm   Neurological: no focal   Musculoskeletal: move all ext     I&O's Detail    26 Dec 2020 07:01  -  27 Dec 2020 07:00  --------------------------------------------------------  IN:    Dexmedetomidine: 104 mL    Dexmedetomidine: 194 mL    Dexmedetomidine: 352 mL    Enteral Tube Flush: 40 mL    FentaNYL: 152 mL    FentaNYL: 80 mL    FentaNYL: 286 mL    IV PiggyBack: 300 mL    Norepinephrine: 142 mL    Propofol: 40 mL    Propofol: 48 mL    Propofol: 13 mL  Total IN: 1751 mL    OUT:    Voided (mL): 1050 mL  Total OUT: 1050 mL    Total NET: 701 mL          LABS:                          12.9   9.18  )-----------( 144      ( 27 Dec 2020 04:30 )             43.1         27 Dec 2020 04:30    148    |  112    |  14     ----------------------------<  126    4.2     |  29     |  0.8      Ca    8.4        27 Dec 2020 04:30  Phos  2.6       27 Dec 2020 04:30  Mg     1.7       27 Dec 2020 04:30    TPro  4.3    /  Alb  2.6    /  TBili  0.4    /  DBili  x      /  AST  14     /  ALT  18     /  AlkPhos  62     27 Dec 2020 04:30  Amylase x     lipase x                                                                                                                                                    Culture - Acid Fast - Bronchial w/Smear (collected 24 Dec 2020 11:31)  Source: BAL None  Preliminary Report (26 Dec 2020 15:03):    Culture is being performed.    Culture - Bronchial (collected 24 Dec 2020 11:31)  Source: Lavage None  Gram Stain (25 Dec 2020 02:22):    Numerous polymorphonuclear leukocytes seen per low power field    No Squamous epithelial cells seen per low power field    No organisms seen per oil power field  Final Report (26 Dec 2020 16:11):    No growth at 48 hours    Culture - Acid Fast - Bronchial w/Smear (collected 24 Dec 2020 11:30)  Source: Bronch Wash None  Preliminary Report (26 Dec 2020 15:03):    Culture is being performed.    Culture - Bronchial (collected 24 Dec 2020 11:30)  Source: Bronch Wash None  Gram Stain (25 Dec 2020 02:22):    Moderate polymorphonuclear leukocytes seen per low power field    No Squamous epithelial cells seen per low power field    No organisms seen per oil power field  Final Report (26 Dec 2020 16:13):    No growth at 48 hours                                                   Mode: AC/ CMV (Assist Control/ Continuous Mandatory Ventilation)  RR (machine): 20  TV (machine): 470  FiO2: 50  PEEP: 7  ITime: 1  MAP: 12  PIP: 23                                      ABG - ( 27 Dec 2020 04:01 )  pH, Arterial: 7.42  pH, Blood: x     /  pCO2: 45    /  pO2: 89    / HCO3: 30    / Base Excess: 4.4   /  SaO2: 95                  MEDICATIONS  (STANDING):  chlorhexidine 0.12% Liquid 15 milliLiter(s) Oral Mucosa two times a day  chlorhexidine 4% Liquid 1 Application(s) Topical daily  cyanocobalamin 1000 MICROGram(s) Oral daily  dexMEDEtomidine Infusion 0.2 MICROgram(s)/kG/Hr (5.2 mL/Hr) IV Continuous <Continuous>  dextrose 40% Gel 15 Gram(s) Oral once  dextrose 5%. 1000 milliLiter(s) (50 mL/Hr) IV Continuous <Continuous>  dextrose 5%. 1000 milliLiter(s) (100 mL/Hr) IV Continuous <Continuous>  dextrose 50% Injectable 25 Gram(s) IV Push once  dextrose 50% Injectable 12.5 Gram(s) IV Push once  dextrose 50% Injectable 25 Gram(s) IV Push once  fentaNYL   Infusion. 0.5 MICROgram(s)/kG/Hr (5.2 mL/Hr) IV Continuous <Continuous>  folic acid 1 milliGRAM(s) Oral daily  glucagon  Injectable 1 milliGRAM(s) IntraMuscular once  heparin   Injectable 5000 Unit(s) SubCutaneous every 8 hours  insulin glargine Injectable (LANTUS) 25 Unit(s) SubCutaneous every morning  insulin lispro (ADMELOG) corrective regimen sliding scale   SubCutaneous three times a day before meals  insulin lispro Injectable (ADMELOG) 6 Unit(s) SubCutaneous every 6 hours  levETIRAcetam  IVPB 500 milliGRAM(s) IV Intermittent every 12 hours  meropenem  IVPB 500 milliGRAM(s) IV Intermittent every 8 hours  meropenem  IVPB      metoprolol tartrate 12.5 milliGRAM(s) Oral two times a day  norepinephrine Infusion 0.05 MICROgram(s)/kG/Min (9.74 mL/Hr) IV Continuous <Continuous>  simvastatin 20 milliGRAM(s) Oral at bedtime  tamsulosin Oral Tab/Cap - Peds 0.4 milliGRAM(s) Oral at bedtime  traZODone 150 milliGRAM(s) Oral daily    MEDICATIONS  (PRN):  acetaminophen   Tablet .. 650 milliGRAM(s) Oral every 6 hours PRN Temp greater or equal to 38C (100.4F)  fentaNYL    Injectable 25 MICROGram(s) IV Push every 6 hours PRN sedation  polyethylene glycol 3350 17 Gram(s) Oral daily PRN Constipation          Xrays:  TLC:  OG:  ET tube:                                                                                      < from: MR Head w/wo IV Cont (12.26.20 @ 15:04) >  Right frontoparietal hemorrhagic enhancing lesion with central necrosis which may represent a primary brain neoplasm versus metastatic lesion.    Mild chronic microvascular-type changes.    < end of copied text >   ECHO:  CAM ICU:

## 2020-12-27 NOTE — PROGRESS NOTE ADULT - ASSESSMENT
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    Precedex.  Wean Fentanyl.  Keppra 500 BID for seizure ppx. Neurology following.  Maintain Normothermia, utilize antipyretics and arctic sun as needed.  Blood and CSF studies if temperature spike     HEENT: Oral care    PULMONARY:  HOB @ 45 degrees.  Aspiration precautions.  Keep POx > 92%.     CARDIOVASCULAR:  Keep -200.  ECHO - 60-65% EF, moderate AS; CE - neg, Cardiology - Metoprolol 12.5 bid    GI: GI prophylaxis.  NG feeds;  Bowel regimen.    RENAL:  Follow up lytes.  Correct as needed    INFECTIOUS DISEASE:  Meropenum. Follow up cultures. COVID negative    HEMATOLOGICAL:  Heparin SQ as per neuro; if neurological worsening stop heparin, obtain CTH     ENDOCRINE:  Follow up FS.  Continue basal bolus + ss insulin     MUSCULOSKELETAL: bedrest    DISPO: CCU Monitoring

## 2020-12-27 NOTE — PROGRESS NOTE ADULT - SUBJECTIVE AND OBJECTIVE BOX
MRI reviewed.    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      Patient intubated, on levophed.    Patient is not a candidate for treatment of suspected lesion at this time.  Please reconsult if status improves.

## 2020-12-27 NOTE — PROGRESS NOTE ADULT - ASSESSMENT
IMPRESSION:  acute resp failure secondary to ICH   ?? PNA   intracranial lesion mets cancer     PLAN:    CNS: continue sedation taper fentnayl   neuro surgery consult   HEENT: oral care     PULMONARY: keep same vent setting    do ct chest and abd for work up malignancy     CARDIOVASCULAR: moderate AS   cardiology follow up   replace K and MG again     GI: GI prophylaxis.  NG  Feeding ,     RENAL:follow is and os        INFECTIOUS DISEASE:  ,    MErop   bld cx     HEMATOLOGICAL:  DVT prophylaxis heparin SQ oncology consult     ENDOCRINE:  Follow up FS.  Insulin protocol if needed.    MUSCULOSKELETAL:

## 2020-12-27 NOTE — PROGRESS NOTE ADULT - SUBJECTIVE AND OBJECTIVE BOX
HPI:  81 yo male with PMHx of DM, COPD, HTN c/o AMS since this morning. Patient lives by himself, son lives next door. Last known well at 10 PM at baseline per son, at 6 AM today, patient was not himself, confused, not talking. Stroke code called en route, NIHSS 11 for aphasia but grossly no focal weakness. CTH reported as having a focus of vasogenic edema in the right temporoparietal white matter with internal hyperdensity and effacement of the adjacent cortical sulci. Trace right temporoparietal subarachnoid hemorrhage is also seen. Not a candidate for IV thrombolytics for being out of the window and for ICH. Not a candidate for IA intervention because of no LVO on CTA. (19 Dec 2020 11:02)    Currently admitted to medicine with the primary diagnosis of Intracranial bleed       Today is hospital day 8d.     INTERVAL HPI / OVERNIGHT EVENTS:  Patient was examined and seen at bedside. This morning he is resting comfortably in bed and reports no new issues or overnight events.     ROS: Otherwise unremarkable     PAST MEDICAL & SURGICAL HISTORY  COPD (chronic obstructive pulmonary disease)    BPH (benign prostatic hyperplasia)    Back pain    Non-Hodgkin lymphoma    CLL (chronic lymphocytic leukemia)  treatment completed 10/19    Depression    HTN (hypertension)    DM (diabetes mellitus)    Anxiety    Encounter for screening colonoscopy  7 years approx    Port-A-Cath in place    H/O total knee replacement, bilateral      ALLERGIES  No Known Allergies    MEDICATIONS  STANDING MEDICATIONS  chlorhexidine 0.12% Liquid 15 milliLiter(s) Oral Mucosa two times a day  chlorhexidine 4% Liquid 1 Application(s) Topical daily  cyanocobalamin 1000 MICROGram(s) Oral daily  dexMEDEtomidine Infusion 0.2 MICROgram(s)/kG/Hr IV Continuous <Continuous>  dextrose 40% Gel 15 Gram(s) Oral once  dextrose 5%. 1000 milliLiter(s) IV Continuous <Continuous>  dextrose 5%. 1000 milliLiter(s) IV Continuous <Continuous>  dextrose 50% Injectable 25 Gram(s) IV Push once  dextrose 50% Injectable 12.5 Gram(s) IV Push once  dextrose 50% Injectable 25 Gram(s) IV Push once  fentaNYL   Infusion. 0.5 MICROgram(s)/kG/Hr IV Continuous <Continuous>  folic acid 1 milliGRAM(s) Oral daily  glucagon  Injectable 1 milliGRAM(s) IntraMuscular once  heparin   Injectable 5000 Unit(s) SubCutaneous every 8 hours  insulin glargine Injectable (LANTUS) 25 Unit(s) SubCutaneous every morning  insulin lispro (ADMELOG) corrective regimen sliding scale   SubCutaneous three times a day before meals  insulin lispro Injectable (ADMELOG) 6 Unit(s) SubCutaneous every 6 hours  levETIRAcetam  IVPB 500 milliGRAM(s) IV Intermittent every 12 hours  meropenem  IVPB 500 milliGRAM(s) IV Intermittent every 8 hours  meropenem  IVPB      metoprolol tartrate 12.5 milliGRAM(s) Oral two times a day  norepinephrine Infusion 0.05 MICROgram(s)/kG/Min IV Continuous <Continuous>  simvastatin 20 milliGRAM(s) Oral at bedtime  tamsulosin Oral Tab/Cap - Peds 0.4 milliGRAM(s) Oral at bedtime  traZODone 150 milliGRAM(s) Oral daily    PRN MEDICATIONS  acetaminophen   Tablet .. 650 milliGRAM(s) Oral every 6 hours PRN  fentaNYL    Injectable 25 MICROGram(s) IV Push every 6 hours PRN  polyethylene glycol 3350 17 Gram(s) Oral daily PRN    VITALS:  T(F): 98.8  HR: 52  BP: 121/68  RR: 20  SpO2: 100%      LABS                        13.4   11.43 )-----------( 168      ( 26 Dec 2020 04:30 )             44.2     12-26    142  |  109  |  16  ----------------------------<  214<H>  4.2   |  26  |  0.8    Ca    8.0<L>      26 Dec 2020 04:30  Mg     1.6     12-26          ABG - ( 26 Dec 2020 02:32 )  pH, Arterial: 7.37  pH, Blood: x     /  pCO2: 49    /  pO2: 59    / HCO3: 28    / Base Excess: 2.3   /  SaO2: 88                    Culture - Acid Fast - Bronchial w/Smear (collected 24 Dec 2020 11:31)  Source: BAL None  Preliminary Report (26 Dec 2020 15:03):    Culture is being performed.    Culture - Bronchial (collected 24 Dec 2020 11:31)  Source: Lavage None  Gram Stain (25 Dec 2020 02:22):    Numerous polymorphonuclear leukocytes seen per low power field    No Squamous epithelial cells seen per low power field    No organisms seen per oil power field  Final Report (26 Dec 2020 16:11):    No growth at 48 hours    Culture - Acid Fast - Bronchial w/Smear (collected 24 Dec 2020 11:30)  Source: Bronch Wash None  Preliminary Report (26 Dec 2020 15:03):    Culture is being performed.    Culture - Bronchial (collected 24 Dec 2020 11:30)  Source: Bronch Wash None  Gram Stain (25 Dec 2020 02:22):    Moderate polymorphonuclear leukocytes seen per low power field    No Squamous epithelial cells seen per low power field    No organisms seen per oil power field  Final Report (26 Dec 2020 16:13):    No growth at 48 hours      PHYSICAL EXAM  GEN: intubated, sedated  PULM: CTAB  CVS: RRR  ABD: Soft, NTND  Skin: intact  Neuro: nonfocal, + movement extremities

## 2020-12-28 NOTE — PROCEDURE NOTE - NSINDICATIONS_GEN_A_CORE
arterial puncture to obtain ABG's/critical patient/monitoring purposes
critical illness/venous access
critical illness/venous access

## 2020-12-28 NOTE — CONSULT NOTE ADULT - SUBJECTIVE AND OBJECTIVE BOX
Patient is a 80y old  Male who presents with a chief complaint of Intracranial bleed (28 Dec 2020 07:31)    HPI:  Patient is a 79 yo male with PMH of DM, COPD, Hypertension and altered mental status. Patient lives by himself, son lives next door. Last known well at 10 PM at baseline per son, at 6 AM today, patient was not himself, confused, not talking. Stroke code called en route, NIHSS 11 for aphasia but grossly no focal weakness. CTH reported as having a focus of vasogenic edema in the right temporoparietal white matter with internal hyperdensity and effacement of the adjacent cortical sulci. Trace right temporoparietal subarachnoid hemorrhage is also seen. Not a candidate for IV thrombolytics for being out of the window and for ICH. Not a candidate for IA intervention because of no LVO on CTA. (19 Dec 2020 11:02)       ROS:  Negative except for:    PAST MEDICAL & SURGICAL HISTORY:  COPD (chronic obstructive pulmonary disease)    BPH (benign prostatic hyperplasia)    Back pain    Non-Hodgkin lymphoma    CLL (chronic lymphocytic leukemia)  treatment completed 10/19    Depression    HTN (hypertension)    DM (diabetes mellitus)    Anxiety    Encounter for screening colonoscopy  7 years approx    Port-A-Cath in place    H/O total knee replacement, bilateral        SOCIAL HISTORY:    FAMILY HISTORY:      MEDICATIONS  (STANDING):  chlorhexidine 0.12% Liquid 15 milliLiter(s) Oral Mucosa two times a day  chlorhexidine 4% Liquid 1 Application(s) Topical daily  cyanocobalamin 1000 MICROGram(s) Oral daily  dextrose 40% Gel 15 Gram(s) Oral once  dextrose 5%. 1000 milliLiter(s) (100 mL/Hr) IV Continuous <Continuous>  dextrose 5%. 1000 milliLiter(s) (50 mL/Hr) IV Continuous <Continuous>  dextrose 50% Injectable 25 Gram(s) IV Push once  dextrose 50% Injectable 12.5 Gram(s) IV Push once  dextrose 50% Injectable 25 Gram(s) IV Push once  fentaNYL   Infusion. 0.5 MICROgram(s)/kG/Hr (5.2 mL/Hr) IV Continuous <Continuous>  folic acid 1 milliGRAM(s) Oral daily  glucagon  Injectable 1 milliGRAM(s) IntraMuscular once  heparin   Injectable 5000 Unit(s) SubCutaneous every 8 hours  insulin glargine Injectable (LANTUS) 25 Unit(s) SubCutaneous every morning  insulin lispro (ADMELOG) corrective regimen sliding scale   SubCutaneous three times a day before meals  insulin lispro Injectable (ADMELOG) 6 Unit(s) SubCutaneous every 6 hours  iohexol 300 mG (iodine)/mL Oral Solution 30 milliLiter(s) Oral once  levETIRAcetam  IVPB 500 milliGRAM(s) IV Intermittent every 12 hours  meropenem  IVPB 500 milliGRAM(s) IV Intermittent every 8 hours  meropenem  IVPB      metoprolol tartrate 12.5 milliGRAM(s) Oral two times a day  midazolam Infusion 0.02 mG/kG/Hr (2.08 mL/Hr) IV Continuous <Continuous>  norepinephrine Infusion 0.05 MICROgram(s)/kG/Min (9.74 mL/Hr) IV Continuous <Continuous>  simvastatin 20 milliGRAM(s) Oral at bedtime  tamsulosin Oral Tab/Cap - Peds 0.4 milliGRAM(s) Oral at bedtime  traZODone 150 milliGRAM(s) Oral daily    MEDICATIONS  (PRN):  acetaminophen   Tablet .. 650 milliGRAM(s) Oral every 6 hours PRN Temp greater or equal to 38C (100.4F)  fentaNYL    Injectable 25 MICROGram(s) IV Push every 6 hours PRN sedation  polyethylene glycol 3350 17 Gram(s) Oral daily PRN Constipation      Allergies    No Known Allergies    Intolerances        Vital Signs Last 24 Hrs  T(C): 37.3 (28 Dec 2020 12:00), Max: 37.7 (27 Dec 2020 20:00)  T(F): 99.1 (28 Dec 2020 12:00), Max: 99.8 (27 Dec 2020 20:00)  HR: 80 (28 Dec 2020 12:00) (50 - 134)  BP: 83/61 (28 Dec 2020 12:00) (70/54 - 135/85)  BP(mean): 67 (28 Dec 2020 12:00) (55 - 98)  RR: 17 (28 Dec 2020 12:00) (12 - 37)  SpO2: 99% (28 Dec 2020 12:00) (81% - 100%)    PHYSICAL EXAM  General: adult in NAD  HEENT: clear oropharynx, anicteric sclera, pink conjunctiva  Neck: supple  CV: normal S1/S2 with no murmur rubs or gallops  Lungs: positive air movement b/l ant lungs,clear to auscultation, no wheezes, no rales  Abdomen: soft non-tender non-distended, no hepatosplenomegaly  Ext: no clubbing cyanosis or edema  Skin: no rashes and no petechiae  Neuro: alert and oriented X 4, no focal deficits      LABS:                          12.7   10.09 )-----------( 169      ( 28 Dec 2020 04:30 )             42.1         Mean Cell Volume : 84.9 fL  Mean Cell Hemoglobin : 25.6 pg  Mean Cell Hemoglobin Concentration : 30.2 g/dL  Auto Neutrophil # : 7.88 K/uL  Auto Lymphocyte # : 0.84 K/uL  Auto Monocyte # : 0.78 K/uL  Auto Eosinophil # : 0.40 K/uL  Auto Basophil # : 0.06 K/uL  Auto Neutrophil % : 78.1 %  Auto Lymphocyte % : 8.3 %  Auto Monocyte % : 7.7 %  Auto Eosinophil % : 4.0 %  Auto Basophil % : 0.6 %      Serial CBC's  12-28 @ 04:30  Hct-42.1 / Hgb-12.7 / Plat-169 / RBC-4.96 / WBC-10.09  Serial CBC's  12-27 @ 04:30  Hct-43.1 / Hgb-12.9 / Plat-144 / RBC-5.02 / WBC-9.18  Serial CBC's  12-26 @ 04:30  Hct-44.2 / Hgb-13.4 / Plat-168 / RBC-5.09 / WBC-11.43  Serial CBC's  12-25 @ 04:30  Hct-43.9 / Hgb-13.1 / Plat-194 / RBC-5.10 / WBC-11.30      12-28    142  |  108  |  18  ----------------------------<  111<H>  4.3   |  25  |  0.7    Ca    8.2<L>      28 Dec 2020 04:30  Phos  2.6     12-27  Mg     2.2     12-28    TPro  4.2<L>  /  Alb  2.5<L>  /  TBili  0.4  /  DBili  x   /  AST  20  /  ALT  21  /  AlkPhos  65  12-28                      BLOOD SMEAR INTERPRETATION:       RADIOLOGY & ADDITIONAL STUDIES:

## 2020-12-28 NOTE — CONSULT NOTE ADULT - ASSESSMENT
81 yo male with PMHx of DM, COPD, HTN c/o AMS since this morning. Patient lives by himself, son lives next door. Last known well at 10 PM at baseline per son, at 6 AM today, patient was not himself, confused, not talking. Stroke code called en route, NIHSS 11 for aphasia but grossly no focal weakness. CTH reported as having a focus of vasogenic edema in the right temporoparietal white matter with internal hyperdensity and effacement of the adjacent cortical sulci. Trace right temporoparietal subarachnoid hemorrhage is also seen. Not a candidate for IV thrombolytics for being out of the window and for ICH. Not a candidate for IA intervention because of no LVO on CTA.    Currently intubated for acute resp failure secondary to ICH   ?? 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

## 2020-12-28 NOTE — CHART NOTE - NSCHARTNOTEFT_GEN_A_CORE
Registered Dietitian Follow-Up     Patient Profile Reviewed                           Yes [x]   No []     Nutrition History Previously Obtained        Yes []  No [x]       Pertinent Subjective Information: n: Pt. remains intubated, sedated. Off Propofol. Receiving TF at goal rate. Previously recommended TF regimen has not been implemented. Ve: 11.0, Tmax 37.3, MAP 67.     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 Intubated/sedated. F/u with neurosx for possible surgical resection of mass once patient is off levophed.     Diet order: Vital HP @240ml q6h      Anthropometrics:   - Ht. 177.8cm  - Wt. 112.3kg on 12/28 vs. 103.9kg dosing weight, pt. with edema at this time.   - %wt change  - BMI 32.9  - IBW 166lbs      Pertinent Lab Data: (12/28) Hg 12.7, glu 111     Pertinent Meds: levophed, Fentanyl, metoprolol, Miralax, Cyanocobalamin, Folic acid, Simvastatin      Physical Findings:  - Appearance: intubated/sedated, 2+ generalized edema, 3+ L, R foot   - GI function: no symptoms noted, last BM 12/24   - Tubes: OGT  - Oral/Mouth cavity: NPO   - Skin: ecchymosis      Nutrition Requirements (from RD note on 12/25)   Weight Used: dosing 103.9kg, ideal weight 75.2kg     Estimated Energy Needs    Continue []  Adjust [x] ~1415-1693kcal   1430-1634kcal (70-80% PSE 2010) vs. 1654-1880kcal (22-25kcal/kg IBW) vs. 1143-1545kcal (11-14kcal/kg CBW)  Adjusted Energy Recommendations:   kcal/day        Estimated Protein Needs    Continue [x]  Adjust [] ] 135-150g (1.8-2.0g/kg IBW)  Adjusted Protein Recommendations:   gm/day        Estimated Fluid Needs        Continue [x]  Adjust []  per CCU team  Adjusted Fluid Recommendations:   mL/day     Nutrient Intake: current TF regimen provides  960kcal, 83g protein 777ml free H2O (68% est calorie needs, 61% est protein needs)        [] Previous Nutrition Diagnosis: : Inadequate protein energy intake.            [x] Ongoing          [] Resolved       Nutrition Intervention:  enteral and parenteral nutrition    Rec: Provide Osmolite 1.5 @40ml/h with No Carb Prosource TF 6x daily for 1680kcal, 125g protein, 730ml free H2O (100% est calorie needs, 93% est protein needs). Feed only if MAP consistently >65. Maintain all aspiration precautions. Additional free H2O per LIP.      Goal/Expected Outcome:  In 3 days TF to provide >85% est energy needs, but not exceed 105%     Indicator/Monitoring:  energy intake, body composition, NFPF, electrolyte profile, glucose profil. Registered Dietitian Follow-Up     Patient Profile Reviewed                           Yes [x]   No []     Nutrition History Previously Obtained        Yes []  No [x]       Pertinent Subjective Information: n: Pt. remains intubated, sedated. Off Propofol. Receiving TF at goal rate. Previously recommended TF regimen has not been implemented. Ve: 11.0, Tmax 37.3, MAP 67.     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 Intubated/sedated. F/u with neurosx for possible surgical resection of mass once patient is off levophed.     Diet order: Vital HP @240ml q6h      Anthropometrics:   - Ht. 177.8cm  - Wt. 112.3kg on 12/28 vs. 103.9kg dosing weight, pt. with edema at this time.   - %wt change  - BMI 32.9  - IBW 166lbs      Pertinent Lab Data: (12/28) Hg 12.7, glu 111     Pertinent Meds: levophed, Fentanyl, metoprolol, Miralax, Cyanocobalamin, Folic acid, Simvastatin      Physical Findings:  - Appearance: intubated/sedated, 2+ generalized edema, 3+ L, R foot   - GI function: no symptoms noted, last BM 12/24   - Tubes: OGT  - Oral/Mouth cavity: NPO   - Skin: ecchymosis      Nutrition Requirements (from RD note on 12/25)   Weight Used: dosing 103.9kg, ideal weight 75.2kg     Estimated Energy Needs    Continue []  Adjust [x] ~1415-1693kcal   1430-1634kcal (70-80% PSE 2010) vs. 1654-1880kcal (22-25kcal/kg IBW) vs. 1143-1545kcal (11-14kcal/kg CBW)  Adjusted Energy Recommendations:   kcal/day        Estimated Protein Needs    Continue [x]  Adjust [] ] 135-150g (1.8-2.0g/kg IBW)  Adjusted Protein Recommendations:   gm/day        Estimated Fluid Needs        Continue [x]  Adjust []  per CCU team  Adjusted Fluid Recommendations:   mL/day     Nutrient Intake: current TF regimen provides  960kcal, 83g protein 777ml free H2O (68% est calorie needs, 61% est protein needs)        [] Previous Nutrition Diagnosis: : Inadequate protein energy intake.            [x] Ongoing          [] Resolved       Nutrition Intervention:  enteral and parenteral nutrition    Rec: Provide Peptamen AF @55ml/h with No Carb Prosource TF BID for 1664kcal,  126g protein, 1069ml free H2O (100% est calorie needs, 92% est protein needs). Feed only IF MAP consistently >65. Additional free H2O flush per LIP. Consider bowel regimen.. Feed only if MAP consistently >65. Maintain all aspiration precautions. Additional free H2O per LIP.      Goal/Expected Outcome:  In 3 days TF to provide >85% est energy needs, but not exceed 105%     Indicator/Monitoring:  energy intake, body composition, NFPF, electrolyte profile, glucose profil.

## 2020-12-28 NOTE — CONSULT NOTE ADULT - SUBJECTIVE AND OBJECTIVE BOX
VASCULAR SURGERY CONSULT NOTE      HPI:  81 yo male with PMHx of DM, COPD, HTN c/o AMS since this morning. Patient lives by himself, son lives next door. Last known well at 10 PM at baseline per son, at 6 AM today, patient was not himself, confused, not talking. Stroke code called en route, NIHSS 11 for aphasia but grossly no focal weakness. CTH reported as having a focus of vasogenic edema in the right temporoparietal white matter with internal hyperdensity and effacement of the adjacent cortical sulci. Trace right temporoparietal subarachnoid hemorrhage is also seen. Not a candidate for IV thrombolytics for being out of the window and for ICH. Not a candidate for IA intervention because of no LVO on CTA. (19 Dec 2020 11:02)        PAST MEDICAL & SURGICAL HISTORY:  COPD (chronic obstructive pulmonary disease)    BPH (benign prostatic hyperplasia)    Back pain    Non-Hodgkin lymphoma    CLL (chronic lymphocytic leukemia)  treatment completed 10/19    Depression    HTN (hypertension)    DM (diabetes mellitus)    Anxiety    Encounter for screening colonoscopy  7 years approx    Port-A-Cath in place    H/O total knee replacement, bilateral      No Known Allergies    Home Medications:  budesonide-formoterol 160 mcg-4.5 mcg/inh inhalation aerosol: 2 puff(s) inhaled 2 times a day (25 Feb 2020 10:24)  buPROPion 150 mg/12 hours (SR) oral tablet, extended release: 1 tab(s) orally 2 times a day (25 Feb 2020 10:24)  Effexor  mg oral capsule, extended release: 1 cap(s) orally once a day (25 Feb 2020 10:24)  folic acid 1 mg oral tablet: 1 tab(s) orally once a day (25 Feb 2020 10:24)  metFORMIN 500 mg oral tablet: 1 tab(s) orally once a day (at bedtime) (25 Feb 2020 10:24)  nicotine 14 mg/24 hr transdermal film, extended release: 1 patch transdermal once a day. DO NOT smoke while using patches. (11 Mar 2020 14:05)  simvastatin 20 mg oral tablet: 1 tab(s) orally once a day (at bedtime) (25 Feb 2020 10:24)  tamsulosin 0.4 mg oral capsule: 1 cap(s) orally once a day (at bedtime) (13 Mar 2020 11:21)  traZODone 150 mg oral tablet: 1 tab(s) orally once a day (at bedtime) (25 Feb 2020 10:24)  Vitamin B12 500 mcg oral tablet: 1 tab(s) orally once a day (25 Feb 2020 10:24)  Vitamin D3 1000 intl units (25 mcg) oral tablet: 1 tab(s) orally once a day (13 Mar 2020 11:40)    No permtinent family history of PVD    REVIEW OF SYSTEMS:  GENERAL:                                         negative  SKIN:                                                 negative  OPTHALMOLOGIC:                          negative  ENMT:                                               negative  RESPIRATORY AND THORAX:      see HPI  CARDIOVASCULAR:                         see HPI  GASTROINTESTINAL:                       negative  NEPHROLOGY:                                  negative  MUSCULOSKELETAL:                       negative  NEUROLOGIC:                                   negative  PSYCHIATRIC:                                    negative  HEMATOLOGY/LYMPHATICS:         negative  ENDOCRINE:                                     see HPI  ALLERGIC/IMMUNOLOGIC:            negative    12 point ROS otherwise normal except as stated in HPI    PHYSICAL EXAM  Vital Signs Last 24 Hrs  T(C): 37.3 (28 Dec 2020 12:00), Max: 37.7 (27 Dec 2020 20:00)  T(F): 99.1 (28 Dec 2020 12:00), Max: 99.8 (27 Dec 2020 20:00)  HR: 80 (28 Dec 2020 12:00) (50 - 134)  BP: 83/61 (28 Dec 2020 12:00) (70/54 - 135/85)  BP(mean): 67 (28 Dec 2020 12:00) (55 - 98)  RR: 17 (28 Dec 2020 12:00) (12 - 37)  SpO2: 99% (28 Dec 2020 12:00) (81% - 100%)    Appearance: intubated	  HEENT:   Normal oral mucosa, PERRL, EOMI	  Neck: Supple, - JVD;   Cardiovascular: Normal S1 S2, No JVD, No murmurs,   Respiratory: Lungs clear to auscultation, No Rales, Rhonchi, Wheezing	  Gastrointestinal:  Soft, Non-tender, positive BS	  Skin: No rashes, No ecchymoses, No cyanosis  Extremities: Normal range of motion, No clubbing, cyanosis or edema  Neurologic: Non-focal          MEDICATIONS:   MEDICATIONS  (STANDING):  chlorhexidine 0.12% Liquid 15 milliLiter(s) Oral Mucosa two times a day  chlorhexidine 4% Liquid 1 Application(s) Topical daily  cyanocobalamin 1000 MICROGram(s) Oral daily  dextrose 40% Gel 15 Gram(s) Oral once  dextrose 5%. 1000 milliLiter(s) (50 mL/Hr) IV Continuous <Continuous>  dextrose 5%. 1000 milliLiter(s) (100 mL/Hr) IV Continuous <Continuous>  dextrose 50% Injectable 25 Gram(s) IV Push once  dextrose 50% Injectable 12.5 Gram(s) IV Push once  dextrose 50% Injectable 25 Gram(s) IV Push once  fentaNYL   Infusion. 0.5 MICROgram(s)/kG/Hr (5.2 mL/Hr) IV Continuous <Continuous>  folic acid 1 milliGRAM(s) Oral daily  glucagon  Injectable 1 milliGRAM(s) IntraMuscular once  heparin   Injectable 5000 Unit(s) SubCutaneous every 8 hours  insulin glargine Injectable (LANTUS) 25 Unit(s) SubCutaneous every morning  insulin lispro (ADMELOG) corrective regimen sliding scale   SubCutaneous three times a day before meals  insulin lispro Injectable (ADMELOG) 6 Unit(s) SubCutaneous every 6 hours  iohexol 300 mG (iodine)/mL Oral Solution 30 milliLiter(s) Oral once  levETIRAcetam  IVPB 500 milliGRAM(s) IV Intermittent every 12 hours  meropenem  IVPB 500 milliGRAM(s) IV Intermittent every 8 hours  meropenem  IVPB      metoprolol tartrate 12.5 milliGRAM(s) Oral two times a day  midazolam Infusion 0.02 mG/kG/Hr (2.08 mL/Hr) IV Continuous <Continuous>  norepinephrine Infusion 0.05 MICROgram(s)/kG/Min (9.74 mL/Hr) IV Continuous <Continuous>  simvastatin 20 milliGRAM(s) Oral at bedtime  tamsulosin Oral Tab/Cap - Peds 0.4 milliGRAM(s) Oral at bedtime  traZODone 150 milliGRAM(s) Oral daily    MEDICATIONS  (PRN):  acetaminophen   Tablet .. 650 milliGRAM(s) Oral every 6 hours PRN Temp greater or equal to 38C (100.4F)  fentaNYL    Injectable 25 MICROGram(s) IV Push every 6 hours PRN sedation  polyethylene glycol 3350 17 Gram(s) Oral daily PRN Constipation      LAB/STUDIES:                        12.7   10.09 )-----------( 169      ( 28 Dec 2020 04:30 )             42.1     12-28    142  |  108  |  18  ----------------------------<  111<H>  4.3   |  25  |  0.7    Ca    8.2<L>      28 Dec 2020 04:30  Phos  2.6     12-27  Mg     2.2     12-28    TPro  4.2<L>  /  Alb  2.5<L>  /  TBili  0.4  /  DBili  x   /  AST  20  /  ALT  21  /  AlkPhos  65  12-28      LIVER FUNCTIONS - ( 28 Dec 2020 04:30 )  Alb: 2.5 g/dL / Pro: 4.2 g/dL / ALK PHOS: 65 U/L / ALT: 21 U/L / AST: 20 U/L / GGT: x                       ABG - ( 28 Dec 2020 04:08 )  pH, Arterial: 7.41  pH, Blood: x     /  pCO2: 44    /  pO2: 107   / HCO3: 28    / Base Excess: 2.4   /  SaO2: 98            IMAGING:    < from: MR Head w/wo IV Cont (12.26.20 @ 15:04) >  There is a well-circumscribed enhancing centrally necrotic no intra-axial lesion within the right frontoparietal region which measures approximately 4.7 x 4.4 x 4.9 cm (AP x TR x CC). There is minimal surrounding vasogenic edema with mild local mass effect. There are foci of susceptibility-weighted artifact within the mass.

## 2020-12-28 NOTE — PROGRESS NOTE ADULT - SUBJECTIVE AND OBJECTIVE BOX
HPI:  81 yo male with PMHx of DM, COPD, HTN c/o AMS since this morning. Patient lives by himself, son lives next door. Last known well at 10 PM at baseline per son, at 6 AM today, patient was not himself, confused, not talking. Stroke code called en route, NIHSS 11 for aphasia but grossly no focal weakness. CTH reported as having a focus of vasogenic edema in the right temporoparietal white matter with internal hyperdensity and effacement of the adjacent cortical sulci. Trace right temporoparietal subarachnoid hemorrhage is also seen. Not a candidate for IV thrombolytics for being out of the window and for ICH. Not a candidate for IA intervention because of no LVO on CTA. (19 Dec 2020 11:02)    Currently admitted to medicine with the primary diagnosis of Intracranial bleed       Today is hospital day 9d.     INTERVAL HPI / OVERNIGHT EVENTS:  Patient was examined and seen at bedside. This morning he is resting comfortably in bed and reports no new issues or overnight events.     ROS: Otherwise unremarkable     PAST MEDICAL & SURGICAL HISTORY  COPD (chronic obstructive pulmonary disease)    BPH (benign prostatic hyperplasia)    Back pain    Non-Hodgkin lymphoma    CLL (chronic lymphocytic leukemia)  treatment completed 10/19    Depression    HTN (hypertension)    DM (diabetes mellitus)    Anxiety    Encounter for screening colonoscopy  7 years approx    Port-A-Cath in place    H/O total knee replacement, bilateral      ALLERGIES  No Known Allergies    MEDICATIONS  STANDING MEDICATIONS  chlorhexidine 0.12% Liquid 15 milliLiter(s) Oral Mucosa two times a day  chlorhexidine 4% Liquid 1 Application(s) Topical daily  cyanocobalamin 1000 MICROGram(s) Oral daily  dexMEDEtomidine Infusion 0.2 MICROgram(s)/kG/Hr IV Continuous <Continuous>  dextrose 40% Gel 15 Gram(s) Oral once  dextrose 5%. 1000 milliLiter(s) IV Continuous <Continuous>  dextrose 5%. 1000 milliLiter(s) IV Continuous <Continuous>  dextrose 50% Injectable 25 Gram(s) IV Push once  dextrose 50% Injectable 12.5 Gram(s) IV Push once  dextrose 50% Injectable 25 Gram(s) IV Push once  fentaNYL   Infusion. 0.5 MICROgram(s)/kG/Hr IV Continuous <Continuous>  folic acid 1 milliGRAM(s) Oral daily  glucagon  Injectable 1 milliGRAM(s) IntraMuscular once  heparin   Injectable 5000 Unit(s) SubCutaneous every 8 hours  insulin glargine Injectable (LANTUS) 25 Unit(s) SubCutaneous every morning  insulin lispro (ADMELOG) corrective regimen sliding scale   SubCutaneous three times a day before meals  insulin lispro Injectable (ADMELOG) 6 Unit(s) SubCutaneous every 6 hours  iohexol 300 mG (iodine)/mL Oral Solution 30 milliLiter(s) Oral once  levETIRAcetam  IVPB 500 milliGRAM(s) IV Intermittent every 12 hours  meropenem  IVPB 500 milliGRAM(s) IV Intermittent every 8 hours  meropenem  IVPB      metoprolol tartrate 12.5 milliGRAM(s) Oral two times a day  norepinephrine Infusion 0.05 MICROgram(s)/kG/Min IV Continuous <Continuous>  simvastatin 20 milliGRAM(s) Oral at bedtime  tamsulosin Oral Tab/Cap - Peds 0.4 milliGRAM(s) Oral at bedtime  traZODone 150 milliGRAM(s) Oral daily    PRN MEDICATIONS  acetaminophen   Tablet .. 650 milliGRAM(s) Oral every 6 hours PRN  fentaNYL    Injectable 25 MICROGram(s) IV Push every 6 hours PRN  polyethylene glycol 3350 17 Gram(s) Oral daily PRN    VITALS:  T(F): 99.2  HR: 70  BP: 86/57  RR: 19  SpO2: 100%      LABS                        12.7   10.09 )-----------( 169      ( 28 Dec 2020 04:30 )             42.1     12-28    142  |  108  |  18  ----------------------------<  111<H>  4.3   |  25  |  0.7    Ca    8.2<L>      28 Dec 2020 04:30  Phos  2.6     12-27  Mg     2.2     12-28    TPro  4.2<L>  /  Alb  2.5<L>  /  TBili  0.4  /  DBili  x   /  AST  20  /  ALT  21  /  AlkPhos  65  12-28        ABG - ( 28 Dec 2020 04:08 )  pH, Arterial: 7.41  pH, Blood: x     /  pCO2: 44    /  pO2: 107   / HCO3: 28    / Base Excess: 2.4   /  SaO2: 98            PHYSICAL EXAM  GEN: intubated  PULM: Clear to auscultation bilaterally, No wheezes  CVS: Regular rate and rhythm, S1-S2, no murmurs  ABD: Soft, non-tender, non-distended, no guarding  EXT: No edema    ASSESSMENT AND PLAN    IMPRESSION:  Intracranial mass legion   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    Precedex.  Wean Fentanyl.  Keppra 500 BID for seizure ppx. Neurology following.  Maintain Normothermia, utilize antipyretics and arctic sun as needed.  Blood and CSF studies if temperature spike   No neurosurgical intervention at this time    HEENT: Oral care    PULMONARY:  HOB @ 45 degrees.  Aspiration precautions.  Keep POx > 92%.     CARDIOVASCULAR:  Keep -200.  ECHO - 60-65% EF, moderate AS; CE - neg, Cardiology - Metoprolol 12.5 bid    GI: GI prophylaxis.  NG feeds;  Bowel regimen.    RENAL:  Follow up lytes.  Correct as needed    INFECTIOUS DISEASE:  Meropenum. Follow up cultures. COVID negative    HEMATOLOGICAL:  Heparin SQ as per neuro; if neurological worsening stop heparin, obtain CTH     ENDOCRINE:  Follow up FS.  Continue basal bolus + ss insulin     MUSCULOSKELETAL: bedrest    DISPO: CCU Monitoring   HPI:  79 yo male with PMHx of DM, COPD, HTN c/o AMS since this morning. Patient lives by himself, son lives next door. Last known well at 10 PM at baseline per son, at 6 AM today, patient was not himself, confused, not talking. Stroke code called en route, NIHSS 11 for aphasia but grossly no focal weakness. CTH reported as having a focus of vasogenic edema in the right temporoparietal white matter with internal hyperdensity and effacement of the adjacent cortical sulci. Trace right temporoparietal subarachnoid hemorrhage is also seen. Not a candidate for IV thrombolytics for being out of the window and for ICH. Not a candidate for IA intervention because of no LVO on CTA. (19 Dec 2020 11:02)    Currently admitted to medicine with the primary diagnosis of Intracranial bleed       Today is hospital day 9d.     INTERVAL HPI / OVERNIGHT EVENTS:  Patient was examined and seen at bedside. This morning he is resting comfortably in bed and reports no new issues or overnight events.     ROS: Otherwise unremarkable     PAST MEDICAL & SURGICAL HISTORY  COPD (chronic obstructive pulmonary disease)    BPH (benign prostatic hyperplasia)    Back pain    Non-Hodgkin lymphoma    CLL (chronic lymphocytic leukemia)  treatment completed 10/19    Depression    HTN (hypertension)    DM (diabetes mellitus)    Anxiety    Encounter for screening colonoscopy  7 years approx    Port-A-Cath in place    H/O total knee replacement, bilateral      ALLERGIES  No Known Allergies    MEDICATIONS  STANDING MEDICATIONS  chlorhexidine 0.12% Liquid 15 milliLiter(s) Oral Mucosa two times a day  chlorhexidine 4% Liquid 1 Application(s) Topical daily  cyanocobalamin 1000 MICROGram(s) Oral daily  dexMEDEtomidine Infusion 0.2 MICROgram(s)/kG/Hr IV Continuous <Continuous>  dextrose 40% Gel 15 Gram(s) Oral once  dextrose 5%. 1000 milliLiter(s) IV Continuous <Continuous>  dextrose 5%. 1000 milliLiter(s) IV Continuous <Continuous>  dextrose 50% Injectable 25 Gram(s) IV Push once  dextrose 50% Injectable 12.5 Gram(s) IV Push once  dextrose 50% Injectable 25 Gram(s) IV Push once  fentaNYL   Infusion. 0.5 MICROgram(s)/kG/Hr IV Continuous <Continuous>  folic acid 1 milliGRAM(s) Oral daily  glucagon  Injectable 1 milliGRAM(s) IntraMuscular once  heparin   Injectable 5000 Unit(s) SubCutaneous every 8 hours  insulin glargine Injectable (LANTUS) 25 Unit(s) SubCutaneous every morning  insulin lispro (ADMELOG) corrective regimen sliding scale   SubCutaneous three times a day before meals  insulin lispro Injectable (ADMELOG) 6 Unit(s) SubCutaneous every 6 hours  iohexol 300 mG (iodine)/mL Oral Solution 30 milliLiter(s) Oral once  levETIRAcetam  IVPB 500 milliGRAM(s) IV Intermittent every 12 hours  meropenem  IVPB 500 milliGRAM(s) IV Intermittent every 8 hours  meropenem  IVPB      metoprolol tartrate 12.5 milliGRAM(s) Oral two times a day  norepinephrine Infusion 0.05 MICROgram(s)/kG/Min IV Continuous <Continuous>  simvastatin 20 milliGRAM(s) Oral at bedtime  tamsulosin Oral Tab/Cap - Peds 0.4 milliGRAM(s) Oral at bedtime  traZODone 150 milliGRAM(s) Oral daily    PRN MEDICATIONS  acetaminophen   Tablet .. 650 milliGRAM(s) Oral every 6 hours PRN  fentaNYL    Injectable 25 MICROGram(s) IV Push every 6 hours PRN  polyethylene glycol 3350 17 Gram(s) Oral daily PRN    VITALS:  T(F): 99.2  HR: 70  BP: 86/57  RR: 19  SpO2: 100%      LABS                        12.7   10.09 )-----------( 169      ( 28 Dec 2020 04:30 )             42.1     12-28    142  |  108  |  18  ----------------------------<  111<H>  4.3   |  25  |  0.7    Ca    8.2<L>      28 Dec 2020 04:30  Phos  2.6     12-27  Mg     2.2     12-28    TPro  4.2<L>  /  Alb  2.5<L>  /  TBili  0.4  /  DBili  x   /  AST  20  /  ALT  21  /  AlkPhos  65  12-28        ABG - ( 28 Dec 2020 04:08 )  pH, Arterial: 7.41  pH, Blood: x     /  pCO2: 44    /  pO2: 107   / HCO3: 28    / Base Excess: 2.4   /  SaO2: 98            PHYSICAL EXAM  GEN: intubated  PULM: Clear to auscultation bilaterally, No wheezes  CVS: Regular rate and rhythm, S1-S2, no murmurs  ABD: Soft, non-tender, non-distended, no guarding  EXT: No edema    ASSESSMENT AND PLAN    IMPRESSION:  Intracranial mass legion   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    Versed for sedation.  Wean Fentanyl.  Keppra 500 BID for seizure ppx. Neurology following.  Maintain Normothermia, utilize antipyretics and arctic sun as needed.  Blood and CSF studies if temperature spike   No neurosurgical intervention at this time    HEENT: Oral care    NEURO: versed and fenatyl for sedation   - f/u with neurosx for possible surgical resection of mass    PULMONARY:  HOB @ 45 degrees.  Aspiration precautions.  Keep POx > 92%.   - right segmental PE identifed - no anticoagulation given brain mass     CARDIOVASCULAR:  Keep -200.  ECHO - 60-65% EF, moderate AS; CE - neg, Cardiology - Metoprolol 12.5 bid    GI: GI prophylaxis.  NG feeds;  Bowel regimen.    RENAL:  Follow up lytes.  Correct as needed    INFECTIOUS DISEASE: possible LLL pneumonia. On  Meropenum. Follow up blood cultures. COVID negative    HEMATOLOGICAL:  Heparin SQ as per neuro; if neurological worsening stop heparin, obtain CTH   - LE Duplex. If DVT present then Vascular consult for IVC filter    ENDOCRINE:  Follow up FS.  Continue basal bolus + ss insulin     MUSCULOSKELETAL: bedrest    DISPO: CCU Monitoring   HPI:  79 yo male with PMHx of DM, COPD, HTN c/o AMS since this morning. Patient lives by himself, son lives next door. Last known well at 10 PM at baseline per son, at 6 AM today, patient was not himself, confused, not talking. Stroke code called en route, NIHSS 11 for aphasia but grossly no focal weakness. CTH reported as having a focus of vasogenic edema in the right temporoparietal white matter with internal hyperdensity and effacement of the adjacent cortical sulci. Trace right temporoparietal subarachnoid hemorrhage is also seen. Not a candidate for IV thrombolytics for being out of the window and for ICH. Not a candidate for IA intervention because of no LVO on CTA. (19 Dec 2020 11:02)    Currently admitted to medicine with the primary diagnosis of Intracranial bleed       Today is hospital day 9d.     INTERVAL HPI / OVERNIGHT EVENTS:  Patient was examined and seen at bedside. This morning he is resting comfortably in bed and reports no new issues or overnight events.     ROS: Otherwise unremarkable     PAST MEDICAL & SURGICAL HISTORY  COPD (chronic obstructive pulmonary disease)    BPH (benign prostatic hyperplasia)    Back pain    Non-Hodgkin lymphoma    CLL (chronic lymphocytic leukemia)  treatment completed 10/19    Depression    HTN (hypertension)    DM (diabetes mellitus)    Anxiety    Encounter for screening colonoscopy  7 years approx    Port-A-Cath in place    H/O total knee replacement, bilateral      ALLERGIES  No Known Allergies    MEDICATIONS  STANDING MEDICATIONS  chlorhexidine 0.12% Liquid 15 milliLiter(s) Oral Mucosa two times a day  chlorhexidine 4% Liquid 1 Application(s) Topical daily  cyanocobalamin 1000 MICROGram(s) Oral daily  dexMEDEtomidine Infusion 0.2 MICROgram(s)/kG/Hr IV Continuous <Continuous>  dextrose 40% Gel 15 Gram(s) Oral once  dextrose 5%. 1000 milliLiter(s) IV Continuous <Continuous>  dextrose 5%. 1000 milliLiter(s) IV Continuous <Continuous>  dextrose 50% Injectable 25 Gram(s) IV Push once  dextrose 50% Injectable 12.5 Gram(s) IV Push once  dextrose 50% Injectable 25 Gram(s) IV Push once  fentaNYL   Infusion. 0.5 MICROgram(s)/kG/Hr IV Continuous <Continuous>  folic acid 1 milliGRAM(s) Oral daily  glucagon  Injectable 1 milliGRAM(s) IntraMuscular once  heparin   Injectable 5000 Unit(s) SubCutaneous every 8 hours  insulin glargine Injectable (LANTUS) 25 Unit(s) SubCutaneous every morning  insulin lispro (ADMELOG) corrective regimen sliding scale   SubCutaneous three times a day before meals  insulin lispro Injectable (ADMELOG) 6 Unit(s) SubCutaneous every 6 hours  iohexol 300 mG (iodine)/mL Oral Solution 30 milliLiter(s) Oral once  levETIRAcetam  IVPB 500 milliGRAM(s) IV Intermittent every 12 hours  meropenem  IVPB 500 milliGRAM(s) IV Intermittent every 8 hours  meropenem  IVPB      metoprolol tartrate 12.5 milliGRAM(s) Oral two times a day  norepinephrine Infusion 0.05 MICROgram(s)/kG/Min IV Continuous <Continuous>  simvastatin 20 milliGRAM(s) Oral at bedtime  tamsulosin Oral Tab/Cap - Peds 0.4 milliGRAM(s) Oral at bedtime  traZODone 150 milliGRAM(s) Oral daily    PRN MEDICATIONS  acetaminophen   Tablet .. 650 milliGRAM(s) Oral every 6 hours PRN  fentaNYL    Injectable 25 MICROGram(s) IV Push every 6 hours PRN  polyethylene glycol 3350 17 Gram(s) Oral daily PRN    VITALS:  T(F): 99.2  HR: 70  BP: 86/57  RR: 19  SpO2: 100%      LABS                        12.7   10.09 )-----------( 169      ( 28 Dec 2020 04:30 )             42.1     12-28    142  |  108  |  18  ----------------------------<  111<H>  4.3   |  25  |  0.7    Ca    8.2<L>      28 Dec 2020 04:30  Phos  2.6     12-27  Mg     2.2     12-28    TPro  4.2<L>  /  Alb  2.5<L>  /  TBili  0.4  /  DBili  x   /  AST  20  /  ALT  21  /  AlkPhos  65  12-28        ABG - ( 28 Dec 2020 04:08 )  pH, Arterial: 7.41  pH, Blood: x     /  pCO2: 44    /  pO2: 107   / HCO3: 28    / Base Excess: 2.4   /  SaO2: 98            PHYSICAL EXAM  GEN: intubated  PULM: Clear to auscultation bilaterally, No wheezes  CVS: Regular rate and rhythm, S1-S2, no murmurs  ABD: Soft, non-tender, non-distended, no guarding  EXT: No edema    ASSESSMENT AND PLAN    IMPRESSION:  Intracranial mass legion   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    Versed for sedation.  Wean Fentanyl.  Keppra 500 BID for seizure ppx. Neurology following.  Maintain Normothermia, utilize antipyretics and arctic sun as needed.  Blood and CSF studies if temperature spike   - f/u with neurosx for possible surgical resection of mass once patient is off levophed    HEENT: Oral care      PULMONARY:  HOB @ 45 degrees.  Aspiration precautions.  Keep POx > 92%.   - right segmental PE identifed - no anticoagulation given brain mass     CARDIOVASCULAR:  Keep -200.  ECHO - 60-65% EF, moderate AS; CE - neg, Cardiology - Metoprolol 12.5 bid    GI: GI prophylaxis.  NG feeds;  Bowel regimen.    RENAL:  Follow up lytes.  Correct as needed    INFECTIOUS DISEASE: possible LLL pneumonia. On  Meropenum. Follow up blood cultures. COVID negative    HEMATOLOGICAL:  Heparin SQ as per neuro; if neurological worsening stop heparin, obtain CTH   - LE Duplex. If DVT present then Vascular consult for IVC filter    ENDOCRINE:  Follow up FS.  Continue basal bolus + ss insulin     MUSCULOSKELETAL: bedrest    DISPO: CCU Monitoring   HPI:  81 yo male with PMHx of DM, COPD, HTN c/o AMS since this morning. Patient lives by himself, son lives next door. Last known well at 10 PM at baseline per son, at 6 AM today, patient was not himself, confused, not talking. Stroke code called en route, NIHSS 11 for aphasia but grossly no focal weakness. CTH reported as having a focus of vasogenic edema in the right temporoparietal white matter with internal hyperdensity and effacement of the adjacent cortical sulci. Trace right temporoparietal subarachnoid hemorrhage is also seen. Not a candidate for IV thrombolytics for being out of the window and for ICH. Not a candidate for IA intervention because of no LVO on CTA. (19 Dec 2020 11:02)    Currently admitted to medicine with the primary diagnosis of Intracranial bleed       Today is hospital day 9d.     INTERVAL HPI / OVERNIGHT EVENTS:  Patient was examined and seen at bedside. This morning he is resting comfortably in bed and reports no new issues or overnight events.     ROS: Otherwise unremarkable     PAST MEDICAL & SURGICAL HISTORY  COPD (chronic obstructive pulmonary disease)    BPH (benign prostatic hyperplasia)    Back pain    Non-Hodgkin lymphoma    CLL (chronic lymphocytic leukemia)  treatment completed 10/19    Depression    HTN (hypertension)    DM (diabetes mellitus)    Anxiety    Encounter for screening colonoscopy  7 years approx    Port-A-Cath in place    H/O total knee replacement, bilateral      ALLERGIES  No Known Allergies    MEDICATIONS  STANDING MEDICATIONS  chlorhexidine 0.12% Liquid 15 milliLiter(s) Oral Mucosa two times a day  chlorhexidine 4% Liquid 1 Application(s) Topical daily  cyanocobalamin 1000 MICROGram(s) Oral daily  dexMEDEtomidine Infusion 0.2 MICROgram(s)/kG/Hr IV Continuous <Continuous>  dextrose 40% Gel 15 Gram(s) Oral once  dextrose 5%. 1000 milliLiter(s) IV Continuous <Continuous>  dextrose 5%. 1000 milliLiter(s) IV Continuous <Continuous>  dextrose 50% Injectable 25 Gram(s) IV Push once  dextrose 50% Injectable 12.5 Gram(s) IV Push once  dextrose 50% Injectable 25 Gram(s) IV Push once  fentaNYL   Infusion. 0.5 MICROgram(s)/kG/Hr IV Continuous <Continuous>  folic acid 1 milliGRAM(s) Oral daily  glucagon  Injectable 1 milliGRAM(s) IntraMuscular once  heparin   Injectable 5000 Unit(s) SubCutaneous every 8 hours  insulin glargine Injectable (LANTUS) 25 Unit(s) SubCutaneous every morning  insulin lispro (ADMELOG) corrective regimen sliding scale   SubCutaneous three times a day before meals  insulin lispro Injectable (ADMELOG) 6 Unit(s) SubCutaneous every 6 hours  iohexol 300 mG (iodine)/mL Oral Solution 30 milliLiter(s) Oral once  levETIRAcetam  IVPB 500 milliGRAM(s) IV Intermittent every 12 hours  meropenem  IVPB 500 milliGRAM(s) IV Intermittent every 8 hours  meropenem  IVPB      metoprolol tartrate 12.5 milliGRAM(s) Oral two times a day  norepinephrine Infusion 0.05 MICROgram(s)/kG/Min IV Continuous <Continuous>  simvastatin 20 milliGRAM(s) Oral at bedtime  tamsulosin Oral Tab/Cap - Peds 0.4 milliGRAM(s) Oral at bedtime  traZODone 150 milliGRAM(s) Oral daily    PRN MEDICATIONS  acetaminophen   Tablet .. 650 milliGRAM(s) Oral every 6 hours PRN  fentaNYL    Injectable 25 MICROGram(s) IV Push every 6 hours PRN  polyethylene glycol 3350 17 Gram(s) Oral daily PRN    VITALS:  T(F): 99.2  HR: 70  BP: 86/57  RR: 19  SpO2: 100%      LABS                        12.7   10.09 )-----------( 169      ( 28 Dec 2020 04:30 )             42.1     12-28    142  |  108  |  18  ----------------------------<  111<H>  4.3   |  25  |  0.7    Ca    8.2<L>      28 Dec 2020 04:30  Phos  2.6     12-27  Mg     2.2     12-28    TPro  4.2<L>  /  Alb  2.5<L>  /  TBili  0.4  /  DBili  x   /  AST  20  /  ALT  21  /  AlkPhos  65  12-28        ABG - ( 28 Dec 2020 04:08 )  pH, Arterial: 7.41  pH, Blood: x     /  pCO2: 44    /  pO2: 107   / HCO3: 28    / Base Excess: 2.4   /  SaO2: 98            PHYSICAL EXAM  GEN: intubated  PULM: Clear to auscultation bilaterally, No wheezes  CVS: Regular rate and rhythm, S1-S2, no murmurs  ABD: Soft, non-tender, non-distended, no guarding  EXT: No edema    ASSESSMENT AND PLAN    IMPRESSION:  Intracranial mass legion   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    Versed for sedation.  Wean Fentanyl.  Keppra 500 BID for seizure ppx. Neurology following.  Maintain Normothermia, utilize antipyretics and arctic sun as needed.  Blood and CSF studies if temperature spike   - f/u with neurosx for possible surgical resection of mass once patient is off levophed    HEENT: Oral care    PULMONARY:  HOB @ 45 degrees.  Aspiration precautions.  Keep POx > 92%.   - right segmental PE identifed - no anticoagulation given brain mass     CARDIOVASCULAR:  HoTN. on levo. [place]. Downtitrate as tolerated. ECHO - 60-65% EF, moderate AS; CE - neg    GI: GI prophylaxis.  NG feeds;  Bowel regimen.    RENAL:  Follow up lytes.  Correct as needed    INFECTIOUS DISEASE: On  Meropenum. Follow up blood cultures. COVID negative    HEMATOLOGICAL:  No AC due to brain mass with areas of hemorrhage.   DVT + R femoral   Vascular consulted for IVC filter    ENDOCRINE:  Follow up FS.  Continue basal bolus + ss insulin     MUSCULOSKELETAL: bedrest    DISPO: CCU Monitoring

## 2020-12-29 NOTE — PROGRESS NOTE ADULT - SUBJECTIVE AND OBJECTIVE BOX
Neurocritical Care Progress Note    CR OWEN    80y     [id number]   Daily     Daily Weight in k.5 (29 Dec 2020 06:00)  SARS-CoV-2: NotDetec (19 Dec 2020 08:30)    Patient is a 80y old  Male who presents with a chief complaint of Intracranial bleed (29 Dec 2020 12:02)    HPI:  79 yo male with PMHx of DM, COPD, HTN c/o AMS since this morning. Patient lives by himself, son lives next door. Last known well at 10 PM at baseline per son, at 6 AM today, patient was not himself, confused, not talking. Stroke code called en route, NIHSS 11 for aphasia but grossly no focal weakness. CTH reported as having a focus of vasogenic edema in the right temporoparietal white matter with internal hyperdensity and effacement of the adjacent cortical sulci. Trace right temporoparietal subarachnoid hemorrhage is also seen. Not a candidate for IV thrombolytics for being out of the window and for ICH. Not a candidate for IA intervention because of no LVO on CTA. (19 Dec 2020 11:02)    Allergies:  No Known Allergies    PAST MEDICAL & SURGICAL HISTORY:  COPD (chronic obstructive pulmonary disease)    BPH (benign prostatic hyperplasia)    Back pain    Non-Hodgkin lymphoma    CLL (chronic lymphocytic leukemia)  treatment completed 10/19    Depression    HTN (hypertension)    DM (diabetes mellitus)    Anxiety    Encounter for screening colonoscopy  7 years approx    Port-A-Cath in place    H/O total knee replacement, bilateral      Home Medications:  budesonide-formoterol 160 mcg-4.5 mcg/inh inhalation aerosol: 2 puff(s) inhaled 2 times a day (2020 10:24)  buPROPion 150 mg/12 hours (SR) oral tablet, extended release: 1 tab(s) orally 2 times a day (2020 10:24)  Effexor  mg oral capsule, extended release: 1 cap(s) orally once a day (2020 10:24)  folic acid 1 mg oral tablet: 1 tab(s) orally once a day (2020 10:24)  metFORMIN 500 mg oral tablet: 1 tab(s) orally once a day (at bedtime) (2020 10:24)  nicotine 14 mg/24 hr transdermal film, extended release: 1 patch transdermal once a day. DO NOT smoke while using patches. (11 Mar 2020 14:05)  simvastatin 20 mg oral tablet: 1 tab(s) orally once a day (at bedtime) (2020 10:24)  tamsulosin 0.4 mg oral capsule: 1 cap(s) orally once a day (at bedtime) (13 Mar 2020 11:21)  traZODone 150 mg oral tablet: 1 tab(s) orally once a day (at bedtime) (2020 10:24)  Vitamin B12 500 mcg oral tablet: 1 tab(s) orally once a day (2020 10:24)  Vitamin D3 1000 intl units (25 mcg) oral tablet: 1 tab(s) orally once a day (13 Mar 2020 11:40)    24 Hour Events:  --> Requested f/u by CCU team in regards to starting DVT ppx: OK for heparin SQ. MRI completed: Right frontoparietal hemorrhagic enhancing lesion with central necrosis which may represent a primary brain neoplasm versus metastatic lesion.  --> Pt remains intubated and sedated.     Exam:  Mentation: On precedex 1.2mcg/kg and fentanyl gtt 2.5mcg/kg. No spontaneous eye opening, not following commands. Language NADIA due to presence of ET tube  Cranial Nerves:  	II – Blink to threat minimal, but present  	III/IV/VI – Pinpoint pupils, but reactive. No ptosis, skew, dysconjugate or gaze preference noted  	V – Corneals present b/l  	VII – Difficult to assess facial palsy due to ET securement device  	VIII – No nystagmus. Absent oculocephalic  	IX/X – Cough to ET suction present (NADIA if palate rises symetrically or if uvula rises midline)  	XI – Deferred (NADIA SCM strength/symmetry)  	XII – Deferred (NADIA if tongue protrusion is at midline)  Motor: No posturing witnessed. No spontaneous movement  Sensory: Withdrawal to pain right LE only w/ ambiguous purposeful movement of left LE to pain. Grimaces to painful stimuli    Reflexes: Babinski downgoing b/l  Cerebellum: NADIA dysmetria. Gait deferred      Current Medications:  acetaminophen   Tablet .. 650 milliGRAM(s) Oral every 6 hours PRN  chlorhexidine 0.12% Liquid 15 milliLiter(s) Oral Mucosa two times a day  chlorhexidine 4% Liquid 1 Application(s) Topical daily  cyanocobalamin 1000 MICROGram(s) Oral daily  dextrose 40% Gel 15 Gram(s) Oral once  dextrose 5%. 1000 milliLiter(s) IV Continuous <Continuous>  dextrose 5%. 1000 milliLiter(s) IV Continuous <Continuous>  dextrose 50% Injectable 25 Gram(s) IV Push once  dextrose 50% Injectable 12.5 Gram(s) IV Push once  dextrose 50% Injectable 25 Gram(s) IV Push once  fentaNYL   Infusion. 0.5 MICROgram(s)/kG/Hr IV Continuous <Continuous>  folic acid 1 milliGRAM(s) Oral daily  glucagon  Injectable 1 milliGRAM(s) IntraMuscular once  heparin   Injectable 5000 Unit(s) SubCutaneous every 8 hours  insulin glargine Injectable (LANTUS) 25 Unit(s) SubCutaneous every morning  insulin lispro (ADMELOG) corrective regimen sliding scale   SubCutaneous three times a day before meals  insulin lispro Injectable (ADMELOG) 6 Unit(s) SubCutaneous every 6 hours  iohexol 300 mG (iodine)/mL Oral Solution 30 milliLiter(s) Oral once  levETIRAcetam  IVPB 500 milliGRAM(s) IV Intermittent every 12 hours  meropenem  IVPB 500 milliGRAM(s) IV Intermittent every 8 hours  meropenem  IVPB      metoprolol tartrate 12.5 milliGRAM(s) Oral two times a day  midazolam Infusion 0.02 mG/kG/Hr IV Continuous <Continuous>  norepinephrine Infusion 0.05 MICROgram(s)/kG/Min IV Continuous <Continuous>  polyethylene glycol 3350 17 Gram(s) Oral daily PRN  simvastatin 20 milliGRAM(s) Oral at bedtime  sodium chloride 0.9% Bolus 250 milliLiter(s) IV Bolus once  sodium chloride 0.9% Bolus 1000 milliLiter(s) IV Bolus once  tamsulosin Oral Tab/Cap - Peds 0.4 milliGRAM(s) Oral at bedtime  traZODone 150 milliGRAM(s) Oral daily    mRS:  0 No symptoms at all  1 No significant disability despite symptoms; able to carry out all usual duties and activities without assistance  2 Slight disability; unable to carry out all previous activities, but able to look after own affairs  3 Moderate disability; requiring some help, but able to walk without assistance  4 Moderately severe disability; unable to walk without assistance and unable to attend to own bodily needs without assistance  5 Severe disability; bedridden, incontinent and requiring constant nursing care and attention  6 Dead    Vital Signs Last 24 Hrs  T(C): 36.8 (29 Dec 2020 12:00), Max: 37.8 (29 Dec 2020 04:00)  T(F): 98.2 (29 Dec 2020 12:00), Max: 100 (29 Dec 2020 04:00)  HR: 74 (29 Dec 2020 12:00) (68 - 116)  BP: 97/61 (29 Dec 2020 07:37) (96/61 - 97/63)  BP(mean): 72 (29 Dec 2020 07:37) (70 - 74)  RR: 19 (29 Dec 2020 12:00) (6 - 29)  SpO2: 99% (29 Dec 2020 12:00) (96% - 100%)     I/Os:  I&O's Detail    28 Dec 2020 07:01  -  29 Dec 2020 07:00  --------------------------------------------------------  IN:    Dexmedetomidine: 80 mL    Enteral Tube Flush: 200 mL    FentaNYL: 621 mL    Free Water: 480 mL    IV PiggyBack: 150 mL    Midazolam: 101 mL    Norepinephrine: 523 mL    Vital High Protein: 575 mL  Total IN: 2730 mL    OUT:    Voided (mL): 1500 mL  Total OUT: 1500 mL    Total NET: 1230 mL      29 Dec 2020 07:01  -  29 Dec 2020 13:16  --------------------------------------------------------  IN:    FentaNYL: 156 mL    Free Water: 250 mL    Midazolam: 30 mL    Norepinephrine: 100 mL    Sodium Chloride 0.9% Bolus: 250 mL    Vital High Protein: 320 mL  Total IN: 1106 mL    OUT:    Indwelling Catheter - Urethral (mL): 200 mL  Total OUT: 200 mL    Total NET: 906 mL        Labs:  ABG - ( 29 Dec 2020 03:38 )  pH, Arterial: 7.36  pH, Blood: x     /  pCO2: 45    /  pO2: 92    / HCO3: 26    / Base Excess: -0.1  /  SaO2: 95                  Mode: AC/ CMV (Assist Control/ Continuous Mandatory Ventilation)  RR (machine): 20  TV (machine): 470  FiO2: 40  PEEP: 5  ITime: 1  MAP: 16  PIP: 22    LIVER FUNCTIONS - ( 29 Dec 2020 06:00 )  Alb: 2.6 g/dL / Pro: 4.5 g/dL / ALK PHOS: 69 U/L / ALT: 19 U/L / AST: 15 U/L / GGT: x              Urinalysis Basic - ( 28 Dec 2020 16:57 )    Color: Yellow / Appearance: Clear / S.043 / pH: x  Gluc: x / Ketone: Negative  / Bili: Negative / Urobili: 3 mg/dL   Blood: x / Protein: 30 mg/dL / Nitrite: Negative   Leuk Esterase: Negative / RBC: 1 /HPF / WBC 1 /HPF   Sq Epi: x / Non Sq Epi: 1 /HPF / Bacteria: Negative                             12.8   12.03 )-----------( 207      ( 29 Dec 2020 06:00 )             42.6      12-    143  |  108  |  27<H>  ----------------------------<  154<H>  4.2   |  25  |  0.9    Ca    8.5      29 Dec 2020 06:00  Mg     1.9     -    TPro  4.5<L>  /  Alb  2.6<L>  /  TBili  0.4  /  DBili  x   /  AST  15  /  ALT  19  /  AlkPhos  69  12-29       Adult Advanced Hemodynamics Last 24 Hrs  CVP(mm Hg): --  CVP(cm H2O): --  CO: --  CI: --  PA: --  PA(mean): --  PCWP: --  SVR: --  SVRI: --  PVR: --  PVRI: --      Diagnostics/ Results:  Last CTH:    Last CTA/MRA:    Last CTP:    Last MRI:    Last TCD:    Last EEG:    Last Echo:    Last EKG:    Chest Xray:    ROS:  [X] A ten-point ROS was otherwise negative except as noted in HPI    Assessment:      Plan:  Neurology:      Respiratory:      Cardiology:      Vascular:      GI/Nutrition:  Diet, NPO with Tube Feed:   Tube Feeding Modality: Orogastric  Peptamen A.F. Formula  Total Volume for 24 Hours (mL): 1320  Continuous  Starting Tube Feed Rate mL per Hour: 25  Increase Tube Feed Rate by (mL): 10     Every 4 hours  Until Goal Tube Feed Rate (mL per Hour): 55  Tube Feed Duration (in Hours): 24  Tube Feed Start Time: 00:00  No Carb Prosource TF     Qty per Day:  2 (20 @ 14:37) [Active]        Bowel Regimen ->  PPx -> Pantoprazole       / Renal/ Fluids/ Electrolytes:      Hematology/ Oncology:  DVT PPx -> Haparin SQ    Lovenox SQ    Haparin gtt      Infectious Disease:      Musculoskeletol:      Endocrine:      Tubes/ Lines/ Drains:  Central    Peripheral    A-line    Koenig    NGT/OGT    Chest    EVD    Disposition:  Continue medical management as per primary team in:   ICU   CCU   SICU   Stroke Unit   Stepdown    CODE STATUS w/ Next of Kin:   DNI   DNR   FULL    Patient seen and case discussed with NCC attending; see attending attestation  Please call w/ questions  Mattie Gilbert NP  [id number]             Neurocritical Care Progress Note    CR OWEN    80y     [id number]      Daily Weight in k.5 (29 Dec 2020 06:00)  SARS-CoV-2: NotDetec (19 Dec 2020 08:30)    Patient is a 80y old  Male who presents with a chief complaint of Intracranial bleed (29 Dec 2020 12:02)    HPI:  79 yo male with PMHx of DM, COPD, HTN c/o AMS since this morning. Patient lives by himself, son lives next door. Last known well at 10 PM at baseline per son, at 6 AM today, patient was not himself, confused, not talking. Stroke code called en route, NIHSS 11 for aphasia but grossly no focal weakness. CTH reported as having a focus of vasogenic edema in the right temporoparietal white matter with internal hyperdensity and effacement of the adjacent cortical sulci. Trace right temporoparietal subarachnoid hemorrhage is also seen. Not a candidate for IV thrombolytics for being out of the window and for ICH. Not a candidate for IA intervention because of no LVO on CTA. (19 Dec 2020 11:02)    Allergies:  No Known Allergies    PAST MEDICAL & SURGICAL HISTORY:  COPD (chronic obstructive pulmonary disease)    BPH (benign prostatic hyperplasia)    Back pain    Non-Hodgkin lymphoma    CLL (chronic lymphocytic leukemia)  treatment completed 10/19    Depression    HTN (hypertension)    DM (diabetes mellitus)    Anxiety    Encounter for screening colonoscopy  7 years approx    Port-A-Cath in place    H/O total knee replacement, bilateral    Home Medications:  budesonide-formoterol 160 mcg-4.5 mcg/inh inhalation aerosol: 2 puff(s) inhaled 2 times a day (2020 10:24)  buPROPion 150 mg/12 hours (SR) oral tablet, extended release: 1 tab(s) orally 2 times a day (2020 10:24)  Effexor  mg oral capsule, extended release: 1 cap(s) orally once a day (2020 10:24)  folic acid 1 mg oral tablet: 1 tab(s) orally once a day (2020 10:24)  metFORMIN 500 mg oral tablet: 1 tab(s) orally once a day (at bedtime) (2020 10:24)  nicotine 14 mg/24 hr transdermal film, extended release: 1 patch transdermal once a day. DO NOT smoke while using patches. (11 Mar 2020 14:05)  simvastatin 20 mg oral tablet: 1 tab(s) orally once a day (at bedtime) (2020 10:24)  tamsulosin 0.4 mg oral capsule: 1 cap(s) orally once a day (at bedtime) (13 Mar 2020 11:21)  traZODone 150 mg oral tablet: 1 tab(s) orally once a day (at bedtime) (2020 10:24)  Vitamin B12 500 mcg oral tablet: 1 tab(s) orally once a day (2020 10:24)  Vitamin D3 1000 intl units (25 mcg) oral tablet: 1 tab(s) orally once a day (13 Mar 2020 11:40)    24 Hour Events:  --> Requested f/u by CCU team in regards to starting DVT ppx: OK for heparin SQ. MRI completed: Right frontoparietal hemorrhagic enhancing lesion with central necrosis which may represent a primary brain neoplasm versus metastatic lesion.  --> Pt remains intubated and sedated on versed, fentanyl, and levophed for BP control. Brain stem reflexes intact. New DVT right external iliac and common femoral vein along with a pulmonary embolism. Plan to place IVC filter. Wean off versed. Family considering DNR    Exam:  Mentation: On fentanyl gtt 2.5mcg/kg and versed .05mg/kg. No spontaneous eye opening, not following commands. Language NADIA due to presence of ET tube  Cranial Nerves:  	II – No blink to threat   	III/IV/VI – Pinpoint pupils, but reactive. No ptosis, skew, dysconjugate or gaze preference noted  	V – Corneals present b/l  	VII – Difficult to assess facial palsy due to ET securement device  	VIII – No nystagmus. Absent oculocephalic  	IX/X – Cough to ET suction present (NADIA if palate rises symetrically or if uvula rises midline)  	XI – Deferred (NADIA SCM strength/symmetry)  	XII – Deferred (NADIA if tongue protrusion is at midline)  Motor: No posturing witnessed. No spontaneous movement  Sensory: Eamon to noxious stimuli by withdrawing. Grimaces to painful stimuli    Reflexes: Babinski mute, absent lower ext reflexes, UE hypoactive b.l  Cerebellum: NADIA dysmetria. Gait deferred    Current Medications:  acetaminophen   Tablet .. 650 milliGRAM(s) Oral every 6 hours PRN  chlorhexidine 0.12% Liquid 15 milliLiter(s) Oral Mucosa two times a day  chlorhexidine 4% Liquid 1 Application(s) Topical daily  cyanocobalamin 1000 MICROGram(s) Oral daily  dextrose 40% Gel 15 Gram(s) Oral once  dextrose 5%. 1000 milliLiter(s) IV Continuous <Continuous>  dextrose 5%. 1000 milliLiter(s) IV Continuous <Continuous>  dextrose 50% Injectable 25 Gram(s) IV Push once  dextrose 50% Injectable 12.5 Gram(s) IV Push once  dextrose 50% Injectable 25 Gram(s) IV Push once  fentaNYL   Infusion. 0.5 MICROgram(s)/kG/Hr IV Continuous <Continuous>  folic acid 1 milliGRAM(s) Oral daily  glucagon  Injectable 1 milliGRAM(s) IntraMuscular once  heparin   Injectable 5000 Unit(s) SubCutaneous every 8 hours  insulin glargine Injectable (LANTUS) 25 Unit(s) SubCutaneous every morning  insulin lispro (ADMELOG) corrective regimen sliding scale   SubCutaneous three times a day before meals  insulin lispro Injectable (ADMELOG) 6 Unit(s) SubCutaneous every 6 hours  iohexol 300 mG (iodine)/mL Oral Solution 30 milliLiter(s) Oral once  levETIRAcetam  IVPB 500 milliGRAM(s) IV Intermittent every 12 hours  meropenem  IVPB 500 milliGRAM(s) IV Intermittent every 8 hours  meropenem  IVPB      metoprolol tartrate 12.5 milliGRAM(s) Oral two times a day  midazolam Infusion 0.02 mG/kG/Hr IV Continuous <Continuous>  norepinephrine Infusion 0.05 MICROgram(s)/kG/Min IV Continuous <Continuous>  polyethylene glycol 3350 17 Gram(s) Oral daily PRN  simvastatin 20 milliGRAM(s) Oral at bedtime  sodium chloride 0.9% Bolus 250 milliLiter(s) IV Bolus once  sodium chloride 0.9% Bolus 1000 milliLiter(s) IV Bolus once  tamsulosin Oral Tab/Cap - Peds 0.4 milliGRAM(s) Oral at bedtime  traZODone 150 milliGRAM(s) Oral daily    mRS:  0 No symptoms at all  1 No significant disability despite symptoms; able to carry out all usual duties and activities without assistance  2 Slight disability; unable to carry out all previous activities, but able to look after own affairs  3 Moderate disability; requiring some help, but able to walk without assistance  4 Moderately severe disability; unable to walk without assistance and unable to attend to own bodily needs without assistance  5 Severe disability; bedridden, incontinent and requiring constant nursing care and attention  6 Dead    Vital Signs Last 24 Hrs  T(C): 36.8 (29 Dec 2020 12:00), Max: 37.8 (29 Dec 2020 04:00)  T(F): 98.2 (29 Dec 2020 12:00), Max: 100 (29 Dec 2020 04:00)  HR: 74 (29 Dec 2020 12:00) (68 - 116)  BP: 97/61 (29 Dec 2020 07:37) (96/61 - 97/63)  BP(mean): 72 (29 Dec 2020 07:37) (70 - 74)  RR: 19 (29 Dec 2020 12:00) (6 - 29)  SpO2: 99% (29 Dec 2020 12:00) (96% - 100%)     I/Os:  I&O's Detail    28 Dec 2020 07:01  -  29 Dec 2020 07:00  --------------------------------------------------------  IN:    Dexmedetomidine: 80 mL    Enteral Tube Flush: 200 mL    FentaNYL: 621 mL    Free Water: 480 mL    IV PiggyBack: 150 mL    Midazolam: 101 mL    Norepinephrine: 523 mL    Vital High Protein: 575 mL  Total IN: 2730 mL    OUT:    Voided (mL): 1500 mL  Total OUT: 1500 mL    Total NET: 1230 mL      29 Dec 2020 07:01  -  29 Dec 2020 13:16  --------------------------------------------------------  IN:    FentaNYL: 156 mL    Free Water: 250 mL    Midazolam: 30 mL    Norepinephrine: 100 mL    Sodium Chloride 0.9% Bolus: 250 mL    Vital High Protein: 320 mL  Total IN: 1106 mL    OUT:    Indwelling Catheter - Urethral (mL): 200 mL  Total OUT: 200 mL    Total NET: 906 mL    Labs:  ABG - ( 29 Dec 2020 03:38 )  pH, Arterial: 7.36  pH, Blood: x     /  pCO2: 45    /  pO2: 92    / HCO3: 26    / Base Excess: -0.1  /  SaO2: 95        Mode: AC/ CMV (Assist Control/ Continuous Mandatory Ventilation)  RR (machine): 20  TV (machine): 470  FiO2: 40  PEEP: 5  ITime: 1  MAP: 16  PIP: 22    LIVER FUNCTIONS - ( 29 Dec 2020 06:00 )  Alb: 2.6 g/dL / Pro: 4.5 g/dL / ALK PHOS: 69 U/L / ALT: 19 U/L / AST: 15 U/L / GGT: x           Urinalysis Basic - ( 28 Dec 2020 16:57 )    Color: Yellow / Appearance: Clear / S.043 / pH: x  Gluc: x / Ketone: Negative  / Bili: Negative / Urobili: 3 mg/dL   Blood: x / Protein: 30 mg/dL / Nitrite: Negative   Leuk Esterase: Negative / RBC: 1 /HPF / WBC 1 /HPF   Sq Epi: x / Non Sq Epi: 1 /HPF / Bacteria: Negative                    12.8   12.03 )-----------( 207      ( 29 Dec 2020 06:00 )             42.6      12-    143  |  108  |  27<H>  ----------------------------<  154<H>  4.2   |  25  |  0.9    Ca    8.5      29 Dec 2020 06:00  Mg     1.9         TPro  4.5<L>  /  Alb  2.6<L>  /  TBili  0.4  /  DBili  x   /  AST  15  /  ALT  19  /  AlkPhos  69       Diagnostics/ Results:  Last CTH: < from: CT Head No Cont (20 @ 18:39) >  IMPRESSION:    No significant change from prior exam.    Unchanged 3.5 cm right frontoparietal hypodensitywith areas of cortical extension and internal hyperdensity. Recommend MRI with contrast for further evaluation.    < end of copied text >    Last CTA/MRA: n/a    Last CTP: < from: CT Perfusion w/ Maps w/ IV Cont (20 @ 08:37) >  IMPRESSION:    1.  No evidence of major vascular stenosis or occlusion. Normal perfusion images.    2.  4.7 cm right frontal/parietal lesion; CTA appearance favors neoplasm. Prominent surrounding the traversing vasculature noted. Recommend further evaluation with a contrast-enhanced brain MRI.    < end of copied text >    Last MRI: < from: MR Head w/wo IV Cont (20 @ 15:04) >  IMPRESSION:    Right frontoparietal hemorrhagic enhancing lesion with central necrosis which may represent a primary brain neoplasm versus metastatic lesion.    Mild chronic microvascular-type changes.    < end of copied text >    < from: MR Head No Cont (20 @ 17:45) >  IMPRESSION:    Limited examination demonstrates abnormal signal in the right frontoparietal region responding to abnormality seen on recent CT of 2020. There is no significant associated mass effect or midline shift. Ventricles and sulci are otherwise unremarkable.    Recommend repeat pre and postcontrast MRI with sedation.    Last TCD: n/a    Last EEG: VEEG in the last 24 hours: intubated and sedated    Background------------ symmetrical, reactive reaching frequencies in the range of 6-7     Focal and generalized slowing----------- right hemispheric focal slowing. mainly over the FT region                                                   Interictal activity--------------------- right FT or FTC low amplitude spikes at times in runs of periodic discharge    Events---------------------- none    Seizures------------- none    Impression:  abnormal as above    Last Echo: < from: TTE Echo Complete w/o Contrast w/ Doppler (20 @ 10:16) >  Summary:   1. Left ventricular ejection fraction, by visual estimation, is 60 to 65%.   2. Normal global left ventricular systolic function.   3. Peak transaortic gradient equals 56.6 mmHg, mean transaortic gradient equals 19.0 mmHg, the calculated aortic valve area equals 1.42 cm² by the continuity equation consistent with moderate aortic stenosis.   4. Mild aortic regurgitation.   5. Thickening and calcification of the anterior and posterior mitral leaflets.   6. Moderate mitralannular calcification.    PHYSICIAN INTERPRETATION:  Left Ventricle: The left ventricular internal cavity size is normal. Left ventricular wall thickness is normal. Global LV systolic function was normal. Left ventricular ejection fraction, by visualestimation, is 60 to 65%. The left ventricular diastolic function could not be assessed in this study.  Right Ventricle: RV systolic function is normal.  Left Atrium: Normal left atrial size.  Right Atrium: Normal right atrial size.  Mitral Valve: Thickening and calcification of the anterior and posterior mitral valve leaflets. There is moderate mitral annular calcification. No mitral regurgitation.  Tricuspid Valve: The tricuspid valve is normal in structure. No tricuspid regurgitation is visualized.  Aortic Valve: Sclerotic aortic valve with decreased opening. Peak transaortic gradient equals 56.6 mmHg, mean transaortic gradient equals 19.0 mmHg, the calculated aortic valve area equals 1.42 cm² by the continuity equation consistent with moderate aortic stenosis. Mild aortic regurgitation.  Pulmonic Valve: The pulmonic valve was not well visualized.  Aorta: The ascending aorta was not well visualized.  Pulmonary Artery: The pulmonary artery is not well visualized.  Venous: The inferior vena cava was dilated, with respiratory size variation less than 50%.    < end of copied text >    Last EKG: < from: 12 Lead ECG (20 @ 14:48) >  Diagnosis Line Sinus rhythm with Premature atrial complexes  Low voltage QRS  Possible Inferior infarct , age undetermined  Nonspecific T wave abnormality  Low voltage QRS chest leads  RSR' or QR pattern in V1 suggests right ventricular conduction delay  Abnormal ECG    < end of copied text >    Chest Xray: < from: Xray Chest 1 View- PORTABLE-Urgent (Xray Chest 1 View- PORTABLE-Urgent .) (20 @ 11:59) >  Impression:    Stable right and increased left basilar opacities. No definite evidence pneumothorax.    Lines and support devices as described above.    < end of copied text >    ROS:  [X] A ten-point ROS was otherwise negative except as noted in HPI    Assessment: Patient is a 79 yo male with PMHx of DM, COPD, HTN who presented with AMS. Initial CTH reported unchanged right frontoparietal hypodensity with areas of cortical extension and internal hyperdensity. Patient was not a candidate for IV thrombolytics for being out of the window and for ICH. Not a candidate for IA intervention because of no LVO on CTA. Evaluated by neurosurgery, no surgical intervention. MRI completed: Right frontoparietal hemorrhagic enhancing lesion with central necrosis which may represent a primary brain neoplasm versus metastatic lesion. Patient still not a candidate for surgery as per NSx. But, recommend biopsy of lesion to r/o malignancy if family desires full medical management. vEEG showed interictal activity right FT or FTC low amplitude spikes at times in runs of periodic discharge; continue Keppra. New PE/DVT; plan for IVC filter, but heparin gtt not a contraindication from neuro status. Neuro exam with brain stem reflexes. Family considering DNR. Discontinue fentanyl for its prolongation of half life.     Plan:  Neurology:  -Precedex for sedation as needed for agitation or vent synchrony. Discontinue versed, as this prolongs patients wakefulness and disrupts ability for neuro exam and status  -vEEG--> potential for seizures; Keppra 500mg BID  -MRI w/w/o --> Right frontoparietal hemorrhagic enhancing lesion with central necrosis which may represent a primary brain neoplasm versus metastatic lesion  -If patient's family agrees for aggressive medical care, consider biopsy of lesion to r/o malignancy   -Prognosis is indeterminate at this time     Respiratory:  -Ventilator management as per respiratory: 470/40/20/5  -Keep HOB > 35; asp precautions     Cardiology:  -Keep -200; titrate levophed PRN    GI/Nutrition:  -Diet, NPO with Tube Feed  -Bowel Regimen ->Miralax  -PPx -> Consider starting Pantoprazole     / Renal/ Fluids/ Electrolytes:  -Strict IsOs, maintain euvolemia  -U/A : Negative    Hematology/ Oncology:  -DVT PPx -> Heparin SQ    Lovenox SQ    Haparin gtt  -No contraindications from neurological perspective to start heparin gtt (no bolus) for DVT/PE    Vascular:  -VA duplex LE b/l : Acute deep vein thrombosis of the right external iliac vein, common femoral vein, and femoral vein.  -CT chest : Cardiomegaly. Segmental right pulmonary embolism. Basilar opacifications indicative of pneumonia.    Infectious Disease:  -ABX as per ID; PNA; meropenum   -Continuous temperature management   -Maintain Normothermia, utilize antipyretics and arctic sun as needed.  -Blood and CSF studies if temperature spike .     Musculoskeletol:  -Bedrest    Endocrine:  -Zocor    Tubes/ Lines/ Drains:  Central    Peripheral    A-line    Koenig    NGT/OGT    Chest    EVD    Disposition:  Continue medical management as per primary team in:   ICU   CCU   SICU   Stroke Unit   Stepdown    CODE STATUS w/ Next of Kin: Cr (son) [phone]  DNI   DNR   FULL    Patient seen and case discussed with NCC attending; see attending attestation  Please call w/ questions  Mattie Gilbert NP  [phone]

## 2020-12-29 NOTE — PROGRESS NOTE ADULT - ATTENDING COMMENTS
History, events, data and scans reviewed, patient examined . I agree and approved plan of care as outlined above.    Biopsy of cerebral lesion should be considered only when patient recovers from medical critical illness.

## 2020-12-29 NOTE — CHART NOTE - NSCHARTNOTEFT_GEN_A_CORE
Spoke to patient's sister Melody; family reconsidering GOC and whether they want further procedures (IVC filter/thrombectomy) will speak to them later today and update note Spoke to patient's sister Melody; family reconsidering GOC and whether they want further procedures (IVC filter/thrombectomy) asked for call back later in day after family discussion. I will speak to them later today and update note. Spoke to patient's sister Melody; family considering GOC and whether they want further procedures (IVC filter/thrombectomy); asked to speak to neurology. I will reach out to neurology team for call back. Spoke to patient's sister Melody; family considering GOC and whether they want further procedures (IVC filter/thrombectomy); asked to speak to neurology. Palliative consulted; will cooridnate family call for tomorrow with palliative/neuro Spoke to patient's sister Melody; family considering GOC and whether they want further procedures (IVC filter/thrombectomy); asked to speak to neuro. Palliative consulted; will cooridnate family call for tomorrow with palliative/neuro; spoke to neurosurgery PA will attempt to coordinate communication with family regarding plan of care and prognosis. Spoke to patient's sister Melody [phone]; family considering GOC and whether they want further procedures (IVC filter/thrombectomy); asked to speak to neuro. Palliative consulted; will cooridnate family call for tomorrow with palliative/neuro; spoke to neurosurgery PA will attempt to coordinate communication with family regarding plan of care and prognosis.

## 2020-12-29 NOTE — CONSULT NOTE ADULT - SUBJECTIVE AND OBJECTIVE BOX
PT SEEN IN icu   vitals stable but pt not responding to any verbal commands   intubated   came with acute historo of altered mental satus 10 days ago  PT SEEN IN icu   vitals stable but pt not responding to any verbal commands   intubated  and on levophed for bp   came with acute historo of altered mental satus 10 days ago

## 2020-12-29 NOTE — CONSULT NOTE ADULT - ASSESSMENT
altered mental status acute onset 10 days ago   neuro is invoved from day 1     will review out pt chart    full consult to follow     amauri garber MD   [phone] altered mental status acute onset 10 days ago   neuro is involved from day 1 ,not a candidate for neurosurgery at present   rt frotoparietal mass with intracraniual bleed   has h/o FOLLICULAR lymhpma but astable and not on any treatment for about 1 year   h/o smoking  ,  at present pt is  critical and not stable ,when better   will need resection of brain mass for diagnosis  and plan accordingly        will follow with you     amauri garber MD   [phone]

## 2020-12-29 NOTE — PROGRESS NOTE ADULT - SUBJECTIVE AND OBJECTIVE BOX
HPI:  79 yo male with PMHx of DM, COPD, HTN c/o AMS since this morning. Patient lives by himself, son lives next door. Last known well at 10 PM at baseline per son, at 6 AM today, patient was not himself, confused, not talking. Stroke code called en route, NIHSS 11 for aphasia but grossly no focal weakness. CTH reported as having a focus of vasogenic edema in the right temporoparietal white matter with internal hyperdensity and effacement of the adjacent cortical sulci. Trace right temporoparietal subarachnoid hemorrhage is also seen. Not a candidate for IV thrombolytics for being out of the window and for ICH. Not a candidate for IA intervention because of no LVO on CTA. (19 Dec 2020 11:02)    Currently admitted to medicine with the primary diagnosis of Intracranial bleed       Today is hospital day 10d.     INTERVAL HPI / OVERNIGHT EVENTS:  Patient was examined and seen at bedside. This morning he is resting comfortably in bed and reports no new issues or overnight events.     ROS: Otherwise unremarkable     PAST MEDICAL & SURGICAL HISTORY  COPD (chronic obstructive pulmonary disease)    BPH (benign prostatic hyperplasia)    Back pain    Non-Hodgkin lymphoma    CLL (chronic lymphocytic leukemia)  treatment completed 10/19    Depression    HTN (hypertension)    DM (diabetes mellitus)    Anxiety    Encounter for screening colonoscopy  7 years approx    Port-A-Cath in place    H/O total knee replacement, bilateral      ALLERGIES  No Known Allergies    MEDICATIONS  STANDING MEDICATIONS  chlorhexidine 0.12% Liquid 15 milliLiter(s) Oral Mucosa two times a day  chlorhexidine 4% Liquid 1 Application(s) Topical daily  cyanocobalamin 1000 MICROGram(s) Oral daily  dextrose 40% Gel 15 Gram(s) Oral once  dextrose 5%. 1000 milliLiter(s) IV Continuous <Continuous>  dextrose 5%. 1000 milliLiter(s) IV Continuous <Continuous>  dextrose 50% Injectable 25 Gram(s) IV Push once  dextrose 50% Injectable 12.5 Gram(s) IV Push once  dextrose 50% Injectable 25 Gram(s) IV Push once  fentaNYL   Infusion. 0.5 MICROgram(s)/kG/Hr IV Continuous <Continuous>  folic acid 1 milliGRAM(s) Oral daily  glucagon  Injectable 1 milliGRAM(s) IntraMuscular once  heparin   Injectable 5000 Unit(s) SubCutaneous every 8 hours  insulin glargine Injectable (LANTUS) 25 Unit(s) SubCutaneous every morning  insulin lispro (ADMELOG) corrective regimen sliding scale   SubCutaneous three times a day before meals  insulin lispro Injectable (ADMELOG) 6 Unit(s) SubCutaneous every 6 hours  iohexol 300 mG (iodine)/mL Oral Solution 30 milliLiter(s) Oral once  levETIRAcetam  IVPB 500 milliGRAM(s) IV Intermittent every 12 hours  meropenem  IVPB 500 milliGRAM(s) IV Intermittent every 8 hours  meropenem  IVPB      metoprolol tartrate 12.5 milliGRAM(s) Oral two times a day  midazolam Infusion 0.02 mG/kG/Hr IV Continuous <Continuous>  norepinephrine Infusion 0.05 MICROgram(s)/kG/Min IV Continuous <Continuous>  simvastatin 20 milliGRAM(s) Oral at bedtime  tamsulosin Oral Tab/Cap - Peds 0.4 milliGRAM(s) Oral at bedtime  traZODone 150 milliGRAM(s) Oral daily    PRN MEDICATIONS  acetaminophen   Tablet .. 650 milliGRAM(s) Oral every 6 hours PRN  polyethylene glycol 3350 17 Gram(s) Oral daily PRN    VITALS:  T(F): 98  HR: 70  BP: 97/61  RR: 19  SpO2: 100%      LABS                        12.8   12.03 )-----------( 207      ( 29 Dec 2020 06:00 )             42.6         143  |  108  |  27<H>  ----------------------------<  154<H>  4.2   |  25  |  0.9    Ca    8.5      29 Dec 2020 06:00  Mg     1.9         TPro  4.5<L>  /  Alb  2.6<L>  /  TBili  0.4  /  DBili  x   /  AST  15  /  ALT  19  /  AlkPhos  69        Urinalysis Basic - ( 28 Dec 2020 16:57 )    Color: Yellow / Appearance: Clear / S.043 / pH: x  Gluc: x / Ketone: Negative  / Bili: Negative / Urobili: 3 mg/dL   Blood: x / Protein: 30 mg/dL / Nitrite: Negative   Leuk Esterase: Negative / RBC: 1 /HPF / WBC 1 /HPF   Sq Epi: x / Non Sq Epi: 1 /HPF / Bacteria: Negative      ABG - ( 29 Dec 2020 03:38 )  pH, Arterial: 7.36  pH, Blood: x     /  pCO2: 45    /  pO2: 92    / HCO3: 26    / Base Excess: -0.1  /  SaO2: 95          PHYSICAL EXAM  GEN: intubated, sedated  PULM: Clear to auscultation bilaterally  CVS: Regular rate and rhythm  ABD: Soft  EXT: No edema  NEURO: sedated, moving all extremities     ASSESSMENT AND PLAN    IMPRESSION:  Intracranial mass legion   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    Versed for sedation.  Wean Fentanyl.  Keppra 500 BID for seizure ppx. Neurology following.  Maintain Normothermia, utilize antipyretics and arctic sun as needed.  Blood and CSF studies if temperature spike   f/u with neurosx for possible surgical resection of mass once patient is off levophed    HEENT: Oral care    PULMONARY:  HOB @ 45 degrees.  Aspiration precautions.  Keep POx > 92%.   - right segmental PE identifed - no anticoagulation given due to brain mass     CARDIOVASCULAR:  HoTN. on levo. [place]. Downtitrate as tolerated. Persistent need for levo - assess fluid status, cheetah  ECHO - 60-65% EF, moderate AS; CE - neg    GI: GI prophylaxis.  NG feeds;  Bowel regimen.    RENAL:  Follow up lytes.  Correct as needed    INFECTIOUS DISEASE: On  Meropenum. Follow up blood cultures. COVID negative    HEMATOLOGICAL:  No AC due to brain mass with areas of hemorrhage.   Vascular consulted for IVC filter  Vascular asked to consider thrombectomy for il        ENDOCRINE:  Follow up FS.  Continue basal bolus + ss insulin     MUSCULOSKELETAL: bedrest    DISPO: CCU Monitoring   HPI:  81 yo male with PMHx of DM, COPD, HTN c/o AMS since this morning. Patient lives by himself, son lives next door. Last known well at 10 PM at baseline per son, at 6 AM today, patient was not himself, confused, not talking. Stroke code called en route, NIHSS 11 for aphasia but grossly no focal weakness. CTH reported as having a focus of vasogenic edema in the right temporoparietal white matter with internal hyperdensity and effacement of the adjacent cortical sulci. Trace right temporoparietal subarachnoid hemorrhage is also seen. Not a candidate for IV thrombolytics for being out of the window and for ICH. Not a candidate for IA intervention because of no LVO on CTA. (19 Dec 2020 11:02)    Currently admitted to medicine with the primary diagnosis of Intracranial bleed       Today is hospital day 10d.     INTERVAL HPI / OVERNIGHT EVENTS:  Patient was examined and seen at bedside. This morning he is resting comfortably in bed and reports no new issues or overnight events.     ROS: Otherwise unremarkable     PAST MEDICAL & SURGICAL HISTORY  COPD (chronic obstructive pulmonary disease)    BPH (benign prostatic hyperplasia)    Back pain    Non-Hodgkin lymphoma    CLL (chronic lymphocytic leukemia)  treatment completed 10/19    Depression    HTN (hypertension)    DM (diabetes mellitus)    Anxiety    Encounter for screening colonoscopy  7 years approx    Port-A-Cath in place    H/O total knee replacement, bilateral      ALLERGIES  No Known Allergies    MEDICATIONS  STANDING MEDICATIONS  chlorhexidine 0.12% Liquid 15 milliLiter(s) Oral Mucosa two times a day  chlorhexidine 4% Liquid 1 Application(s) Topical daily  cyanocobalamin 1000 MICROGram(s) Oral daily  dextrose 40% Gel 15 Gram(s) Oral once  dextrose 5%. 1000 milliLiter(s) IV Continuous <Continuous>  dextrose 5%. 1000 milliLiter(s) IV Continuous <Continuous>  dextrose 50% Injectable 25 Gram(s) IV Push once  dextrose 50% Injectable 12.5 Gram(s) IV Push once  dextrose 50% Injectable 25 Gram(s) IV Push once  fentaNYL   Infusion. 0.5 MICROgram(s)/kG/Hr IV Continuous <Continuous>  folic acid 1 milliGRAM(s) Oral daily  glucagon  Injectable 1 milliGRAM(s) IntraMuscular once  heparin   Injectable 5000 Unit(s) SubCutaneous every 8 hours  insulin glargine Injectable (LANTUS) 25 Unit(s) SubCutaneous every morning  insulin lispro (ADMELOG) corrective regimen sliding scale   SubCutaneous three times a day before meals  insulin lispro Injectable (ADMELOG) 6 Unit(s) SubCutaneous every 6 hours  iohexol 300 mG (iodine)/mL Oral Solution 30 milliLiter(s) Oral once  levETIRAcetam  IVPB 500 milliGRAM(s) IV Intermittent every 12 hours  meropenem  IVPB 500 milliGRAM(s) IV Intermittent every 8 hours  meropenem  IVPB      metoprolol tartrate 12.5 milliGRAM(s) Oral two times a day  midazolam Infusion 0.02 mG/kG/Hr IV Continuous <Continuous>  norepinephrine Infusion 0.05 MICROgram(s)/kG/Min IV Continuous <Continuous>  simvastatin 20 milliGRAM(s) Oral at bedtime  tamsulosin Oral Tab/Cap - Peds 0.4 milliGRAM(s) Oral at bedtime  traZODone 150 milliGRAM(s) Oral daily    PRN MEDICATIONS  acetaminophen   Tablet .. 650 milliGRAM(s) Oral every 6 hours PRN  polyethylene glycol 3350 17 Gram(s) Oral daily PRN    VITALS:  T(F): 98  HR: 70  BP: 97/61  RR: 19  SpO2: 100%      LABS                        12.8   12.03 )-----------( 207      ( 29 Dec 2020 06:00 )             42.6         143  |  108  |  27<H>  ----------------------------<  154<H>  4.2   |  25  |  0.9    Ca    8.5      29 Dec 2020 06:00  Mg     1.9         TPro  4.5<L>  /  Alb  2.6<L>  /  TBili  0.4  /  DBili  x   /  AST  15  /  ALT  19  /  AlkPhos  69        Urinalysis Basic - ( 28 Dec 2020 16:57 )    Color: Yellow / Appearance: Clear / S.043 / pH: x  Gluc: x / Ketone: Negative  / Bili: Negative / Urobili: 3 mg/dL   Blood: x / Protein: 30 mg/dL / Nitrite: Negative   Leuk Esterase: Negative / RBC: 1 /HPF / WBC 1 /HPF   Sq Epi: x / Non Sq Epi: 1 /HPF / Bacteria: Negative      ABG - ( 29 Dec 2020 03:38 )  pH, Arterial: 7.36  pH, Blood: x     /  pCO2: 45    /  pO2: 92    / HCO3: 26    / Base Excess: -0.1  /  SaO2: 95          PHYSICAL EXAM  GEN: intubated, sedated  PULM: Clear to auscultation bilaterally  CVS: Regular rate and rhythm  ABD: Soft  EXT: No edema  NEURO: sedated, moving all extremities     ASSESSMENT AND PLAN    IMPRESSION:  Intracranial mass legion   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    Versed for sedation.  Wean Fentanyl.  Keppra 500 BID for seizure ppx. Neurology following.  Maintain Normothermia, utilize antipyretics and arctic sun as needed.  Blood and CSF studies if temperature spike   f/u with neurosx for possible surgical resection of mass once patient is off levophed    HEENT: Oral care    PULMONARY:  HOB @ 45 degrees.  Aspiration precautions.  Keep POx > 92%.   - right segmental PE identifed - no anticoagulation given due to brain mass     CARDIOVASCULAR:  MAP > 70 on levo 0.077 mcg/kg. Likely needs fluids. Assess fluid status w/cheetah, bolus as indicated. Wean levo.   ECHO - 60-65% EF, moderate AS; CE - neg    GI: GI prophylaxis.  NG feeds;  Bowel regimen.    RENAL:  Follow up lytes.  Correct as needed    INFECTIOUS DISEASE: On  Meropenum. Follow up blood cultures. COVID negative    HEMATOLOGICAL:  No AC due to brain mass with areas of hemorrhage.   Iliofemoral DVT  Vascular consulted for IVC filter  Vascular asked to consider thrombectomy; moderate to high risk for procedure but benefits outweigh risks from medicine standpoint but if AC needed need to consult neurology.     ENDOCRINE:  Follow up FS.  Continue basal bolus + ss insulin     MUSCULOSKELETAL: bedrest    DISPO: CCU Monitoring   HPI:  79 yo male with PMHx of DM, COPD, HTN c/o AMS since this morning. Patient lives by himself, son lives next door. Last known well at 10 PM at baseline per son, at 6 AM today, patient was not himself, confused, not talking. Stroke code called en route, NIHSS 11 for aphasia but grossly no focal weakness. CTH reported as having a focus of vasogenic edema in the right temporoparietal white matter with internal hyperdensity and effacement of the adjacent cortical sulci. Trace right temporoparietal subarachnoid hemorrhage is also seen. Not a candidate for IV thrombolytics for being out of the window and for ICH. Not a candidate for IA intervention because of no LVO on CTA. (19 Dec 2020 11:02)    Currently admitted to medicine with the primary diagnosis of Intracranial bleed       Today is hospital day 10d.     INTERVAL HPI / OVERNIGHT EVENTS:  Patient was examined and seen at bedside. This morning he is resting comfortably in bed and reports no new issues or overnight events.     ROS: Otherwise unremarkable     PAST MEDICAL & SURGICAL HISTORY  COPD (chronic obstructive pulmonary disease)    BPH (benign prostatic hyperplasia)    Back pain    Non-Hodgkin lymphoma    CLL (chronic lymphocytic leukemia)  treatment completed 10/19    Depression    HTN (hypertension)    DM (diabetes mellitus)    Anxiety    Encounter for screening colonoscopy  7 years approx    Port-A-Cath in place    H/O total knee replacement, bilateral      ALLERGIES  No Known Allergies    MEDICATIONS  STANDING MEDICATIONS  chlorhexidine 0.12% Liquid 15 milliLiter(s) Oral Mucosa two times a day  chlorhexidine 4% Liquid 1 Application(s) Topical daily  cyanocobalamin 1000 MICROGram(s) Oral daily  dextrose 40% Gel 15 Gram(s) Oral once  dextrose 5%. 1000 milliLiter(s) IV Continuous <Continuous>  dextrose 5%. 1000 milliLiter(s) IV Continuous <Continuous>  dextrose 50% Injectable 25 Gram(s) IV Push once  dextrose 50% Injectable 12.5 Gram(s) IV Push once  dextrose 50% Injectable 25 Gram(s) IV Push once  fentaNYL   Infusion. 0.5 MICROgram(s)/kG/Hr IV Continuous <Continuous>  folic acid 1 milliGRAM(s) Oral daily  glucagon  Injectable 1 milliGRAM(s) IntraMuscular once  heparin   Injectable 5000 Unit(s) SubCutaneous every 8 hours  insulin glargine Injectable (LANTUS) 25 Unit(s) SubCutaneous every morning  insulin lispro (ADMELOG) corrective regimen sliding scale   SubCutaneous three times a day before meals  insulin lispro Injectable (ADMELOG) 6 Unit(s) SubCutaneous every 6 hours  iohexol 300 mG (iodine)/mL Oral Solution 30 milliLiter(s) Oral once  levETIRAcetam  IVPB 500 milliGRAM(s) IV Intermittent every 12 hours  meropenem  IVPB 500 milliGRAM(s) IV Intermittent every 8 hours  meropenem  IVPB      metoprolol tartrate 12.5 milliGRAM(s) Oral two times a day  midazolam Infusion 0.02 mG/kG/Hr IV Continuous <Continuous>  norepinephrine Infusion 0.05 MICROgram(s)/kG/Min IV Continuous <Continuous>  simvastatin 20 milliGRAM(s) Oral at bedtime  tamsulosin Oral Tab/Cap - Peds 0.4 milliGRAM(s) Oral at bedtime  traZODone 150 milliGRAM(s) Oral daily    PRN MEDICATIONS  acetaminophen   Tablet .. 650 milliGRAM(s) Oral every 6 hours PRN  polyethylene glycol 3350 17 Gram(s) Oral daily PRN    VITALS:  T(F): 98  HR: 70  BP: 97/61  RR: 19  SpO2: 100%      LABS                        12.8   12.03 )-----------( 207      ( 29 Dec 2020 06:00 )             42.6         143  |  108  |  27<H>  ----------------------------<  154<H>  4.2   |  25  |  0.9    Ca    8.5      29 Dec 2020 06:00  Mg     1.9         TPro  4.5<L>  /  Alb  2.6<L>  /  TBili  0.4  /  DBili  x   /  AST  15  /  ALT  19  /  AlkPhos  69        Urinalysis Basic - ( 28 Dec 2020 16:57 )    Color: Yellow / Appearance: Clear / S.043 / pH: x  Gluc: x / Ketone: Negative  / Bili: Negative / Urobili: 3 mg/dL   Blood: x / Protein: 30 mg/dL / Nitrite: Negative   Leuk Esterase: Negative / RBC: 1 /HPF / WBC 1 /HPF   Sq Epi: x / Non Sq Epi: 1 /HPF / Bacteria: Negative      ABG - ( 29 Dec 2020 03:38 )  pH, Arterial: 7.36  pH, Blood: x     /  pCO2: 45    /  pO2: 92    / HCO3: 26    / Base Excess: -0.1  /  SaO2: 95          PHYSICAL EXAM  GEN: intubated, sedated  PULM: Clear to auscultation bilaterally  CVS: Regular rate and rhythm  ABD: Soft  EXT: No edema  NEURO: sedated, moving all extremities     ASSESSMENT AND PLAN    IMPRESSION:  Intracranial mass legion   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    Versed for sedation.  Wean Fentanyl.  Keppra 500 BID for seizure ppx. Neurology following.  Maintain Normothermia, utilize antipyretics and arctic sun as needed.  Blood and CSF studies if temperature spike   f/u with neurosx for possible surgical resection of mass once patient is off levophed    HEENT: Oral care    PULMONARY:  HOB @ 45 degrees.  Aspiration precautions.  Keep POx > 92%.   - right segmental PE identifed - no anticoagulation given due to brain mass     CARDIOVASCULAR:  MAP > 70 on levo 0.077 mcg/kg. Likely needs fluids. Assess fluid status w/cheetah, bolus as indicated. Wean levo.   ECHO - 60-65% EF, moderate AS; CE - neg    GI: GI prophylaxis.  NG feeds;  Bowel regimen.    RENAL:  Follow up lytes.  Correct as needed    INFECTIOUS DISEASE: On  Meropenum. Follow up blood cultures. COVID negative    HEMATOLOGICAL:  No AC due to brain mass with areas of hemorrhage.   Iliofemoral DVT  Vascular consulted for IVC filter  Vascular asked to consider thrombectomy    ENDOCRINE:  Follow up FS.  Continue basal bolus + ss insulin     MUSCULOSKELETAL: bedrest    DISPO: CCU Monitoring

## 2020-12-29 NOTE — PROGRESS NOTE ADULT - SUBJECTIVE AND OBJECTIVE BOX
VASCULAR SURGERY PROGRESS NOTE    CC: brain mass with hemorrhage      Events of past 24 hours: vascular surgery asked to evaluate for thrombectomy on the Right ilio-femoral DVT          ROS otherwise negative except per subjective and HPI      PAST MEDICAL & SURGICAL HISTORY:  COPD (chronic obstructive pulmonary disease)    BPH (benign prostatic hyperplasia)    Back pain    Non-Hodgkin lymphoma    CLL (chronic lymphocytic leukemia)  treatment completed 10/19    Depression    HTN (hypertension)    DM (diabetes mellitus)    Anxiety    Encounter for screening colonoscopy  7 years approx    Port-A-Cath in place    H/O total knee replacement, bilateral        Vital Signs Last 24 Hrs  T(C): 36.7 (29 Dec 2020 07:37), Max: 37.8 (29 Dec 2020 04:00)  T(F): 98 (29 Dec 2020 07:37), Max: 100 (29 Dec 2020 04:00)  HR: 70 (29 Dec 2020 10:00) (68 - 126)  BP: 97/61 (29 Dec 2020 07:37) (76/40 - 111/81)  BP(mean): 72 (29 Dec 2020 07:37) (56 - 95)  RR: 19 (29 Dec 2020 10:00) (6 - 40)  SpO2: 100% (29 Dec 2020 10:00) (91% - 100%)    Pain (0-10):            Pain Control Adequate: [] YES [] N    Diet:    I&O's Detail    28 Dec 2020 07:01  -  29 Dec 2020 07:00  --------------------------------------------------------  IN:    Dexmedetomidine: 80 mL    Enteral Tube Flush: 200 mL    FentaNYL: 621 mL    Free Water: 480 mL    IV PiggyBack: 150 mL    Midazolam: 101 mL    Norepinephrine: 523 mL    Vital High Protein: 575 mL  Total IN: 2730 mL    OUT:    Voided (mL): 1500 mL  Total OUT: 1500 mL    Total NET: 1230 mL      29 Dec 2020 07:01  -  29 Dec 2020 12:03  --------------------------------------------------------  IN:    FentaNYL: 104 mL    Midazolam: 20 mL    Norepinephrine: 70 mL    Vital High Protein: 210 mL  Total IN: 404 mL    OUT:  Total OUT: 0 mL    Total NET: 404 mL      PHYSICAL EXAM    Appearance: Normal	  HEENT:   Normal oral mucosa, PERRL, EOMI	  Neck: Supple, - JVD;    Cardiovascular: Normal S1 S2, No JVD, No murmurs,   Respiratory: Lungs clear to auscultation/No Rales, Rhonchi, Wheezing	  Gastrointestinal:  Soft, Non-tender, + BS	  Skin: No rashes, No ecchymoses, No cyanosis  Extremities: Normal range of motion, No clubbing, cyanosis   Right leg edema >Left leg, + TLC in right groin  Neurologic: Non-focal  Psychiatry: A & O x 3, Mood & affect appropriate          MEDICATIONS:   MEDICATIONS  (STANDING):  chlorhexidine 0.12% Liquid 15 milliLiter(s) Oral Mucosa two times a day  chlorhexidine 4% Liquid 1 Application(s) Topical daily  cyanocobalamin 1000 MICROGram(s) Oral daily  dextrose 40% Gel 15 Gram(s) Oral once  dextrose 5%. 1000 milliLiter(s) (50 mL/Hr) IV Continuous <Continuous>  dextrose 5%. 1000 milliLiter(s) (100 mL/Hr) IV Continuous <Continuous>  dextrose 50% Injectable 25 Gram(s) IV Push once  dextrose 50% Injectable 12.5 Gram(s) IV Push once  dextrose 50% Injectable 25 Gram(s) IV Push once  fentaNYL   Infusion. 0.5 MICROgram(s)/kG/Hr (5.2 mL/Hr) IV Continuous <Continuous>  folic acid 1 milliGRAM(s) Oral daily  glucagon  Injectable 1 milliGRAM(s) IntraMuscular once  heparin   Injectable 5000 Unit(s) SubCutaneous every 8 hours  insulin glargine Injectable (LANTUS) 25 Unit(s) SubCutaneous every morning  insulin lispro (ADMELOG) corrective regimen sliding scale   SubCutaneous three times a day before meals  insulin lispro Injectable (ADMELOG) 6 Unit(s) SubCutaneous every 6 hours  iohexol 300 mG (iodine)/mL Oral Solution 30 milliLiter(s) Oral once  levETIRAcetam  IVPB 500 milliGRAM(s) IV Intermittent every 12 hours  meropenem  IVPB 500 milliGRAM(s) IV Intermittent every 8 hours  meropenem  IVPB      metoprolol tartrate 12.5 milliGRAM(s) Oral two times a day  midazolam Infusion 0.02 mG/kG/Hr (2.08 mL/Hr) IV Continuous <Continuous>  norepinephrine Infusion 0.05 MICROgram(s)/kG/Min (9.74 mL/Hr) IV Continuous <Continuous>  simvastatin 20 milliGRAM(s) Oral at bedtime  sodium chloride 0.9% Bolus 250 milliLiter(s) IV Bolus once  tamsulosin Oral Tab/Cap - Peds 0.4 milliGRAM(s) Oral at bedtime  traZODone 150 milliGRAM(s) Oral daily    MEDICATIONS  (PRN):  acetaminophen   Tablet .. 650 milliGRAM(s) Oral every 6 hours PRN Temp greater or equal to 38C (100.4F)  polyethylene glycol 3350 17 Gram(s) Oral daily PRN Constipation      DVT PROPHYLAXIS: [] YES [] NO  GI PROPHYLAXIS: [] YES [] NO  ANTIPLATELETS: [] YES [] NO  ANTICOAGULATION: [] YES [] NO  ANTIBIOTICS: [] YES [] NO    LAB/STUDIES:                        12.8   12.03 )-----------( 207      ( 29 Dec 2020 06:00 )             42.6         143  |  108  |  27<H>  ----------------------------<  154<H>  4.2   |  25  |  0.9    Ca    8.5      29 Dec 2020 06:00  Mg     1.9         TPro  4.5<L>  /  Alb  2.6<L>  /  TBili  0.4  /  DBili  x   /  AST  15  /  ALT  19  /  AlkPhos  69        LIVER FUNCTIONS - ( 29 Dec 2020 06:00 )  Alb: 2.6 g/dL / Pro: 4.5 g/dL / ALK PHOS: 69 U/L / ALT: 19 U/L / AST: 15 U/L / GGT: x             Urinalysis Basic - ( 28 Dec 2020 16:57 )    Color: Yellow / Appearance: Clear / S.043 / pH: x  Gluc: x / Ketone: Negative  / Bili: Negative / Urobili: 3 mg/dL   Blood: x / Protein: 30 mg/dL / Nitrite: Negative   Leuk Esterase: Negative / RBC: 1 /HPF / WBC 1 /HPF   Sq Epi: x / Non Sq Epi: 1 /HPF / Bacteria: Negative              ABG - ( 29 Dec 2020 03:38 )  pH, Arterial: 7.36  pH, Blood: x     /  pCO2: 45    /  pO2: 92    / HCO3: 26    / Base Excess: -0.1  /  SaO2: 95

## 2020-12-30 NOTE — BRIEF OPERATIVE NOTE - NSICDXBRIEFPROCEDURE_GEN_ALL_CORE_FT
PROCEDURES:  IVC filter placement 30-Dec-2020 11:17:08  Rudy Armendariz  Ultrasound guidance for placement of central venous catheter (CVC) 23-Dec-2020 11:29:25  Musa Pina

## 2020-12-30 NOTE — BRIEF OPERATIVE NOTE - NSICDXBRIEFPOSTOP_GEN_ALL_CORE_FT
POST-OP DIAGNOSIS:  Deep vein thrombosis (DVT) of popliteal vein of right lower extremity 30-Dec-2020 11:17:51  Rudy Armendariz

## 2020-12-30 NOTE — PROGRESS NOTE ADULT - SUBJECTIVE AND OBJECTIVE BOX
GENERAL SURGERY PROGRESS NOTE     LEXIE [address] day :11d  POD:  Procedure:      Surgical Attending: Dr. Armendariz  Overnight events: No acute events overnight. Patient found in NAD. NPO after MN    T(F): 98.9 (20 @ 04:00), Max: 98.9 (20 @ 04:00)  HR: 62 (20 @ 06:00) (60 - 92)  BP: 97/61 (20 @ 07:37) (97/61 - 97/61)  ABP: 106/66 (20 @ 06:00) (84/66 - 148/86)  ABP(mean): 78 (20 @ 06:00) (62 - 106)  RR: 19 (20 @ 06:00) (18 - 34)  SpO2: 98% (20 @ 06:00) (98% - 100%)      20 @ 07:01  -  20 @ 07:00  --------------------------------------------------------  IN:    Dexmedetomidine: 80 mL    Enteral Tube Flush: 200 mL    FentaNYL: 621 mL    Free Water: 480 mL    IV PiggyBack: 150 mL    Midazolam: 101 mL    Norepinephrine: 523 mL    Vital High Protein: 575 mL  Total IN: 2730 mL    OUT:    Voided (mL): 1500 mL  Total OUT: 1500 mL    Total NET: 1230 mL      20 @ 07:01  -  20 @ 06:52  --------------------------------------------------------  IN:    FentaNYL: 650 mL    Free Water: 750 mL    IV PiggyBack: 300 mL    Midazolam: 120 mL    Norepinephrine: 236 mL    Sodium Chloride 0.9% Bolus: 1250 mL    Vital High Protein: 925 mL  Total IN: 4231 mL    OUT:    Indwelling Catheter - Urethral (mL): 1200 mL  Total OUT: 1200 mL    Total NET: 3031 mL        DIET/FLUIDS: cyanocobalamin 1000 MICROGram(s) Oral daily  dextrose 5%. 1000 milliLiter(s) IV Continuous <Continuous>  dextrose 5%. 1000 milliLiter(s) IV Continuous <Continuous>  folic acid 1 milliGRAM(s) Oral daily  sodium chloride 0.9% Bolus 250 milliLiter(s) IV Bolus once  sodium chloride 0.9% Bolus 1000 milliLiter(s) IV Bolus once    URINE:    Indwelling Urethral Catheter:     Connect To:  Straight Drainage/[place]    Indication:  Urinary Retention / Obstruction (20 @ 11:40)    GI proph:    AC/ proph: heparin   Injectable 5000 Unit(s) SubCutaneous every 8 hours    ABx: meropenem  IVPB 500 milliGRAM(s) IV Intermittent every 8 hours  meropenem  IVPB          GEN: NAD,   HEENT: NC/AT, intubated  CHEST: Symmetric chest wall expansion. Regular heart rate  ABD: S/D, non-tender,  EXT: BLLE pitting edema extending to mid shin    LABS  Labs:  CAPILLARY BLOOD GLUCOSE      POCT Blood Glucose.: 126 mg/dL (30 Dec 2020 05:18)  POCT Blood Glucose.: 123 mg/dL (29 Dec 2020 21:07)  POCT Blood Glucose.: 118 mg/dL (29 Dec 2020 16:15)  POCT Blood Glucose.: 125 mg/dL (29 Dec 2020 11:31)                          11.7   9.64  )-----------( 175      ( 30 Dec 2020 04:30 )             38.7       Auto Neutrophil %: 85.2 % (20 @ 04:30)  Auto Immature Granulocyte %: 1.1 % (20 @ 04:30)        143  |  110  |  27<H>  ----------------------------<  166<H>  4.6   |  27  |  0.9      Calcium, Total Serum: 8.3 mg/dL (20 @ 04:30)      LFTs:             4.2  | 0.3  | 12       ------------------[64      ( 29 Dec 2020 21:03 )  2.4  | x    | 17          Lipase:x      Amylase:x         Blood Gas Arterial, Lactate: 0.6 mmoL/L (20 @ 03:52)  Blood Gas Arterial, Lactate: 0.8 mmoL/L (20 @ 03:38)  Blood Gas Arterial, Lactate: 0.8 mmoL/L (20 @ 14:33)  Blood Gas Arterial, Lactate: 0.5 mmoL/L (20 @ 04:08)    ABG - ( 30 Dec 2020 03:52 )  pH: 7.38  /  pCO2: 45    /  pO2: 87    / HCO3: 26    / Base Excess: 0.7   /  SaO2: 94              ABG - ( 29 Dec 2020 03:38 )  pH: 7.36  /  pCO2: 45    /  pO2: 92    / HCO3: 26    / Base Excess: -0.1  /  SaO2: 95              ABG - ( 28 Dec 2020 14:33 )  pH: 7.35  /  pCO2: 43    /  pO2: 117   / HCO3: 24    / Base Excess: -1.7  /  SaO2: 98                Coags:     15.50  ----< 1.35    ( 29 Dec 2020 21:03 )     30.3                Urinalysis Basic - ( 28 Dec 2020 16:57 )    Color: Yellow / Appearance: Clear / S.043 / pH: x  Gluc: x / Ketone: Negative  / Bili: Negative / Urobili: 3 mg/dL   Blood: x / Protein: 30 mg/dL / Nitrite: Negative   Leuk Esterase: Negative / RBC: 1 /HPF / WBC 1 /HPF   Sq Epi: x / Non Sq Epi: 1 /HPF / Bacteria: Negative        Culture - Sputum (collected 29 Dec 2020 06:00)  Source: .Sputum Sputum  Gram Stain (30 Dec 2020 04:09):    Few polymorphonuclear leukocytes per low power field    Few Squamous epithelial cells per low power field    Rare Gram positive cocci in pairs per oil power field    Culture - Blood (collected 28 Dec 2020 11:59)  Source: .Blood None  Preliminary Report (29 Dec 2020 23:01):    No growth to date.          RADIOLOGY & ADDITIONAL TESTS:

## 2020-12-30 NOTE — PROGRESS NOTE ADULT - ASSESSMENT
labs and imaging reviewed   condition critical   being minitered closely in ccu   if ata will need resection of brain lesion  h/o folloicular lymhpme but was stable and was not on any active treatment     will follow with you   amauri garber MD   [phone]

## 2020-12-30 NOTE — PRE-ANESTHESIA EVALUATION ADULT - NSANTHINDVINFOSD_GEN_ALL_CORE
Patient family reached at 1615, Melody Borges, informed to be on the look out for a call tomorrow. Home # [phone] or Cell # [phone]
relative

## 2020-12-30 NOTE — PROGRESS NOTE ADULT - SUBJECTIVE AND OBJECTIVE BOX
HPI:  81 yo male with PMHx of DM, COPD, HTN c/o AMS since this morning. Patient lives by himself, son lives next door. Last known well at 10 PM at baseline per son, at 6 AM today, patient was not himself, confused, not talking. Stroke code called en route, NIHSS 11 for aphasia but grossly no focal weakness. CTH reported as having a focus of vasogenic edema in the right temporoparietal white matter with internal hyperdensity and effacement of the adjacent cortical sulci. Trace right temporoparietal subarachnoid hemorrhage is also seen. Not a candidate for IV thrombolytics for being out of the window and for ICH. Not a candidate for IA intervention because of no LVO on CTA. (19 Dec 2020 11:02)    Currently admitted to medicine with the primary diagnosis of Intracranial bleed       Today is hospital day 11d.     INTERVAL HPI / OVERNIGHT EVENTS:  Patient was examined and seen at bedside. This morning he is resting comfortably in bed and reports no new issues or overnight events.     ROS: Otherwise unremarkable     PAST MEDICAL & SURGICAL HISTORY  COPD (chronic obstructive pulmonary disease)    BPH (benign prostatic hyperplasia)    Back pain    Non-Hodgkin lymphoma    CLL (chronic lymphocytic leukemia)  treatment completed 10/19    Depression    HTN (hypertension)    DM (diabetes mellitus)    Anxiety    Encounter for screening colonoscopy  7 years approx    Port-A-Cath in place    H/O total knee replacement, bilateral      ALLERGIES  No Known Allergies    MEDICATIONS  STANDING MEDICATIONS  sodium chloride 0.9% Bolus 1000 milliLiter(s) IV Bolus once    PRN MEDICATIONS    VITALS:  T(F): 96  HR: 65  BP: 91/58  RR: 19  SpO2: 100%    GENERAL: intubated, sedated  HEENT:  Atraumatic. EOMI. No JVD  PULMONARY: CTAB  CARDIOVASCULAR: RRR  GASTROINTESTINAL: Soft  NEUROLOGY: moves all extremities. +gag, pupillaries   SKIN: intact      LABS                        11.7   9.64  )-----------( 175      ( 30 Dec 2020 04:30 )             38.7     12-30    143  |  110  |  27<H>  ----------------------------<  166<H>  4.6   |  27  |  0.9    Ca    8.3<L>      30 Dec 2020 04:30  Mg     1.9     12-30    TPro  4.2<L>  /  Alb  2.4<L>  /  TBili  0.3  /  DBili  x   /  AST  12  /  ALT  17  /  AlkPhos  64  12-29    PT/INR - ( 29 Dec 2020 21:03 )   PT: 15.50 sec;   INR: 1.35 ratio         PTT - ( 29 Dec 2020 21:03 )  PTT:30.3 sec  Urinalysis Basic - ( 28 Dec 2020 16:57 )    Color: Yellow / Appearance: Clear / S.043 / pH: x  Gluc: x / Ketone: Negative  / Bili: Negative / Urobili: 3 mg/dL   Blood: x / Protein: 30 mg/dL / Nitrite: Negative   Leuk Esterase: Negative / RBC: 1 /HPF / WBC 1 /HPF   Sq Epi: x / Non Sq Epi: 1 /HPF / Bacteria: Negative      ABG - ( 30 Dec 2020 03:52 )  pH, Arterial: 7.38  pH, Blood: x     /  pCO2: 45    /  pO2: 87    / HCO3: 26    / Base Excess: 0.7   /  SaO2: 94          Culture - Sputum (collected 29 Dec 2020 06:00)  Source: .Sputum Sputum  Gram Stain (30 Dec 2020 04:09):    Few polymorphonuclear leukocytes per low power field    Few Squamous epithelial cells per low power field    Rare Gram positive cocci in pairs per oil power field    Culture - Blood (collected 28 Dec 2020 11:59)  Source: .Blood None  Preliminary Report (29 Dec 2020 23:01):    No growth to date.        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    Versed for sedation.  Wean Fentanyl.  Keppra 500 BID for seizure ppx. Neurology following.  Maintain Normothermia, utilize antipyretics and arctic sun as needed.  Blood and CSF studies if temperature spike   f/u with neurosx for possible surgical resection of mass once patient is off levophed    HEENT: Oral care    PULMONARY:  HOB @ 45 degrees.  Aspiration precautions.  Keep POx > 92%.   - right segmental PE identifed - no anticoagulation given due to brain mass     CARDIOVASCULAR:  MAP at 90, Improved s/p 1 L bolus. Wean levo.     ECHO - 60-65% EF, moderate AS; CE - neg    GI: GI prophylaxis.  NG feeds;  Bowel regimen.    RENAL:  Follow up lytes.  Correct as needed    INFECTIOUS DISEASE: On  Meropenum. Follow up blood cultures. COVID negative    HEMATOLOGICAL:  No AC due to brain mass with areas of hemorrhage.   Iliofemoral DVT  IVC filter today  (not a candidate for thrombectomy as per vascular)    ENDOCRINE:  Follow up FS.  Continue basal bolus + ss insulin     MUSCULOSKELETAL: bedrest    DISPO: CCU Monitoring   HPI:  81 yo male with PMHx of DM, COPD, HTN c/o AMS since this morning. Patient lives by himself, son lives next door. Last known well at 10 PM at baseline per son, at 6 AM today, patient was not himself, confused, not talking. Stroke code called en route, NIHSS 11 for aphasia but grossly no focal weakness. CTH reported as having a focus of vasogenic edema in the right temporoparietal white matter with internal hyperdensity and effacement of the adjacent cortical sulci. Trace right temporoparietal subarachnoid hemorrhage is also seen. Not a candidate for IV thrombolytics for being out of the window and for ICH. Not a candidate for IA intervention because of no LVO on CTA. (19 Dec 2020 11:02)    Currently admitted to medicine with the primary diagnosis of Intracranial bleed       Today is hospital day 11d.     INTERVAL HPI / OVERNIGHT EVENTS:  Patient was examined and seen at bedside. This morning he is resting comfortably in bed and reports no new issues or overnight events.     ROS: Otherwise unremarkable     PAST MEDICAL & SURGICAL HISTORY  COPD (chronic obstructive pulmonary disease)    BPH (benign prostatic hyperplasia)    Back pain    Non-Hodgkin lymphoma    CLL (chronic lymphocytic leukemia)  treatment completed 10/19    Depression    HTN (hypertension)    DM (diabetes mellitus)    Anxiety    Encounter for screening colonoscopy  7 years approx    Port-A-Cath in place    H/O total knee replacement, bilateral      ALLERGIES  No Known Allergies    MEDICATIONS  STANDING MEDICATIONS  sodium chloride 0.9% Bolus 1000 milliLiter(s) IV Bolus once    PRN MEDICATIONS    VITALS:  T(F): 96  HR: 65  BP: 91/58  RR: 19  SpO2: 100%    GENERAL: intubated, sedated  HEENT:  Atraumatic. EOMI. No JVD  PULMONARY: CTAB  CARDIOVASCULAR: RRR  GASTROINTESTINAL: Soft  NEUROLOGY: moves all extremities. +gag, pupillaries   SKIN: intact      LABS                        11.7   9.64  )-----------( 175      ( 30 Dec 2020 04:30 )             38.7     12-30    143  |  110  |  27<H>  ----------------------------<  166<H>  4.6   |  27  |  0.9    Ca    8.3<L>      30 Dec 2020 04:30  Mg     1.9     12-30    TPro  4.2<L>  /  Alb  2.4<L>  /  TBili  0.3  /  DBili  x   /  AST  12  /  ALT  17  /  AlkPhos  64  12-29    PT/INR - ( 29 Dec 2020 21:03 )   PT: 15.50 sec;   INR: 1.35 ratio         PTT - ( 29 Dec 2020 21:03 )  PTT:30.3 sec  Urinalysis Basic - ( 28 Dec 2020 16:57 )    Color: Yellow / Appearance: Clear / S.043 / pH: x  Gluc: x / Ketone: Negative  / Bili: Negative / Urobili: 3 mg/dL   Blood: x / Protein: 30 mg/dL / Nitrite: Negative   Leuk Esterase: Negative / RBC: 1 /HPF / WBC 1 /HPF   Sq Epi: x / Non Sq Epi: 1 /HPF / Bacteria: Negative      ABG - ( 30 Dec 2020 03:52 )  pH, Arterial: 7.38  pH, Blood: x     /  pCO2: 45    /  pO2: 87    / HCO3: 26    / Base Excess: 0.7   /  SaO2: 94          Culture - Sputum (collected 29 Dec 2020 06:00)  Source: .Sputum Sputum  Gram Stain (30 Dec 2020 04:09):    Few polymorphonuclear leukocytes per low power field    Few Squamous epithelial cells per low power field    Rare Gram positive cocci in pairs per oil power field    Culture - Blood (collected 28 Dec 2020 11:59)  Source: .Blood None  Preliminary Report (29 Dec 2020 23:01):    No growth to date.        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    Versed for sedation.  Wean Fentanyl.  Keppra 500 BID for seizure ppx. Neurology following.  Maintain Normothermia, utilize antipyretics and arctic sun as needed.  Blood and CSF studies if temperature spike   f/u with neurosx for possible surgical resection of mass once patient is off levophed    HEENT: Oral care    PULMONARY:  HOB @ 45 degrees.  Aspiration precautions.  Keep POx > 92%.   - right segmental PE identifed - no anticoagulation given due to brain mass     CARDIOVASCULAR:  MAP at 90, Improved s/p 1 L bolus. Wean levo.     ECHO - 60-65% EF, moderate AS; CE - neg    GI: GI prophylaxis.  NG feeds;  Bowel regimen.    RENAL:  Follow up lytes.  Correct as needed    INFECTIOUS DISEASE: On  Meropenum. Follow up blood cultures. COVID negative    HEMATOLOGICAL:  No AC due to brain mass with areas of hemorrhage.   Iliofemoral DVT  IVC filter today  (not a candidate for thrombectomy as per vascular)    ENDOCRINE:  Follow up FS.  Continue basal bolus + ss insulin     MUSCULOSKELETAL: bedrest    DISPO: CCU Monitoring    Attempted to reach sister Melody several times over the day, was unsuccessful.   Will attempt to reach out tomorrow after weaning trial.   If weaning trail fails will reconsult palliative as patient would require trach/PEG at some point.

## 2020-12-30 NOTE — PRE-ANESTHESIA EVALUATION ADULT - NSANTHPEFT_GEN_ALL_CORE
regular rate with PACs, intubated and sedated with midazolam continuous infusion, pain controlled with fentanyl continuos infusion. BP supported with Norepinephrine at 0.05 mcg/kg/min via femoral multi lumen central venous catheter.
cor: rrr s1s2 nl  lungs: scattered rhonchi bilat

## 2020-12-30 NOTE — PROGRESS NOTE ADULT - SUBJECTIVE AND OBJECTIVE BOX
pt admitted with altered mental status ,intubated   acute hamorrhagic stroke ,also found to have brail lesion with necrosis possible neoplasm   today little better    off levophed   had Ivc  filter placed today

## 2020-12-30 NOTE — CHART NOTE - NSCHARTNOTEFT_GEN_A_CORE
Vascular Surgery Post Operative Note    CR OWEN,80yMale  [id number]  12-30-20 @ 17:38  Procedure: Status post  Post Operative day #0 s/p ivc filter placement    Tolerated procedure well. no issues    No Known Allergies    acetaminophen   Tablet .. 650 milliGRAM(s) Oral every 6 hours PRN  chlorhexidine 0.12% Liquid 15 milliLiter(s) Oral Mucosa two times a day  cyanocobalamin 1000 MICROGram(s) Oral daily  dexMEDEtomidine Infusion 0.2 MICROgram(s)/kG/Hr IV Continuous <Continuous>  dextrose 40% Gel 15 Gram(s) Oral once  dextrose 5%. 1000 milliLiter(s) IV Continuous <Continuous>  dextrose 5%. 1000 milliLiter(s) IV Continuous <Continuous>  dextrose 50% Injectable 25 Gram(s) IV Push once  dextrose 50% Injectable 12.5 Gram(s) IV Push once  dextrose 50% Injectable 25 Gram(s) IV Push once  fentaNYL   Infusion. 0.5 MICROgram(s)/kG/Hr IV Continuous <Continuous>  folic acid 1 milliGRAM(s) Oral daily  heparin   Injectable 5000 Unit(s) SubCutaneous every 8 hours  insulin glargine Injectable (LANTUS) 25 Unit(s) SubCutaneous every morning  insulin lispro (ADMELOG) corrective regimen sliding scale   SubCutaneous three times a day before meals  insulin lispro Injectable (ADMELOG) 6 Unit(s) SubCutaneous every 6 hours  lactulose Syrup 15 Gram(s) Oral two times a day  levETIRAcetam  IVPB 500 milliGRAM(s) IV Intermittent every 12 hours  meropenem  IVPB 1000 milliGRAM(s) IV Intermittent every 8 hours  metoprolol tartrate 12.5 milliGRAM(s) Oral two times a day  midazolam Infusion 0.02 mG/kG/Hr IV Continuous <Continuous>  polyethylene glycol 3350 17 Gram(s) Oral daily PRN  senna 2 Tablet(s) Oral at bedtime  simvastatin 20 milliGRAM(s) Oral at bedtime  sodium chloride 0.9% Bolus 1000 milliLiter(s) IV Bolus once  tamsulosin Oral Tab/Cap - Peds 0.4 milliGRAM(s) Oral at bedtime  traZODone 150 milliGRAM(s) Oral daily        T(C): 36.1 (12-30-20 @ 16:00), Max: 37.2 (12-30-20 @ 04:00)  HR: 70 (12-30-20 @ 17:00) (60 - 92)  BP: 91/58 (12-30-20 @ 10:20) (91/58 - 115/66)  RR: 20 (12-30-20 @ 17:00) (5 - 34)  SpO2: 100% (12-30-20 @ 17:00) (97% - 100%)    12-29-20 @ 07:01  -  12-30-20 @ 07:00  --------------------------------------------------------  IN: 4231 mL / OUT: 1200 mL / NET: 3031 mL    12-30-20 @ 07:01  -  12-30-20 @ 17:38  --------------------------------------------------------  IN: 1293 mL / OUT: 590 mL / NET: 703 mL        GEN: NAD  CHEST: Regular rate symmetric chest wall expansion  Abdomen: Soft, depressible, nontender  Groin: L groin access site is soft, depressible, no hematoma, no other signs of bleeding, dressing clean and dry         Plan and assesment:  Pt. 80yMale status post   - management as per primary team  - will recheck site 12/31  - F/U Vitals  - F/U labs & replete as needed

## 2020-12-30 NOTE — PROGRESS NOTE ADULT - ASSESSMENT
A/P:  CR OWEN is a 80yMale consulted for IVC filter placement. Patient is preoped and consented, is to go to OR today.     Plan:   keep NPO  monitor vitals  OR toda for IVC filter placement

## 2020-12-31 NOTE — CHART NOTE - NSCHARTNOTEFT_GEN_A_CORE
Registered Dietitian Follow-Up     Patient Profile Reviewed                           Yes [x]   No []     Nutrition History Previously Obtained        Yes []  No [x]       Pertinent Subjective Information: Pt. remains intubated. TF running at goal rate, noted no BM x 7 days. Ve: 11.4, Tmax 36.3, MAP 82.     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  f/u with neurosx for possible surgical resection of mass once patient is off levophed. Weaning trials, may need PEG/trach.       Diet order: Peptamen AF @55ml/h with No Carb Prosource TF BID      Anthropometrics:  - Ht. 177.8cm  - Wt. 117.5kg on 12/31 vs. 112.3kg on 12/28 vs. 103.9kg dosing weight, pt. with edema at this time.   - %wt change  - BMI 32.9  - IBW 166lbs      Pertinent Lab Data: (12/31) Hg 12.2, Hct 40.3, creat 0.6, glu 144, Phos 1.8     Pertinent Meds: Heparin, Lispro, Glargine, Fentanyl      Physical Findings:  - Appearance: intubated, generalized non pitting edema   - GI function: constipation? las BM 12/24   - Tubes: OGT  - Oral/Mouth cavity: NPO  - Skin: tear     Nutrition Requirements (from RD note on 12/28)   Weight Used: dosing 103.9kg, ideal weight 75.2kg     Estimated Energy Needs    Continue [x]  Adjust [] ~1415-1693kcal   1430-1634kcal (70-80% PSE 2010) vs. 1654-1880kcal (22-25kcal/kg IBW) vs. 1143-1545kcal (11-14kcal/kg CBW)  Adjusted Energy Recommendations:   kcal/day        Estimated Protein Needs    Continue [x]  Adjust []  135-150g (1.8-2.0g/kg IBW)  Adjusted Protein Recommendations:   gm/day        Estimated Fluid Needs        Continue [x]  Adjust []   per CCU team  Adjusted Fluid Recommendations:   mL/day     Nutrient Intake: 1664kcal, 126g protein, 1069ml free H2O (100% est calorie needs, 92% est protein needs)        [] Previous Nutrition Diagnosis:  Inadequate protein energy intake               [x] No active nutrition diagnosis identified at this time     However, pt. remains intubated. Will continue to follow up.     Nutrition Intervention:   enteral and parenteral nutrition, nutrition related medication management     Rec: Continue Peptamen AF @55ml/h with No Carb Prosource TF BID.  Feed only IF MAP consistently >65. Additional free H2O flush per LIP. Feed only if MAP consistently >65. Maintain all aspiration precautions. Additional free H2O per LIP. Order bowel regimen.     Goal/Expected Outcome:  In 4 days TF to provide >85% est energy needs, but not exceed 105%, at least 1BM in 3 days.     Indicator/Monitoring: energy intake, body composition, NFPF, electrolyte profile, glucose profile

## 2020-12-31 NOTE — PROGRESS NOTE ADULT - SUBJECTIVE AND OBJECTIVE BOX
Procedure:s/p ivc filter placement   Patient is a 80y old  Male who presents with a chief complaint of Intracranial bleed (30 Dec 2020 18:44)    PAST MEDICAL & SURGICAL HISTORY:  COPD (chronic obstructive pulmonary disease)    BPH (benign prostatic hyperplasia)    Back pain    Non-Hodgkin lymphoma    CLL (chronic lymphocytic leukemia)  treatment completed 10/19    Depression    HTN (hypertension)    DM (diabetes mellitus)    Anxiety    Encounter for screening colonoscopy  7 years approx    Port-A-Cath in place    H/O total knee replacement, bilateral        Events of the Last 24h:  Vital Signs Last 24 Hrs  T(C): 36.7 (31 Dec 2020 04:00), Max: 36.7 (31 Dec 2020 04:00)  T(F): 98.1 (31 Dec 2020 04:00), Max: 98.1 (31 Dec 2020 04:00)  HR: 76 (31 Dec 2020 05:00) (58 - 104)  BP: 91/58 (30 Dec 2020 10:20) (91/58 - 115/66)  BP(mean): 92 (30 Dec 2020 10:00) (92 - 92)  RR: 23 (31 Dec 2020 05:00) (5 - 26)  SpO2: 98% (31 Dec 2020 05:00) (97% - 100%)    Mode: AC/ CMV (Assist Control/ Continuous Mandatory Ventilation), RR (machine): 20, TV (machine): 470, FiO2: 40, PEEP: 5, ITime: 1, MAP: 17, T-Low: 20, PIP: 25    Diet, NPO with Tube Feed:   Tube Feeding Modality: Orogastric  Peptamen A.F. Formula  Total Volume for 24 Hours (mL): 1320  Continuous  Starting Tube Feed Rate mL per Hour: 25  Increase Tube Feed Rate by (mL): 10     Every 4 hours  Until Goal Tube Feed Rate (mL per Hour): 55  Tube Feed Duration (in Hours): 24  Tube Feed Start Time: 00:00  No Carb Prosource TF     Qty per Day:  2 (20 @ 12:24)      I&O's Summary    29 Dec 2020 07:01  -  30 Dec 2020 07:00  --------------------------------------------------------  IN: 4231 mL / OUT: 1200 mL / NET: 3031 mL    30 Dec 2020 07:  -  31 Dec 2020 05:24  --------------------------------------------------------  IN: 2471 mL / OUT: 1170 mL / NET: 1301 mL     I&O's Detail    29 Dec 2020 07:  -  30 Dec 2020 07:00  --------------------------------------------------------  IN:    FentaNYL: 650 mL    Free Water: 750 mL    IV PiggyBack: 300 mL    Midazolam: 120 mL    Norepinephrine: 236 mL    Sodium Chloride 0.9% Bolus: 1250 mL    Vital High Protein: 925 mL  Total IN: 4231 mL    OUT:    Indwelling Catheter - Urethral (mL): 1200 mL  Total OUT: 1200 mL    Total NET: 3031 mL      30 Dec 2020 07:  -  31 Dec 2020 05:24  --------------------------------------------------------  IN:    Dexmedetomidine: 276.6 mL    FentaNYL: 122 mL    FentaNYL: 75 mL    Free Water: 750 mL    IV PiggyBack: 200 mL    Midazolam: 15 mL    Norepinephrine: 27 mL    Norepinephrine: 70.4 mL    Vital High Protein: 935 mL  Total IN: 2471 mL    OUT:    Indwelling Catheter - Urethral (mL): 1170 mL  Total OUT: 1170 mL    Total NET: 1301 mL          MEDICATIONS  (STANDING):  chlorhexidine 0.12% Liquid 15 milliLiter(s) Oral Mucosa two times a day  cyanocobalamin 1000 MICROGram(s) Oral daily  dexMEDEtomidine Infusion 0.2 MICROgram(s)/kG/Hr (5.2 mL/Hr) IV Continuous <Continuous>  dextrose 40% Gel 15 Gram(s) Oral once  dextrose 5%. 1000 milliLiter(s) (50 mL/Hr) IV Continuous <Continuous>  dextrose 5%. 1000 milliLiter(s) (100 mL/Hr) IV Continuous <Continuous>  dextrose 50% Injectable 25 Gram(s) IV Push once  dextrose 50% Injectable 12.5 Gram(s) IV Push once  dextrose 50% Injectable 25 Gram(s) IV Push once  fentaNYL   Infusion. 0.5 MICROgram(s)/kG/Hr (5.2 mL/Hr) IV Continuous <Continuous>  folic acid 1 milliGRAM(s) Oral daily  heparin   Injectable 5000 Unit(s) SubCutaneous every 8 hours  insulin glargine Injectable (LANTUS) 25 Unit(s) SubCutaneous every morning  insulin lispro (ADMELOG) corrective regimen sliding scale   SubCutaneous three times a day before meals  insulin lispro Injectable (ADMELOG) 6 Unit(s) SubCutaneous every 6 hours  lactulose Syrup 15 Gram(s) Oral two times a day  levETIRAcetam  IVPB 500 milliGRAM(s) IV Intermittent every 12 hours  meropenem  IVPB 1000 milliGRAM(s) IV Intermittent every 8 hours  metoprolol tartrate 12.5 milliGRAM(s) Oral two times a day  midazolam Infusion 0.02 mG/kG/Hr (2.08 mL/Hr) IV Continuous <Continuous>  norepinephrine Infusion 0.05 MICROgram(s)/kG/Min (9.74 mL/Hr) IV Continuous <Continuous>  senna 2 Tablet(s) Oral at bedtime  simvastatin 20 milliGRAM(s) Oral at bedtime  sodium chloride 0.9% Bolus 1000 milliLiter(s) IV Bolus once  tamsulosin Oral Tab/Cap - Peds 0.4 milliGRAM(s) Oral at bedtime  traZODone 150 milliGRAM(s) Oral daily    MEDICATIONS  (PRN):  acetaminophen   Tablet .. 650 milliGRAM(s) Oral every 6 hours PRN Temp greater or equal to 38C (100.4F)  polyethylene glycol 3350 17 Gram(s) Oral daily PRN Constipation      PHYSICAL EXAM:    GENERAL: NAD    HEENT: NCAT    CHEST/LUNGS: CTAB    HEART: RRR,  No murmurs, rubs, or gallops    ABDOMEN: SNTND +BS    EXTREMITIES:  FROM, No clubbing, cyanosis, or edema, palpable pulse    GROIN: left groin clean dry intact no hematoma no bleeding     NEURO: No focal neurological deficits    SKIN: No rashes or lesions    INCISION/WOUNDS:                          12.2   10.03 )-----------( 210      ( 31 Dec 2020 04:30 )             40.3        CBC Full  -  ( 31 Dec 2020 04:30 )  WBC Count : 10.03 K/uL  RBC Count : 4.77 M/uL  Hemoglobin : 12.2 g/dL  Hematocrit : 40.3 %  Platelet Count - Automated : 210 K/uL  Mean Cell Volume : 84.5 fL  Mean Cell Hemoglobin : 25.6 pg  Mean Cell Hemoglobin Concentration : 30.3 g/dL  Auto Neutrophil # : 8.37 K/uL  Auto Lymphocyte # : 0.59 K/uL  Auto Monocyte # : 0.64 K/uL  Auto Eosinophil # : 0.16 K/uL  Auto Basophil # : 0.05 K/uL  Auto Neutrophil % : 83.4 %  Auto Lymphocyte % : 5.9 %  Auto Monocyte % : 6.4 %  Auto Eosinophil % : 1.6 %  Auto Basophil % : 0.5 %               143   |  110   |  27                 Ca: 8.3    BMP:   ----------------------------< 166    M.9   (20 @ 04:30)             4.6    |  27    | 0.9                Ph: x        LFT:     TPro: 4.2 / Alb: 2.4 / TBili: 0.3 / DBili: x / AST: 12 / ALT: 17 / AlkPhos: 64   (20 @ 21:03)    LIVER FUNCTIONS - ( 29 Dec 2020 21:03 )  Alb: 2.4 g/dL / Pro: 4.2 g/dL / ALK PHOS: 64 U/L / ALT: 17 U/L / AST: 12 U/L / GGT: x           PT/INR - ( 29 Dec 2020 21:03 )   PT: 15.50 sec;   INR: 1.35 ratio         PTT - ( 29 Dec 2020 21:03 )  PTT:30.3 sec          Culture - Sputum (collected 29 Dec 2020 06:00)  Source: .Sputum Sputum  Gram Stain (30 Dec 2020 04:09):    Few polymorphonuclear leukocytes per low power field    Few Squamous epithelial cells per low power field    Rare Gram positive cocci in pairs per oil power field  Preliminary Report (30 Dec 2020 19:56):    No growth    Culture - Blood (collected 28 Dec 2020 11:59)  Source: .Blood None  Preliminary Report (29 Dec 2020 23:01):    No growth to date.

## 2020-12-31 NOTE — PROGRESS NOTE ADULT - ASSESSMENT
pt being moniteredin ccu   will need surgical resection of mass  when stable   cnt present managment     amauri garber MD

## 2020-12-31 NOTE — PROGRESS NOTE ADULT - SUBJECTIVE AND OBJECTIVE BOX
pt intubated with alteredmental status   temporoparitalmasswith bleeding,vasogenicedema   has h/o follicularlymphoma   not on any  chemo at this time   vitals stable   hadIVC  filterplaced  PE  andDVT

## 2020-12-31 NOTE — PROGRESS NOTE ADULT - ASSESSMENT
Will continue to follow   Call vascular team as needed    Groin dressing in place, clean, dry and intact, no hematoma  No further surgical intervention at this time  Call vascular team as needed

## 2020-12-31 NOTE — PROGRESS NOTE ADULT - SUBJECTIVE AND OBJECTIVE BOX
HPI:  81 yo male with PMHx of DM, COPD, HTN c/o AMS since this morning. Patient lives by himself, son lives next door. Last known well at 10 PM at baseline per son, at 6 AM today, patient was not himself, confused, not talking. Stroke code called en route, NIHSS 11 for aphasia but grossly no focal weakness. CTH reported as having a focus of vasogenic edema in the right temporoparietal white matter with internal hyperdensity and effacement of the adjacent cortical sulci. Trace right temporoparietal subarachnoid hemorrhage is also seen. Not a candidate for IV thrombolytics for being out of the window and for ICH. Not a candidate for IA intervention because of no LVO on CTA. (19 Dec 2020 11:02)    Currently admitted to medicine with the primary diagnosis of Intracranial bleed       Today is hospital day 12d.     INTERVAL HPI / OVERNIGHT EVENTS:  Patient was examined and seen at bedside. This morning he is resting comfortably in bed and reports no new issues or overnight events.     ROS: Otherwise unremarkable     PAST MEDICAL & SURGICAL HISTORY  COPD (chronic obstructive pulmonary disease)    BPH (benign prostatic hyperplasia)    Back pain    Non-Hodgkin lymphoma    CLL (chronic lymphocytic leukemia)  treatment completed 10/19    Depression    HTN (hypertension)    DM (diabetes mellitus)    Anxiety    Encounter for screening colonoscopy  7 years approx    Port-A-Cath in place    H/O total knee replacement, bilateral      ALLERGIES  No Known Allergies    MEDICATIONS  STANDING MEDICATIONS  chlorhexidine 0.12% Liquid 15 milliLiter(s) Oral Mucosa two times a day  cyanocobalamin 1000 MICROGram(s) Oral daily  dexMEDEtomidine Infusion 0.2 MICROgram(s)/kG/Hr IV Continuous <Continuous>  dextrose 40% Gel 15 Gram(s) Oral once  dextrose 5%. 1000 milliLiter(s) IV Continuous <Continuous>  dextrose 5%. 1000 milliLiter(s) IV Continuous <Continuous>  dextrose 50% Injectable 25 Gram(s) IV Push once  dextrose 50% Injectable 12.5 Gram(s) IV Push once  dextrose 50% Injectable 25 Gram(s) IV Push once  fentaNYL   Infusion. 0.5 MICROgram(s)/kG/Hr IV Continuous <Continuous>  folic acid 1 milliGRAM(s) Oral daily  heparin   Injectable 5000 Unit(s) SubCutaneous every 8 hours  insulin glargine Injectable (LANTUS) 25 Unit(s) SubCutaneous every morning  insulin lispro (ADMELOG) corrective regimen sliding scale   SubCutaneous three times a day before meals  insulin lispro Injectable (ADMELOG) 6 Unit(s) SubCutaneous every 6 hours  lactulose Syrup 15 Gram(s) Oral two times a day  levETIRAcetam  IVPB 500 milliGRAM(s) IV Intermittent every 12 hours  meropenem  IVPB 1000 milliGRAM(s) IV Intermittent every 8 hours  metoprolol tartrate 12.5 milliGRAM(s) Oral two times a day  midazolam Infusion 0.02 mG/kG/Hr IV Continuous <Continuous>  norepinephrine Infusion 0.05 MICROgram(s)/kG/Min IV Continuous <Continuous>  senna 2 Tablet(s) Oral at bedtime  simvastatin 20 milliGRAM(s) Oral at bedtime  sodium chloride 0.9% Bolus 1000 milliLiter(s) IV Bolus once  tamsulosin Oral Tab/Cap - Peds 0.4 milliGRAM(s) Oral at bedtime  traZODone 150 milliGRAM(s) Oral daily    PRN MEDICATIONS  acetaminophen   Tablet .. 650 milliGRAM(s) Oral every 6 hours PRN  polyethylene glycol 3350 17 Gram(s) Oral daily PRN    VITALS:  T(F): 96.9  HR: 102  BP: 91/58  RR: 25  SpO2: 97%      GENERAL: intubated, sedated  HEENT:  Atraumatic. EOMI. No JVD  PULMONARY: CTAB  CARDIOVASCULAR: RRR  GASTROINTESTINAL: Soft  NEUROLOGY: moves all extremities. +gag, pupillaries   SKIN: intact      LABS                        12.2   10.03 )-----------( 210      ( 31 Dec 2020 04:30 )             40.3     12-31    143  |  110  |  18  ----------------------------<  144<H>  4.4   |  26  |  0.6<L>    Ca    8.4<L>      31 Dec 2020 04:30  Phos  1.8     12-31  Mg     1.8     12-31    TPro  4.5<L>  /  Alb  2.5<L>  /  TBili  0.3  /  DBili  x   /  AST  12  /  ALT  13  /  AlkPhos  74  12-31    PT/INR - ( 29 Dec 2020 21:03 )   PT: 15.50 sec;   INR: 1.35 ratio         PTT - ( 29 Dec 2020 21:03 )  PTT:30.3 sec    ABG - ( 31 Dec 2020 04:51 )  pH, Arterial: 7.42  pH, Blood: x     /  pCO2: 43    /  pO2: 107   / HCO3: 28    / Base Excess: 2.9   /  SaO2: 98                    Culture - Sputum (collected 29 Dec 2020 06:00)  Source: .Sputum Sputum  Gram Stain (30 Dec 2020 04:09):    Few polymorphonuclear leukocytes per low power field    Few Squamous epithelial cells per low power field    Rare Gram positive cocci in pairs per oil power field  Preliminary Report (30 Dec 2020 19:56):    No growth    Culture - Blood (collected 28 Dec 2020 11:59)  Source: .Blood None  Preliminary Report (29 Dec 2020 23:01):    No growth to date.          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    Versed for sedation.  Wean Fentanyl.  Keppra 500 BID for seizure ppx. Neurology following.  Maintain Normothermia, utilize antipyretics and arctic sun as needed.  Blood and CSF studies if temperature spike   f/u with neurosx for possible surgical resection of mass once patient is off levophed    HEENT: Oral care    PULMONARY:  HOB @ 45 degrees.  Aspiration precautions.  Keep POx > 92%.   - right segmental PE identifed - no anticoagulation given due to brain mass     CARDIOVASCULAR:  MAP at 90, Improved s/p 1 L bolus. Wean levo.     ECHO - 60-65% EF, moderate AS; CE - neg    GI: GI prophylaxis.  NG feeds;  Bowel regimen.    RENAL:  Follow up lytes.  Correct as needed    INFECTIOUS DISEASE: On  Meropenum. Follow up blood cultures. COVID negative    HEMATOLOGICAL:  No AC due to brain mass with areas of hemorrhage.   Iliofemoral DVT  IVC filter today  (not a candidate for thrombectomy as per vascular)    ENDOCRINE:  Follow up FS.  Continue basal bolus + ss insulin     MUSCULOSKELETAL: bedrest    DISPO: CCU Monitoring    Attempted to reach sister Melody several times over the day, was unsuccessful.   Will attempt to reach out tomorrow after weaning trial.   If weaning trail fails will reconsult palliative as patient would require trach/PEG at some point.  HPI:  79 yo male with PMHx of DM, COPD, HTN c/o AMS since this morning. Patient lives by himself, son lives next door. Last known well at 10 PM at baseline per son, at 6 AM today, patient was not himself, confused, not talking. Stroke code called en route, NIHSS 11 for aphasia but grossly no focal weakness. CTH reported as having a focus of vasogenic edema in the right temporoparietal white matter with internal hyperdensity and effacement of the adjacent cortical sulci. Trace right temporoparietal subarachnoid hemorrhage is also seen. Not a candidate for IV thrombolytics for being out of the window and for ICH. Not a candidate for IA intervention because of no LVO on CTA. (19 Dec 2020 11:02)    Currently admitted to medicine with the primary diagnosis of Intracranial bleed       Today is hospital day 12d.     INTERVAL HPI / OVERNIGHT EVENTS:  Patient was examined and seen at bedside. This morning he is resting comfortably in bed and reports no new issues or overnight events.     ROS: Otherwise unremarkable     PAST MEDICAL & SURGICAL HISTORY  COPD (chronic obstructive pulmonary disease)    BPH (benign prostatic hyperplasia)    Back pain    Non-Hodgkin lymphoma    CLL (chronic lymphocytic leukemia)  treatment completed 10/19    Depression    HTN (hypertension)    DM (diabetes mellitus)    Anxiety    Encounter for screening colonoscopy  7 years approx    Port-A-Cath in place    H/O total knee replacement, bilateral      ALLERGIES  No Known Allergies    MEDICATIONS  STANDING MEDICATIONS  chlorhexidine 0.12% Liquid 15 milliLiter(s) Oral Mucosa two times a day  cyanocobalamin 1000 MICROGram(s) Oral daily  dexMEDEtomidine Infusion 0.2 MICROgram(s)/kG/Hr IV Continuous <Continuous>  dextrose 40% Gel 15 Gram(s) Oral once  dextrose 5%. 1000 milliLiter(s) IV Continuous <Continuous>  dextrose 5%. 1000 milliLiter(s) IV Continuous <Continuous>  dextrose 50% Injectable 25 Gram(s) IV Push once  dextrose 50% Injectable 12.5 Gram(s) IV Push once  dextrose 50% Injectable 25 Gram(s) IV Push once  fentaNYL   Infusion. 0.5 MICROgram(s)/kG/Hr IV Continuous <Continuous>  folic acid 1 milliGRAM(s) Oral daily  heparin   Injectable 5000 Unit(s) SubCutaneous every 8 hours  insulin glargine Injectable (LANTUS) 25 Unit(s) SubCutaneous every morning  insulin lispro (ADMELOG) corrective regimen sliding scale   SubCutaneous three times a day before meals  insulin lispro Injectable (ADMELOG) 6 Unit(s) SubCutaneous every 6 hours  lactulose Syrup 15 Gram(s) Oral two times a day  levETIRAcetam  IVPB 500 milliGRAM(s) IV Intermittent every 12 hours  meropenem  IVPB 1000 milliGRAM(s) IV Intermittent every 8 hours  metoprolol tartrate 12.5 milliGRAM(s) Oral two times a day  midazolam Infusion 0.02 mG/kG/Hr IV Continuous <Continuous>  norepinephrine Infusion 0.05 MICROgram(s)/kG/Min IV Continuous <Continuous>  senna 2 Tablet(s) Oral at bedtime  simvastatin 20 milliGRAM(s) Oral at bedtime  sodium chloride 0.9% Bolus 1000 milliLiter(s) IV Bolus once  tamsulosin Oral Tab/Cap - Peds 0.4 milliGRAM(s) Oral at bedtime  traZODone 150 milliGRAM(s) Oral daily    PRN MEDICATIONS  acetaminophen   Tablet .. 650 milliGRAM(s) Oral every 6 hours PRN  polyethylene glycol 3350 17 Gram(s) Oral daily PRN    VITALS:  T(F): 96.9  HR: 102  BP: 91/58  RR: 25  SpO2: 97%      GENERAL: intubated, sedated  HEENT:  Atraumatic. EOMI. No JVD  PULMONARY: CTAB  CARDIOVASCULAR: RRR  GASTROINTESTINAL: Soft  NEUROLOGY: moves all extremities. +gag, pupillaries   SKIN: intact      LABS                        12.2   10.03 )-----------( 210      ( 31 Dec 2020 04:30 )             40.3     12-31    143  |  110  |  18  ----------------------------<  144<H>  4.4   |  26  |  0.6<L>    Ca    8.4<L>      31 Dec 2020 04:30  Phos  1.8     12-31  Mg     1.8     12-31    TPro  4.5<L>  /  Alb  2.5<L>  /  TBili  0.3  /  DBili  x   /  AST  12  /  ALT  13  /  AlkPhos  74  12-31    PT/INR - ( 29 Dec 2020 21:03 )   PT: 15.50 sec;   INR: 1.35 ratio         PTT - ( 29 Dec 2020 21:03 )  PTT:30.3 sec    ABG - ( 31 Dec 2020 04:51 )  pH, Arterial: 7.42  pH, Blood: x     /  pCO2: 43    /  pO2: 107   / HCO3: 28    / Base Excess: 2.9   /  SaO2: 98                    Culture - Sputum (collected 29 Dec 2020 06:00)  Source: .Sputum Sputum  Gram Stain (30 Dec 2020 04:09):    Few polymorphonuclear leukocytes per low power field    Few Squamous epithelial cells per low power field    Rare Gram positive cocci in pairs per oil power field  Preliminary Report (30 Dec 2020 19:56):    No growth    Culture - Blood (collected 28 Dec 2020 11:59)  Source: .Blood None  Preliminary Report (29 Dec 2020 23:01):    No growth to date.          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    Versed for sedation.  Wean Fentanyl.  Keppra 500 BID for seizure ppx. Neurology following.  Maintain Normothermia, utilize antipyretics and arctic sun as needed.  Blood and CSF studies if temperature spike   f/u with neurosx for possible surgical resection of mass as patient is off levophed  If neurosx does not plan resection attempt to wean   Patient has been difficult to wean to do agitation; continue home trazadone; add home effexor; add seroquel prior to weaning trial     HEENT: Oral care    PULMONARY:  HOB @ 45 degrees.  Aspiration precautions.  Keep POx > 92%.   - right segmental PE identifed - no anticoagulation given due to brain mass     CARDIOVASCULAR:  MAP Improved s/p 1 L bolus yestraday, levo weaned. Keep MAP >60  echo - 60-65% EF, moderate AS; CE - neg    GI: GI prophylaxis.  NG feeds;  Bowel regimen.  No BM since 12/24; s/p lactulose yesterday without reponse    RENAL:  Follow up lytes.  Correct as needed    INFECTIOUS DISEASE: s/p 7 day course Meropenum for PNA. COVID negative    HEMATOLOGICAL:  No AC due to brain mass with areas of hemorrhage.   Iliofemoral DVT  IVC filter in place    ENDOCRINE:  Follow up FS.  Continue basal bolus + ss insulin     MUSCULOSKELETAL: bedrest    DISPO: CCU Monitoring    Attempted to reach sister Melody several times over the day, was unsuccessful.   Will attempt to reach out tomorrow after weaning trial.   If weaning trail fails will reconsult palliative as patient would require trach/PEG at some point.  HPI:  81 yo male with PMHx of DM, COPD, HTN c/o AMS since this morning. Patient lives by himself, son lives next door. Last known well at 10 PM at baseline per son, at 6 AM today, patient was not himself, confused, not talking. Stroke code called en route, NIHSS 11 for aphasia but grossly no focal weakness. CTH reported as having a focus of vasogenic edema in the right temporoparietal white matter with internal hyperdensity and effacement of the adjacent cortical sulci. Trace right temporoparietal subarachnoid hemorrhage is also seen. Not a candidate for IV thrombolytics for being out of the window and for ICH. Not a candidate for IA intervention because of no LVO on CTA. (19 Dec 2020 11:02)    Currently admitted to medicine with the primary diagnosis of Intracranial bleed       Today is hospital day 12d.     INTERVAL HPI / OVERNIGHT EVENTS:  Patient was examined and seen at bedside. This morning he is resting comfortably in bed and reports no new issues or overnight events.     ROS: Otherwise unremarkable     PAST MEDICAL & SURGICAL HISTORY  COPD (chronic obstructive pulmonary disease)    BPH (benign prostatic hyperplasia)    Back pain    Non-Hodgkin lymphoma    CLL (chronic lymphocytic leukemia)  treatment completed 10/19    Depression    HTN (hypertension)    DM (diabetes mellitus)    Anxiety    Encounter for screening colonoscopy  7 years approx    Port-A-Cath in place    H/O total knee replacement, bilateral      ALLERGIES  No Known Allergies    MEDICATIONS  STANDING MEDICATIONS  chlorhexidine 0.12% Liquid 15 milliLiter(s) Oral Mucosa two times a day  cyanocobalamin 1000 MICROGram(s) Oral daily  dexMEDEtomidine Infusion 0.2 MICROgram(s)/kG/Hr IV Continuous <Continuous>  dextrose 40% Gel 15 Gram(s) Oral once  dextrose 5%. 1000 milliLiter(s) IV Continuous <Continuous>  dextrose 5%. 1000 milliLiter(s) IV Continuous <Continuous>  dextrose 50% Injectable 25 Gram(s) IV Push once  dextrose 50% Injectable 12.5 Gram(s) IV Push once  dextrose 50% Injectable 25 Gram(s) IV Push once  fentaNYL   Infusion. 0.5 MICROgram(s)/kG/Hr IV Continuous <Continuous>  folic acid 1 milliGRAM(s) Oral daily  heparin   Injectable 5000 Unit(s) SubCutaneous every 8 hours  insulin glargine Injectable (LANTUS) 25 Unit(s) SubCutaneous every morning  insulin lispro (ADMELOG) corrective regimen sliding scale   SubCutaneous three times a day before meals  insulin lispro Injectable (ADMELOG) 6 Unit(s) SubCutaneous every 6 hours  lactulose Syrup 15 Gram(s) Oral two times a day  levETIRAcetam  IVPB 500 milliGRAM(s) IV Intermittent every 12 hours  meropenem  IVPB 1000 milliGRAM(s) IV Intermittent every 8 hours  metoprolol tartrate 12.5 milliGRAM(s) Oral two times a day  midazolam Infusion 0.02 mG/kG/Hr IV Continuous <Continuous>  norepinephrine Infusion 0.05 MICROgram(s)/kG/Min IV Continuous <Continuous>  senna 2 Tablet(s) Oral at bedtime  simvastatin 20 milliGRAM(s) Oral at bedtime  sodium chloride 0.9% Bolus 1000 milliLiter(s) IV Bolus once  tamsulosin Oral Tab/Cap - Peds 0.4 milliGRAM(s) Oral at bedtime  traZODone 150 milliGRAM(s) Oral daily    PRN MEDICATIONS  acetaminophen   Tablet .. 650 milliGRAM(s) Oral every 6 hours PRN  polyethylene glycol 3350 17 Gram(s) Oral daily PRN    VITALS:  T(F): 96.9  HR: 102  BP: 91/58  RR: 25  SpO2: 97%      GENERAL: intubated, sedated  HEENT:  Atraumatic. EOMI. No JVD  PULMONARY: CTAB  CARDIOVASCULAR: RRR  GASTROINTESTINAL: Soft  NEUROLOGY: moves all extremities. +gag, pupillaries   SKIN: intact      LABS                        12.2   10.03 )-----------( 210      ( 31 Dec 2020 04:30 )             40.3     12-31    143  |  110  |  18  ----------------------------<  144<H>  4.4   |  26  |  0.6<L>    Ca    8.4<L>      31 Dec 2020 04:30  Phos  1.8     12-31  Mg     1.8     12-31    TPro  4.5<L>  /  Alb  2.5<L>  /  TBili  0.3  /  DBili  x   /  AST  12  /  ALT  13  /  AlkPhos  74  12-31    PT/INR - ( 29 Dec 2020 21:03 )   PT: 15.50 sec;   INR: 1.35 ratio         PTT - ( 29 Dec 2020 21:03 )  PTT:30.3 sec    ABG - ( 31 Dec 2020 04:51 )  pH, Arterial: 7.42  pH, Blood: x     /  pCO2: 43    /  pO2: 107   / HCO3: 28    / Base Excess: 2.9   /  SaO2: 98                    Culture - Sputum (collected 29 Dec 2020 06:00)  Source: .Sputum Sputum  Gram Stain (30 Dec 2020 04:09):    Few polymorphonuclear leukocytes per low power field    Few Squamous epithelial cells per low power field    Rare Gram positive cocci in pairs per oil power field  Preliminary Report (30 Dec 2020 19:56):    No growth    Culture - Blood (collected 28 Dec 2020 11:59)  Source: .Blood None  Preliminary Report (29 Dec 2020 23:01):    No growth to date.          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 Weaning trial.   Failed several times due to severe agitation; restarted home effexor, added seroquel. Reattempt.     2. excision/biopsy  ?Candidate - f/u with NS to write note clearly stating whether patient is candidate.     If unable to wean family leaning toward comfort measures.  May make sense to perform weaning trial regardless of candidate status to clarify GOC. Has been on only precedex without meaningful MS recovery, unlikely to pass weaning trial.     Patient has been on/off low-dose levo gtt several times due to hypotension. If HoTN check fluid responsiveness.       CNS:    MRI Right frontoparietal hemorrhagic enhancing lesion with central necrosis which may represent a primary brain neoplasm versus metastatic lesion.    Versed for sedation.  Wean Fentanyl.  Keppra 500 BID for seizure ppx. Neurology following.  Maintain Normothermia, utilize antipyretics and arctic sun as needed.  Blood and CSF studies if temperature spike   f/u with neurosx for possible surgical resection of mass as patient is off levophed  Patient has been difficult to wean to do agitation; continue home trazadone; add home effexor; add seroquel prior to weaning trial     HEENT: Oral care    PULMONARY:  HOB @ 45 degrees.  Aspiration precautions.  Keep POx > 92%.   - right segmental PE identifed - no anticoagulation given due to brain mass     CARDIOVASCULAR:  MAP Improved s/p 1 L bolus yestraday, levo weaned. Keep MAP >60  echo - 60-65% EF, moderate AS; CE - neg    GI: GI prophylaxis.  NG feeds;  Bowel regimen.  No BM since 12/24; s/p lactulose yesterday without reponse    RENAL:  Follow up lytes.  Correct as needed    INFECTIOUS DISEASE: s/p 7 day course Meropenum for PNA. COVID negative    HEMATOLOGICAL:  No AC due to brain mass with areas of hemorrhage.   Iliofemoral DVT  IVC filter in place    ENDOCRINE:  Follow up FS.  Continue basal bolus + ss insulin     MUSCULOSKELETAL: bedrest    DISPO: CCU Monitoring    Attempted to reach sister Melody several times over the day, was unsuccessful.   Will attempt to reach out tomorrow after weaning trial.   If weaning trail fails will reconsult palliative as patient would require trach/PEG at some point.  HPI:  81 yo male with PMHx of DM, COPD, HTN c/o AMS since this morning. Patient lives by himself, son lives next door. Last known well at 10 PM at baseline per son, at 6 AM today, patient was not himself, confused, not talking. Stroke code called en route, NIHSS 11 for aphasia but grossly no focal weakness. CTH reported as having a focus of vasogenic edema in the right temporoparietal white matter with internal hyperdensity and effacement of the adjacent cortical sulci. Trace right temporoparietal subarachnoid hemorrhage is also seen. Not a candidate for IV thrombolytics for being out of the window and for ICH. Not a candidate for IA intervention because of no LVO on CTA. (19 Dec 2020 11:02)    Currently admitted to medicine with the primary diagnosis of Intracranial bleed       Today is hospital day 12d.     INTERVAL HPI / OVERNIGHT EVENTS:  Patient was examined and seen at bedside. This morning he is resting comfortably in bed and reports no new issues or overnight events.     ROS: Otherwise unremarkable     PAST MEDICAL & SURGICAL HISTORY  COPD (chronic obstructive pulmonary disease)    BPH (benign prostatic hyperplasia)    Back pain    Non-Hodgkin lymphoma    CLL (chronic lymphocytic leukemia)  treatment completed 10/19    Depression    HTN (hypertension)    DM (diabetes mellitus)    Anxiety    Encounter for screening colonoscopy  7 years approx    Port-A-Cath in place    H/O total knee replacement, bilateral      ALLERGIES  No Known Allergies    MEDICATIONS  STANDING MEDICATIONS  chlorhexidine 0.12% Liquid 15 milliLiter(s) Oral Mucosa two times a day  cyanocobalamin 1000 MICROGram(s) Oral daily  dexMEDEtomidine Infusion 0.2 MICROgram(s)/kG/Hr IV Continuous <Continuous>  dextrose 40% Gel 15 Gram(s) Oral once  dextrose 5%. 1000 milliLiter(s) IV Continuous <Continuous>  dextrose 5%. 1000 milliLiter(s) IV Continuous <Continuous>  dextrose 50% Injectable 25 Gram(s) IV Push once  dextrose 50% Injectable 12.5 Gram(s) IV Push once  dextrose 50% Injectable 25 Gram(s) IV Push once  fentaNYL   Infusion. 0.5 MICROgram(s)/kG/Hr IV Continuous <Continuous>  folic acid 1 milliGRAM(s) Oral daily  heparin   Injectable 5000 Unit(s) SubCutaneous every 8 hours  insulin glargine Injectable (LANTUS) 25 Unit(s) SubCutaneous every morning  insulin lispro (ADMELOG) corrective regimen sliding scale   SubCutaneous three times a day before meals  insulin lispro Injectable (ADMELOG) 6 Unit(s) SubCutaneous every 6 hours  lactulose Syrup 15 Gram(s) Oral two times a day  levETIRAcetam  IVPB 500 milliGRAM(s) IV Intermittent every 12 hours  meropenem  IVPB 1000 milliGRAM(s) IV Intermittent every 8 hours  metoprolol tartrate 12.5 milliGRAM(s) Oral two times a day  midazolam Infusion 0.02 mG/kG/Hr IV Continuous <Continuous>  norepinephrine Infusion 0.05 MICROgram(s)/kG/Min IV Continuous <Continuous>  senna 2 Tablet(s) Oral at bedtime  simvastatin 20 milliGRAM(s) Oral at bedtime  sodium chloride 0.9% Bolus 1000 milliLiter(s) IV Bolus once  tamsulosin Oral Tab/Cap - Peds 0.4 milliGRAM(s) Oral at bedtime  traZODone 150 milliGRAM(s) Oral daily    PRN MEDICATIONS  acetaminophen   Tablet .. 650 milliGRAM(s) Oral every 6 hours PRN  polyethylene glycol 3350 17 Gram(s) Oral daily PRN    VITALS:  T(F): 96.9  HR: 102  BP: 91/58  RR: 25  SpO2: 97%      GENERAL: intubated, sedated  HEENT:  Atraumatic. EOMI. No JVD  PULMONARY: CTAB  CARDIOVASCULAR: RRR  GASTROINTESTINAL: Soft  NEUROLOGY: moves all extremities. +gag, pupillaries   SKIN: intact      LABS                        12.2   10.03 )-----------( 210      ( 31 Dec 2020 04:30 )             40.3     12-31    143  |  110  |  18  ----------------------------<  144<H>  4.4   |  26  |  0.6<L>    Ca    8.4<L>      31 Dec 2020 04:30  Phos  1.8     12-31  Mg     1.8     12-31    TPro  4.5<L>  /  Alb  2.5<L>  /  TBili  0.3  /  DBili  x   /  AST  12  /  ALT  13  /  AlkPhos  74  12-31    PT/INR - ( 29 Dec 2020 21:03 )   PT: 15.50 sec;   INR: 1.35 ratio         PTT - ( 29 Dec 2020 21:03 )  PTT:30.3 sec    ABG - ( 31 Dec 2020 04:51 )  pH, Arterial: 7.42  pH, Blood: x     /  pCO2: 43    /  pO2: 107   / HCO3: 28    / Base Excess: 2.9   /  SaO2: 98                    Culture - Sputum (collected 29 Dec 2020 06:00)  Source: .Sputum Sputum  Gram Stain (30 Dec 2020 04:09):    Few polymorphonuclear leukocytes per low power field    Few Squamous epithelial cells per low power field    Rare Gram positive cocci in pairs per oil power field  Preliminary Report (30 Dec 2020 19:56):    No growth    Culture - Blood (collected 28 Dec 2020 11:59)  Source: .Blood None  Preliminary Report (29 Dec 2020 23:01):    No growth to date.          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 Weaning trial.   Failed several times due to severe agitation; restarted home effexor, added seroquel. Reattempt.     2. excision/biopsy  ?Candidate - f/u with NS to write note clearly stating whether patient is candidate.     If unable to wean family leaning toward comfort measures.  May make sense to perform weaning trial regardless of candidate status to clarify GOC. Has been on only precedex without meaningful MS recovery, unlikely to pass weaning trial.     Patient has been on/off low-dose levo gtt several times due to hypotension. If HoTN check fluid responsiveness.     Daily communication with patient's sister Melody [phone]; call son for any final decisions; son is in close communication with aunt    CNS:    MRI Right frontoparietal hemorrhagic enhancing lesion with central necrosis which may represent a primary brain neoplasm versus metastatic lesion.    Versed for sedation.  Wean Fentanyl.  Keppra 500 BID for seizure ppx. Neurology following.  Maintain Normothermia, utilize antipyretics and arctic sun as needed.  Blood and CSF studies if temperature spike   f/u with neurosx for possible surgical resection of mass as patient is off levophed  Patient has been difficult to wean to do agitation; continue home trazadone; add home effexor; add seroquel prior to weaning trial     HEENT: Oral care    PULMONARY:  HOB @ 45 degrees.  Aspiration precautions.  Keep POx > 92%.   - right segmental PE identifed - no anticoagulation given due to brain mass     CARDIOVASCULAR:  MAP Improved s/p 1 L bolus yestraday, levo weaned. Keep MAP >60  echo - 60-65% EF, moderate AS; CE - neg    GI: GI prophylaxis.  NG feeds;  Bowel regimen.  No BM since 12/24; s/p lactulose yesterday without reponse    RENAL:  Follow up lytes.  Correct as needed    INFECTIOUS DISEASE: s/p 7 day course Meropenum for PNA. COVID negative    HEMATOLOGICAL:  No AC due to brain mass with areas of hemorrhage.   Iliofemoral DVT  IVC filter in place    ENDOCRINE:  Follow up FS.  Continue basal bolus + ss insulin     MUSCULOSKELETAL: bedrest    DISPO: CCU Monitoring    Attempted to reach sister Melody several times over the day, was unsuccessful.   Will attempt to reach out tomorrow after weaning trial.   If weaning trail fails will reconsult palliative as patient would require trach/PEG at some point.

## 2021-01-01 VITALS — HEART RATE: 106 BPM | RESPIRATION RATE: 12 BRPM

## 2021-01-01 LAB
ALBUMIN SERPL ELPH-MCNC: 2.4 G/DL — LOW (ref 3.5–5.2)
ALBUMIN SERPL ELPH-MCNC: 2.5 G/DL — LOW (ref 3.5–5.2)
ALBUMIN SERPL ELPH-MCNC: 2.5 G/DL — LOW (ref 3.5–5.2)
ALP SERPL-CCNC: 70 U/L — SIGNIFICANT CHANGE UP (ref 30–115)
ALP SERPL-CCNC: 75 U/L — SIGNIFICANT CHANGE UP (ref 30–115)
ALP SERPL-CCNC: 77 U/L — SIGNIFICANT CHANGE UP (ref 30–115)
ALT FLD-CCNC: 13 U/L — SIGNIFICANT CHANGE UP (ref 0–41)
ALT FLD-CCNC: 18 U/L — SIGNIFICANT CHANGE UP (ref 0–41)
ALT FLD-CCNC: 23 U/L — SIGNIFICANT CHANGE UP (ref 0–41)
ANION GAP SERPL CALC-SCNC: 8 MMOL/L — SIGNIFICANT CHANGE UP (ref 7–14)
ANION GAP SERPL CALC-SCNC: 9 MMOL/L — SIGNIFICANT CHANGE UP (ref 7–14)
ANION GAP SERPL CALC-SCNC: 9 MMOL/L — SIGNIFICANT CHANGE UP (ref 7–14)
AST SERPL-CCNC: 13 U/L — SIGNIFICANT CHANGE UP (ref 0–41)
AST SERPL-CCNC: 16 U/L — SIGNIFICANT CHANGE UP (ref 0–41)
AST SERPL-CCNC: 20 U/L — SIGNIFICANT CHANGE UP (ref 0–41)
BASOPHILS # BLD AUTO: 0.04 K/UL — SIGNIFICANT CHANGE UP (ref 0–0.2)
BASOPHILS # BLD AUTO: 0.04 K/UL — SIGNIFICANT CHANGE UP (ref 0–0.2)
BASOPHILS # BLD AUTO: 0.06 K/UL — SIGNIFICANT CHANGE UP (ref 0–0.2)
BASOPHILS NFR BLD AUTO: 0.2 % — SIGNIFICANT CHANGE UP (ref 0–1)
BASOPHILS NFR BLD AUTO: 0.3 % — SIGNIFICANT CHANGE UP (ref 0–1)
BASOPHILS NFR BLD AUTO: 0.5 % — SIGNIFICANT CHANGE UP (ref 0–1)
BILIRUB SERPL-MCNC: 0.2 MG/DL — SIGNIFICANT CHANGE UP (ref 0.2–1.2)
BILIRUB SERPL-MCNC: 0.3 MG/DL — SIGNIFICANT CHANGE UP (ref 0.2–1.2)
BILIRUB SERPL-MCNC: 0.3 MG/DL — SIGNIFICANT CHANGE UP (ref 0.2–1.2)
BUN SERPL-MCNC: 20 MG/DL — SIGNIFICANT CHANGE UP (ref 10–20)
BUN SERPL-MCNC: 23 MG/DL — HIGH (ref 10–20)
BUN SERPL-MCNC: 25 MG/DL — HIGH (ref 10–20)
CALCIUM SERPL-MCNC: 8.4 MG/DL — LOW (ref 8.5–10.1)
CALCIUM SERPL-MCNC: 8.7 MG/DL — SIGNIFICANT CHANGE UP (ref 8.5–10.1)
CALCIUM SERPL-MCNC: 8.7 MG/DL — SIGNIFICANT CHANGE UP (ref 8.5–10.1)
CHLORIDE SERPL-SCNC: 104 MMOL/L — SIGNIFICANT CHANGE UP (ref 98–110)
CHLORIDE SERPL-SCNC: 106 MMOL/L — SIGNIFICANT CHANGE UP (ref 98–110)
CHLORIDE SERPL-SCNC: 112 MMOL/L — HIGH (ref 98–110)
CO2 SERPL-SCNC: 23 MMOL/L — SIGNIFICANT CHANGE UP (ref 17–32)
CO2 SERPL-SCNC: 24 MMOL/L — SIGNIFICANT CHANGE UP (ref 17–32)
CO2 SERPL-SCNC: 27 MMOL/L — SIGNIFICANT CHANGE UP (ref 17–32)
CREAT SERPL-MCNC: 0.6 MG/DL — LOW (ref 0.7–1.5)
CREAT SERPL-MCNC: 0.6 MG/DL — LOW (ref 0.7–1.5)
CREAT SERPL-MCNC: 0.7 MG/DL — SIGNIFICANT CHANGE UP (ref 0.7–1.5)
CULTURE RESULTS: SIGNIFICANT CHANGE UP
EOSINOPHIL # BLD AUTO: 0 K/UL — SIGNIFICANT CHANGE UP (ref 0–0.7)
EOSINOPHIL # BLD AUTO: 0 K/UL — SIGNIFICANT CHANGE UP (ref 0–0.7)
EOSINOPHIL # BLD AUTO: 0.07 K/UL — SIGNIFICANT CHANGE UP (ref 0–0.7)
EOSINOPHIL NFR BLD AUTO: 0 % — SIGNIFICANT CHANGE UP (ref 0–8)
EOSINOPHIL NFR BLD AUTO: 0 % — SIGNIFICANT CHANGE UP (ref 0–8)
EOSINOPHIL NFR BLD AUTO: 0.6 % — SIGNIFICANT CHANGE UP (ref 0–8)
GLUCOSE BLDC GLUCOMTR-MCNC: 149 MG/DL — HIGH (ref 70–99)
GLUCOSE BLDC GLUCOMTR-MCNC: 159 MG/DL — HIGH (ref 70–99)
GLUCOSE BLDC GLUCOMTR-MCNC: 174 MG/DL — HIGH (ref 70–99)
GLUCOSE BLDC GLUCOMTR-MCNC: 182 MG/DL — HIGH (ref 70–99)
GLUCOSE BLDC GLUCOMTR-MCNC: 184 MG/DL — HIGH (ref 70–99)
GLUCOSE BLDC GLUCOMTR-MCNC: 188 MG/DL — HIGH (ref 70–99)
GLUCOSE BLDC GLUCOMTR-MCNC: 193 MG/DL — HIGH (ref 70–99)
GLUCOSE BLDC GLUCOMTR-MCNC: 197 MG/DL — HIGH (ref 70–99)
GLUCOSE BLDC GLUCOMTR-MCNC: 201 MG/DL — HIGH (ref 70–99)
GLUCOSE BLDC GLUCOMTR-MCNC: 217 MG/DL — HIGH (ref 70–99)
GLUCOSE BLDC GLUCOMTR-MCNC: 225 MG/DL — HIGH (ref 70–99)
GLUCOSE BLDC GLUCOMTR-MCNC: 226 MG/DL — HIGH (ref 70–99)
GLUCOSE BLDC GLUCOMTR-MCNC: 232 MG/DL — HIGH (ref 70–99)
GLUCOSE BLDC GLUCOMTR-MCNC: 260 MG/DL — HIGH (ref 70–99)
GLUCOSE SERPL-MCNC: 189 MG/DL — HIGH (ref 70–99)
GLUCOSE SERPL-MCNC: 216 MG/DL — HIGH (ref 70–99)
GLUCOSE SERPL-MCNC: 274 MG/DL — HIGH (ref 70–99)
HCT VFR BLD CALC: 39 % — LOW (ref 42–52)
HCT VFR BLD CALC: 39.9 % — LOW (ref 42–52)
HCT VFR BLD CALC: 41.9 % — LOW (ref 42–52)
HGB BLD-MCNC: 11.9 G/DL — LOW (ref 14–18)
HGB BLD-MCNC: 12.2 G/DL — LOW (ref 14–18)
HGB BLD-MCNC: 12.8 G/DL — LOW (ref 14–18)
IMM GRANULOCYTES NFR BLD AUTO: 2.4 % — HIGH (ref 0.1–0.3)
IMM GRANULOCYTES NFR BLD AUTO: 3 % — HIGH (ref 0.1–0.3)
IMM GRANULOCYTES NFR BLD AUTO: 3 % — HIGH (ref 0.1–0.3)
LYMPHOCYTES # BLD AUTO: 0.55 K/UL — LOW (ref 1.2–3.4)
LYMPHOCYTES # BLD AUTO: 0.55 K/UL — LOW (ref 1.2–3.4)
LYMPHOCYTES # BLD AUTO: 0.63 K/UL — LOW (ref 1.2–3.4)
LYMPHOCYTES # BLD AUTO: 2.9 % — LOW (ref 20.5–51.1)
LYMPHOCYTES # BLD AUTO: 4.6 % — LOW (ref 20.5–51.1)
LYMPHOCYTES # BLD AUTO: 5.4 % — LOW (ref 20.5–51.1)
MAGNESIUM SERPL-MCNC: 1.7 MG/DL — LOW (ref 1.8–2.4)
MAGNESIUM SERPL-MCNC: 1.8 MG/DL — SIGNIFICANT CHANGE UP (ref 1.8–2.4)
MAGNESIUM SERPL-MCNC: 1.9 MG/DL — SIGNIFICANT CHANGE UP (ref 1.8–2.4)
MCHC RBC-ENTMCNC: 25.8 PG — LOW (ref 27–31)
MCHC RBC-ENTMCNC: 25.9 PG — LOW (ref 27–31)
MCHC RBC-ENTMCNC: 25.9 PG — LOW (ref 27–31)
MCHC RBC-ENTMCNC: 30.5 G/DL — LOW (ref 32–37)
MCHC RBC-ENTMCNC: 30.5 G/DL — LOW (ref 32–37)
MCHC RBC-ENTMCNC: 30.6 G/DL — LOW (ref 32–37)
MCV RBC AUTO: 84.4 FL — SIGNIFICANT CHANGE UP (ref 80–94)
MCV RBC AUTO: 84.7 FL — SIGNIFICANT CHANGE UP (ref 80–94)
MCV RBC AUTO: 84.8 FL — SIGNIFICANT CHANGE UP (ref 80–94)
MONOCYTES # BLD AUTO: 0.21 K/UL — SIGNIFICANT CHANGE UP (ref 0.1–0.6)
MONOCYTES # BLD AUTO: 0.54 K/UL — SIGNIFICANT CHANGE UP (ref 0.1–0.6)
MONOCYTES # BLD AUTO: 0.8 K/UL — HIGH (ref 0.1–0.6)
MONOCYTES NFR BLD AUTO: 1.8 % — SIGNIFICANT CHANGE UP (ref 1.7–9.3)
MONOCYTES NFR BLD AUTO: 4.3 % — SIGNIFICANT CHANGE UP (ref 1.7–9.3)
MONOCYTES NFR BLD AUTO: 4.6 % — SIGNIFICANT CHANGE UP (ref 1.7–9.3)
NEUTROPHILS # BLD AUTO: 10.12 K/UL — HIGH (ref 1.4–6.5)
NEUTROPHILS # BLD AUTO: 10.82 K/UL — HIGH (ref 1.4–6.5)
NEUTROPHILS # BLD AUTO: 16.92 K/UL — HIGH (ref 1.4–6.5)
NEUTROPHILS NFR BLD AUTO: 85.9 % — HIGH (ref 42.2–75.2)
NEUTROPHILS NFR BLD AUTO: 90.2 % — HIGH (ref 42.2–75.2)
NEUTROPHILS NFR BLD AUTO: 90.3 % — HIGH (ref 42.2–75.2)
NRBC # BLD: 0 /100 WBCS — SIGNIFICANT CHANGE UP (ref 0–0)
PLATELET # BLD AUTO: 250 K/UL — SIGNIFICANT CHANGE UP (ref 130–400)
PLATELET # BLD AUTO: 323 K/UL — SIGNIFICANT CHANGE UP (ref 130–400)
PLATELET # BLD AUTO: 387 K/UL — SIGNIFICANT CHANGE UP (ref 130–400)
POTASSIUM SERPL-MCNC: 4.8 MMOL/L — SIGNIFICANT CHANGE UP (ref 3.5–5)
POTASSIUM SERPL-MCNC: 4.9 MMOL/L — SIGNIFICANT CHANGE UP (ref 3.5–5)
POTASSIUM SERPL-MCNC: 5.1 MMOL/L — HIGH (ref 3.5–5)
POTASSIUM SERPL-SCNC: 4.8 MMOL/L — SIGNIFICANT CHANGE UP (ref 3.5–5)
POTASSIUM SERPL-SCNC: 4.9 MMOL/L — SIGNIFICANT CHANGE UP (ref 3.5–5)
POTASSIUM SERPL-SCNC: 5.1 MMOL/L — HIGH (ref 3.5–5)
PROT SERPL-MCNC: 4.1 G/DL — LOW (ref 6–8)
PROT SERPL-MCNC: 4.4 G/DL — LOW (ref 6–8)
PROT SERPL-MCNC: 4.5 G/DL — LOW (ref 6–8)
RBC # BLD: 4.62 M/UL — LOW (ref 4.7–6.1)
RBC # BLD: 4.71 M/UL — SIGNIFICANT CHANGE UP (ref 4.7–6.1)
RBC # BLD: 4.94 M/UL — SIGNIFICANT CHANGE UP (ref 4.7–6.1)
RBC # FLD: 16.5 % — HIGH (ref 11.5–14.5)
RBC # FLD: 16.5 % — HIGH (ref 11.5–14.5)
RBC # FLD: 16.6 % — HIGH (ref 11.5–14.5)
SODIUM SERPL-SCNC: 138 MMOL/L — SIGNIFICANT CHANGE UP (ref 135–146)
SODIUM SERPL-SCNC: 139 MMOL/L — SIGNIFICANT CHANGE UP (ref 135–146)
SODIUM SERPL-SCNC: 145 MMOL/L — SIGNIFICANT CHANGE UP (ref 135–146)
SPECIMEN SOURCE: SIGNIFICANT CHANGE UP
WBC # BLD: 11.77 K/UL — HIGH (ref 4.8–10.8)
WBC # BLD: 11.98 K/UL — HIGH (ref 4.8–10.8)
WBC # BLD: 18.76 K/UL — HIGH (ref 4.8–10.8)
WBC # FLD AUTO: 11.77 K/UL — HIGH (ref 4.8–10.8)
WBC # FLD AUTO: 11.98 K/UL — HIGH (ref 4.8–10.8)
WBC # FLD AUTO: 18.76 K/UL — HIGH (ref 4.8–10.8)

## 2021-01-01 PROCEDURE — 71045 X-RAY EXAM CHEST 1 VIEW: CPT | Mod: 26

## 2021-01-01 PROCEDURE — 99232 SBSQ HOSP IP/OBS MODERATE 35: CPT

## 2021-01-01 PROCEDURE — 99497 ADVNCD CARE PLAN 30 MIN: CPT | Mod: 25

## 2021-01-01 PROCEDURE — 99222 1ST HOSP IP/OBS MODERATE 55: CPT

## 2021-01-01 RX ORDER — MAGNESIUM SULFATE 500 MG/ML
2 VIAL (ML) INJECTION ONCE
Refills: 0 | Status: COMPLETED | OUTPATIENT
Start: 2021-01-01 | End: 2021-01-01

## 2021-01-01 RX ORDER — MORPHINE SULFATE 50 MG/1
4 CAPSULE, EXTENDED RELEASE ORAL ONCE
Refills: 0 | Status: DISCONTINUED | OUTPATIENT
Start: 2021-01-01 | End: 2021-01-01

## 2021-01-01 RX ORDER — SODIUM CHLORIDE 9 MG/ML
1000 INJECTION, SOLUTION INTRAVENOUS
Refills: 0 | Status: DISCONTINUED | OUTPATIENT
Start: 2021-01-01 | End: 2021-01-01

## 2021-01-01 RX ORDER — DEXTROSE 50 % IN WATER 50 %
12.5 SYRINGE (ML) INTRAVENOUS ONCE
Refills: 0 | Status: DISCONTINUED | OUTPATIENT
Start: 2021-01-01 | End: 2021-01-01

## 2021-01-01 RX ORDER — MORPHINE SULFATE 50 MG/1
10 CAPSULE, EXTENDED RELEASE ORAL
Qty: 100 | Refills: 0 | Status: DISCONTINUED | OUTPATIENT
Start: 2021-01-01 | End: 2021-01-01

## 2021-01-01 RX ORDER — FENTANYL CITRATE 50 UG/ML
0.5 INJECTION INTRAVENOUS
Qty: 2500 | Refills: 0 | Status: DISCONTINUED | OUTPATIENT
Start: 2021-01-01 | End: 2021-01-01

## 2021-01-01 RX ORDER — QUETIAPINE FUMARATE 200 MG/1
50 TABLET, FILM COATED ORAL
Refills: 0 | Status: DISCONTINUED | OUTPATIENT
Start: 2021-01-01 | End: 2021-01-01

## 2021-01-01 RX ORDER — DEXTROSE 50 % IN WATER 50 %
25 SYRINGE (ML) INTRAVENOUS ONCE
Refills: 0 | Status: DISCONTINUED | OUTPATIENT
Start: 2021-01-01 | End: 2021-01-01

## 2021-01-01 RX ORDER — HALOPERIDOL DECANOATE 100 MG/ML
1 INJECTION INTRAMUSCULAR
Refills: 0 | Status: DISCONTINUED | OUTPATIENT
Start: 2021-01-01 | End: 2021-01-01

## 2021-01-01 RX ORDER — FENTANYL CITRATE 50 UG/ML
25 INJECTION INTRAVENOUS
Refills: 0 | Status: DISCONTINUED | OUTPATIENT
Start: 2021-01-01 | End: 2021-01-01

## 2021-01-01 RX ORDER — PROPOFOL 10 MG/ML
50 INJECTION, EMULSION INTRAVENOUS ONCE
Refills: 0 | Status: COMPLETED | OUTPATIENT
Start: 2021-01-01 | End: 2021-01-01

## 2021-01-01 RX ORDER — INSULIN LISPRO 100/ML
VIAL (ML) SUBCUTANEOUS EVERY 6 HOURS
Refills: 0 | Status: DISCONTINUED | OUTPATIENT
Start: 2021-01-01 | End: 2021-01-01

## 2021-01-01 RX ORDER — ROBINUL 0.2 MG/ML
0.4 INJECTION INTRAMUSCULAR; INTRAVENOUS ONCE
Refills: 0 | Status: COMPLETED | OUTPATIENT
Start: 2021-01-01 | End: 2021-01-01

## 2021-01-01 RX ORDER — GLUCAGON INJECTION, SOLUTION 0.5 MG/.1ML
1 INJECTION, SOLUTION SUBCUTANEOUS ONCE
Refills: 0 | Status: DISCONTINUED | OUTPATIENT
Start: 2021-01-01 | End: 2021-01-01

## 2021-01-01 RX ORDER — PROPOFOL 10 MG/ML
1 INJECTION, EMULSION INTRAVENOUS
Qty: 1000 | Refills: 0 | Status: DISCONTINUED | OUTPATIENT
Start: 2021-01-01 | End: 2021-01-01

## 2021-01-01 RX ORDER — ROBINUL 0.2 MG/ML
0.2 INJECTION INTRAMUSCULAR; INTRAVENOUS EVERY 4 HOURS
Refills: 0 | Status: DISCONTINUED | OUTPATIENT
Start: 2021-01-01 | End: 2021-01-01

## 2021-01-01 RX ORDER — MORPHINE SULFATE 50 MG/1
10 CAPSULE, EXTENDED RELEASE ORAL
Qty: 250 | Refills: 0 | Status: DISCONTINUED | OUTPATIENT
Start: 2021-01-01 | End: 2021-01-01

## 2021-01-01 RX ORDER — MORPHINE SULFATE 50 MG/1
4 CAPSULE, EXTENDED RELEASE ORAL
Refills: 0 | Status: DISCONTINUED | OUTPATIENT
Start: 2021-01-01 | End: 2021-01-01

## 2021-01-01 RX ORDER — DEXAMETHASONE 0.5 MG/5ML
4 ELIXIR ORAL EVERY 6 HOURS
Refills: 0 | Status: DISCONTINUED | OUTPATIENT
Start: 2021-01-01 | End: 2021-01-01

## 2021-01-01 RX ORDER — NOREPINEPHRINE BITARTRATE/D5W 8 MG/250ML
0.05 PLASTIC BAG, INJECTION (ML) INTRAVENOUS
Qty: 8 | Refills: 0 | Status: DISCONTINUED | OUTPATIENT
Start: 2021-01-01 | End: 2021-01-01

## 2021-01-01 RX ORDER — DEXAMETHASONE 0.5 MG/5ML
10 ELIXIR ORAL ONCE
Refills: 0 | Status: COMPLETED | OUTPATIENT
Start: 2021-01-01 | End: 2021-01-01

## 2021-01-01 RX ADMIN — CHLORHEXIDINE GLUCONATE 15 MILLILITER(S): 213 SOLUTION TOPICAL at 18:07

## 2021-01-01 RX ADMIN — Medication 6 UNIT(S): at 17:25

## 2021-01-01 RX ADMIN — QUETIAPINE FUMARATE 50 MILLIGRAM(S): 200 TABLET, FILM COATED ORAL at 05:07

## 2021-01-01 RX ADMIN — CHLORHEXIDINE GLUCONATE 15 MILLILITER(S): 213 SOLUTION TOPICAL at 05:07

## 2021-01-01 RX ADMIN — PROPOFOL 0.62 MICROGRAM(S)/KG/MIN: 10 INJECTION, EMULSION INTRAVENOUS at 14:00

## 2021-01-01 RX ADMIN — Medication 4 MILLIGRAM(S): at 17:42

## 2021-01-01 RX ADMIN — HEPARIN SODIUM 5000 UNIT(S): 5000 INJECTION INTRAVENOUS; SUBCUTANEOUS at 14:25

## 2021-01-01 RX ADMIN — INSULIN GLARGINE 25 UNIT(S): 100 INJECTION, SOLUTION SUBCUTANEOUS at 08:30

## 2021-01-01 RX ADMIN — LEVETIRACETAM 420 MILLIGRAM(S): 250 TABLET, FILM COATED ORAL at 05:34

## 2021-01-01 RX ADMIN — Medication 150 MILLIGRAM(S): at 11:48

## 2021-01-01 RX ADMIN — PROPOFOL 50 MILLIGRAM(S): 10 INJECTION, EMULSION INTRAVENOUS at 14:00

## 2021-01-01 RX ADMIN — LACTULOSE 15 GRAM(S): 10 SOLUTION ORAL at 05:33

## 2021-01-01 RX ADMIN — FENTANYL CITRATE 25 MICROGRAM(S): 50 INJECTION INTRAVENOUS at 10:45

## 2021-01-01 RX ADMIN — PREGABALIN 1000 MICROGRAM(S): 225 CAPSULE ORAL at 11:47

## 2021-01-01 RX ADMIN — Medication 1: at 07:00

## 2021-01-01 RX ADMIN — Medication 4 MILLIGRAM(S): at 14:12

## 2021-01-01 RX ADMIN — MORPHINE SULFATE 4 MILLIGRAM(S): 50 CAPSULE, EXTENDED RELEASE ORAL at 15:58

## 2021-01-01 RX ADMIN — QUETIAPINE FUMARATE 50 MILLIGRAM(S): 200 TABLET, FILM COATED ORAL at 05:38

## 2021-01-01 RX ADMIN — HEPARIN SODIUM 5000 UNIT(S): 5000 INJECTION INTRAVENOUS; SUBCUTANEOUS at 05:33

## 2021-01-01 RX ADMIN — DEXMEDETOMIDINE HYDROCHLORIDE IN 0.9% SODIUM CHLORIDE 5.2 MICROGRAM(S)/KG/HR: 4 INJECTION INTRAVENOUS at 23:38

## 2021-01-01 RX ADMIN — HEPARIN SODIUM 5000 UNIT(S): 5000 INJECTION INTRAVENOUS; SUBCUTANEOUS at 21:16

## 2021-01-01 RX ADMIN — QUETIAPINE FUMARATE 50 MILLIGRAM(S): 200 TABLET, FILM COATED ORAL at 17:26

## 2021-01-01 RX ADMIN — Medication 6 UNIT(S): at 23:09

## 2021-01-01 RX ADMIN — Medication 10 MILLIGRAM(S): at 10:29

## 2021-01-01 RX ADMIN — Medication 12.5 MILLIGRAM(S): at 17:25

## 2021-01-01 RX ADMIN — FENTANYL CITRATE 25 MICROGRAM(S): 50 INJECTION INTRAVENOUS at 14:10

## 2021-01-01 RX ADMIN — FENTANYL CITRATE 5.2 MICROGRAM(S)/KG/HR: 50 INJECTION INTRAVENOUS at 12:30

## 2021-01-01 RX ADMIN — Medication 4 MILLIGRAM(S): at 17:25

## 2021-01-01 RX ADMIN — HEPARIN SODIUM 5000 UNIT(S): 5000 INJECTION INTRAVENOUS; SUBCUTANEOUS at 06:11

## 2021-01-01 RX ADMIN — LEVETIRACETAM 420 MILLIGRAM(S): 250 TABLET, FILM COATED ORAL at 17:26

## 2021-01-01 RX ADMIN — Medication 1 MILLIGRAM(S): at 11:56

## 2021-01-01 RX ADMIN — FENTANYL CITRATE 25 MICROGRAM(S): 50 INJECTION INTRAVENOUS at 11:30

## 2021-01-01 RX ADMIN — DEXMEDETOMIDINE HYDROCHLORIDE IN 0.9% SODIUM CHLORIDE 5.2 MICROGRAM(S)/KG/HR: 4 INJECTION INTRAVENOUS at 06:21

## 2021-01-01 RX ADMIN — TAMSULOSIN HYDROCHLORIDE 0.4 MILLIGRAM(S): 0.4 CAPSULE ORAL at 21:05

## 2021-01-01 RX ADMIN — CHLORHEXIDINE GLUCONATE 15 MILLILITER(S): 213 SOLUTION TOPICAL at 05:33

## 2021-01-01 RX ADMIN — Medication 150 MILLIGRAM(S): at 11:56

## 2021-01-01 RX ADMIN — PROPOFOL 0.62 MICROGRAM(S)/KG/MIN: 10 INJECTION, EMULSION INTRAVENOUS at 23:38

## 2021-01-01 RX ADMIN — Medication 4 MILLIGRAM(S): at 11:48

## 2021-01-01 RX ADMIN — MORPHINE SULFATE 4 MILLIGRAM(S): 50 CAPSULE, EXTENDED RELEASE ORAL at 14:08

## 2021-01-01 RX ADMIN — Medication 6 UNIT(S): at 11:55

## 2021-01-01 RX ADMIN — MORPHINE SULFATE 4 MILLIGRAM(S): 50 CAPSULE, EXTENDED RELEASE ORAL at 14:30

## 2021-01-01 RX ADMIN — Medication 1 MILLIGRAM(S): at 11:30

## 2021-01-01 RX ADMIN — INSULIN GLARGINE 25 UNIT(S): 100 INJECTION, SOLUTION SUBCUTANEOUS at 09:04

## 2021-01-01 RX ADMIN — HEPARIN SODIUM 5000 UNIT(S): 5000 INJECTION INTRAVENOUS; SUBCUTANEOUS at 14:08

## 2021-01-01 RX ADMIN — Medication 4 MILLIGRAM(S): at 05:38

## 2021-01-01 RX ADMIN — Medication 12.5 MILLIGRAM(S): at 17:42

## 2021-01-01 RX ADMIN — ROBINUL 0.4 MILLIGRAM(S): 0.2 INJECTION INTRAMUSCULAR; INTRAVENOUS at 13:21

## 2021-01-01 RX ADMIN — PREGABALIN 1000 MICROGRAM(S): 225 CAPSULE ORAL at 11:56

## 2021-01-01 RX ADMIN — DEXMEDETOMIDINE HYDROCHLORIDE IN 0.9% SODIUM CHLORIDE 5.2 MICROGRAM(S)/KG/HR: 4 INJECTION INTRAVENOUS at 23:10

## 2021-01-01 RX ADMIN — PROPOFOL 0.62 MICROGRAM(S)/KG/MIN: 10 INJECTION, EMULSION INTRAVENOUS at 03:16

## 2021-01-01 RX ADMIN — HEPARIN SODIUM 5000 UNIT(S): 5000 INJECTION INTRAVENOUS; SUBCUTANEOUS at 21:05

## 2021-01-01 RX ADMIN — MORPHINE SULFATE 4 MILLIGRAM(S): 50 CAPSULE, EXTENDED RELEASE ORAL at 14:36

## 2021-01-01 RX ADMIN — Medication 25 GRAM(S): at 11:29

## 2021-01-01 RX ADMIN — CHLORHEXIDINE GLUCONATE 15 MILLILITER(S): 213 SOLUTION TOPICAL at 17:24

## 2021-01-01 RX ADMIN — Medication 4: at 23:10

## 2021-01-01 RX ADMIN — Medication 1: at 16:55

## 2021-01-01 RX ADMIN — Medication 4 MILLIGRAM(S): at 23:09

## 2021-01-01 RX ADMIN — INSULIN GLARGINE 25 UNIT(S): 100 INJECTION, SOLUTION SUBCUTANEOUS at 07:14

## 2021-01-01 RX ADMIN — CHLORHEXIDINE GLUCONATE 15 MILLILITER(S): 213 SOLUTION TOPICAL at 17:42

## 2021-01-01 RX ADMIN — Medication 6 UNIT(S): at 11:48

## 2021-01-01 RX ADMIN — Medication 4 MILLIGRAM(S): at 23:32

## 2021-01-01 RX ADMIN — CHLORHEXIDINE GLUCONATE 15 MILLILITER(S): 213 SOLUTION TOPICAL at 06:11

## 2021-01-01 RX ADMIN — Medication 4 MILLIGRAM(S): at 14:30

## 2021-01-01 RX ADMIN — MORPHINE SULFATE 10 MG/HR: 50 CAPSULE, EXTENDED RELEASE ORAL at 14:00

## 2021-01-01 RX ADMIN — Medication 1 MILLIGRAM(S): at 16:40

## 2021-01-01 RX ADMIN — FENTANYL CITRATE 25 MICROGRAM(S): 50 INJECTION INTRAVENOUS at 10:30

## 2021-01-01 RX ADMIN — SIMVASTATIN 20 MILLIGRAM(S): 20 TABLET, FILM COATED ORAL at 21:16

## 2021-01-01 RX ADMIN — Medication 2: at 11:30

## 2021-01-01 RX ADMIN — Medication 6 UNIT(S): at 11:30

## 2021-01-01 RX ADMIN — Medication 1: at 16:07

## 2021-01-01 RX ADMIN — Medication 150 MILLIGRAM(S): at 11:30

## 2021-01-01 RX ADMIN — Medication 12.5 MILLIGRAM(S): at 05:37

## 2021-01-01 RX ADMIN — Medication 6 UNIT(S): at 17:39

## 2021-01-01 RX ADMIN — TAMSULOSIN HYDROCHLORIDE 0.4 MILLIGRAM(S): 0.4 CAPSULE ORAL at 21:16

## 2021-01-01 RX ADMIN — QUETIAPINE FUMARATE 50 MILLIGRAM(S): 200 TABLET, FILM COATED ORAL at 17:42

## 2021-01-01 RX ADMIN — Medication 4: at 17:40

## 2021-01-01 RX ADMIN — SENNA PLUS 2 TABLET(S): 8.6 TABLET ORAL at 21:16

## 2021-01-01 RX ADMIN — SIMVASTATIN 20 MILLIGRAM(S): 20 TABLET, FILM COATED ORAL at 21:05

## 2021-01-01 RX ADMIN — Medication 6 UNIT(S): at 05:33

## 2021-01-01 RX ADMIN — HEPARIN SODIUM 5000 UNIT(S): 5000 INJECTION INTRAVENOUS; SUBCUTANEOUS at 05:06

## 2021-01-01 RX ADMIN — Medication 2: at 11:55

## 2021-01-01 RX ADMIN — POLYETHYLENE GLYCOL 3350 17 GRAM(S): 17 POWDER, FOR SOLUTION ORAL at 17:24

## 2021-01-01 RX ADMIN — DEXMEDETOMIDINE HYDROCHLORIDE IN 0.9% SODIUM CHLORIDE 5.2 MICROGRAM(S)/KG/HR: 4 INJECTION INTRAVENOUS at 12:00

## 2021-01-01 RX ADMIN — Medication 6 UNIT(S): at 06:10

## 2021-01-01 RX ADMIN — Medication 4 MILLIGRAM(S): at 05:32

## 2021-01-01 RX ADMIN — Medication 1 MILLIGRAM(S): at 11:48

## 2021-01-01 RX ADMIN — LEVETIRACETAM 420 MILLIGRAM(S): 250 TABLET, FILM COATED ORAL at 06:09

## 2021-01-01 RX ADMIN — Medication 1 MILLIGRAM(S): at 18:06

## 2021-01-01 RX ADMIN — Medication 6 UNIT(S): at 05:06

## 2021-01-01 RX ADMIN — Medication 50 GRAM(S): at 06:21

## 2021-01-01 RX ADMIN — PROPOFOL 0.62 MICROGRAM(S)/KG/MIN: 10 INJECTION, EMULSION INTRAVENOUS at 12:00

## 2021-01-01 RX ADMIN — SENNA PLUS 2 TABLET(S): 8.6 TABLET ORAL at 21:05

## 2021-01-01 RX ADMIN — PREGABALIN 1000 MICROGRAM(S): 225 CAPSULE ORAL at 11:48

## 2021-01-01 RX ADMIN — LEVETIRACETAM 420 MILLIGRAM(S): 250 TABLET, FILM COATED ORAL at 18:07

## 2021-01-01 RX ADMIN — LEVETIRACETAM 420 MILLIGRAM(S): 250 TABLET, FILM COATED ORAL at 05:07

## 2021-01-01 RX ADMIN — DEXMEDETOMIDINE HYDROCHLORIDE IN 0.9% SODIUM CHLORIDE 5.2 MICROGRAM(S)/KG/HR: 4 INJECTION INTRAVENOUS at 03:16

## 2021-01-01 RX ADMIN — POLYETHYLENE GLYCOL 3350 17 GRAM(S): 17 POWDER, FOR SOLUTION ORAL at 11:47

## 2021-01-01 RX ADMIN — Medication 4 MILLIGRAM(S): at 11:56

## 2021-01-01 RX ADMIN — Medication 2: at 09:04

## 2021-01-01 RX ADMIN — Medication 6 UNIT(S): at 23:20

## 2021-01-01 NOTE — PROGRESS NOTE ADULT - ASSESSMENT
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  PRN Fentanyl.    Keppra 500 BID for seizure ppx. Neurology following.  Maintain Normothermia,   f/u with neurosx for possible surgical resection of mass once patient is off levophed    HEENT: Oral care    PULMONARY:  HOB @ 45 degrees.  A  spiration precautions.  Keep POx > 92%. no change in vent  no SAT very high MVe  - right segmental PE identified - no anticoagulation given due to brain mass     CARDIOVASCULAR:   Wean levo.keep I=O     ECHO - 60-65% EF, moderate AS; CE - neg    GI: GI prophylaxis.  NG feeds;  Bowel regimen.    RENAL:  Follow up lytes.  Correct as needed    INFECTIOUS DISEASE: On  Meropenum. Follow up blood cultures. COVID negative    HEMATOLOGICAL:    No AC due to brain mass with areas of hemorrhage.   Iliofemoral DVT  IVC filter (not a candidate for thrombectomy as per vascular)    ENDOCRINE:  Follow up FS.  Continue basal bolus + ss insulin     MUSCULOSKELETAL: bedrest    DISPO: CCU Monitoring    prognosis grave

## 2021-01-01 NOTE — CHART NOTE - NSCHARTNOTEFT_GEN_A_CORE
Spoke to patients sister  discussed GOC.   She would like to speak to palliative care  she does not want a trach  neursx say mass resection is high risk procedure  contact is son: [phone]

## 2021-01-01 NOTE — PROGRESS NOTE ADULT - SUBJECTIVE AND OBJECTIVE BOX
Patient is a 80y old  Male who presents with a chief complaint of Intracranial bleed (31 Dec 2020 16:44)        HPI:  81 yo male with PMHx of DM, COPD, HTN c/o AMS since this morning. Patient lives by himself, son lives next door. Last known well at 10 PM at baseline per son, at 6 AM today, patient was not himself, confused, not talking. Stroke code called en route, NIHSS 11 for aphasia but grossly no focal weakness. CTH reported as having a focus of vasogenic edema in the right temporoparietal white matter with internal hyperdensity and effacement of the adjacent cortical sulci. Trace right temporoparietal subarachnoid hemorrhage is also seen. Not a candidate for IV thrombolytics for being out of the window and for ICH. Not a candidate for IA intervention because of no LVO on CTA. (19 Dec 2020 11:02)      Interval Events: No overnight events.    REVIEW OF SYSTEMS:   see HPI      OBJECTIVE:  ICU Vital Signs Last 24 Hrs  T(C): 37.7 (01 Jan 2021 12:00), Max: 37.7 (01 Jan 2021 12:00)  T(F): 99.9 (01 Jan 2021 12:00), Max: 99.9 (01 Jan 2021 12:00)  HR: 60 (01 Jan 2021 14:00) (58 - 102)  ABP: 100/58 (01 Jan 2021 14:00) (74/64 - 224/112)  ABP(mean): 70 (01 Jan 2021 14:00) (68 - 150)  RR: 22 (01 Jan 2021 14:00) (0 - 48)  SpO2: 100% (01 Jan 2021 14:00) (95% - 100%)    Mode: AC/ CMV (Assist Control/ Continuous Mandatory Ventilation), RR (machine): 20, TV (machine): 470, FiO2: 40, PEEP: 5, ITime: 1, MAP: 16, PIP: 28    12-31 @ 07:01  -  01-01 @ 07:00  --------------------------------------------------------  IN: 3529.6 mL / OUT: 1180 mL / NET: 2349.6 mL    01-01 @ 07:01 - 01-01 @ 15:41  --------------------------------------------------------  IN: 1076.5 mL / OUT: 400 mL / NET: 676.5 mL      CAPILLARY BLOOD GLUCOSE      POCT Blood Glucose.: 149 mg/dL (01 Jan 2021 11:43)        PHYSICAL EXAM:     · CONSTITUTIONAL:   not septic appearing,   well nourished,   NAD    · ENMT:   Airway patent,   Nasal mucosa clear.  Mouth with normal mucosa.   No thrush    · EYES:   Clear bilaterally,   pupils equal,   round and reactive to light.    · CARDIAC:   Normal rate,   regular rhythm.    Heart sounds S1, S2.   No murmurs, no rubs or gallops on auscultation  no edema        CAROTID:   normal systolic impulse  no bruits    · RESPIRATORY:   rales  normal chest expansion  tachypnea,  no retractions or use of accessory muscles  palpation of chest is normal with no fremitus  percussion of chest demonstrates no hyperresonance or dullness    · GASTROINTESTINAL:  Abdomen soft,   non-tender,   + BS  liver/spleen not palpable    · MUSCULOSKELETAL:   no clubbing, cyanosis      · NEUROLOGICAL:   Unavailable as the patient is sedated.  agitated when off sedation      · SKIN:   Skin normal color for race,   warm, dry   No evidence of rash.    · PSYCHIATRIC:   Unable to obtain due to patient's condition.        · HEME LYMPH:   no splenomegaly.  No cervical  lymphadenopathy.  no inguinal lymphadenopathy    HOSPITAL MEDICATIONS:  MEDICATIONS  (STANDING):  chlorhexidine 0.12% Liquid 15 milliLiter(s) Oral Mucosa two times a day  cyanocobalamin 1000 MICROGram(s) Oral daily  dexAMETHasone  Injectable 4 milliGRAM(s) IV Push every 6 hours  dexMEDEtomidine Infusion 0.2 MICROgram(s)/kG/Hr (5.2 mL/Hr) IV Continuous <Continuous>  dextrose 40% Gel 15 Gram(s) Oral once  dextrose 5%. 1000 milliLiter(s) (50 mL/Hr) IV Continuous <Continuous>  dextrose 5%. 1000 milliLiter(s) (100 mL/Hr) IV Continuous <Continuous>  dextrose 50% Injectable 25 Gram(s) IV Push once  dextrose 50% Injectable 25 Gram(s) IV Push once  dextrose 50% Injectable 12.5 Gram(s) IV Push once  folic acid 1 milliGRAM(s) Oral daily  heparin   Injectable 5000 Unit(s) SubCutaneous every 8 hours  insulin glargine Injectable (LANTUS) 25 Unit(s) SubCutaneous every morning  insulin lispro (ADMELOG) corrective regimen sliding scale   SubCutaneous three times a day before meals  insulin lispro Injectable (ADMELOG) 6 Unit(s) SubCutaneous every 6 hours  levETIRAcetam  IVPB 500 milliGRAM(s) IV Intermittent every 12 hours  metoprolol tartrate 12.5 milliGRAM(s) Oral two times a day  propofol Infusion 1 MICROgram(s)/kG/Min (0.62 mL/Hr) IV Continuous <Continuous>  QUEtiapine 50 milliGRAM(s) Oral two times a day  senna 2 Tablet(s) Oral at bedtime  simvastatin 20 milliGRAM(s) Oral at bedtime  sodium chloride 0.9% Bolus 1000 milliLiter(s) IV Bolus once  tamsulosin Oral Tab/Cap - Peds 0.4 milliGRAM(s) Oral at bedtime  traZODone 150 milliGRAM(s) Oral daily    MEDICATIONS  (PRN):  acetaminophen   Tablet .. 650 milliGRAM(s) Oral every 6 hours PRN Temp greater or equal to 38C (100.4F)  fentaNYL    Injectable 25 MICROGram(s) IV Push every 1 hour PRN Moderate Pain (4 - 6)  polyethylene glycol 3350 17 Gram(s) Oral daily PRN Constipation    sodium chloride 0.9% Bolus:   1000 milliLiter(s), IV Bolus, once, infuse over 60 Minute(s), Stop After 1 Doses  sodium chloride 0.9% Bolus:   250 milliLiter(s), IV Bolus, once, infuse over 15 Minute(s), Stop After 1 Doses  lactated ringers Bolus:   1000 milliLiter(s), IV Bolus, once, infuse over 60 Minute(s), Stop After 1 Doses  lactated ringers.: Solution, 1000 milliLiter(s) infuse at 70 mL/Hr  lactated ringers Bolus:   500 milliLiter(s), IV Bolus, once, infuse over 60 Minute(s), Stop After 1 Doses  sodium chloride 0.9% Bolus:   500 milliLiter(s), IV Bolus, once, infuse over 15 Minute(s), Stop After 1 Doses      LABS:                        11.9   11.77 )-----------( 250      ( 01 Jan 2021 04:40 )             39.0     01-01    145  |  112<H>  |  20  ----------------------------<  189<H>  4.8   |  24  |  0.6<L>    Ca    8.4<L>      01 Jan 2021 04:40  Phos  1.8     12-31  Mg     1.7     01-01    TPro  4.1<L>  /  Alb  2.4<L>  /  TBili  0.3  /  DBili  x   /  AST  13  /  ALT  13  /  AlkPhos  70  01-01        Arterial Blood Gas:  01-01 @ 04:23  7.46/37/101/27/97/2.7  ABG lactate: --  Arterial Blood Gas:  12-31 @ 04:51  7.42/43/107/28/98/2.9  ABG lactate: --            Mode: AC/ CMV (Assist Control/ Continuous Mandatory Ventilation)  RR (machine): 20  TV (machine): 470  FiO2: 40  PEEP: 5  ITime: 1  MAP: 16  PIP: 28      ABG - ( 01 Jan 2021 04:23 )  pH, Arterial: 7.46  pH, Blood: x     /  pCO2: 37    /  pO2: 101   / HCO3: 27    / Base Excess: 2.7   /  SaO2: 97                  RADIOLOGY: I personally reviewed latest CXR and other pertinent films.

## 2021-01-02 NOTE — PROGRESS NOTE ADULT - ASSESSMENT
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  PRN Fentanyl.    Keppra 500 BID for seizure ppx. Neurology following.  Maintain Normothermia,   f/u with neurosx for possible surgical resection of mass once patient is off levophed    HEENT: Oral care    PULMONARY:  HOB @ 45 degrees.  A  spiration precautions.  Keep POx > 92%. no change in vent  no SAT very high MVe  - right segmental PE identified - no anticoagulation given due to brain mass     CARDIOVASCULAR:   Wean levo.keep I=O     ECHO - 60-65% EF, moderate AS; CE - neg    GI: GI prophylaxis.  NG feeds;  Bowel regimen.    RENAL:  Follow up lytes.  Correct as needed    INFECTIOUS DISEASE: On  Meropenum. Follow up blood cultures. COVID negative    HEMATOLOGICAL:    No AC due to brain mass with areas of hemorrhage.   Iliofemoral DVT  IVC filter (not a candidate for thrombectomy as per vascular)    ENDOCRINE:  Follow up FS.  Continue basal bolus + ss insulin     MUSCULOSKELETAL: bedrest    DISPO: CCU Monitoring    prognosis grave  await palliative care consult

## 2021-01-02 NOTE — PROGRESS NOTE ADULT - SUBJECTIVE AND OBJECTIVE BOX
pt intubated   had altetred mental status with acute CVA    ALSO found to have intracranial mass with bleeding   h/o NH  lymphoma was stable and was on no medsa for last 1 year

## 2021-01-02 NOTE — PROGRESS NOTE ADULT - ASSESSMENT
pt being monitered in CCU   STILL INTUBATED   vitals stable       a6t this point  prognosis still critical  no neurosurgical intervention for brain lesion   will follow with you     amauri garber MD   [phone]

## 2021-01-02 NOTE — PROGRESS NOTE ADULT - SUBJECTIVE AND OBJECTIVE BOX
Patient is a 80y old  Male who presents with a chief complaint of Intracranial bleed (01 Jan 2021 15:41)        HPI:  79 yo male with PMHx of DM, COPD, HTN c/o AMS since this morning. Patient lives by himself, son lives next door. Last known well at 10 PM at baseline per son, at 6 AM today, patient was not himself, confused, not talking. Stroke code called en route, NIHSS 11 for aphasia but grossly no focal weakness. CTH reported as having a focus of vasogenic edema in the right temporoparietal white matter with internal hyperdensity and effacement of the adjacent cortical sulci. Trace right temporoparietal subarachnoid hemorrhage is also seen. Not a candidate for IV thrombolytics for being out of the window and for ICH. Not a candidate for IA intervention because of no LVO on CTA. (19 Dec 2020 11:02)      Interval Events: No overnight events.    REVIEW OF SYSTEMS:   see HPI      OBJECTIVE:  ICU Vital Signs Last 24 Hrs  T(C): 37.2 (02 Jan 2021 08:00), Max: 37.7 (01 Jan 2021 12:00)  T(F): 99 (02 Jan 2021 08:00), Max: 99.9 (01 Jan 2021 12:00)  HR: 78 (02 Jan 2021 10:00) (54 - 112)  ABP: 118/60 (02 Jan 2021 10:00) (96/56 - 224/112)  ABP(mean): 78 (02 Jan 2021 10:00) (68 - 150)  RR: 30 (02 Jan 2021 10:00) (0 - 48)  SpO2: 98% (02 Jan 2021 10:00) (97% - 100%)    Mode: AC/ CMV (Assist Control/ Continuous Mandatory Ventilation), RR (machine): 20, TV (machine): 470, FiO2: 40, PEEP: 5, ITime: 1, MAP: 15, PIP: 33    01-01 @ 07:01  -  01-02 @ 07:00  --------------------------------------------------------  IN: 3863.9 mL / OUT: 1410 mL / NET: 2453.9 mL    01-02 @ 07:01  -  01-02 @ 11:09  --------------------------------------------------------  IN: 213.5 mL / OUT: 250 mL / NET: -36.5 mL      CAPILLARY BLOOD GLUCOSE      POCT Blood Glucose.: 226 mg/dL (02 Jan 2021 08:58)        PHYSICAL EXAM:     · CONSTITUTIONAL:   not septic appearing,   well nourished,   NAD    · ENMT:   Airway patent,   Nasal mucosa clear.  Mouth with normal mucosa.   No thrush    · EYES:   Clear bilaterally,   pupils equal,   round and reactive to light.    · CARDIAC:   Normal rate,   regular rhythm.    Heart sounds S1, S2.   No murmurs, no rubs or gallops on auscultation  no edema        CAROTID:   normal systolic impulse  no bruits    · RESPIRATORY:   rales  normal chest expansion  tachypnea,  no retractions or use of accessory muscles  palpation of chest is normal with no fremitus  percussion of chest demonstrates no hyperresonance or dullness    · GASTROINTESTINAL:  Abdomen soft,   non-tender,   + BS  liver/spleen not palpable    · MUSCULOSKELETAL:   no clubbing, cyanosis      · NEUROLOGICAL:   Unavailable as the patient is sedated.  Agitated when not sedated      · SKIN:   Skin normal color for race,   warm, dry   No evidence of rash.    · PSYCHIATRIC:   Unable to obtain due to patient's condition.        · HEME LYMPH:   no splenomegaly.  No cervical  lymphadenopathy.  no inguinal lymphadenopathy    HOSPITAL MEDICATIONS:  MEDICATIONS  (STANDING):  chlorhexidine 0.12% Liquid 15 milliLiter(s) Oral Mucosa two times a day  cyanocobalamin 1000 MICROGram(s) Oral daily  dexAMETHasone  Injectable 4 milliGRAM(s) IV Push every 6 hours  dexMEDEtomidine Infusion 0.2 MICROgram(s)/kG/Hr (5.2 mL/Hr) IV Continuous <Continuous>  dextrose 40% Gel 15 Gram(s) Oral once  dextrose 5%. 1000 milliLiter(s) (50 mL/Hr) IV Continuous <Continuous>  dextrose 5%. 1000 milliLiter(s) (100 mL/Hr) IV Continuous <Continuous>  dextrose 50% Injectable 25 Gram(s) IV Push once  dextrose 50% Injectable 12.5 Gram(s) IV Push once  dextrose 50% Injectable 25 Gram(s) IV Push once  folic acid 1 milliGRAM(s) Oral daily  heparin   Injectable 5000 Unit(s) SubCutaneous every 8 hours  insulin glargine Injectable (LANTUS) 25 Unit(s) SubCutaneous every morning  insulin lispro (ADMELOG) corrective regimen sliding scale   SubCutaneous three times a day before meals  insulin lispro Injectable (ADMELOG) 6 Unit(s) SubCutaneous every 6 hours  levETIRAcetam  IVPB 500 milliGRAM(s) IV Intermittent every 12 hours  metoprolol tartrate 12.5 milliGRAM(s) Oral two times a day  propofol Infusion 1 MICROgram(s)/kG/Min (0.62 mL/Hr) IV Continuous <Continuous>  QUEtiapine 50 milliGRAM(s) Oral two times a day  senna 2 Tablet(s) Oral at bedtime  simvastatin 20 milliGRAM(s) Oral at bedtime  sodium chloride 0.9% Bolus 1000 milliLiter(s) IV Bolus once  tamsulosin Oral Tab/Cap - Peds 0.4 milliGRAM(s) Oral at bedtime  traZODone 150 milliGRAM(s) Oral daily    MEDICATIONS  (PRN):  acetaminophen   Tablet .. 650 milliGRAM(s) Oral every 6 hours PRN Temp greater or equal to 38C (100.4F)  fentaNYL    Injectable 25 MICROGram(s) IV Push every 1 hour PRN Moderate Pain (4 - 6)  polyethylene glycol 3350 17 Gram(s) Oral daily PRN Constipation    sodium chloride 0.9% Bolus:   1000 milliLiter(s), IV Bolus, once, infuse over 60 Minute(s), Stop After 1 Doses  sodium chloride 0.9% Bolus:   250 milliLiter(s), IV Bolus, once, infuse over 15 Minute(s), Stop After 1 Doses  lactated ringers Bolus:   1000 milliLiter(s), IV Bolus, once, infuse over 60 Minute(s), Stop After 1 Doses  lactated ringers.: Solution, 1000 milliLiter(s) infuse at 70 mL/Hr  lactated ringers Bolus:   500 milliLiter(s), IV Bolus, once, infuse over 60 Minute(s), Stop After 1 Doses  sodium chloride 0.9% Bolus:   500 milliLiter(s), IV Bolus, once, infuse over 15 Minute(s), Stop After 1 Doses      LABS:                        12.8   11.98 )-----------( 323      ( 02 Jan 2021 04:30 )             41.9     01-02    138  |  106  |  23<H>  ----------------------------<  274<H>  5.1<H>   |  23  |  0.6<L>    Ca    8.7      02 Jan 2021 04:30  Mg     1.9     01-02    TPro  4.5<L>  /  Alb  2.5<L>  /  TBili  0.3  /  DBili  x   /  AST  16  /  ALT  18  /  AlkPhos  75  01-02        Arterial Blood Gas:  01-02 @ 03:53  7.45/36/86/25/94/1.3  ABG lactate: --  Arterial Blood Gas:  01-01 @ 04:23  7.46/37/101/27/97/2.7  ABG lactate: --            Mode: AC/ CMV (Assist Control/ Continuous Mandatory Ventilation)  RR (machine): 20  TV (machine): 470  FiO2: 40  PEEP: 5  ITime: 1  MAP: 15  PIP: 33      ABG - ( 02 Jan 2021 03:53 )  pH, Arterial: 7.45  pH, Blood: x     /  pCO2: 36    /  pO2: 86    / HCO3: 25    / Base Excess: 1.3   /  SaO2: 94                  RADIOLOGY: I personally reviewed latest CXR and other pertinent films.

## 2021-01-02 NOTE — GOALS OF CARE CONVERSATION - ADVANCED CARE PLANNING - CONVERSATION DETAILS
Discussed diagnosis and prognosis w/ patients sister (Melody, [phone]). Sister identified herself as medical decision maker, confirmed w/ son Zach on subsequent phone call. Sister endorsed that patient would want to be made DNR. She wishes to speak w/ palliative care regarding treatment options. Discussed diagnosis and prognosis w/ patients sister (Melody, [phone]). Sister identified herself as medical decision maker, confirmed w/ son Zach on subsequent phone call. Sister endorsed that patient would want to be made DNR. She wishes to speak w/ palliative care regarding treatment options. [facility] form completed, placed in chart, requires attending signature.

## 2021-01-03 NOTE — PROGRESS NOTE ADULT - SUBJECTIVE AND OBJECTIVE BOX
Patient is a 80y old  Male who presents with a chief complaint of Intracranial bleed (03 Jan 2021 02:43)        HPI:  81 yo male with PMHx of DM, COPD, HTN c/o AMS since this morning. Patient lives by himself, son lives next door. Last known well at 10 PM at baseline per son, at 6 AM today, patient was not himself, confused, not talking. Stroke code called en route, NIHSS 11 for aphasia but grossly no focal weakness. CTH reported as having a focus of vasogenic edema in the right temporoparietal white matter with internal hyperdensity and effacement of the adjacent cortical sulci. Trace right temporoparietal subarachnoid hemorrhage is also seen. Not a candidate for IV thrombolytics for being out of the window and for ICH. Not a candidate for IA intervention because of no LVO on CTA. (19 Dec 2020 11:02)      Interval Events: No overnight events.    REVIEW OF SYSTEMS:   see HPI      OBJECTIVE:  ICU Vital Signs Last 24 Hrs  T(C): 37.1 (03 Jan 2021 08:00), Max: 37.2 (02 Jan 2021 23:00)  T(F): 98.7 (03 Jan 2021 08:00), Max: 99 (02 Jan 2021 23:00)  HR: 76 (03 Jan 2021 10:00) (62 - 84)  ABP: 118/50 (03 Jan 2021 10:00) (98/56 - 142/64)  ABP(mean): 68 (03 Jan 2021 10:00) (68 - 90)  RR: 23 (03 Jan 2021 10:00) (8 - 25)  SpO2: 92% (03 Jan 2021 10:00) (91% - 100%)    Mode: AC/ CMV (Assist Control/ Continuous Mandatory Ventilation), RR (machine): 24, TV (machine): 450, FiO2: 40, PEEP: 5, ITime: 1, MAP: 11, PIP: 17    01-02 @ 07:01  -  01-03 @ 07:00  --------------------------------------------------------  IN: 3303.9 mL / OUT: 2575 mL / NET: 728.9 mL    01-03 @ 07:01  -  01-03 @ 11:01  --------------------------------------------------------  IN: 220 mL / OUT: 0 mL / NET: 220 mL      CAPILLARY BLOOD GLUCOSE      POCT Blood Glucose.: 184 mg/dL (03 Jan 2021 07:12)        PHYSICAL EXAM:     · CONSTITUTIONAL:   not septic appearing,   well nourished,   NAD    · ENMT:   Airway patent,   Nasal mucosa clear.  Mouth with normal mucosa.   No thrush    · EYES:   Clear bilaterally,   pupils equal,   round and reactive to light.    · CARDIAC:   Normal rate,   regular rhythm.    Heart sounds S1, S2.   No murmurs, no rubs or gallops on auscultation  no edema        CAROTID:   normal systolic impulse  no bruits    · RESPIRATORY:   scattered ronchi  normal chest expansion  tachypnea,  no retractions or use of accessory muscles  palpation of chest is normal with no fremitus  percussion of chest demonstrates no hyperresonance or dullness    · GASTROINTESTINAL:  Abdomen soft,   non-tender,   + BS  liver/spleen not palpable    · MUSCULOSKELETAL:   no clubbing, cyanosis      · NEUROLOGICAL:   Unavailable as the patient is sedated.  agitated despite sedation      · SKIN:   Skin normal color for race,   warm, dry   No evidence of rash.    · PSYCHIATRIC:   Unable to obtain due to patient's condition.        · HEME LYMPH:   no splenomegaly.  No cervical  lymphadenopathy.  no inguinal lymphadenopathy    HOSPITAL MEDICATIONS:  MEDICATIONS  (STANDING):  chlorhexidine 0.12% Liquid 15 milliLiter(s) Oral Mucosa two times a day  cyanocobalamin 1000 MICROGram(s) Oral daily  dexAMETHasone  Injectable 4 milliGRAM(s) IV Push every 6 hours  dexMEDEtomidine Infusion 0.2 MICROgram(s)/kG/Hr (5.2 mL/Hr) IV Continuous <Continuous>  dextrose 40% Gel 15 Gram(s) Oral once  dextrose 5%. 1000 milliLiter(s) (50 mL/Hr) IV Continuous <Continuous>  dextrose 5%. 1000 milliLiter(s) (100 mL/Hr) IV Continuous <Continuous>  dextrose 5%. 1000 milliLiter(s) (100 mL/Hr) IV Continuous <Continuous>  dextrose 5%. 1000 milliLiter(s) (50 mL/Hr) IV Continuous <Continuous>  dextrose 50% Injectable 25 Gram(s) IV Push once  dextrose 50% Injectable 12.5 Gram(s) IV Push once  dextrose 50% Injectable 25 Gram(s) IV Push once  dextrose 50% Injectable 25 Gram(s) IV Push once  dextrose 50% Injectable 12.5 Gram(s) IV Push once  dextrose 50% Injectable 25 Gram(s) IV Push once  folic acid 1 milliGRAM(s) Oral daily  glucagon  Injectable 1 milliGRAM(s) IntraMuscular once  heparin   Injectable 5000 Unit(s) SubCutaneous every 8 hours  insulin glargine Injectable (LANTUS) 25 Unit(s) SubCutaneous every morning  insulin lispro (ADMELOG) corrective regimen sliding scale   SubCutaneous every 6 hours  insulin lispro Injectable (ADMELOG) 6 Unit(s) SubCutaneous every 6 hours  levETIRAcetam  IVPB 500 milliGRAM(s) IV Intermittent every 12 hours  metoprolol tartrate 12.5 milliGRAM(s) Oral two times a day  propofol Infusion 1 MICROgram(s)/kG/Min (0.62 mL/Hr) IV Continuous <Continuous>  QUEtiapine 50 milliGRAM(s) Oral two times a day  senna 2 Tablet(s) Oral at bedtime  simvastatin 20 milliGRAM(s) Oral at bedtime  sodium chloride 0.9% Bolus 1000 milliLiter(s) IV Bolus once  tamsulosin Oral Tab/Cap - Peds 0.4 milliGRAM(s) Oral at bedtime  traZODone 150 milliGRAM(s) Oral daily    MEDICATIONS  (PRN):  acetaminophen   Tablet .. 650 milliGRAM(s) Oral every 6 hours PRN Temp greater or equal to 38C (100.4F)  fentaNYL    Injectable 25 MICROGram(s) IV Push every 1 hour PRN Moderate Pain (4 - 6)  polyethylene glycol 3350 17 Gram(s) Oral daily PRN Constipation    sodium chloride 0.9% Bolus:   1000 milliLiter(s), IV Bolus, once, infuse over 60 Minute(s), Stop After 1 Doses  sodium chloride 0.9% Bolus:   250 milliLiter(s), IV Bolus, once, infuse over 15 Minute(s), Stop After 1 Doses  lactated ringers Bolus:   1000 milliLiter(s), IV Bolus, once, infuse over 60 Minute(s), Stop After 1 Doses  lactated ringers.: Solution, 1000 milliLiter(s) infuse at 70 mL/Hr  lactated ringers Bolus:   500 milliLiter(s), IV Bolus, once, infuse over 60 Minute(s), Stop After 1 Doses  sodium chloride 0.9% Bolus:   500 milliLiter(s), IV Bolus, once, infuse over 15 Minute(s), Stop After 1 Doses      LABS:                        12.2   18.76 )-----------( 387      ( 03 Jan 2021 04:44 )             39.9     01-03    139  |  104  |  25<H>  ----------------------------<  216<H>  4.9   |  27  |  0.7    Ca    8.7      03 Jan 2021 04:44  Mg     1.8     01-03    TPro  4.4<L>  /  Alb  2.5<L>  /  TBili  0.2  /  DBili  x   /  AST  20  /  ALT  23  /  AlkPhos  77  01-03        Arterial Blood Gas:  01-03 @ 03:55  7.44/43/74/29/93/4.2  ABG lactate: --  Arterial Blood Gas:  01-02 @ 03:53  7.45/36/86/25/94/1.3  ABG lactate: --            Mode: AC/ CMV (Assist Control/ Continuous Mandatory Ventilation)  RR (machine): 24  TV (machine): 450  FiO2: 40  PEEP: 5  ITime: 1  MAP: 11  PIP: 17      ABG - ( 03 Jan 2021 03:55 )  pH, Arterial: 7.44  pH, Blood: x     /  pCO2: 43    /  pO2: 74    / HCO3: 29    / Base Excess: 4.2   /  SaO2: 93                  RADIOLOGY: I personally reviewed latest CXR and other pertinent films.

## 2021-01-03 NOTE — CHART NOTE - NSCHARTNOTEFT_GEN_A_CORE
Spoke with sister Melody [phone]. gave clinical updates. Patient is obtunded this am even without sedation. not withdrawing to pain off sedation, no purposeful movement this am. still has corneal and gag reflex. I discussed patient's clinical statues and prognosis with the Sister and son, sister Melody wishes to make the patient comfort measures only  and terminally extubated the patient. MOLST form in chart    d/w pulm fellow Dr. Hernandez

## 2021-01-03 NOTE — PROGRESS NOTE ADULT - ASSESSMENT
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  add Fentanyl.    Keppra 500 BID for seizure ppx. Neurology following.  Maintain Normothermia,   f/u with neurosx for possible surgical resection of mass once patient is off levophed    HEENT: Oral care    PULMONARY:  HOB @ 45 degrees.  A  spiration precautions.  Keep POx > 92%. no change in vent  no SAT very high MVe  right segmental PE identified - no anticoagulation given due to brain mass     CARDIOVASCULAR:   Wean levo.keep I=O     ECHO - 60-65% EF, moderate AS; CE - neg    GI: GI prophylaxis.  NG feeds;  Bowel regimen.    RENAL:  Follow up lytes.  Correct as needed    INFECTIOUS DISEASE: On  Meropenum. Follow up blood cultures.   COVID negative    HEMATOLOGICAL:    No AC due to brain mass with areas of hemorrhage.   Iliofemoral DVT  IVC filter (not a candidate for thrombectomy as per vascular)    ENDOCRINE:  Follow up FS.  Continue basal bolus + ss insulin     MUSCULOSKELETAL: bedrest    DISPO: CCU Monitoring    prognosis grave  await palliative care consult

## 2021-01-03 NOTE — PROGRESS NOTE ADULT - SUBJECTIVE AND OBJECTIVE BOX
HPI:  79 yo male with PMHx of DM, COPD, HTN c/o AMS since this morning. Patient lives by himself, son lives next door. Last known well at 10 PM at baseline per son, at 6 AM today, patient was not himself, confused, not talking. Stroke code called en route, NIHSS 11 for aphasia but grossly no focal weakness. CTH reported as having a focus of vasogenic edema in the right temporoparietal white matter with internal hyperdensity and effacement of the adjacent cortical sulci. Trace right temporoparietal subarachnoid hemorrhage is also seen. Not a candidate for IV thrombolytics for being out of the window and for ICH. Not a candidate for IA intervention because of no LVO on CTA. (19 Dec 2020 11:02)    Currently admitted to medicine with the primary diagnosis of Intracranial bleed       Today is hospital day 15d.     INTERVAL HPI / OVERNIGHT EVENTS:  Patient was examined and seen at bedside. This morning he is resting comfortably in bed and reports no new issues or overnight events.     ROS: Otherwise unremarkable     PAST MEDICAL & SURGICAL HISTORY  COPD (chronic obstructive pulmonary disease)    BPH (benign prostatic hyperplasia)    Back pain    Non-Hodgkin lymphoma    CLL (chronic lymphocytic leukemia)  treatment completed 10/19    Depression    HTN (hypertension)    DM (diabetes mellitus)    Anxiety    Encounter for screening colonoscopy  7 years approx    Port-A-Cath in place    H/O total knee replacement, bilateral      ALLERGIES  No Known Allergies    MEDICATIONS  STANDING MEDICATIONS  chlorhexidine 0.12% Liquid 15 milliLiter(s) Oral Mucosa two times a day  cyanocobalamin 1000 MICROGram(s) Oral daily  dexAMETHasone  Injectable 4 milliGRAM(s) IV Push every 6 hours  dexMEDEtomidine Infusion 0.2 MICROgram(s)/kG/Hr IV Continuous <Continuous>  dextrose 40% Gel 15 Gram(s) Oral once  dextrose 5%. 1000 milliLiter(s) IV Continuous <Continuous>  dextrose 5%. 1000 milliLiter(s) IV Continuous <Continuous>  dextrose 5%. 1000 milliLiter(s) IV Continuous <Continuous>  dextrose 5%. 1000 milliLiter(s) IV Continuous <Continuous>  dextrose 50% Injectable 25 Gram(s) IV Push once  dextrose 50% Injectable 12.5 Gram(s) IV Push once  dextrose 50% Injectable 25 Gram(s) IV Push once  dextrose 50% Injectable 25 Gram(s) IV Push once  dextrose 50% Injectable 12.5 Gram(s) IV Push once  dextrose 50% Injectable 25 Gram(s) IV Push once  folic acid 1 milliGRAM(s) Oral daily  glucagon  Injectable 1 milliGRAM(s) IntraMuscular once  heparin   Injectable 5000 Unit(s) SubCutaneous every 8 hours  insulin glargine Injectable (LANTUS) 25 Unit(s) SubCutaneous every morning  insulin lispro (ADMELOG) corrective regimen sliding scale   SubCutaneous every 6 hours  insulin lispro Injectable (ADMELOG) 6 Unit(s) SubCutaneous every 6 hours  levETIRAcetam  IVPB 500 milliGRAM(s) IV Intermittent every 12 hours  metoprolol tartrate 12.5 milliGRAM(s) Oral two times a day  propofol Infusion 1 MICROgram(s)/kG/Min IV Continuous <Continuous>  QUEtiapine 50 milliGRAM(s) Oral two times a day  senna 2 Tablet(s) Oral at bedtime  simvastatin 20 milliGRAM(s) Oral at bedtime  sodium chloride 0.9% Bolus 1000 milliLiter(s) IV Bolus once  tamsulosin Oral Tab/Cap - Peds 0.4 milliGRAM(s) Oral at bedtime  traZODone 150 milliGRAM(s) Oral daily    PRN MEDICATIONS  acetaminophen   Tablet .. 650 milliGRAM(s) Oral every 6 hours PRN  fentaNYL    Injectable 25 MICROGram(s) IV Push every 1 hour PRN  polyethylene glycol 3350 17 Gram(s) Oral daily PRN    VITALS:  T(F): 99  HR: 70  BP: --  RR: 24  SpO2: 99%      LABS                        12.8   11.98 )-----------( 323      ( 02 Jan 2021 04:30 )             41.9     01-02    138  |  106  |  23<H>  ----------------------------<  274<H>  5.1<H>   |  23  |  0.6<L>    Ca    8.7      02 Jan 2021 04:30  Mg     1.9     01-02    TPro  4.5<L>  /  Alb  2.5<L>  /  TBili  0.3  /  DBili  x   /  AST  16  /  ALT  18  /  AlkPhos  75  01-02        ABG - ( 02 Jan 2021 03:53 )  pH, Arterial: 7.45  pH, Blood: x     /  pCO2: 36    /  pO2: 86    / HCO3: 25    / Base Excess: 1.3   /  SaO2: 94            PHYSICAL EXAM  GEN: Intubated, sedated  PULM: Clear to auscultation bilaterally, No wheezes  CVS: Regular rate and rhythm, S1-S2, no murmurs  ABD: Soft, non-tender, non-distended, no guarding  EXT: No edema  NEURO: No movement to pain, +gag    ASSESSMENT AND PLAN    Intracranial mass legion   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    Keppra 500 BID for seizure ppx. .  f/u with neurosx for possible surgical resection of mass - unlikely to be candidate at this time  Patient has been difficult to wean to do agitation; continue home trazadone; added home effexor and seroquel but not able to wean; unlikely to pass    HEENT: Oral care    PULMONARY:  HOB @ 45 degrees.  Aspiration precautions.  Keep POx > 92%.   - right segmental PE identifed - no anticoagulation given due to brain mass     CARDIOVASCULAR:  Keep MAP >60  echo - 60-65% EF, moderate AS; CE - neg    GI: GI prophylaxis.  NG feeds;  Bowel regimen.    RENAL:  Follow up lytes.  Correct as needed    INFECTIOUS DISEASE: s/p 7 day course Meropenum for PNA. COVID negative    HEMATOLOGICAL:  No AC due to brain mass with areas of hemorrhage.   Iliofemoral DVT - IVC filter in place    ENDOCRINE:  Follow up FS.  Continue basal bolus + ss insulin     MUSCULOSKELETAL: bedrest    DISPO: CCU Monitoring       HPI:  81 yo male with PMHx of DM, COPD, HTN c/o AMS since this morning. Patient lives by himself, son lives next door. Last known well at 10 PM at baseline per son, at 6 AM today, patient was not himself, confused, not talking. Stroke code called en route, NIHSS 11 for aphasia but grossly no focal weakness. CTH reported as having a focus of vasogenic edema in the right temporoparietal white matter with internal hyperdensity and effacement of the adjacent cortical sulci. Trace right temporoparietal subarachnoid hemorrhage is also seen. Not a candidate for IV thrombolytics for being out of the window and for ICH. Not a candidate for IA intervention because of no LVO on CTA. (19 Dec 2020 11:02)    Currently admitted to medicine with the primary diagnosis of Intracranial bleed       Today is hospital day 15d.     INTERVAL HPI / OVERNIGHT EVENTS:  Patient was examined and seen at bedside. This morning he is resting comfortably in bed and reports no new issues or overnight events.     ROS: Otherwise unremarkable     PAST MEDICAL & SURGICAL HISTORY  COPD (chronic obstructive pulmonary disease)    BPH (benign prostatic hyperplasia)    Back pain    Non-Hodgkin lymphoma    CLL (chronic lymphocytic leukemia)  treatment completed 10/19    Depression    HTN (hypertension)    DM (diabetes mellitus)    Anxiety    Encounter for screening colonoscopy  7 years approx    Port-A-Cath in place    H/O total knee replacement, bilateral      ALLERGIES  No Known Allergies    MEDICATIONS  STANDING MEDICATIONS  chlorhexidine 0.12% Liquid 15 milliLiter(s) Oral Mucosa two times a day  cyanocobalamin 1000 MICROGram(s) Oral daily  dexAMETHasone  Injectable 4 milliGRAM(s) IV Push every 6 hours  dexMEDEtomidine Infusion 0.2 MICROgram(s)/kG/Hr IV Continuous <Continuous>  dextrose 40% Gel 15 Gram(s) Oral once  dextrose 5%. 1000 milliLiter(s) IV Continuous <Continuous>  dextrose 5%. 1000 milliLiter(s) IV Continuous <Continuous>  dextrose 5%. 1000 milliLiter(s) IV Continuous <Continuous>  dextrose 5%. 1000 milliLiter(s) IV Continuous <Continuous>  dextrose 50% Injectable 25 Gram(s) IV Push once  dextrose 50% Injectable 12.5 Gram(s) IV Push once  dextrose 50% Injectable 25 Gram(s) IV Push once  dextrose 50% Injectable 25 Gram(s) IV Push once  dextrose 50% Injectable 12.5 Gram(s) IV Push once  dextrose 50% Injectable 25 Gram(s) IV Push once  folic acid 1 milliGRAM(s) Oral daily  glucagon  Injectable 1 milliGRAM(s) IntraMuscular once  heparin   Injectable 5000 Unit(s) SubCutaneous every 8 hours  insulin glargine Injectable (LANTUS) 25 Unit(s) SubCutaneous every morning  insulin lispro (ADMELOG) corrective regimen sliding scale   SubCutaneous every 6 hours  insulin lispro Injectable (ADMELOG) 6 Unit(s) SubCutaneous every 6 hours  levETIRAcetam  IVPB 500 milliGRAM(s) IV Intermittent every 12 hours  metoprolol tartrate 12.5 milliGRAM(s) Oral two times a day  propofol Infusion 1 MICROgram(s)/kG/Min IV Continuous <Continuous>  QUEtiapine 50 milliGRAM(s) Oral two times a day  senna 2 Tablet(s) Oral at bedtime  simvastatin 20 milliGRAM(s) Oral at bedtime  sodium chloride 0.9% Bolus 1000 milliLiter(s) IV Bolus once  tamsulosin Oral Tab/Cap - Peds 0.4 milliGRAM(s) Oral at bedtime  traZODone 150 milliGRAM(s) Oral daily    PRN MEDICATIONS  acetaminophen   Tablet .. 650 milliGRAM(s) Oral every 6 hours PRN  fentaNYL    Injectable 25 MICROGram(s) IV Push every 1 hour PRN  polyethylene glycol 3350 17 Gram(s) Oral daily PRN    VITALS:  T(F): 99  HR: 70  BP: --  RR: 24  SpO2: 99%      LABS                        12.8   11.98 )-----------( 323      ( 02 Jan 2021 04:30 )             41.9     01-02    138  |  106  |  23<H>  ----------------------------<  274<H>  5.1<H>   |  23  |  0.6<L>    Ca    8.7      02 Jan 2021 04:30  Mg     1.9     01-02    TPro  4.5<L>  /  Alb  2.5<L>  /  TBili  0.3  /  DBili  x   /  AST  16  /  ALT  18  /  AlkPhos  75  01-02        ABG - ( 02 Jan 2021 03:53 )  pH, Arterial: 7.45  pH, Blood: x     /  pCO2: 36    /  pO2: 86    / HCO3: 25    / Base Excess: 1.3   /  SaO2: 94            PHYSICAL EXAM  GEN: Intubated, sedated  PULM: Clear to auscultation bilaterally, No wheezes  CVS: Regular rate and rhythm, S1-S2, no murmurs  ABD: Soft, non-tender, non-distended, no guarding  EXT: No edema  NEURO: No movement to pain, +gag    ASSESSMENT AND PLAN    Intracranial mass legion   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 DNR.   Daily communication with patient's sister Melody [phone]; call son for any final decisions; son is in close communication with aunt    CNS:    MRI Right frontoparietal hemorrhagic enhancing lesion with central necrosis which may represent a primary brain neoplasm versus metastatic lesion.    Keppra 500 BID for seizure ppx.  f/u with neurosx for possible surgical resection of mass - unlikely to be candidate at this time  Patient has been difficult to wean to do agitation; continue home trazadone; added home effexor and seroquel but not able to wean; unlikely to pass  propofol    HEENT: Oral care    PULMONARY:  HOB @ 45 degrees.  Aspiration precautions.  Keep POx > 92%.   - right segmental PE identifed - no anticoagulation given due to brain mass     CARDIOVASCULAR:  Keep MAP >60. Off pressors at this time.   echo - 60-65% EF, moderate AS; CE - neg    GI: GI prophylaxis.  NG feeds;  Bowel regimen.    RENAL:  Follow up lytes.  Correct as needed    INFECTIOUS DISEASE: s/p 7 day course Meropenum for PNA. COVID negative    HEMATOLOGICAL:  No AC due to brain mass with areas of hemorrhage.   Iliofemoral DVT - IVC filter in place    ENDOCRINE:  Follow up FS.  Continue basal bolus + ss insulin     MUSCULOSKELETAL: bedrest    DISPO: CCU Monitoring

## 2021-01-04 NOTE — GOALS OF CARE CONVERSATION - ADVANCED CARE PLANNING - CONVERSATION/DISCUSSION
Diagnosis/Prognosis/MOLST Discussed
Diagnosis/Prognosis/MOLST Discussed/Treatment Options/Chaplaincy Referral

## 2021-01-04 NOTE — GOALS OF CARE CONVERSATION - ADVANCED CARE PLANNING - CONVERSATION DETAILS
Palliative care team met with the patients family at bedside to review goals of care.  Family has been provided with a medical update and verbalized understanding of her overall medical conditions and very poor prognosis. At this time family is requesting complete comfort care. No further blood work or finger sticks. No IV hydration and/or artificial nutrition. No pulse oximetry. DNR/DNI. Per families request  contacted for last rights. Support provided.     Palliative care team will remain available as appropriate.

## 2021-01-04 NOTE — CONSULT NOTE ADULT - REASON FOR ADMISSION
Intracranial bleed
small SAH, edema
Hemorrhagic CVA  Intubated for airway protection
Intracranial bleed
ams

## 2021-01-04 NOTE — PROGRESS NOTE ADULT - SUBJECTIVE AND OBJECTIVE BOX
CR OWEN 80y Male  MRN#: [id number]   CODE STATUS:________    SUBJECTIVE  Patient is a 80y old Male who presents with a chief complaint of Intracranial bleed (04 Jan 2021 10:37)  Currently admitted to medicine with the primary diagnosis of Intracranial bleed    Today is hospital day 16d, and this morning he is obtunded and labored breathing      OBJECTIVE  PAST MEDICAL & SURGICAL HISTORY  COPD (chronic obstructive pulmonary disease)    BPH (benign prostatic hyperplasia)    Back pain    Non-Hodgkin lymphoma    CLL (chronic lymphocytic leukemia)  treatment completed 10/19    Depression    HTN (hypertension)    DM (diabetes mellitus)    Anxiety    Encounter for screening colonoscopy  7 years approx    Port-A-Cath in place    H/O total knee replacement, bilateral      ALLERGIES:  No Known Allergies    MEDICATIONS:  STANDING MEDICATIONS  chlorhexidine 0.12% Liquid 15 milliLiter(s) Oral Mucosa two times a day  cyanocobalamin 1000 MICROGram(s) Oral daily  dexAMETHasone  Injectable 4 milliGRAM(s) IV Push every 6 hours  dextrose 40% Gel 15 Gram(s) Oral once  dextrose 5%. 1000 milliLiter(s) IV Continuous <Continuous>  dextrose 5%. 1000 milliLiter(s) IV Continuous <Continuous>  dextrose 5%. 1000 milliLiter(s) IV Continuous <Continuous>  dextrose 5%. 1000 milliLiter(s) IV Continuous <Continuous>  dextrose 50% Injectable 25 Gram(s) IV Push once  dextrose 50% Injectable 12.5 Gram(s) IV Push once  dextrose 50% Injectable 25 Gram(s) IV Push once  dextrose 50% Injectable 25 Gram(s) IV Push once  dextrose 50% Injectable 12.5 Gram(s) IV Push once  dextrose 50% Injectable 25 Gram(s) IV Push once  folic acid 1 milliGRAM(s) Oral daily  glucagon  Injectable 1 milliGRAM(s) IntraMuscular once  heparin   Injectable 5000 Unit(s) SubCutaneous every 8 hours  insulin glargine Injectable (LANTUS) 25 Unit(s) SubCutaneous every morning  insulin lispro (ADMELOG) corrective regimen sliding scale   SubCutaneous every 6 hours  insulin lispro Injectable (ADMELOG) 6 Unit(s) SubCutaneous every 6 hours  levETIRAcetam  IVPB 500 milliGRAM(s) IV Intermittent every 12 hours  metoprolol tartrate 12.5 milliGRAM(s) Oral two times a day  morphine  Infusion. 10 mG/Hr IV Continuous <Continuous>  QUEtiapine 50 milliGRAM(s) Oral two times a day  senna 2 Tablet(s) Oral at bedtime  simvastatin 20 milliGRAM(s) Oral at bedtime  sodium chloride 0.9% Bolus 1000 milliLiter(s) IV Bolus once  tamsulosin Oral Tab/Cap - Peds 0.4 milliGRAM(s) Oral at bedtime  traZODone 150 milliGRAM(s) Oral daily    PRN MEDICATIONS  acetaminophen   Tablet .. 650 milliGRAM(s) Oral every 6 hours PRN  fentaNYL    Injectable 25 MICROGram(s) IV Push every 1 hour PRN  haloperidol    Injectable 1 milliGRAM(s) IV Push every 2 hours PRN  LORazepam   Injectable 1 milliGRAM(s) IV Push every 1 hour PRN  polyethylene glycol 3350 17 Gram(s) Oral daily PRN      VITAL SIGNS: Last 24 Hours  T(C): 36.1 (04 Jan 2021 02:00), Max: 37.1 (03 Jan 2021 12:00)  T(F): 97 (04 Jan 2021 02:00), Max: 98.8 (03 Jan 2021 12:00)  HR: 106 (04 Jan 2021 08:00) (60 - 106)  BP: --  BP(mean): --  RR: 12 (04 Jan 2021 08:00) (0 - 25)  SpO2: 79% (03 Jan 2021 15:00) (79% - 99%)    LABS:                        12.2   18.76 )-----------( 387      ( 03 Jan 2021 04:44 )             39.9     01-03    139  |  104  |  25<H>  ----------------------------<  216<H>  4.9   |  27  |  0.7    Ca    8.7      03 Jan 2021 04:44  Mg     1.8     01-03    TPro  4.4<L>  /  Alb  2.5<L>  /  TBili  0.2  /  DBili  x   /  AST  20  /  ALT  23  /  AlkPhos  77  01-03        ABG - ( 03 Jan 2021 03:55 )  pH, Arterial: 7.44  pH, Blood: x     /  pCO2: 43    /  pO2: 74    / HCO3: 29    / Base Excess: 4.2   /  SaO2: 93                        RADIOLOGY:    PHYSICAL EXAM:    GENERAL: NAD, obtunded  PULMONARY: Clear to auscultation bilaterally; No wheeze  CARDIOVASCULAR: bradycardic  GASTROINTESTINAL: Soft, Nontender, Nondistended  MUSCULOSKELETAL: No clubbing, cyanosis, or edema  SKIN: No rashes or lesions      ASSESSMENT & PLAN  Daily communication with patient's sister Melody [phone]; call son for any final decisions; son is in close communication with aunt    Plan:   MRI Right frontoparietal hemorrhagic enhancing lesion with central necrosis which may represent a primary brain neoplasm versus metastatic lesion.    Patient has been extubated and off of meds. Only on morphine gtt. CMO per family. Molst in chart. Will f/u with palliative for further recs    Spoke to Sister, melody, and family will come today to visit patient.    DISPO: CCU Monitoring, palliative care f/u. CMO

## 2021-01-04 NOTE — CONSULT NOTE ADULT - SUBJECTIVE AND OBJECTIVE BOX
REQUESTED OF: DR. JACK    Chart reviewed, Hospital Day 17    CR ZAMAN 80yMale  HPI:  79 yo male with PMHx of DM, COPD, HTN c/o AMS since this morning. Patient lives by himself, son lives next door. Last known well at 10 PM at baseline per son, at 6 AM today, patient was not himself, confused, not talking. Stroke code called en route, NIHSS 11 for aphasia but grossly no focal weakness. CTH reported as having a focus of vasogenic edema in the right temporoparietal white matter with internal hyperdensity and effacement of the adjacent cortical sulci. Trace right temporoparietal subarachnoid hemorrhage is also seen. Not a candidate for IV thrombolytics for being out of the window and for ICH. Not a candidate for IA intervention because of no LVO on CTA. (19 Dec 2020 11:02)      PAST MEDICAL & SURGICAL HISTORY:  COPD (chronic obstructive pulmonary disease)    BPH (benign prostatic hyperplasia)    Back pain    Non-Hodgkin lymphoma    CLL (chronic lymphocytic leukemia)  treatment completed 10/19    Depression    HTN (hypertension)    DM (diabetes mellitus)    Anxiety    Encounter for screening colonoscopy  7 years approx    Port-A-Cath in place    H/O total knee replacement, bilateral        Subjective and Objective:  Today,  Discussed with resident, Dr. Lopez. Patient was palliatively extubated and is comfort measures only, per families wishes. Patient may be downgraded soon.     Focused Palliative Care Evaluation:                   Symptoms:                                      Pain                                     Dyspnea                                     N/V                                     Appetite                                     Anxiety                                     Other _____________________                     Support Devices:              PHYSICAL EXAM:      Constitutional:    Eyes:    ENMT:    Neck:    Breasts:    Back:    Respiratory:    Cardiovascular:    Gastrointestinal:    Genitourinary:    Rectal:    Extremities:    Vascular:    Neurological:    Skin:    Lymph Nodes:    Musculoskeletal:    Psychiatric:        T(C): 36.1, Max: 37.1 (12:00)  HR: 106 (60 - 106)  BP: --  RR: 12 (0 - 25)  SpO2: 79% (79% - 99%)      LABS/STUDIES:  01-03    139  |  104  |  25<H>  ----------------------------<  216<H>  4.9   |  27  |  0.7    Ca    8.7      03 Jan 2021 04:44  Mg     1.8     01-03    TPro  4.4<L>  /  Alb  2.5<L>  /  TBili  0.2  /  DBili  x   /  AST  20  /  ALT  23  /  AlkPhos  77  01-03                            12.2   18.76 )-----------( 387      ( 03 Jan 2021 04:44 )             39.9       MEDICATIONS  (STANDING):  chlorhexidine 0.12% Liquid 15 milliLiter(s) Oral Mucosa two times a day  cyanocobalamin 1000 MICROGram(s) Oral daily  dexAMETHasone  Injectable 4 milliGRAM(s) IV Push every 6 hours  dextrose 40% Gel 15 Gram(s) Oral once  dextrose 5%. 1000 milliLiter(s) (50 mL/Hr) IV Continuous <Continuous>  dextrose 5%. 1000 milliLiter(s) (100 mL/Hr) IV Continuous <Continuous>  dextrose 5%. 1000 milliLiter(s) (50 mL/Hr) IV Continuous <Continuous>  dextrose 5%. 1000 milliLiter(s) (100 mL/Hr) IV Continuous <Continuous>  dextrose 50% Injectable 25 Gram(s) IV Push once  dextrose 50% Injectable 12.5 Gram(s) IV Push once  dextrose 50% Injectable 25 Gram(s) IV Push once  dextrose 50% Injectable 25 Gram(s) IV Push once  dextrose 50% Injectable 12.5 Gram(s) IV Push once  dextrose 50% Injectable 25 Gram(s) IV Push once  folic acid 1 milliGRAM(s) Oral daily  glucagon  Injectable 1 milliGRAM(s) IntraMuscular once  heparin   Injectable 5000 Unit(s) SubCutaneous every 8 hours  insulin glargine Injectable (LANTUS) 25 Unit(s) SubCutaneous every morning  insulin lispro (ADMELOG) corrective regimen sliding scale   SubCutaneous every 6 hours  insulin lispro Injectable (ADMELOG) 6 Unit(s) SubCutaneous every 6 hours  levETIRAcetam  IVPB 500 milliGRAM(s) IV Intermittent every 12 hours  metoprolol tartrate 12.5 milliGRAM(s) Oral two times a day  morphine  Infusion. 10 mG/Hr (10 mL/Hr) IV Continuous <Continuous>  QUEtiapine 50 milliGRAM(s) Oral two times a day  senna 2 Tablet(s) Oral at bedtime  simvastatin 20 milliGRAM(s) Oral at bedtime  sodium chloride 0.9% Bolus 1000 milliLiter(s) IV Bolus once  tamsulosin Oral Tab/Cap - Peds 0.4 milliGRAM(s) Oral at bedtime  traZODone 150 milliGRAM(s) Oral daily    MEDICATIONS  (PRN):  acetaminophen   Tablet .. 650 milliGRAM(s) Oral every 6 hours PRN Temp greater or equal to 38C (100.4F)  fentaNYL    Injectable 25 MICROGram(s) IV Push every 1 hour PRN Moderate Pain (4 - 6)  haloperidol    Injectable 1 milliGRAM(s) IV Push every 2 hours PRN agitation  LORazepam   Injectable 1 milliGRAM(s) IV Push every 1 hour PRN Combative behavior  polyethylene glycol 3350 17 Gram(s) Oral daily PRN Constipation          iStop: This report was requested by: Mirtha Butler | Reference #: [id number]   Patient Name: Cr Zaman   YOB: 1940   Address: [address] [place] [address]   Sex: Male   Rx Written	Rx Dispensed	Drug	Quantity	Days Supply	Prescriber Name	Payment Method	Dispenser  12/11/2020	12/11/2020	clonazepam 0.5 mg tablet 	30	30	Duncan Li MD	Medicare	[id number]  11/12/2020	11/13/2020	clonazepam 0.5 mg tablet 	30	30	Duncan Li MD	Medicare	[facility] #[id number]  10/15/2020	10/15/2020	clonazepam 0.5 mg tablet 	30	30	Duncan Li MD	Medicare	[facility][id number]  09/17/2020	09/18/2020	clonazepam 0.5 mg tablet 	30	30	Duncan Li MD	Medicare	[id number]  08/20/2020	08/21/2020	clonazepam 0.5 mg tablet 	30	30	Chester Edmonds)	Medicare	[facility] #[id number]  07/22/2020	07/23/2020	clonazepam 0.5 mg tablet 	30	30	Duncan Li MD	Medicare	[id number]  06/25/2020	06/25/2020	clonazepam 0.5 mg tablet 	30	30	Duncan Li MD	Medicare	[id number]  05/27/2020	05/28/2020	clonazepam 0.5 mg tablet 	30	30	Duncan Li MD	Medicare	[facility][id number]  04/29/2020	04/29/2020	clonazepam 0.5 mg tablet 	30	30	Duncan Li MD	Medicare	[id number]  04/02/2020	04/02/2020	clonazepam 0.5 mg tablet 	30	30	Verona Scales (NP)	Medicare	[facility][id number]  03/02/2020	03/02/2020	clonazepam 0.5 mg tablet 	30	30	Duncan Li MD	Medicare	[id number]  01/30/2020	01/31/2020	clonazepam 0.5 mg tablet 	30	30	Duncan Li MD	Medicare	[id number]  * - Drugs marked with an asterisk are compound drugs. If the compound drug is made up of more         PPS  Level   10%       Note PPS = Palliative Performance Scale; (c)2001, [facility] Hospice Society       Range from 100% meaning Full ambulation/self-care/intake/Level of Consicous                                                                              to        10% meaning Bedbound/Unable to do any activity/extensive disease /Total Care/ No PO intake/ LOC=Full/drowsy/+/-confusion        (0% = death)                     Prior to acute illness, patient's functionality reportedly lived with son, Cr. He required assistance with ADLs, walked with walker, also had wheelchair.                                                                                                                                                                                     REQUESTED OF: DR. JACK    Chart reviewed, Hospital Day 17    CR ZAMAN 80yMale  HPI:  81 yo male with PMHx of DM, COPD, HTN c/o AMS since this morning. Patient lives by himself, son lives next door. Last known well at 10 PM at baseline per son, at 6 AM today, patient was not himself, confused, not talking. Stroke code called en route, NIHSS 11 for aphasia but grossly no focal weakness. CTH reported as having a focus of vasogenic edema in the right temporoparietal white matter with internal hyperdensity and effacement of the adjacent cortical sulci. Trace right temporoparietal subarachnoid hemorrhage is also seen. Not a candidate for IV thrombolytics for being out of the window and for ICH. Not a candidate for IA intervention because of no LVO on CTA. (19 Dec 2020 11:02)      PAST MEDICAL & SURGICAL HISTORY:  COPD (chronic obstructive pulmonary disease)    BPH (benign prostatic hyperplasia)    Back pain    Non-Hodgkin lymphoma    CLL (chronic lymphocytic leukemia)  treatment completed 10/19    Depression    HTN (hypertension)    DM (diabetes mellitus)    Anxiety    Encounter for screening colonoscopy  7 years approx    Port-A-Cath in place    H/O total knee replacement, bilateral        Subjective and Objective:  Today,  Discussed with resident, Dr. Lopez. Patient was palliatively extubated and is comfort measures only, per families wishes. He was extubated Sunday (yesterday) afternoon.    Focused Palliative Care Evaluation: unable to assess due to mental status.                   Symptoms:                                      Pain                                     Dyspnea                                     N/V                                     Appetite                                     Anxiety                                     Other _____________________                     Support Devices:              PHYSICAL EXAM:      Constitutional: NAD. Ill-appearing.     Eyes: EOMI, MMM.     ENMT: Normocephalic, atraumatic.     Respiratory: Cheyne-Lopez breathing, + respiratory secretions. + belly breathing.      Cardiovascular: No edema.     Gastrointestinal: Soft, non-distended.     Genitourinary: + Koenig.    Extremities: Not moving extremities. No contractures.     Neurological: Obtunded, spontaneously opens eyes.     Skin: see nursing assessment.     Psychiatric: unable to assess.         T(C): 36.1, Max: 37.1 (12:00)  HR: 106 (60 - 106)  BP: --  RR: 12 (0 - 25)  SpO2: 79% (79% - 99%)      LABS/STUDIES:  01-03    139  |  104  |  25<H>  ----------------------------<  216<H>  4.9   |  27  |  0.7    Ca    8.7      03 Jan 2021 04:44  Mg     1.8     01-03    TPro  4.4<L>  /  Alb  2.5<L>  /  TBili  0.2  /  DBili  x   /  AST  20  /  ALT  23  /  AlkPhos  77  01-03                            12.2   18.76 )-----------( 387      ( 03 Jan 2021 04:44 )             39.9       MEDICATIONS  (STANDING):  chlorhexidine 0.12% Liquid 15 milliLiter(s) Oral Mucosa two times a day  cyanocobalamin 1000 MICROGram(s) Oral daily  dexAMETHasone  Injectable 4 milliGRAM(s) IV Push every 6 hours  dextrose 40% Gel 15 Gram(s) Oral once  dextrose 5%. 1000 milliLiter(s) (50 mL/Hr) IV Continuous <Continuous>  dextrose 5%. 1000 milliLiter(s) (100 mL/Hr) IV Continuous <Continuous>  dextrose 5%. 1000 milliLiter(s) (50 mL/Hr) IV Continuous <Continuous>  dextrose 5%. 1000 milliLiter(s) (100 mL/Hr) IV Continuous <Continuous>  dextrose 50% Injectable 25 Gram(s) IV Push once  dextrose 50% Injectable 12.5 Gram(s) IV Push once  dextrose 50% Injectable 25 Gram(s) IV Push once  dextrose 50% Injectable 25 Gram(s) IV Push once  dextrose 50% Injectable 12.5 Gram(s) IV Push once  dextrose 50% Injectable 25 Gram(s) IV Push once  folic acid 1 milliGRAM(s) Oral daily  glucagon  Injectable 1 milliGRAM(s) IntraMuscular once  heparin   Injectable 5000 Unit(s) SubCutaneous every 8 hours  insulin glargine Injectable (LANTUS) 25 Unit(s) SubCutaneous every morning  insulin lispro (ADMELOG) corrective regimen sliding scale   SubCutaneous every 6 hours  insulin lispro Injectable (ADMELOG) 6 Unit(s) SubCutaneous every 6 hours  levETIRAcetam  IVPB 500 milliGRAM(s) IV Intermittent every 12 hours  metoprolol tartrate 12.5 milliGRAM(s) Oral two times a day  morphine  Infusion. 10 mG/Hr (10 mL/Hr) IV Continuous <Continuous>  QUEtiapine 50 milliGRAM(s) Oral two times a day  senna 2 Tablet(s) Oral at bedtime  simvastatin 20 milliGRAM(s) Oral at bedtime  sodium chloride 0.9% Bolus 1000 milliLiter(s) IV Bolus once  tamsulosin Oral Tab/Cap - Peds 0.4 milliGRAM(s) Oral at bedtime  traZODone 150 milliGRAM(s) Oral daily    MEDICATIONS  (PRN):  acetaminophen   Tablet .. 650 milliGRAM(s) Oral every 6 hours PRN Temp greater or equal to 38C (100.4F)  fentaNYL    Injectable 25 MICROGram(s) IV Push every 1 hour PRN Moderate Pain (4 - 6)  haloperidol    Injectable 1 milliGRAM(s) IV Push every 2 hours PRN agitation  LORazepam   Injectable 1 milliGRAM(s) IV Push every 1 hour PRN Combative behavior  polyethylene glycol 3350 17 Gram(s) Oral daily PRN Constipation          iStop: This report was requested by: Mirtha Butler | Reference #: [id number]   Patient Name: Cr Zaman   YOB: 1940   Address: [address] [place] [address]   Sex: Male   Rx Written	Rx Dispensed	Drug	Quantity	Days Supply	Prescriber Name	Payment Method	Dispenser  12/11/2020	12/11/2020	clonazepam 0.5 mg tablet 	30	30	Duncan Li MD	Medicare	[id number]  11/12/2020	11/13/2020	clonazepam 0.5 mg tablet 	30	30	Duncan Li MD	Medicare	[facility][id number]  10/15/2020	10/15/2020	clonazepam 0.5 mg tablet 	30	30	Duncan Li MD	Medicare	[facility][id number]  09/17/2020	09/18/2020	clonazepam 0.5 mg tablet 	30	30	Duncan Li MD	Medicare	[id number]  08/20/2020	08/21/2020	clonazepam 0.5 mg tablet 	30	30	Chester Edmonds)	Medicare	[id number]  07/22/2020	07/23/2020	clonazepam 0.5 mg tablet 	30	30	Duncan Li MD	Medicare	[facility][id number]  06/25/2020	06/25/2020	clonazepam 0.5 mg tablet 	30	30	Duncan Li MD	Medicare	[facility][id number]  05/27/2020	05/28/2020	clonazepam 0.5 mg tablet 	30	30	Duncan Li MD	Medicare	[facility] #[id number]  04/29/2020	04/29/2020	clonazepam 0.5 mg tablet 	30	30	Duncan Li MD	Medicare	[facility] #[id number]  04/02/2020	04/02/2020	clonazepam 0.5 mg tablet 	30	30	Verona Scales (NP)	Medicare	[id number]  03/02/2020	03/02/2020	clonazepam 0.5 mg tablet 	30	30	Duncan Li MD	Medicare	[facility][id number]  01/30/2020	01/31/2020	clonazepam 0.5 mg tablet 	30	30	Duncan Li MD	Medicare	[facility] #[id number]  * - Drugs marked with an asterisk are compound drugs. If the compound drug is made up of more         PPS  Level   10%       Note PPS = Palliative Performance Scale; (c)2001, [facility] Hospice Society       Range from 100% meaning Full ambulation/self-care/intake/Level of Consicous                                                                              to        10% meaning Bedbound/Unable to do any activity/extensive disease /Total Care/ No PO intake/ LOC=Full/drowsy/+/-confusion        (0% = death)                     Prior to acute illness, patient's functionality reportedly lived with son, Cr. He required assistance with ADLs, walked with walker, also had wheelchair.

## 2021-01-04 NOTE — PROGRESS NOTE ADULT - PROVIDER SPECIALTY LIST ADULT
CCU
CCU
Heme/Onc
Neurology
CCU
Critical Care
Neurology
Neurosurgery
CCU
CCU
Heme/Onc
Heme/Onc
Pulmonology
Vascular Surgery
Critical Care
Critical Care
Neurology
Neurosurgery
Pulmonology
Critical Care

## 2021-01-04 NOTE — CONSULT NOTE ADULT - CONSULT REASON
ARF
Patient with intracranial mass; baseline wheelchair bound; intubated s/p failed extubation. Family open to reconsidering GOC at this time.
aortic stenosis
evaluate for IVC filter
neuro
Brain mass
NSVT
was called for h/o NHL
AMS
Hemorrhagic CVA  Intubated for airway protection

## 2021-01-04 NOTE — GOALS OF CARE CONVERSATION - ADVANCED CARE PLANNING - TREATMENT GUIDELINE COMMENT
At this time family is requesting complete comfort care. No further blood work or finger sticks. No IV hydration and/or artificial nutrition. No pulse oximetry. DNR/DNI.

## 2021-01-04 NOTE — DISCHARGE NOTE FOR THE EXPIRED PATIENT - HOSPITAL COURSE
81 yo male with PMHx of DM, COPD, HTN c/o AMS since this morning. Patient lives by himself, son lives next door. Last known well at 10 PM at baseline per son, at 6 AM today, patient was not himself, confused, not talking. Stroke code called en route, NIHSS 11 for aphasia but grossly no focal weakness. CTH reported as having a focus of vasogenic edema in the right temporoparietal white matter with internal hyperdensity and effacement of the adjacent cortical sulci. Trace right temporoparietal subarachnoid hemorrhage is also seen. Not a candidate for IV thrombolytics for being out of the window and for ICH. Not a candidate for IA intervention because of no LVO on CTA.    Patient is found to be obtunded and is not able to recover and no intervention made for the hemorrhagic CVA. PAtient is extubated and made CMO.

## 2021-01-04 NOTE — CHART NOTE - NSCHARTNOTEFT_GEN_A_CORE
Registered dietitian limited note    Pt. due for comprehensive follow up today, however pt. noted to be comfort measure only at this time. S/p palliative extubation yesterday, off medications. RD to sign off.

## 2021-01-04 NOTE — CONSULT NOTE ADULT - ASSESSMENT
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 GOC discussion held yesterday with son and sister of patient, decided to proceed with palliative extubation and comfort measures given poor prognosis.         Morphine Equivalent Daily Dose (MEDD):       ____________ minutes spent discussing Advance Care Planning.     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 GOC discussion held yesterday with son and sister of patient, during which they decided to proceed with palliative extubation and comfort measures given poor prognosis. Patient is nearing very end of life, with periods of apnea, and appears comfortable on current dose of morphine. He does have significant respiratory secretions which could be improved.     Met at bedside with son and sister of patient. All questions were answered regarding end of life care, what to expect going forward. They re-affirmed that they would like the patient to be comfort measures only, and DNR/DNI. They requested a  come by for last rites. They did express worry about moving the patient to a different unit should he stabilize- i made the primary team aware of this concern.       30 minutes spent discussing Advance Care Planning.     Recommendations:  DNR/DNI  Comfort measures only- new MOLST updated  - no labs  - no fingersticks  - no IVF  - no artificial nutrition  - no pulse ox  - no injections  - no face masks   - vitals OK Q shift   Continue morphine drip at 10 mg/H   D/C Fentanyl pushes for pain  Start Morphine 4 mg IVP Q 1 H PRN for respiratory distress or pain   D/C Haldol for agitation  Start Lorazepam 1 mg IVP Q 1 H PRN for agitation   Start Glycopyrrolate 0.4 mg STAT and then 0.2 mg Q 6 H ATC for secretions   Continue indwelling Koenig for comfort  Pastoral services referred to visit at bedside  Will follow       Please Call [phone] PRN Consult Summary    Patient is a 80 yr old male admitted from home for AMS, found to have a 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 GOC discussion held yesterday with son and sister of patient, during which they decided to proceed with palliative extubation and comfort measures given poor prognosis. Patient is nearing very end of life, with periods of apnea, and appears comfortable on current dose of morphine. He does have significant respiratory secretions which could be improved.     Met at bedside with son and sister of patient. All questions were answered regarding end of life care, what to expect going forward. They re-affirmed that they would like the patient to be comfort measures only, and DNR/DNI. They requested a  come by for last rites. They did express worry about moving the patient to a different unit should he stabilize- i made the primary team aware of this concern.     30 minutes spent discussing Advance Care Planning.     Recommendations:  DNR/DNI  Comfort measures only- new MOLST updated  - no labs  - no fingersticks  - no IVF  - no artificial nutrition  - no pulse ox  - no injections  - no face masks   - vitals OK Q shift   Continue morphine drip at 10 mg/H   D/C Fentanyl pushes for pain  Start Morphine 4 mg IVP Q 1 H PRN for respiratory distress or pain   D/C Haldol for agitation  Start Lorazepam 1 mg IVP Q 1 H PRN for agitation   Start Glycopyrrolate 0.4 mg STAT and then 0.2 mg Q 6 H ATC for secretions   Continue indwelling Koenig for comfort  Pastoral services referred to visit at bedside  Will follow       Please Call [phone] PRN

## 2021-01-04 NOTE — CHART NOTE - NSCHARTNOTESELECT_GEN_ALL_CORE
Bronchoscopy/Event Note
Event Note
GOC/Event Note
post op check/Event Note

## 2021-01-04 NOTE — CONSULT NOTE ADULT - CONSULT REQUESTED DATE/TIME
19-Dec-2020 09:56
23-Dec-2020 12:27
28-Dec-2020 13:45
04-Jan-2021 10:38
19-Dec-2020 08:46
19-Dec-2020 10:50
21-Dec-2020 08:48
28-Dec-2020 13:49
22-Dec-2020 11:31

## 2021-01-04 NOTE — CONSULT NOTE ADULT - PROVIDER SPECIALTY LIST ADULT
Critical Care
Pulmonology
Cardiology
Heme/Onc
Vascular Surgery
Electrophysiology
Neurology
Neurosurgery
Heme/Onc
Palliative Care

## 2021-01-04 NOTE — PROGRESS NOTE ADULT - REASON FOR ADMISSION
Intracranial bleed

## 2021-01-04 NOTE — GOALS OF CARE CONVERSATION - ADVANCED CARE PLANNING - TREATMENT GUIDELINES
DNR Order
DNR Order/Comfort measures only/Do not re-hospitalize/No blood draws/No artificial nutrition/No antibiotics/No IV fluids

## 2021-01-07 ENCOUNTER — APPOINTMENT (OUTPATIENT)
Dept: UROLOGY | Facility: CLINIC | Age: 81
End: 2021-01-07

## 2021-01-12 DIAGNOSIS — F41.9 ANXIETY DISORDER, UNSPECIFIED: ICD-10-CM

## 2021-01-12 DIAGNOSIS — N40.0 BENIGN PROSTATIC HYPERPLASIA WITHOUT LOWER URINARY TRACT SYMPTOMS: ICD-10-CM

## 2021-01-12 DIAGNOSIS — I47.2 VENTRICULAR TACHYCARDIA: ICD-10-CM

## 2021-01-12 DIAGNOSIS — R29.711 NIHSS SCORE 11: ICD-10-CM

## 2021-01-12 DIAGNOSIS — J96.00 ACUTE RESPIRATORY FAILURE, UNSPECIFIED WHETHER WITH HYPOXIA OR HYPERCAPNIA: ICD-10-CM

## 2021-01-12 DIAGNOSIS — Z87.891 PERSONAL HISTORY OF NICOTINE DEPENDENCE: ICD-10-CM

## 2021-01-12 DIAGNOSIS — I61.1 NONTRAUMATIC INTRACEREBRAL HEMORRHAGE IN HEMISPHERE, CORTICAL: ICD-10-CM

## 2021-01-12 DIAGNOSIS — I60.8 OTHER NONTRAUMATIC SUBARACHNOID HEMORRHAGE: ICD-10-CM

## 2021-01-12 DIAGNOSIS — J18.9 PNEUMONIA, UNSPECIFIED ORGANISM: ICD-10-CM

## 2021-01-12 DIAGNOSIS — I35.0 NONRHEUMATIC AORTIC (VALVE) STENOSIS: ICD-10-CM

## 2021-01-12 DIAGNOSIS — G93.6 CEREBRAL EDEMA: ICD-10-CM

## 2021-01-12 DIAGNOSIS — Z51.5 ENCOUNTER FOR PALLIATIVE CARE: ICD-10-CM

## 2021-01-12 DIAGNOSIS — Z96.653 PRESENCE OF ARTIFICIAL KNEE JOINT, BILATERAL: ICD-10-CM

## 2021-01-12 DIAGNOSIS — I67.89 OTHER CEREBROVASCULAR DISEASE: ICD-10-CM

## 2021-01-12 DIAGNOSIS — I10 ESSENTIAL (PRIMARY) HYPERTENSION: ICD-10-CM

## 2021-01-12 DIAGNOSIS — Z66 DO NOT RESUSCITATE: ICD-10-CM

## 2021-01-12 DIAGNOSIS — I26.99 OTHER PULMONARY EMBOLISM WITHOUT ACUTE COR PULMONALE: ICD-10-CM

## 2021-01-12 DIAGNOSIS — Z79.51 LONG TERM (CURRENT) USE OF INHALED STEROIDS: ICD-10-CM

## 2021-01-12 DIAGNOSIS — J44.9 CHRONIC OBSTRUCTIVE PULMONARY DISEASE, UNSPECIFIED: ICD-10-CM

## 2021-01-12 DIAGNOSIS — C71.8 MALIGNANT NEOPLASM OF OVERLAPPING SITES OF BRAIN: ICD-10-CM

## 2021-01-12 DIAGNOSIS — R47.01 APHASIA: ICD-10-CM

## 2021-01-12 DIAGNOSIS — Z79.84 LONG TERM (CURRENT) USE OF ORAL HYPOGLYCEMIC DRUGS: ICD-10-CM

## 2021-01-12 DIAGNOSIS — Z85.6 PERSONAL HISTORY OF LEUKEMIA: ICD-10-CM

## 2021-01-12 DIAGNOSIS — E11.9 TYPE 2 DIABETES MELLITUS WITHOUT COMPLICATIONS: ICD-10-CM

## 2021-01-12 DIAGNOSIS — A41.9 SEPSIS, UNSPECIFIED ORGANISM: ICD-10-CM

## 2021-01-12 DIAGNOSIS — Z99.3 DEPENDENCE ON WHEELCHAIR: ICD-10-CM

## 2021-01-12 DIAGNOSIS — I82.411 ACUTE EMBOLISM AND THROMBOSIS OF RIGHT FEMORAL VEIN: ICD-10-CM

## 2021-01-23 LAB
CULTURE RESULTS: SIGNIFICANT CHANGE UP
CULTURE RESULTS: SIGNIFICANT CHANGE UP
SPECIMEN SOURCE: SIGNIFICANT CHANGE UP
SPECIMEN SOURCE: SIGNIFICANT CHANGE UP

## 2021-02-18 NOTE — OCCUPATIONAL THERAPY INITIAL EVALUATION ADULT - LEVEL OF INDEPENDENCE: STAND/SIT, REHAB EVAL
Quality 431: Preventive Care And Screening: Unhealthy Alcohol Use - Screening: Patient screened for unhealthy alcohol use using a single question and scores less than 2 times per year Detail Level: Detailed Quality 130: Documentation Of Current Medications In The Medical Record: Current Medications Documented Quality 226: Preventive Care And Screening: Tobacco Use: Screening And Cessation Intervention: Patient screened for tobacco use and is an ex/non-smoker contact guard

## 2021-05-26 NOTE — ASU PATIENT PROFILE, ADULT - PT NEEDS ASSIST
Scope #   Sedation given by   Colonoscopy completed  Pictures taken  Patient tolerated procedure well yes

## 2021-06-19 NOTE — PATIENT PROFILE ADULT - NSPROGENARRIVEDFROM_GEN_A_NUR
DATE & TIME: 06/19/21,    Cognitive Concerns/ Orientation : A & O times four  BEHAVIOR & AGGRESSION TOOL COLOR: calm  MOBILITY: total care  PAIN MANAGMENT: denied  BOWEL/BLADDER: payne and incontinent stool  SKIN: wound care  D/C DATE: awaiting placement, continue to monitor   home

## 2021-10-08 NOTE — ED PROCEDURE NOTE - PROCEDURE DATE TIME, MLM
Patient notified prescription sent to pharmacy.  
19-Dec-2020 09:25
19-Dec-2020 09:31
19-Dec-2020 09:26
19-Dec-2020 14:44

## 2022-03-25 NOTE — H&P ADULT - ASSESSMENT
March 25, 2022     Patient: Gabino Ackerman   YOB: 1988   Date of Visit: 3/25/2022       To Whom it May Concern:    Gabino Ackerman was seen in my clinic on 3/25/2022 at 3:30 pm.    Please excuse Gabino for his absence from work on the date listed above to be able to make his appointment.  He is able to return to work on Monday.     Sincerely,         Timmy Hassan MD    Medical information is confidential and cannot be disclosed without the written consent of the patient or his representative.       Mr. Zaman is a 79 yo male patient with a PMH of HTN, DM, depression, non-hodgkin lymphoma and CLL (on rituximab every 2 months with Dr. Shaikh, last chemotherapy 3 weeks PTP), spinal stenosis, frequent falls (last in January 2019 with a tooth embedded in the left cheek subcutaneous soft tissues with adjacent foci of gas and small phlegmon/developing abscess lateral to the tooth), SOFIA not compliant with CPAP, COPD, and illegal narcotics use (buy opioids for sleep- unprescribed, could not name drug nor quantify dose), was brought to [facility] after being found down at home in an altered state.    # Syncope + hypotension + CO2 retention, likely due opioid intoxication  - Illegal opoid use, pinpoint pupils, responding to Narcan  - Responded to fluid challenge.   - will continue IV hydration, and monitor respiratory status.   - Admission to telemetry  - TTE  - orthostatic BP  - avoiding opioids   - will repeat CK, K+, to rule out rhabdomyolysis.    # CHANA  - likely due to hypotension and dehydration  - started on IV hydration     # Acute bilateral nasal bone fractures with displacement of the distal fracture fragments and adjacent soft tissue swelling  - will obtain an evaluation by [facility]    # Depression  - no suicidal ideation  - on bupropion 150 BID, will continue  - might need an evaluation by psychiatry    # CLL on chemotherapy   - Outpatient f/u with Oncology Dr. Shaikh   - on rituximab    # HTN   - withholding home meds given hypotension    # DM   - withholding metformin  - will start on basal bolus once FS >180    # Frequent falls  - will request an evaluation by PT/physiatry Mr. Zaman is a 79 yo male patient with a PMH of HTN, DM, depression, non-hodgkin lymphoma and CLL (on rituximab every 2 months with Dr. Shaikh, last chemotherapy 3 weeks PTP), spinal stenosis, frequent falls (last in January 2019 with a tooth embedded in the left cheek subcutaneous soft tissues with adjacent foci of gas and small phlegmon/developing abscess lateral to the tooth), SOFIA not compliant with CPAP, COPD, and illegal narcotics use (buy opioids for sleep- unprescribed, could not name drug nor quantify dose), was brought to [facility] after being found down at home in an altered state.    # Syncope + hypotension + CO2 retention, likely due opioid intoxication  - Illegal opoid use, pinpoint pupils, responding to Narcan  - Responded to fluid challenge.   - will continue IV hydration, and monitor respiratory status.   - Admission to telemetry  - TTE  - orthostatic BP  - ECG with NSR.   - avoiding opioids   - will repeat CK, K+, to rule out rhabdomyolysis.    # CHANA  - likely due to hypotension and dehydration  - started on IV hydration     # Acute bilateral nasal bone fractures with displacement of the distal fracture fragments and adjacent soft tissue swelling  - will obtain an evaluation by [facility]    # Depression  - no suicidal ideation  - on bupropion 150 BID, will continue  - might need an evaluation by psychiatry    # CLL on chemotherapy   - Outpatient f/u with Oncology Dr. Shaikh   - on rituximab    # HTN   - withholding home meds given hypotension    # DM   - withholding metformin  - will start on basal bolus once FS >180    # Frequent falls  - will request an evaluation by PT/physiatry Mr. Zaman is a 77 yo male patient with a PMH of HTN, DM, depression, non-hodgkin lymphoma and CLL (on rituximab every 2 months with Dr. Shaikh, last chemotherapy 3 weeks PTP), spinal stenosis, frequent falls (last in January 2019 with a tooth embedded in the left cheek subcutaneous soft tissues with adjacent foci of gas and small phlegmon/developing abscess lateral to the tooth), SOFIA not compliant with CPAP, COPD, and illegal narcotics use (buy opioids for sleep- unprescribed, could not name drug nor quantify dose), was brought to [facility] after being found down at home in an altered state.    # Syncope + hypotension + CO2 retention, likely due opioid intoxication  - Illegal opoid use, pinpoint pupils, responding to Narcan  - Responded to fluid challenge.   - will continue IV hydration, and monitor respiratory status.   - Admission to telemetry  - TTE  - orthostatic BP  - ECG with NSR.   - avoiding opioids   - will repeat CK, K+, to rule out rhabdomyolysis.    # CHANA  - likely due to hypotension and dehydration  - started on IV hydration     # Acute bilateral nasal bone fractures with displacement of the distal fracture fragments and adjacent soft tissue swelling  - will obtain an evaluation by [facility]    # Depression  - no suicidal ideation  - on bupropion 150 BID, will continue  - might need an evaluation by psychiatry    # Elevated troponins  - no chest pain at this time  - ECG: NSR, no ischemic changes  - likely nonsignificant in the setting of CHANA. Will monitor    # CLL on chemotherapy   - Outpatient f/u with Oncology Dr. Shaikh   - on rituximab    # HTN   - withholding home meds (irbesartan and HCTZ) given hypotension    # DM   - withholding metformin  - will start on basal bolus once FS >180    # COPD  - no signs of exacerbation  - will continue home symbicort  - will add duonebs    # Frequent falls  - will request an evaluation by PT/physiatry

## 2022-05-18 NOTE — PATIENT PROFILE ADULT - BRADEN MOBILITY
OB FOLLOW UP    Itzel Michel is a 23 y.o.  14w6d patient being seen today for her obstetrical follow up visit. Patient reports heartburn and nausea, lower abdominal cramping.     Her prenatal care is complicated by (and status) :    Patient Active Problem List   Diagnosis   • Irregular periods   • Pregnancy       ROS -   Patient Reports : Cramping/Contractions   Patient Denies: Loss of Fluid, Vaginal Spotting, Vision Changes and Headaches  Fetal Movement : absent      /60   Wt 59.3 kg (130 lb 12.8 oz)   LMP 2022   BMI 21.11 kg/m²     Ultrasound Today: no    EXAM:  Vitals: See prenatal flowsheet   Abdomen: gravid   Urine glucose/protein: See prenatal flowsheet   Pelvic: Not performed     Assessment    1. Pregnancy at 14w6d  2. Fetal status reassuring     Problem List Items Addressed This Visit        Gravid and     Pregnancy - Primary    Relevant Orders    POC Protein, Urine, Qualitative, Dipstick (Completed)    POC Glucose, Urine, Qualitative, Dipstick (Completed)          Plan    1. Fetal movement/ Labor Precautions  2. Fetal movement/PTL or Labor precautions  3. Reviewed routine prenatal care with the office and educational materials given  4. Patient is on Prenatal vitamins  5. Follow up: 4 week(s)      Geoff Curtis MD  2022  
(3) slightly limited

## 2022-06-16 NOTE — PATIENT PROFILE ADULT - ARRIVAL FROM
start checking sugars daily. Keep diary- given- and also try increasing fiber and increase ozempic to 0.5 if tolerates. Home

## 2022-07-12 NOTE — ED PROCEDURE NOTE - NS ED ATTENDING STATEMENT MOD
[facility] ambulatory encounter  CARDIOLOGY OFFICE VISIT    PCP: Ingrid Pyle PA-C     CHIEF COMPLAINT:    Chief Complaint   Patient presents with   • Office Visit     3 mo f/u-Pt denies CP, dizziness, edema. He has some SOB with activity but states this unchanged. Pt has occ. Palpitations but this has improved with new PPM implant.        HISTORY OF PRESENT ILLNESS   Carson Natarajan is a 89 year old male seen today for a 3 month follow up. 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     Patient notes that since his dose of Toprol-XL was increased during his last office visit to 75 mg BID he has not noticed any difference with his heart rate and can still feel \"flutters\" every once in a while. However, he denies chest pain. He notes that he is experiencing dyspnea with activities such as fast walking, lifting items, and moving around regularly. He notes that this is unchanged from before. He states that once he experiences dyspnea he will stop and rest. No further concerns reported at this time.     REVIEW OF SYSTEMS   All other systems are reviewed and are negative except as documented in the HPI.     HISTORIES   I have reviewed the past medical history, family history, social history, medications and allergies listed in the medical record as obtained by my nursing staff and support staff and agree with their documentation.    PHYSICAL EXAM   Vital Signs:    Vitals:    07/12/22 1016 07/12/22 1034   BP: (!) 154/96 (!) 148/90   BP Location: RUE - Right upper extremity    Patient Position: Sitting    Cuff Size: Regular    Pulse: 88    SpO2: 96%    Weight: 80.3 kg (177 lb 2.2 oz)    Height: 5' 9\" (1.753 m)      General:   Alert, cooperative, conversive in no acute distress.  Skin:  Warm and dry without rash.    Head:  Normocephalic-atraumatic.   Neck:  Trachea is midline. No adenopathy.  Normal 
thyroid without mass or tenderness.  Eyes:  Normal conjunctiva and sclera.  Cardiovascular: irregular rhythm, S1, S2 normal, no murmur, no click, no rub, no gallop  Respiratory: clear to auscultation bilaterally  Gastrointestinal:  Soft and nontender.  Normal bowel sounds.  No hepatomegaly or splenomegaly.   Extremities:  edema  none  Neuro:   Orientated x 4.  No focal deficits or lateralizing signs.  Psychiatric:   Cooperative.  Appropriate mood & affect.    LABORATORY DATA   All pertinent laboratory results were reviewed.    DIAGNOSTIC IMAGING   All pertinent diagnostic imaging reviewed.    Echo 09/24/2021  Normal left ventricular systolic function. EF 63% GLS -20.8%.  Normal left ventricular chamber size.  Mildly increased left ventricular wall thickness.  Grade I diastolic dysfunction of the left ventricle (impaired relaxation pattern).  Normal right ventricular size and systolic function. RV lead is seen.  Good visualization of the valves. No vegetations seen    NM Stress Test 04/2016  IMPRESSION:  1. Small area of mild apparent reversible perfusion involving the mid to proximal inferior wall. Superimposed on mild fixed perfusion defect  inferior wall. Difficult to determine if findings may relate to mild  associated stress-induced ischemia versus differences in diaphragm  attenuation artifact. Remainder of the examination is unremarkable.  ASSESSMENT & PLAN   Paroxysmal Atrial fibrillation: HR 88 in clinic.  Reports experiencing \"flutters\" intermittently, and dyspnea with daily activities unchanged from before. Continue on Toprol-XL 75 mg BID.     Hypertension: BP Elevated in clinic today. Lisinopril was discontinued 10/2021 due to acute renal insufficiency. Ordered BMP labs to be drawn today. If lab results are normal will restart patient on low dose of Lisinopril.    Tachy-prasanth syndrome: S/p permanent pacemaker now after pocket infection with leadless pacemaker implanted 09/27/2021. On Toprol-XL 75 mg BID. 
Follows with Dr. Seaman in EP.      Hyperlipidemia: On atorvastatin 20 mg daily. Tolerating well with no complaints. Continue stating therapy.     Patient will Return in about 3 months (around 10/12/2022) for f/u w Dr. Zuñiga . or sooner if problems arise.    Instructions provided as documented in the after visit summary.    On 7/12/2022, Monica LAWLER scribed the services personally performed by Jaja Barrera NP    The documentation recorded by the scribe accurately and completely reflects the service(s) I personally performed and the decisions made by me.     
I have personally seen and examined this patient.  I have fully participated in the care of this patient. I have reviewed all pertinent clinical information, including history, physical exam, plan and the Resident’s note and agree except as noted.

## 2022-09-27 NOTE — DISCHARGE NOTE NURSING/CASE MANAGEMENT/SOCIAL WORK - NSDCVIVACCINE_GEN_ALL_CORE_FT
Patient arrives to triage from home with chief complaint of left sided rib pain which started a week ago after being hit with a soccer ball.  Patient reports pain is worse when taking a deep breath in.  Patient reports taking nothing for pain.  Alert and oriented x4.       Influenza , 2019/11/25 17:25 , Carola Hernández (RN)  Tdap , 2018/10/31 19:02 , Lior Lamb (RN)  Tdap , 2019/9/14 11:13 , Calli Hdez (RN)  Tdap , 2020/3/6 14:34 , Mitchell Perera (RN)

## 2022-12-08 NOTE — DISCHARGE NOTE NURSING/CASE MANAGEMENT/SOCIAL WORK - MODE OF TRANSPORTATION
Prior to immunization administration, verified patients identity using patient s name and date of birth. Please see Immunization Activity for additional information. Yes    Screening Questionnaire for Adult Immunization    Are you sick today?   No   Do you have allergies to medications, food, a vaccine component or latex?   No   Have you ever had a serious reaction after receiving a vaccination?   No   Do you have a long-term health problem with heart, lung, kidney, or metabolic disease (e.g., diabetes), asthma, a blood disorder, no spleen, complement component deficiency, a cochlear implant, or a spinal fluid leak?  Are you on long-term aspirin therapy?   No   Do you have cancer, leukemia, HIV/AIDS, or any other immune system problem?   No   Do you have a parent, brother, or sister with an immune system problem?   No   In the past 3 months, have you taken medications that affect  your immune system, such as prednisone, other steroids, or anticancer drugs; drugs for the treatment of rheumatoid arthritis, Crohn s disease, or psoriasis; or have you had radiation treatments?   No   Have you had a seizure, or a brain or other nervous system problem?   No   During the past year, have you received a transfusion of blood or blood    products, or been given immune (gamma) globulin or antiviral drug?   No   For women: Are you pregnant or is there a chance you could become       pregnant during the next month?   N/A   Have you received any vaccinations in the past 4 weeks?   No     Immunization questionnaire answers were all negative.        Per orders of Dr. Barreto, injection of TDAP given by Diane L. Schoenherr, RN. Patient instructed to remain in clinic for 15 minutes afterwards, and to report any adverse reaction to me immediately.       Screening performed by Diane L. Schoenherr, RN on 12/8/2022 at 2:37 PM.  
Ambulatory

## 2023-05-29 NOTE — PROGRESS NOTE ADULT - ASSESSMENT
79 yo male with PMHx of DM, COPD, HTN c/o AMS since this morning. Patient lives by himself, son lives next door. Last known well at 10 PM at baseline per son, at 6 AM today, patient was not himself, confused, not talking. Stroke code called en route, NIHSS 11 for aphasia but grossly no focal weakness. CTH reported as having a focus of vasogenic edema in the right temporoparietal white matter with internal hyperdensity and effacement of the adjacent cortical sulci. Trace right temporoparietal subarachnoid hemorrhage is also seen. Not a candidate for IV thrombolytics for being out of the window and for ICH. Not a candidate for IA intervention because of no LVO on CTA.    Currently SP intubation for airway protection for acute resp failure secondary to ICH   PNA   On Levophed  Intracranial mass legion with right temporoparietal hemorrhagic CVA with vasogenic edema  PE  DVT Right Iliac/CF/F veins    Cannot be anticoagulated due to intracranial hemorrhage    Not a candidate for thrombectomy due to the fact that pt is not medically stable and high risk for bleed with heparin/TPA    Pt is a candidate for IVC filter due to inability to be on AC  Scheduled for tomorrow  NPO post MN    SPECTRA [id number]   intact

## 2023-06-01 NOTE — PRE-OP CHECKLIST - NOTHING BY MOUTH SINCE
30-Dec-2020 00:00 Doxepin Pregnancy And Lactation Text: This medication is Pregnancy Category C and it isn't known if it is safe during pregnancy. It is also excreted in breast milk and breast feeding isn't recommended.

## 2023-10-25 NOTE — ASU DISCHARGE PLAN (ADULT/PEDIATRIC) - OK TO LEAVE MESSAGE ON VOICEMAIL
CARDIOTHORACIC SURGERY       Patient seen and examined in clinic after failed TAVR attempt with cutdown in order to remove percutaneous closure device. 4 nylon sutures removed, area cleansed, outlined with tissue marking pen, steri strips would not adhere to site thus steril dry gauze with primapore tape was used for a dressing. Patient and wife educated on signs of infection (not limited to inc redness, foul smelling drainage, swelling, new pain) and to call right away.   He has follow up with Vascular Surgery Dr. Nagy and Bere Arias with Structural heart team in next coming weeks. Note picture was captured and placed into media.     Francisca Bolden PA-C      CC  [place]      Yes

## 2023-10-25 NOTE — PATIENT PROFILE ADULT - HARM RISK FACTORS
10/25/2023                                           To Whom It May Concern:    Tom Elise was in the Clinic for an appointment today.      Sincerely,        Jolly Duran MD  Pediatrics Department    [address]  [address] [phone]                                                                                                           yes

## 2023-11-14 NOTE — PROCEDURE NOTE - ATTENDING PROVIDER
Vaginal / vulvar symptoms     Abstain from intercourse ×7 days, use condoms     >Dove unscented  Soap or Cetaphil   ( avoid Vagisil , summer's lior , honey pot , LUSH ,[employer] )    > Homemade Vinegar WIPES :   baby wipes +3/4 c water 1/8-1/4  c VINEGAR ( any type)  2-4 x /day >not douche  ,  > Vaginal gel to balance pH : LUVENA or LUXIQ  ( prebiotic)    >Probiotics: THREE /3 x day  x 5 d   Florostor  izzy  Acidophilus  Rolando  Apple cider vinegar      - The patient is aware of antibiotic resistance and understands  will be called only  if the antibiotic needs to be changed.   -If develops fever, new symptoms or have worsening of symptoms they should return to the clinic or go to the ER.    Female contraception : ON-DEMAND  Foam/gel/suppository  \"VCF\"     Recurrent Bladder symptoms :   overactive bladder / hormone effect / pelvic f/oor dysfunction      Kati Gonzalez MD   [facility]                    Syphilis  Syphilis is a sexually transmitted infection (STI). It is a bacterial infection. Having syphilis can increase your risk for getting HIV, the virus that causes AIDS. It can also increase your risk of giving HIV to other people. Left untreated, syphilis can cause heart and brain damage. It can also lead to death. Pregnant women who have syphilis can infect their unborn babies. This can lead to deformities or even the baby's death.   Syphilis on the rise  Syphilis rates are increasing in every part of the [place] Health experts are particularly concerned about:   Continued high rates among jacob and bisexual men  The increase in babies born with the disease (congenital syphilis)     Syphilis stages and symptoms  Syphilis gets worse in stages. The symptoms in each stage may be very mild. You may not even notice them.   Stage 1: A painless sore on the genitals, anal skin, or mouth. The sore goes away in 3 to 6 weeks.   Stage 2: A rash, fever, sore throat, muscle aches, extreme tiredness (fatigue),  and other flu-like symptoms.  These symptoms often are severe enough that the person gets medical care. If they are mild, or not brought to attention, they will also go away over time. But the infection is still there. You may have no other symptoms until serious problems occur.   Stage 3: Severe diseases. These often occur years or even decades later. They can include nerve or walking problems, paralysis, a change for the worse in your mental state, damage to internal organs, seizures, blindness, or death.   Treatment  Syphilis is treated with antibiotics. It's often treated with shots of penicillin. The shots may be given once. Or you may get them weekly for 3 weeks. It also may be treated with doxycycline or other antibiotics taken by mouth for up to 1 month. More severe infection may need IV (intravenous) penicillin.   During treatment, it's important not to have sex, or you may infect someone else. See your healthcare provider for follow-up visits. This lets your provider check to make sure the syphilis is cured. Your partner should also be checked for the infection. Syphilis can be cured with the right antibiotics. But treatment won't fix any damage that the infection has caused.   Prevention    As with all STIs, knowing your partner’s sexual history is a big step toward preventing syphilis. You should also know the signs and symptoms of the infection. And use latex condoms to reduce your risk.   If you are a jacob or bisexual man who is sexually active:   See your healthcare provider.  Tell your provider about your sexual history. Ask to be tested for syphilis and other STIs.  Get tested. Get a syphilis test at least once a year. Get tested more often if you are at risk. Often there are no symptoms. So testing is the only way to know for sure that you don't have syphilis.  Have 1 partner. It's safest to be in a long-term relationship with only 1 sex partner. Your partner should be tested and have negative STI  test results.  Use latex condoms. It's important to use latex condoms the correct way each time you have sex.  If you have multiple sex partners, get tested again.  Even after you’ve been successfully treated for syphilis, you can still be reinfected.     If you are pregnant:   See your healthcare provider. Tell your provider about your sexual history and STI testing.  Get tested. Get a syphilis test at your first prenatal visit. You may also need to be retested during your third trimester and at delivery, if you are at risk.  Know your test results. If you test positive for syphilis while you are pregnant, get treatment right away.  Have 1 partner. It's safest to be in a long-term relationship with only 1 sex partner. Your partner should be tested and have negative STI test results.  Use latex condoms. Whenever you have sex, use latex condoms the correct way.  Go to follow-up visits. Keep seeing your provider during your pregnancy. If you learn that a sex partner has or may have syphilis, tell your provider. Your partner should be treated too.     Resources  [employer] STD Hotline,  www.[employer].com.org , 982.162.4200   CDC, www.cdc.gov/std,  813.465.5498        Is Pelvic Inflammatory Disease (PID)?  PID is an infection of the reproductive organs. Left untreated, it can cause severe damage to the body. PID sometimes causes symptoms bad enough to send you to the emergency room. But in many cases, PID is a “silent” infection with few or no symptoms. Rest assured that the infection can be treated. This can help prevent lasting damage.  Who gets PID?  Although PID can happen at any age, most women get it in their late teens or early 20s. Many don’t know they have PID until years later. The longer a woman is infected, the higher her risk of further health problems. The more sexual partners you have the higher the risk. You are also more likely to get PID if you have had it before.   What are  Justus the symptoms?  If PID symptoms do happen, they are similar to those of many other health problems. This can make PID hard to detect. Symptoms can include:  Pelvic pain  Pain during sex, or bleeding afterward  Painful or frequent urination  Fever, chills, or other flu-like symptoms  Vaginal discharge with a bad odor  Abnormal vaginal bleeding   Nausea and vomiting  Pain in the upper right abdomen  How did I get PID?    PID happens when certain bacteria infect the reproductive organs. Often, this happens because you are infected with an STD (sexually transmitted disease). In a few cases, women develop PID while using an IUD (intrauterine device) for birth control. This usually happens within the first 3 weeks after the IUD is inserted. PID is thought to happen by the following process:   Semen is sent from the penis into the vagina during sex. STD-causing bacteria may enter with the semen.  Bacteria may pass through the cervix and enter the uterus.  Bacteria travel from the uterus into the fallopian tubes and ovaries, which become infected.  The infection can leave the fallopian tubes and spread to other parts of the body.  Treatment can help  When PID is found and treated early, it can often be cured. But if not treated, PID can cause severe health problems. These include damage to the reproductive organs, pelvic pain, and infertility (problems becoming pregnant). Complications of PID can, in rare cases, even be life-threatening. This is why PID should be treated as early as possible.

## 2024-01-15 NOTE — PLAN
[FreeTextEntry1] : We have had a thorough discussion regarding his current condition, findings, and treatment options. We have discussed various treatment options. Given his recent history of a dental infection I would like routine blood work to assure there is no residual systemic infection. He will have ESR and CRP levels done. If these levels are negative he will consult with pain management to trial LESI / Caudal injections prior ton considering surgery.Surgical intervention would consist of a decompressive laminectomy at L3/4 and L4/5 with posterior stabilization. I will see him back in 6 weeks for reassessment. 
N/A

## 2024-03-12 NOTE — ED PROCEDURE NOTE - CPROC ED INFORMED CONSENT1
This was an emergent procedure and consent was implied.
This was an emergent procedure and consent was implied.
done
This was an emergent procedure and consent was implied.
This was an emergent procedure and consent was implied.
